# Patient Record
Sex: FEMALE | Race: WHITE | NOT HISPANIC OR LATINO | Employment: OTHER
[De-identification: names, ages, dates, MRNs, and addresses within clinical notes are randomized per-mention and may not be internally consistent; named-entity substitution may affect disease eponyms.]

---

## 2017-01-05 ENCOUNTER — RECORDS - HEALTHEAST (OUTPATIENT)
Dept: ADMINISTRATIVE | Facility: OTHER | Age: 67
End: 2017-01-05

## 2017-01-13 ENCOUNTER — COMMUNICATION - HEALTHEAST (OUTPATIENT)
Dept: INTERNAL MEDICINE | Facility: CLINIC | Age: 67
End: 2017-01-13

## 2017-01-25 ENCOUNTER — RECORDS - HEALTHEAST (OUTPATIENT)
Dept: ADMINISTRATIVE | Facility: OTHER | Age: 67
End: 2017-01-25

## 2017-01-28 ENCOUNTER — COMMUNICATION - HEALTHEAST (OUTPATIENT)
Dept: INTERNAL MEDICINE | Facility: CLINIC | Age: 67
End: 2017-01-28

## 2017-01-30 ENCOUNTER — COMMUNICATION - HEALTHEAST (OUTPATIENT)
Dept: INTERNAL MEDICINE | Facility: CLINIC | Age: 67
End: 2017-01-30

## 2017-02-07 ENCOUNTER — RECORDS - HEALTHEAST (OUTPATIENT)
Dept: ADMINISTRATIVE | Facility: OTHER | Age: 67
End: 2017-02-07

## 2017-02-14 ENCOUNTER — RECORDS - HEALTHEAST (OUTPATIENT)
Dept: ADMINISTRATIVE | Facility: OTHER | Age: 67
End: 2017-02-14

## 2017-02-14 ENCOUNTER — COMMUNICATION - HEALTHEAST (OUTPATIENT)
Dept: INTERNAL MEDICINE | Facility: CLINIC | Age: 67
End: 2017-02-14

## 2017-02-20 ENCOUNTER — OFFICE VISIT - HEALTHEAST (OUTPATIENT)
Dept: INTERNAL MEDICINE | Facility: CLINIC | Age: 67
End: 2017-02-20

## 2017-02-20 DIAGNOSIS — R42 DIZZINESS: ICD-10-CM

## 2017-02-22 ENCOUNTER — COMMUNICATION - HEALTHEAST (OUTPATIENT)
Dept: INTERNAL MEDICINE | Facility: CLINIC | Age: 67
End: 2017-02-22

## 2017-02-22 ENCOUNTER — RECORDS - HEALTHEAST (OUTPATIENT)
Dept: ADMINISTRATIVE | Facility: OTHER | Age: 67
End: 2017-02-22

## 2017-02-23 ENCOUNTER — RECORDS - HEALTHEAST (OUTPATIENT)
Dept: ADMINISTRATIVE | Facility: OTHER | Age: 67
End: 2017-02-23

## 2017-02-23 ENCOUNTER — COMMUNICATION - HEALTHEAST (OUTPATIENT)
Dept: INTERNAL MEDICINE | Facility: CLINIC | Age: 67
End: 2017-02-23

## 2017-02-28 ENCOUNTER — OFFICE VISIT - HEALTHEAST (OUTPATIENT)
Dept: INTERNAL MEDICINE | Facility: CLINIC | Age: 67
End: 2017-02-28

## 2017-02-28 DIAGNOSIS — E83.42 HYPOMAGNESEMIA: ICD-10-CM

## 2017-02-28 DIAGNOSIS — E86.0 DEHYDRATION: ICD-10-CM

## 2017-02-28 DIAGNOSIS — R06.02 SHORTNESS OF BREATH: ICD-10-CM

## 2017-03-02 ENCOUNTER — RECORDS - HEALTHEAST (OUTPATIENT)
Dept: ADMINISTRATIVE | Facility: OTHER | Age: 67
End: 2017-03-02

## 2017-03-10 ENCOUNTER — HOSPITAL ENCOUNTER (OUTPATIENT)
Dept: CARDIOLOGY | Facility: CLINIC | Age: 67
Discharge: HOME | End: 2017-03-10
Attending: INTERNAL MEDICINE

## 2017-03-10 DIAGNOSIS — R06.02 SHORTNESS OF BREATH: ICD-10-CM

## 2017-03-10 LAB
AORTIC ROOT: 3 CM
AORTIC ROOT: 3 CM
BSA FOR ECHO PROCEDURE: 1.49 M2
CV BLOOD PRESSURE: NORMAL MMHG
CV ECHO HEIGHT: 60 IN
CV ECHO WEIGHT: 116 LBS
DOP CALC LVOT PEAK VEL: 93.1 CM/S
DOP CALCLVOT PEAK VEL VTI: 16.1 CM
EJECTION FRACTION: 64 % (ref 55–75)
FRACTIONAL SHORTENING: 31.7 % (ref 28–44)
INTERVENTRICULAR SEPTUM IN END DIASTOLE: 0.86 CM (ref 0.6–0.9)
IVS/PW RATIO: 1.1
LA AREA 1: 8.53 CM2
LA AREA 2: 8.98 CM2
LEFT ATRIUM LENGTH: 3.61 CM
LEFT ATRIUM SIZE: 3.3 CM
LEFT ATRIUM TO AORTIC ROOT RATIO: 1.1 NO UNITS
LEFT ATRIUM VOLUME INDEX: 12.1 ML/M2
LEFT ATRIUM VOLUME: 18 CM3
LEFT VENTRICLE DIASTOLIC VOLUME INDEX: 31.5 CM3/M2 (ref 34–74)
LEFT VENTRICLE DIASTOLIC VOLUME: 47 CM3 (ref 46–106)
LEFT VENTRICLE HEART RATE: 62 BPM
LEFT VENTRICLE MASS INDEX: 79.5 G/M2
LEFT VENTRICLE SYSTOLIC VOLUME INDEX: 11.4 CM3/M2 (ref 11–31)
LEFT VENTRICLE SYSTOLIC VOLUME: 17 CM3 (ref 14–42)
LEFT VENTRICULAR INTERNAL DIMENSION IN DIASTOLE: 4.51 CM (ref 3.8–5.2)
LEFT VENTRICULAR INTERNAL DIMENSION IN SYSTOLE: 3.08 CM (ref 2.2–3.5)
LEFT VENTRICULAR MASS: 118.5 G
LEFT VENTRICULAR OUTFLOW TRACT MEAN GRADIENT: 2 MMHG
LEFT VENTRICULAR OUTFLOW TRACT MEAN VELOCITY: 64.8 CM/S
LEFT VENTRICULAR OUTFLOW TRACT PEAK GRADIENT: 3 MMHG
LEFT VENTRICULAR POSTERIOR WALL IN END DIASTOLE: 0.79 CM (ref 0.6–0.9)
MITRAL VALVE DECELERATION SLOPE: 2160 MM/S2
MITRAL VALVE E/A RATIO: 0.9
MITRAL VALVE PRESSURE HALF-TIME: 105 MS
MV AVERAGE E/E' RATIO: 6.7 CM/S
MV DECELERATION TIME: 303 MS
MV E'TISSUE VEL-LAT: 8.77 CM/S
MV E'TISSUE VEL-MED: 8.19 CM/S
MV LATERAL E/E' RATIO: 6.5
MV MEDIAL E/E' RATIO: 6.9
MV PEAK A VELOCITY: 64.8 CM/S
MV PEAK E VELOCITY: 56.8 CM/S
MV VALVE AREA PRESSURE 1/2 METHOD: 2.1 CM2
NUC REST DIASTOLIC VOLUME INDEX: 1856 LBS
NUC REST SYSTOLIC VOLUME INDEX: 60 IN
TRICUSPID REGURGITATION PEAK PRESSURE GRADIENT: 26 MMHG
TRICUSPID VALVE PEAK REGURGITANT VELOCITY: 255 CM/S

## 2017-03-10 ASSESSMENT — MIFFLIN-ST. JEOR: SCORE: 962.67

## 2017-03-13 ENCOUNTER — COMMUNICATION - HEALTHEAST (OUTPATIENT)
Dept: INTERNAL MEDICINE | Facility: CLINIC | Age: 67
End: 2017-03-13

## 2017-03-23 ENCOUNTER — RECORDS - HEALTHEAST (OUTPATIENT)
Dept: ADMINISTRATIVE | Facility: OTHER | Age: 67
End: 2017-03-23

## 2017-03-25 ENCOUNTER — COMMUNICATION - HEALTHEAST (OUTPATIENT)
Dept: INTERNAL MEDICINE | Facility: CLINIC | Age: 67
End: 2017-03-25

## 2017-03-31 ENCOUNTER — RECORDS - HEALTHEAST (OUTPATIENT)
Dept: ADMINISTRATIVE | Facility: OTHER | Age: 67
End: 2017-03-31

## 2017-04-02 ENCOUNTER — RECORDS - HEALTHEAST (OUTPATIENT)
Dept: ADMINISTRATIVE | Facility: OTHER | Age: 67
End: 2017-04-02

## 2017-04-04 ENCOUNTER — COMMUNICATION - HEALTHEAST (OUTPATIENT)
Dept: INTERNAL MEDICINE | Facility: CLINIC | Age: 67
End: 2017-04-04

## 2017-04-04 ENCOUNTER — RECORDS - HEALTHEAST (OUTPATIENT)
Dept: ADMINISTRATIVE | Facility: OTHER | Age: 67
End: 2017-04-04

## 2017-04-05 ENCOUNTER — COMMUNICATION - HEALTHEAST (OUTPATIENT)
Dept: SCHEDULING | Facility: CLINIC | Age: 67
End: 2017-04-05

## 2017-04-05 ENCOUNTER — COMMUNICATION - HEALTHEAST (OUTPATIENT)
Dept: INTERNAL MEDICINE | Facility: CLINIC | Age: 67
End: 2017-04-05

## 2017-04-05 ENCOUNTER — RECORDS - HEALTHEAST (OUTPATIENT)
Dept: ADMINISTRATIVE | Facility: OTHER | Age: 67
End: 2017-04-05

## 2017-04-07 ENCOUNTER — COMMUNICATION - HEALTHEAST (OUTPATIENT)
Dept: INTERNAL MEDICINE | Facility: CLINIC | Age: 67
End: 2017-04-07

## 2017-04-08 ENCOUNTER — COMMUNICATION - HEALTHEAST (OUTPATIENT)
Dept: INTERNAL MEDICINE | Facility: CLINIC | Age: 67
End: 2017-04-08

## 2017-04-10 ENCOUNTER — RECORDS - HEALTHEAST (OUTPATIENT)
Dept: ADMINISTRATIVE | Facility: OTHER | Age: 67
End: 2017-04-10

## 2017-04-13 ENCOUNTER — RECORDS - HEALTHEAST (OUTPATIENT)
Dept: ADMINISTRATIVE | Facility: OTHER | Age: 67
End: 2017-04-13

## 2017-04-17 ENCOUNTER — RECORDS - HEALTHEAST (OUTPATIENT)
Dept: ADMINISTRATIVE | Facility: OTHER | Age: 67
End: 2017-04-17

## 2017-04-17 ENCOUNTER — COMMUNICATION - HEALTHEAST (OUTPATIENT)
Dept: ADMINISTRATIVE | Facility: CLINIC | Age: 67
End: 2017-04-17

## 2017-04-17 DIAGNOSIS — R10.9 ABDOMINAL PAIN: ICD-10-CM

## 2017-04-19 ENCOUNTER — RECORDS - HEALTHEAST (OUTPATIENT)
Dept: ADMINISTRATIVE | Facility: OTHER | Age: 67
End: 2017-04-19

## 2017-04-28 ENCOUNTER — OFFICE VISIT - HEALTHEAST (OUTPATIENT)
Dept: INTERNAL MEDICINE | Facility: CLINIC | Age: 67
End: 2017-04-28

## 2017-04-28 DIAGNOSIS — N18.30 ACUTE RENAL FAILURE SUPERIMPOSED ON STAGE 3 CHRONIC KIDNEY DISEASE (H): ICD-10-CM

## 2017-04-28 DIAGNOSIS — N17.9 ACUTE RENAL FAILURE SUPERIMPOSED ON STAGE 3 CHRONIC KIDNEY DISEASE (H): ICD-10-CM

## 2017-04-28 ASSESSMENT — MIFFLIN-ST. JEOR: SCORE: 958.13

## 2017-05-04 ENCOUNTER — RECORDS - HEALTHEAST (OUTPATIENT)
Dept: ADMINISTRATIVE | Facility: OTHER | Age: 67
End: 2017-05-04

## 2017-05-08 ENCOUNTER — RECORDS - HEALTHEAST (OUTPATIENT)
Dept: ADMINISTRATIVE | Facility: OTHER | Age: 67
End: 2017-05-08

## 2017-05-08 ENCOUNTER — COMMUNICATION - HEALTHEAST (OUTPATIENT)
Dept: INTERNAL MEDICINE | Facility: CLINIC | Age: 67
End: 2017-05-08

## 2017-05-11 ENCOUNTER — COMMUNICATION - HEALTHEAST (OUTPATIENT)
Dept: SCHEDULING | Facility: CLINIC | Age: 67
End: 2017-05-11

## 2017-05-11 ENCOUNTER — COMMUNICATION - HEALTHEAST (OUTPATIENT)
Dept: INTERNAL MEDICINE | Facility: CLINIC | Age: 67
End: 2017-05-11

## 2017-05-12 ENCOUNTER — COMMUNICATION - HEALTHEAST (OUTPATIENT)
Dept: INTERNAL MEDICINE | Facility: CLINIC | Age: 67
End: 2017-05-12

## 2017-05-15 ENCOUNTER — COMMUNICATION - HEALTHEAST (OUTPATIENT)
Dept: INTERNAL MEDICINE | Facility: CLINIC | Age: 67
End: 2017-05-15

## 2017-05-19 ENCOUNTER — RECORDS - HEALTHEAST (OUTPATIENT)
Dept: ADMINISTRATIVE | Facility: OTHER | Age: 67
End: 2017-05-19

## 2017-05-19 ENCOUNTER — OFFICE VISIT - HEALTHEAST (OUTPATIENT)
Dept: INTERNAL MEDICINE | Facility: CLINIC | Age: 67
End: 2017-05-19

## 2017-05-19 DIAGNOSIS — E86.0 DEHYDRATION: ICD-10-CM

## 2017-05-19 ASSESSMENT — MIFFLIN-ST. JEOR: SCORE: 962.67

## 2017-05-22 ENCOUNTER — RECORDS - HEALTHEAST (OUTPATIENT)
Dept: ADMINISTRATIVE | Facility: OTHER | Age: 67
End: 2017-05-22

## 2017-05-22 ENCOUNTER — COMMUNICATION - HEALTHEAST (OUTPATIENT)
Dept: INTERNAL MEDICINE | Facility: CLINIC | Age: 67
End: 2017-05-22

## 2017-05-23 ENCOUNTER — COMMUNICATION - HEALTHEAST (OUTPATIENT)
Dept: INTERNAL MEDICINE | Facility: CLINIC | Age: 67
End: 2017-05-23

## 2017-05-23 ENCOUNTER — RECORDS - HEALTHEAST (OUTPATIENT)
Dept: ADMINISTRATIVE | Facility: OTHER | Age: 67
End: 2017-05-23

## 2017-05-25 ENCOUNTER — RECORDS - HEALTHEAST (OUTPATIENT)
Dept: ADMINISTRATIVE | Facility: OTHER | Age: 67
End: 2017-05-25

## 2017-05-26 ENCOUNTER — RECORDS - HEALTHEAST (OUTPATIENT)
Dept: ADMINISTRATIVE | Facility: OTHER | Age: 67
End: 2017-05-26

## 2017-06-01 ENCOUNTER — COMMUNICATION - HEALTHEAST (OUTPATIENT)
Dept: INTERNAL MEDICINE | Facility: CLINIC | Age: 67
End: 2017-06-01

## 2017-06-02 ENCOUNTER — COMMUNICATION - HEALTHEAST (OUTPATIENT)
Dept: INTERNAL MEDICINE | Facility: CLINIC | Age: 67
End: 2017-06-02

## 2017-06-07 ENCOUNTER — HOME INFUSION (PRE-WILLOW HOME INFUSION) (OUTPATIENT)
Dept: PHARMACY | Facility: CLINIC | Age: 67
End: 2017-06-07

## 2017-06-13 ENCOUNTER — RECORDS - HEALTHEAST (OUTPATIENT)
Dept: ADMINISTRATIVE | Facility: OTHER | Age: 67
End: 2017-06-13

## 2017-06-14 NOTE — PROGRESS NOTES
This is a snapshot of the patient's [facility] medical record. For complete information or questions call [phone] or In [facility],  Home Infusion ([id number]).  [facility] Number:  [id number]

## 2017-06-15 ENCOUNTER — COMMUNICATION - HEALTHEAST (OUTPATIENT)
Dept: INTERNAL MEDICINE | Facility: CLINIC | Age: 67
End: 2017-06-15

## 2017-06-16 ENCOUNTER — COMMUNICATION - HEALTHEAST (OUTPATIENT)
Dept: INTERNAL MEDICINE | Facility: CLINIC | Age: 67
End: 2017-06-16

## 2017-06-19 ENCOUNTER — COMMUNICATION - HEALTHEAST (OUTPATIENT)
Dept: INTERNAL MEDICINE | Facility: CLINIC | Age: 67
End: 2017-06-19

## 2017-06-20 ENCOUNTER — RECORDS - HEALTHEAST (OUTPATIENT)
Dept: ADMINISTRATIVE | Facility: OTHER | Age: 67
End: 2017-06-20

## 2017-06-23 ENCOUNTER — COMMUNICATION - HEALTHEAST (OUTPATIENT)
Dept: INTERNAL MEDICINE | Facility: CLINIC | Age: 67
End: 2017-06-23

## 2017-06-24 ENCOUNTER — COMMUNICATION - HEALTHEAST (OUTPATIENT)
Dept: INTERNAL MEDICINE | Facility: CLINIC | Age: 67
End: 2017-06-24

## 2017-06-27 ENCOUNTER — OFFICE VISIT - HEALTHEAST (OUTPATIENT)
Dept: INTERNAL MEDICINE | Facility: CLINIC | Age: 67
End: 2017-06-27

## 2017-06-27 DIAGNOSIS — E83.42 HYPOMAGNESEMIA: ICD-10-CM

## 2017-06-27 ASSESSMENT — MIFFLIN-ST. JEOR: SCORE: 958.13

## 2017-06-28 ENCOUNTER — RECORDS - HEALTHEAST (OUTPATIENT)
Dept: ADMINISTRATIVE | Facility: OTHER | Age: 67
End: 2017-06-28

## 2017-07-05 ENCOUNTER — HOME INFUSION (PRE-WILLOW HOME INFUSION) (OUTPATIENT)
Dept: PHARMACY | Facility: CLINIC | Age: 67
End: 2017-07-05

## 2017-07-07 ENCOUNTER — COMMUNICATION - HEALTHEAST (OUTPATIENT)
Dept: INTERNAL MEDICINE | Facility: CLINIC | Age: 67
End: 2017-07-07

## 2017-07-07 ENCOUNTER — RECORDS - HEALTHEAST (OUTPATIENT)
Dept: ADMINISTRATIVE | Facility: OTHER | Age: 67
End: 2017-07-07

## 2017-07-08 ENCOUNTER — COMMUNICATION - HEALTHEAST (OUTPATIENT)
Dept: INTERNAL MEDICINE | Facility: CLINIC | Age: 67
End: 2017-07-08

## 2017-07-11 ENCOUNTER — COMMUNICATION - HEALTHEAST (OUTPATIENT)
Dept: INTERNAL MEDICINE | Facility: CLINIC | Age: 67
End: 2017-07-11

## 2017-07-11 DIAGNOSIS — G93.32 CHRONIC FATIGUE SYNDROME: ICD-10-CM

## 2017-07-12 ENCOUNTER — COMMUNICATION - HEALTHEAST (OUTPATIENT)
Dept: INTERNAL MEDICINE | Facility: CLINIC | Age: 67
End: 2017-07-12

## 2017-07-12 ENCOUNTER — RECORDS - HEALTHEAST (OUTPATIENT)
Dept: ADMINISTRATIVE | Facility: OTHER | Age: 67
End: 2017-07-12

## 2017-07-13 ENCOUNTER — COMMUNICATION - HEALTHEAST (OUTPATIENT)
Dept: INTERNAL MEDICINE | Facility: CLINIC | Age: 67
End: 2017-07-13

## 2017-07-13 DIAGNOSIS — R10.9 ABDOMINAL PAIN: ICD-10-CM

## 2017-07-14 ENCOUNTER — RECORDS - HEALTHEAST (OUTPATIENT)
Dept: ADMINISTRATIVE | Facility: OTHER | Age: 67
End: 2017-07-14

## 2017-07-18 ENCOUNTER — OFFICE VISIT - HEALTHEAST (OUTPATIENT)
Dept: INTERNAL MEDICINE | Facility: CLINIC | Age: 67
End: 2017-07-18

## 2017-07-18 DIAGNOSIS — E83.42 HYPOMAGNESEMIA: ICD-10-CM

## 2017-07-18 ASSESSMENT — MIFFLIN-ST. JEOR: SCORE: 942.26

## 2017-07-19 ENCOUNTER — RECORDS - HEALTHEAST (OUTPATIENT)
Dept: ADMINISTRATIVE | Facility: OTHER | Age: 67
End: 2017-07-19

## 2017-07-27 ENCOUNTER — COMMUNICATION - HEALTHEAST (OUTPATIENT)
Dept: INTERNAL MEDICINE | Facility: CLINIC | Age: 67
End: 2017-07-27

## 2017-07-27 DIAGNOSIS — E83.42 HYPOMAGNESEMIA: ICD-10-CM

## 2017-08-03 ENCOUNTER — RECORDS - HEALTHEAST (OUTPATIENT)
Dept: ADMINISTRATIVE | Facility: OTHER | Age: 67
End: 2017-08-03

## 2017-08-09 ENCOUNTER — COMMUNICATION - HEALTHEAST (OUTPATIENT)
Dept: INTERNAL MEDICINE | Facility: CLINIC | Age: 67
End: 2017-08-09

## 2017-08-10 ENCOUNTER — RECORDS - HEALTHEAST (OUTPATIENT)
Dept: ADMINISTRATIVE | Facility: OTHER | Age: 67
End: 2017-08-10

## 2017-08-10 ENCOUNTER — OFFICE VISIT - HEALTHEAST (OUTPATIENT)
Dept: CARDIOLOGY | Facility: CLINIC | Age: 67
End: 2017-08-10

## 2017-08-10 DIAGNOSIS — R07.9 CHEST PAIN: ICD-10-CM

## 2017-08-10 DIAGNOSIS — R42 DIZZINESS: ICD-10-CM

## 2017-08-10 DIAGNOSIS — G47.30 SLEEP APNEA: ICD-10-CM

## 2017-08-10 LAB
ATRIAL RATE - MUSE: 58 BPM
DIASTOLIC BLOOD PRESSURE - MUSE: NORMAL MMHG
INTERPRETATION ECG - MUSE: NORMAL
P AXIS - MUSE: 71 DEGREES
PR INTERVAL - MUSE: 166 MS
QRS DURATION - MUSE: 82 MS
QT - MUSE: 410 MS
QTC - MUSE: 402 MS
R AXIS - MUSE: 10 DEGREES
SYSTOLIC BLOOD PRESSURE - MUSE: NORMAL MMHG
T AXIS - MUSE: 50 DEGREES
VENTRICULAR RATE- MUSE: 58 BPM

## 2017-08-10 ASSESSMENT — MIFFLIN-ST. JEOR: SCORE: 937.95

## 2017-08-11 ENCOUNTER — AMBULATORY - HEALTHEAST (OUTPATIENT)
Dept: SLEEP MEDICINE | Facility: CLINIC | Age: 67
End: 2017-08-11

## 2017-08-15 ENCOUNTER — COMMUNICATION - HEALTHEAST (OUTPATIENT)
Dept: INTERNAL MEDICINE | Facility: CLINIC | Age: 67
End: 2017-08-15

## 2017-08-16 ENCOUNTER — RECORDS - HEALTHEAST (OUTPATIENT)
Dept: ADMINISTRATIVE | Facility: OTHER | Age: 67
End: 2017-08-16

## 2017-08-16 ENCOUNTER — COMMUNICATION - HEALTHEAST (OUTPATIENT)
Dept: INTERNAL MEDICINE | Facility: CLINIC | Age: 67
End: 2017-08-16

## 2017-08-17 ENCOUNTER — HOSPITAL ENCOUNTER (OUTPATIENT)
Dept: CARDIOLOGY | Facility: CLINIC | Age: 67
Discharge: HOME | End: 2017-08-17
Attending: INTERNAL MEDICINE

## 2017-08-17 DIAGNOSIS — R07.9 CHEST PAIN: ICD-10-CM

## 2017-08-17 DIAGNOSIS — R42 DIZZINESS: ICD-10-CM

## 2017-08-17 LAB
CV STRESS CURRENT BP HE: NORMAL
CV STRESS CURRENT HR HE: 101
CV STRESS CURRENT HR HE: 102
CV STRESS CURRENT HR HE: 109
CV STRESS CURRENT HR HE: 109
CV STRESS CURRENT HR HE: 110
CV STRESS CURRENT HR HE: 114
CV STRESS CURRENT HR HE: 119
CV STRESS CURRENT HR HE: 126
CV STRESS CURRENT HR HE: 127
CV STRESS CURRENT HR HE: 128
CV STRESS CURRENT HR HE: 128
CV STRESS CURRENT HR HE: 64
CV STRESS CURRENT HR HE: 67
CV STRESS CURRENT HR HE: 69
CV STRESS CURRENT HR HE: 72
CV STRESS CURRENT HR HE: 73
CV STRESS CURRENT HR HE: 74
CV STRESS CURRENT HR HE: 74
CV STRESS CURRENT HR HE: 75
CV STRESS CURRENT HR HE: 76
CV STRESS CURRENT HR HE: 76
CV STRESS CURRENT HR HE: 77
CV STRESS CURRENT HR HE: 79
CV STRESS CURRENT HR HE: 81
CV STRESS CURRENT HR HE: 82
CV STRESS CURRENT HR HE: 88
CV STRESS CURRENT HR HE: 90
CV STRESS CURRENT HR HE: 90
CV STRESS CURRENT HR HE: 94
CV STRESS CURRENT HR HE: 97
CV STRESS DEVIATION TIME HE: NORMAL
CV STRESS ECHO PERCENT HR HE: NORMAL
CV STRESS EXERCISE STAGE HE: NORMAL
CV STRESS FINAL RESTING BP HE: NORMAL
CV STRESS FINAL RESTING HR HE: 69
CV STRESS MAX HR HE: 128
CV STRESS MAX TREADMILL GRADE HE: 14
CV STRESS MAX TREADMILL SPEED HE: 3.4
CV STRESS PEAK DIA BP HE: NORMAL
CV STRESS PEAK SYS BP HE: NORMAL
CV STRESS PHASE HE: NORMAL
CV STRESS PROTOCOL HE: NORMAL
CV STRESS RESTING PT POSITION HE: NORMAL
CV STRESS RESTING PT POSITION HE: NORMAL
CV STRESS ST DEVIATION AMOUNT HE: NORMAL
CV STRESS ST DEVIATION ELEVATION HE: NORMAL
CV STRESS ST EVELATION AMOUNT HE: NORMAL
CV STRESS TEST TYPE HE: NORMAL
CV STRESS TOTAL STAGE TIME MIN 1 HE: NORMAL
ECHO EJECTION FRACTION ESTIMATED: 60 %
STRESS ECHO BASELINE BP: NORMAL
STRESS ECHO BASELINE HR: 78
STRESS ECHO CALCULATED PERCENT HR: 84 %
STRESS ECHO LAST STRESS BP: NORMAL
STRESS ECHO LAST STRESS HR: 128
STRESS ECHO POST ESTIMATED WORKLOAD: 10.3
STRESS ECHO POST EXERCISE DUR MIN: 8
STRESS ECHO POST EXERCISE DUR SEC: 45
STRESS ECHO TARGET HR: 130

## 2017-08-18 ENCOUNTER — AMBULATORY - HEALTHEAST (OUTPATIENT)
Dept: CARDIOLOGY | Facility: CLINIC | Age: 67
End: 2017-08-18

## 2017-08-18 ENCOUNTER — RECORDS - HEALTHEAST (OUTPATIENT)
Dept: ADMINISTRATIVE | Facility: OTHER | Age: 67
End: 2017-08-18

## 2017-08-18 DIAGNOSIS — R07.9 CHEST PAIN WITH HIGH RISK FOR CARDIAC ETIOLOGY: ICD-10-CM

## 2017-08-23 ENCOUNTER — RECORDS - HEALTHEAST (OUTPATIENT)
Dept: ADMINISTRATIVE | Facility: OTHER | Age: 67
End: 2017-08-23

## 2017-08-24 ENCOUNTER — RECORDS - HEALTHEAST (OUTPATIENT)
Dept: ADMINISTRATIVE | Facility: OTHER | Age: 67
End: 2017-08-24

## 2017-08-29 ENCOUNTER — COMMUNICATION - HEALTHEAST (OUTPATIENT)
Dept: CARDIOLOGY | Facility: CLINIC | Age: 67
End: 2017-08-29

## 2017-08-31 ENCOUNTER — HOSPITAL ENCOUNTER (OUTPATIENT)
Dept: CT IMAGING | Facility: CLINIC | Age: 67
Discharge: HOME | End: 2017-08-31
Attending: INTERNAL MEDICINE

## 2017-08-31 ENCOUNTER — COMMUNICATION - HEALTHEAST (OUTPATIENT)
Dept: INTERNAL MEDICINE | Facility: CLINIC | Age: 67
End: 2017-08-31

## 2017-08-31 DIAGNOSIS — R07.9 CHEST PAIN WITH HIGH RISK FOR CARDIAC ETIOLOGY: ICD-10-CM

## 2017-08-31 LAB
BSA FOR ECHO PROCEDURE: 1.5 M2
CV CALCIUM SCORE AGATSTON LM: 0
CV CALCIUM SCORING AGATSON LAD: 0
CV CALCIUM SCORING AGATSTON CX: 0
CV CALCIUM SCORING AGATSTON RCA: 0
CV CALCIUM SCORING AGATSTON TOTAL: 0
LEFT VENTRICLE HEART RATE: 61 BPM

## 2017-08-31 ASSESSMENT — MIFFLIN-ST. JEOR: SCORE: 1005.76

## 2017-09-01 ENCOUNTER — COMMUNICATION - HEALTHEAST (OUTPATIENT)
Dept: CARDIOLOGY | Facility: CLINIC | Age: 67
End: 2017-09-01

## 2017-09-01 DIAGNOSIS — N28.9 RENAL INSUFFICIENCY: ICD-10-CM

## 2017-09-05 ENCOUNTER — RECORDS - HEALTHEAST (OUTPATIENT)
Dept: ADMINISTRATIVE | Facility: OTHER | Age: 67
End: 2017-09-05

## 2017-09-05 ENCOUNTER — COMMUNICATION - HEALTHEAST (OUTPATIENT)
Dept: INTERNAL MEDICINE | Facility: CLINIC | Age: 67
End: 2017-09-05

## 2017-09-06 ENCOUNTER — RECORDS - HEALTHEAST (OUTPATIENT)
Dept: ADMINISTRATIVE | Facility: OTHER | Age: 67
End: 2017-09-06

## 2017-09-07 ENCOUNTER — COMMUNICATION - HEALTHEAST (OUTPATIENT)
Dept: CARDIOLOGY | Facility: CLINIC | Age: 67
End: 2017-09-07

## 2017-09-11 ENCOUNTER — AMBULATORY - HEALTHEAST (OUTPATIENT)
Dept: CARDIOLOGY | Facility: CLINIC | Age: 67
End: 2017-09-11

## 2017-09-13 ENCOUNTER — RECORDS - HEALTHEAST (OUTPATIENT)
Dept: ADMINISTRATIVE | Facility: OTHER | Age: 67
End: 2017-09-13

## 2017-09-14 ENCOUNTER — COMMUNICATION - HEALTHEAST (OUTPATIENT)
Dept: INTERNAL MEDICINE | Facility: CLINIC | Age: 67
End: 2017-09-14

## 2017-09-19 ENCOUNTER — COMMUNICATION - HEALTHEAST (OUTPATIENT)
Dept: INTERNAL MEDICINE | Facility: CLINIC | Age: 67
End: 2017-09-19

## 2017-09-20 ENCOUNTER — RECORDS - HEALTHEAST (OUTPATIENT)
Dept: ADMINISTRATIVE | Facility: OTHER | Age: 67
End: 2017-09-20

## 2017-09-21 ENCOUNTER — COMMUNICATION - HEALTHEAST (OUTPATIENT)
Dept: INTERNAL MEDICINE | Facility: CLINIC | Age: 67
End: 2017-09-21

## 2017-09-21 ENCOUNTER — RECORDS - HEALTHEAST (OUTPATIENT)
Dept: ADMINISTRATIVE | Facility: OTHER | Age: 67
End: 2017-09-21

## 2017-09-22 ENCOUNTER — COMMUNICATION - HEALTHEAST (OUTPATIENT)
Dept: INTERNAL MEDICINE | Facility: CLINIC | Age: 67
End: 2017-09-22

## 2017-09-25 ENCOUNTER — COMMUNICATION - HEALTHEAST (OUTPATIENT)
Dept: INTERNAL MEDICINE | Facility: CLINIC | Age: 67
End: 2017-09-25

## 2017-09-26 ENCOUNTER — AMBULATORY - HEALTHEAST (OUTPATIENT)
Dept: PULMONOLOGY | Facility: OTHER | Age: 67
End: 2017-09-26

## 2017-09-26 ENCOUNTER — OFFICE VISIT - HEALTHEAST (OUTPATIENT)
Dept: CARDIOLOGY | Facility: CLINIC | Age: 67
End: 2017-09-26

## 2017-09-26 DIAGNOSIS — R06.02 SOB (SHORTNESS OF BREATH): ICD-10-CM

## 2017-09-26 DIAGNOSIS — R06.00 DYSPNEA: ICD-10-CM

## 2017-09-26 ASSESSMENT — MIFFLIN-ST. JEOR: SCORE: 929.79

## 2017-09-27 ENCOUNTER — RECORDS - HEALTHEAST (OUTPATIENT)
Dept: ADMINISTRATIVE | Facility: OTHER | Age: 67
End: 2017-09-27

## 2017-09-27 ENCOUNTER — COMMUNICATION - HEALTHEAST (OUTPATIENT)
Dept: INTERNAL MEDICINE | Facility: CLINIC | Age: 67
End: 2017-09-27

## 2017-09-28 ENCOUNTER — COMMUNICATION - HEALTHEAST (OUTPATIENT)
Dept: INTERNAL MEDICINE | Facility: CLINIC | Age: 67
End: 2017-09-28

## 2017-09-28 NOTE — PROGRESS NOTES
This is a recent snapshot of the patient's [facility] medical record.  For current drug dose and complete information and questions, call [phone] or In [facility] pool, fv home infusion (71968)  CSN Number:  [id number]

## 2017-09-29 ENCOUNTER — RECORDS - HEALTHEAST (OUTPATIENT)
Dept: ADMINISTRATIVE | Facility: OTHER | Age: 67
End: 2017-09-29

## 2017-10-02 ENCOUNTER — RECORDS - HEALTHEAST (OUTPATIENT)
Dept: ADMINISTRATIVE | Facility: OTHER | Age: 67
End: 2017-10-02

## 2017-10-06 ENCOUNTER — COMMUNICATION - HEALTHEAST (OUTPATIENT)
Dept: INTERNAL MEDICINE | Facility: CLINIC | Age: 67
End: 2017-10-06

## 2017-10-06 DIAGNOSIS — E53.8 VITAMIN B12 DEFICIENCY: ICD-10-CM

## 2017-10-09 ENCOUNTER — RECORDS - HEALTHEAST (OUTPATIENT)
Dept: ADMINISTRATIVE | Facility: OTHER | Age: 67
End: 2017-10-09

## 2017-10-09 ENCOUNTER — RECORDS - HEALTHEAST (OUTPATIENT)
Dept: PULMONOLOGY | Facility: OTHER | Age: 67
End: 2017-10-09

## 2017-10-09 DIAGNOSIS — R06.02 SHORTNESS OF BREATH: ICD-10-CM

## 2017-10-11 ENCOUNTER — RECORDS - HEALTHEAST (OUTPATIENT)
Dept: ADMINISTRATIVE | Facility: OTHER | Age: 67
End: 2017-10-11

## 2017-10-12 ENCOUNTER — COMMUNICATION - HEALTHEAST (OUTPATIENT)
Dept: INTERNAL MEDICINE | Facility: CLINIC | Age: 67
End: 2017-10-12

## 2017-10-12 ENCOUNTER — RECORDS - HEALTHEAST (OUTPATIENT)
Dept: ADMINISTRATIVE | Facility: OTHER | Age: 67
End: 2017-10-12

## 2017-10-19 ENCOUNTER — RECORDS - HEALTHEAST (OUTPATIENT)
Dept: ADMINISTRATIVE | Facility: OTHER | Age: 67
End: 2017-10-19

## 2017-10-24 ENCOUNTER — OFFICE VISIT - HEALTHEAST (OUTPATIENT)
Dept: INTERNAL MEDICINE | Facility: CLINIC | Age: 67
End: 2017-10-24

## 2017-10-24 DIAGNOSIS — E83.42 HYPOMAGNESEMIA: ICD-10-CM

## 2017-10-24 DIAGNOSIS — Z93.2 HIGH OUTPUT ILEOSTOMY (H): ICD-10-CM

## 2017-10-24 DIAGNOSIS — R19.8 HIGH OUTPUT ILEOSTOMY (H): ICD-10-CM

## 2017-10-25 ENCOUNTER — RECORDS - HEALTHEAST (OUTPATIENT)
Dept: ADMINISTRATIVE | Facility: OTHER | Age: 67
End: 2017-10-25

## 2017-10-26 ENCOUNTER — INFUSION - HEALTHEAST (OUTPATIENT)
Dept: INFUSION THERAPY | Facility: HOSPITAL | Age: 67
End: 2017-10-26

## 2017-10-26 ENCOUNTER — AMBULATORY - HEALTHEAST (OUTPATIENT)
Dept: PULMONOLOGY | Facility: OTHER | Age: 67
End: 2017-10-26

## 2017-10-26 ENCOUNTER — OFFICE VISIT - HEALTHEAST (OUTPATIENT)
Dept: PULMONOLOGY | Facility: OTHER | Age: 67
End: 2017-10-26

## 2017-10-26 DIAGNOSIS — E86.0 DEHYDRATION: ICD-10-CM

## 2017-10-26 DIAGNOSIS — R06.09 DOE (DYSPNEA ON EXERTION): ICD-10-CM

## 2017-10-26 DIAGNOSIS — E83.42 HYPOMAGNESEMIA: ICD-10-CM

## 2017-10-26 DIAGNOSIS — R06.00 DYSPNEA: ICD-10-CM

## 2017-10-26 DIAGNOSIS — R06.09 DYSPNEA ON EXERTION: ICD-10-CM

## 2017-10-26 ASSESSMENT — MIFFLIN-ST. JEOR: SCORE: 941.58

## 2017-10-30 LAB — ANA SER QL: 2.9 U

## 2017-10-31 LAB
DNA (DS) ANTIBODY - HISTORICAL: 8 IU
JO-1 AUTOANTIBODIES - HISTORICAL: 10 EU
SCL-70 AUTOANTIBODIES - HISTORICAL: 4 EU
SM/RNP AUTOANTIBODIES - HISTORICAL: 19 EU
SS-A/RO AUTOANTIBODIES - HISTORICAL: 59 EU
SS-B/LA AUTOANTIBODIES - HISTORICAL: 6 EU

## 2017-11-02 ENCOUNTER — RECORDS - HEALTHEAST (OUTPATIENT)
Dept: ADMINISTRATIVE | Facility: OTHER | Age: 67
End: 2017-11-02

## 2017-11-02 ENCOUNTER — COMMUNICATION - HEALTHEAST (OUTPATIENT)
Dept: PULMONOLOGY | Facility: OTHER | Age: 67
End: 2017-11-02

## 2017-11-02 DIAGNOSIS — R06.89 DYSPNEA AND RESPIRATORY ABNORMALITY: ICD-10-CM

## 2017-11-02 DIAGNOSIS — R06.00 DYSPNEA AND RESPIRATORY ABNORMALITY: ICD-10-CM

## 2017-11-03 ENCOUNTER — COMMUNICATION - HEALTHEAST (OUTPATIENT)
Dept: PULMONOLOGY | Facility: OTHER | Age: 67
End: 2017-11-03

## 2017-11-09 ENCOUNTER — RECORDS - HEALTHEAST (OUTPATIENT)
Dept: ADMINISTRATIVE | Facility: OTHER | Age: 67
End: 2017-11-09

## 2017-11-10 ENCOUNTER — HOME INFUSION (PRE-WILLOW HOME INFUSION) (OUTPATIENT)
Dept: PHARMACY | Facility: CLINIC | Age: 67
End: 2017-11-10

## 2017-11-13 NOTE — PROGRESS NOTES
This is a recent snapshot of the patient's [facility] medical record.  For current drug dose and complete information and questions, call [phone] or In Basket pool, fv home infusion (17285)  CSN Number:  [id number]

## 2017-11-15 ENCOUNTER — COMMUNICATION - HEALTHEAST (OUTPATIENT)
Dept: INTERNAL MEDICINE | Facility: CLINIC | Age: 67
End: 2017-11-15

## 2017-11-16 ENCOUNTER — COMMUNICATION - HEALTHEAST (OUTPATIENT)
Dept: INTERNAL MEDICINE | Facility: CLINIC | Age: 67
End: 2017-11-16

## 2017-11-16 ENCOUNTER — HOSPITAL ENCOUNTER (OUTPATIENT)
Dept: CT IMAGING | Facility: CLINIC | Age: 67
Discharge: HOME | End: 2017-11-16
Attending: INTERNAL MEDICINE

## 2017-11-16 DIAGNOSIS — R06.89 DYSPNEA AND RESPIRATORY ABNORMALITY: ICD-10-CM

## 2017-11-16 DIAGNOSIS — R06.00 DYSPNEA AND RESPIRATORY ABNORMALITY: ICD-10-CM

## 2017-11-17 ENCOUNTER — COMMUNICATION - HEALTHEAST (OUTPATIENT)
Dept: INTENSIVE CARE | Facility: CLINIC | Age: 67
End: 2017-11-17

## 2017-11-17 DIAGNOSIS — M35.00 SICCA SYNDROME (H): ICD-10-CM

## 2017-11-24 ENCOUNTER — RECORDS - HEALTHEAST (OUTPATIENT)
Dept: ADMINISTRATIVE | Facility: OTHER | Age: 67
End: 2017-11-24

## 2017-11-27 ENCOUNTER — HOME INFUSION (PRE-WILLOW HOME INFUSION) (OUTPATIENT)
Dept: PHARMACY | Facility: CLINIC | Age: 67
End: 2017-11-27

## 2017-11-27 ENCOUNTER — RECORDS - HEALTHEAST (OUTPATIENT)
Dept: ADMINISTRATIVE | Facility: OTHER | Age: 67
End: 2017-11-27

## 2017-11-28 ENCOUNTER — COMMUNICATION - HEALTHEAST (OUTPATIENT)
Dept: INTERNAL MEDICINE | Facility: CLINIC | Age: 67
End: 2017-11-28

## 2017-11-28 NOTE — PROGRESS NOTES
This is a recent snapshot of the patient's [facility] medical record.  For current drug dose and complete information and questions, call [phone] or In Basket pool, fv home infusion (19148)  CSN Number:  [id number]

## 2017-11-30 ENCOUNTER — RECORDS - HEALTHEAST (OUTPATIENT)
Dept: ADMINISTRATIVE | Facility: OTHER | Age: 67
End: 2017-11-30

## 2017-12-04 ENCOUNTER — COMMUNICATION - HEALTHEAST (OUTPATIENT)
Dept: INTERNAL MEDICINE | Facility: CLINIC | Age: 67
End: 2017-12-04

## 2017-12-04 DIAGNOSIS — R10.9 ABDOMINAL PAIN: ICD-10-CM

## 2017-12-04 DIAGNOSIS — F32.A DEPRESSION: ICD-10-CM

## 2017-12-06 ENCOUNTER — RECORDS - HEALTHEAST (OUTPATIENT)
Dept: ADMINISTRATIVE | Facility: OTHER | Age: 67
End: 2017-12-06

## 2017-12-07 ENCOUNTER — HOME INFUSION (PRE-WILLOW HOME INFUSION) (OUTPATIENT)
Dept: PHARMACY | Facility: CLINIC | Age: 67
End: 2017-12-07

## 2017-12-08 NOTE — PROGRESS NOTES
This is a recent snapshot of the patient's [facility] medical record.  For current drug dose and complete information and questions, call [phone] or In Basket pool, fv home infusion (34517)  CSN Number:  [id number]

## 2017-12-14 ENCOUNTER — RECORDS - HEALTHEAST (OUTPATIENT)
Dept: ADMINISTRATIVE | Facility: OTHER | Age: 67
End: 2017-12-14

## 2017-12-20 ENCOUNTER — COMMUNICATION - HEALTHEAST (OUTPATIENT)
Dept: INTERNAL MEDICINE | Facility: CLINIC | Age: 67
End: 2017-12-20

## 2017-12-20 ENCOUNTER — RECORDS - HEALTHEAST (OUTPATIENT)
Dept: ADMINISTRATIVE | Facility: OTHER | Age: 67
End: 2017-12-20

## 2017-12-20 ENCOUNTER — HOME INFUSION (PRE-WILLOW HOME INFUSION) (OUTPATIENT)
Dept: PHARMACY | Facility: CLINIC | Age: 67
End: 2017-12-20

## 2017-12-22 ENCOUNTER — RECORDS - HEALTHEAST (OUTPATIENT)
Dept: ADMINISTRATIVE | Facility: OTHER | Age: 67
End: 2017-12-22

## 2017-12-22 NOTE — PROGRESS NOTES
This is a recent snapshot of the patient's [facility] medical record.  For current drug dose and complete information and questions, call [phone] or In Basket pool, fv home infusion (34286)  CSN Number:  [id number]

## 2017-12-28 ENCOUNTER — RECORDS - HEALTHEAST (OUTPATIENT)
Dept: ADMINISTRATIVE | Facility: OTHER | Age: 67
End: 2017-12-28

## 2018-01-02 ENCOUNTER — COMMUNICATION - HEALTHEAST (OUTPATIENT)
Dept: INTERNAL MEDICINE | Facility: CLINIC | Age: 68
End: 2018-01-02

## 2018-01-03 ENCOUNTER — RECORDS - HEALTHEAST (OUTPATIENT)
Dept: ADMINISTRATIVE | Facility: OTHER | Age: 68
End: 2018-01-03

## 2018-01-04 ENCOUNTER — HOME INFUSION (PRE-WILLOW HOME INFUSION) (OUTPATIENT)
Dept: PHARMACY | Facility: CLINIC | Age: 68
End: 2018-01-04

## 2018-01-05 NOTE — PROGRESS NOTES
This is a recent snapshot of the patient's [facility] medical record.  For current drug dose and complete information and questions, call [phone] or In Basket pool, fv home infusion (85103)  CSN Number:  [id number]

## 2018-01-08 ENCOUNTER — COMMUNICATION - HEALTHEAST (OUTPATIENT)
Dept: INTERNAL MEDICINE | Facility: CLINIC | Age: 68
End: 2018-01-08

## 2018-01-08 DIAGNOSIS — E53.8 VITAMIN B12 DEFICIENCY: ICD-10-CM

## 2018-01-15 ENCOUNTER — OFFICE VISIT - HEALTHEAST (OUTPATIENT)
Dept: RHEUMATOLOGY | Facility: CLINIC | Age: 68
End: 2018-01-15

## 2018-01-15 DIAGNOSIS — E86.0 DEHYDRATION: ICD-10-CM

## 2018-01-15 DIAGNOSIS — R94.4 DECREASED GFR: ICD-10-CM

## 2018-01-15 DIAGNOSIS — G89.29 CHRONIC NECK PAIN: ICD-10-CM

## 2018-01-15 DIAGNOSIS — M25.562 CHRONIC PAIN OF BOTH KNEES: ICD-10-CM

## 2018-01-15 DIAGNOSIS — E83.51 HYPOCALCEMIA: ICD-10-CM

## 2018-01-15 DIAGNOSIS — M25.561 CHRONIC PAIN OF BOTH KNEES: ICD-10-CM

## 2018-01-15 DIAGNOSIS — D69.6 THROMBOCYTOPENIA (H): ICD-10-CM

## 2018-01-15 DIAGNOSIS — D64.9 ANEMIA: ICD-10-CM

## 2018-01-15 DIAGNOSIS — M54.2 CHRONIC NECK PAIN: ICD-10-CM

## 2018-01-15 DIAGNOSIS — G89.29 CHRONIC PAIN OF BOTH KNEES: ICD-10-CM

## 2018-01-15 DIAGNOSIS — Z78.0 POST-MENOPAUSAL: ICD-10-CM

## 2018-01-15 DIAGNOSIS — R06.09 DOE (DYSPNEA ON EXERTION): ICD-10-CM

## 2018-01-15 DIAGNOSIS — M79.7 FIBROMYALGIA: ICD-10-CM

## 2018-01-15 DIAGNOSIS — R23.3 EASY BRUISING: ICD-10-CM

## 2018-01-15 DIAGNOSIS — M79.605 PAIN IN BOTH LOWER EXTREMITIES: ICD-10-CM

## 2018-01-15 DIAGNOSIS — M79.604 PAIN IN BOTH LOWER EXTREMITIES: ICD-10-CM

## 2018-01-15 DIAGNOSIS — M35.00 SICCA SYNDROME, UNSPECIFIED: ICD-10-CM

## 2018-01-15 ASSESSMENT — MIFFLIN-ST. JEOR: SCORE: 952.47

## 2018-01-16 ENCOUNTER — COMMUNICATION - HEALTHEAST (OUTPATIENT)
Dept: RHEUMATOLOGY | Facility: CLINIC | Age: 68
End: 2018-01-16

## 2018-01-16 DIAGNOSIS — M79.7 FIBROMYALGIA: ICD-10-CM

## 2018-01-17 ENCOUNTER — RECORDS - HEALTHEAST (OUTPATIENT)
Dept: ADMINISTRATIVE | Facility: OTHER | Age: 68
End: 2018-01-17

## 2018-01-18 ENCOUNTER — HOME INFUSION (PRE-WILLOW HOME INFUSION) (OUTPATIENT)
Dept: PHARMACY | Facility: CLINIC | Age: 68
End: 2018-01-18

## 2018-01-19 NOTE — PROGRESS NOTES
This is a recent snapshot of the patient's [facility] medical record.  For current drug dose and complete information and questions, call [phone] or In Basket pool, fv home infusion (83254)  CSN Number:  [id number]

## 2018-01-20 ENCOUNTER — HOME INFUSION (PRE-WILLOW HOME INFUSION) (OUTPATIENT)
Dept: PHARMACY | Facility: CLINIC | Age: 68
End: 2018-01-20

## 2018-01-22 ENCOUNTER — COMMUNICATION - HEALTHEAST (OUTPATIENT)
Dept: INTERNAL MEDICINE | Facility: CLINIC | Age: 68
End: 2018-01-22

## 2018-01-23 ENCOUNTER — COMMUNICATION - HEALTHEAST (OUTPATIENT)
Dept: INTERNAL MEDICINE | Facility: CLINIC | Age: 68
End: 2018-01-23

## 2018-01-23 NOTE — PROGRESS NOTES
This is a recent snapshot of the patient's [facility] medical record.  For current drug dose and complete information and questions, call [phone] or In Basket pool, fv home infusion (69716)  CSN Number:  [id number]

## 2018-01-31 ENCOUNTER — COMMUNICATION - HEALTHEAST (OUTPATIENT)
Dept: INTERNAL MEDICINE | Facility: CLINIC | Age: 68
End: 2018-01-31

## 2018-01-31 ENCOUNTER — RECORDS - HEALTHEAST (OUTPATIENT)
Dept: LAB | Facility: HOSPITAL | Age: 68
End: 2018-01-31

## 2018-01-31 LAB
CREAT SERPL-MCNC: 0.95 MG/DL (ref 0.6–1.1)
GFR SERPL CREATININE-BSD FRML MDRD: 58 ML/MIN/1.73M2
MAGNESIUM SERPL-MCNC: 1.7 MG/DL (ref 1.8–2.6)

## 2018-02-01 ENCOUNTER — COMMUNICATION - HEALTHEAST (OUTPATIENT)
Dept: INTERNAL MEDICINE | Facility: CLINIC | Age: 68
End: 2018-02-01

## 2018-02-01 ENCOUNTER — HOME INFUSION (PRE-WILLOW HOME INFUSION) (OUTPATIENT)
Dept: PHARMACY | Facility: CLINIC | Age: 68
End: 2018-02-01

## 2018-02-02 ENCOUNTER — RECORDS - HEALTHEAST (OUTPATIENT)
Dept: ADMINISTRATIVE | Facility: OTHER | Age: 68
End: 2018-02-02

## 2018-02-02 NOTE — PROGRESS NOTES
This is a recent snapshot of the patient's [facility] medical record.  For current drug dose and complete information and questions, call [phone] or In Basket pool, fv home infusion (45254)  CSN Number:  [id number]

## 2018-02-05 ENCOUNTER — COMMUNICATION - HEALTHEAST (OUTPATIENT)
Dept: INTERNAL MEDICINE | Facility: CLINIC | Age: 68
End: 2018-02-05

## 2018-02-05 DIAGNOSIS — G93.32 CHRONIC FATIGUE SYNDROME: ICD-10-CM

## 2018-02-06 ENCOUNTER — HOSPITAL ENCOUNTER (OUTPATIENT)
Dept: RESPIRATORY THERAPY | Facility: HOSPITAL | Age: 68
Discharge: HOME | End: 2018-02-06
Attending: INTERNAL MEDICINE

## 2018-02-06 DIAGNOSIS — R06.09 DOE (DYSPNEA ON EXERTION): ICD-10-CM

## 2018-02-06 DIAGNOSIS — R06.09 OTHER FORMS OF DYSPNEA: ICD-10-CM

## 2018-02-06 LAB — HGB BLD-MCNC: 11 G/DL (ref 12–16)

## 2018-02-12 ENCOUNTER — RECORDS - HEALTHEAST (OUTPATIENT)
Dept: BONE DENSITY | Facility: CLINIC | Age: 68
End: 2018-02-12

## 2018-02-12 ENCOUNTER — RECORDS - HEALTHEAST (OUTPATIENT)
Dept: ADMINISTRATIVE | Facility: OTHER | Age: 68
End: 2018-02-12

## 2018-02-12 DIAGNOSIS — Z78.0 ASYMPTOMATIC MENOPAUSAL STATE: ICD-10-CM

## 2018-02-14 ENCOUNTER — RECORDS - HEALTHEAST (OUTPATIENT)
Dept: ADMINISTRATIVE | Facility: OTHER | Age: 68
End: 2018-02-14

## 2018-02-15 ENCOUNTER — HOME INFUSION (PRE-WILLOW HOME INFUSION) (OUTPATIENT)
Dept: PHARMACY | Facility: CLINIC | Age: 68
End: 2018-02-15

## 2018-02-16 ENCOUNTER — INFUSION - HEALTHEAST (OUTPATIENT)
Dept: INFUSION THERAPY | Facility: HOSPITAL | Age: 68
End: 2018-02-16

## 2018-02-16 ENCOUNTER — OFFICE VISIT - HEALTHEAST (OUTPATIENT)
Dept: PULMONOLOGY | Facility: OTHER | Age: 68
End: 2018-02-16

## 2018-02-16 DIAGNOSIS — D64.9 ANEMIA: ICD-10-CM

## 2018-02-16 DIAGNOSIS — E83.51 HYPOCALCEMIA: ICD-10-CM

## 2018-02-16 DIAGNOSIS — R06.09 DOE (DYSPNEA ON EXERTION): ICD-10-CM

## 2018-02-16 DIAGNOSIS — M79.604 PAIN IN BOTH LOWER EXTREMITIES: ICD-10-CM

## 2018-02-16 DIAGNOSIS — M25.562 CHRONIC PAIN OF BOTH KNEES: ICD-10-CM

## 2018-02-16 DIAGNOSIS — D69.6 THROMBOCYTOPENIA (H): ICD-10-CM

## 2018-02-16 DIAGNOSIS — M79.605 PAIN IN BOTH LOWER EXTREMITIES: ICD-10-CM

## 2018-02-16 DIAGNOSIS — M35.00 SICCA SYNDROME, UNSPECIFIED: ICD-10-CM

## 2018-02-16 DIAGNOSIS — M25.561 CHRONIC PAIN OF BOTH KNEES: ICD-10-CM

## 2018-02-16 DIAGNOSIS — R23.3 EASY BRUISING: ICD-10-CM

## 2018-02-16 DIAGNOSIS — R94.4 DECREASED GFR: ICD-10-CM

## 2018-02-16 DIAGNOSIS — G89.29 CHRONIC PAIN OF BOTH KNEES: ICD-10-CM

## 2018-02-16 LAB
ALBUMIN SERPL-MCNC: 3.2 G/DL (ref 3.5–5)
ALT SERPL W P-5'-P-CCNC: 16 U/L (ref 0–45)
AST SERPL W P-5'-P-CCNC: 19 U/L (ref 0–40)
BASOPHILS # BLD AUTO: 0 THOU/UL (ref 0–0.2)
BASOPHILS NFR BLD AUTO: 0 % (ref 0–2)
C REACTIVE PROTEIN LHE: 0.3 MG/DL (ref 0–0.8)
CALCIUM SERPL-MCNC: 8.6 MG/DL (ref 8.5–10.5)
CREAT SERPL-MCNC: 0.91 MG/DL (ref 0.6–1.1)
EOSINOPHIL # BLD AUTO: 0.1 THOU/UL (ref 0–0.4)
EOSINOPHIL NFR BLD AUTO: 1 % (ref 0–6)
ERYTHROCYTE [DISTWIDTH] IN BLOOD BY AUTOMATED COUNT: 13.8 % (ref 11–14.5)
ERYTHROCYTE [SEDIMENTATION RATE] IN BLOOD BY WESTERGREN METHOD: 12 MM/HR (ref 0–20)
GFR SERPL CREATININE-BSD FRML MDRD: >60 ML/MIN/1.73M2
HCT VFR BLD AUTO: 34.1 % (ref 35–47)
HGB BLD-MCNC: 11.1 G/DL (ref 12–16)
LYMPHOCYTES # BLD AUTO: 1.7 THOU/UL (ref 0.8–4.4)
LYMPHOCYTES NFR BLD AUTO: 23 % (ref 20–40)
MCH RBC QN AUTO: 30.2 PG (ref 27–34)
MCHC RBC AUTO-ENTMCNC: 32.6 G/DL (ref 32–36)
MCV RBC AUTO: 93 FL (ref 80–100)
MONOCYTES # BLD AUTO: 0.4 THOU/UL (ref 0–0.9)
MONOCYTES NFR BLD AUTO: 6 % (ref 2–10)
NEUTROPHILS # BLD AUTO: 5 THOU/UL (ref 2–7.7)
NEUTROPHILS NFR BLD AUTO: 70 % (ref 50–70)
PLATELET # BLD AUTO: 166 THOU/UL (ref 140–440)
PMV BLD AUTO: 10.5 FL (ref 8.5–12.5)
PTH-INTACT SERPL-MCNC: 44 PG/ML (ref 10–86)
RBC # BLD AUTO: 3.68 MILL/UL (ref 3.8–5.4)
WBC: 7.3 THOU/UL (ref 4–11)

## 2018-02-19 LAB — 25(OH)D3 SERPL-MCNC: 26.1 NG/ML (ref 30–80)

## 2018-02-20 LAB
CARDIOLIPIN IGA SER IA-ACNC: <0.5 APL U/ML (ref 0–19.9)
CARDIOLIPIN IGG SER IA-ACNC: <1.6 GPL-U/ML (ref 0–19.9)
CARDIOLIPIN IGM SER IA-ACNC: 0.7 MPL-U/ML (ref 0–19.9)

## 2018-02-20 NOTE — PROGRESS NOTES
This is a recent snapshot of the patient's [facility] medical record.  For current drug dose and complete information and questions, call [phone] or In Basket pool, fv home infusion (92772)  CSN Number:  [id number]

## 2018-02-21 ENCOUNTER — COMMUNICATION - HEALTHEAST (OUTPATIENT)
Dept: RHEUMATOLOGY | Facility: CLINIC | Age: 68
End: 2018-02-21

## 2018-02-21 DIAGNOSIS — E55.9 VITAMIN D DEFICIENCY: ICD-10-CM

## 2018-02-21 LAB — DRVVT, LUPUS ANTICOAGULANT - HISTORICAL: 46 SEC

## 2018-02-28 ENCOUNTER — RECORDS - HEALTHEAST (OUTPATIENT)
Dept: ADMINISTRATIVE | Facility: OTHER | Age: 68
End: 2018-02-28

## 2018-03-01 ENCOUNTER — RECORDS - HEALTHEAST (OUTPATIENT)
Dept: ADMINISTRATIVE | Facility: OTHER | Age: 68
End: 2018-03-01

## 2018-03-01 ENCOUNTER — HOME INFUSION (PRE-WILLOW HOME INFUSION) (OUTPATIENT)
Dept: PHARMACY | Facility: CLINIC | Age: 68
End: 2018-03-01

## 2018-03-01 ENCOUNTER — COMMUNICATION - HEALTHEAST (OUTPATIENT)
Dept: INTERNAL MEDICINE | Facility: CLINIC | Age: 68
End: 2018-03-01

## 2018-03-02 NOTE — PROGRESS NOTES
This is a recent snapshot of the patient's [facility] medical record.  For current drug dose and complete information and questions, call [phone] or In Basket pool, fv home infusion (93451)  CSN Number:  [id number]

## 2018-03-05 ENCOUNTER — RECORDS - HEALTHEAST (OUTPATIENT)
Dept: ADMINISTRATIVE | Facility: OTHER | Age: 68
End: 2018-03-05

## 2018-03-06 ENCOUNTER — COMMUNICATION - HEALTHEAST (OUTPATIENT)
Dept: INTERNAL MEDICINE | Facility: CLINIC | Age: 68
End: 2018-03-06

## 2018-03-14 ENCOUNTER — RECORDS - HEALTHEAST (OUTPATIENT)
Dept: ADMINISTRATIVE | Facility: OTHER | Age: 68
End: 2018-03-14

## 2018-03-15 ENCOUNTER — COMMUNICATION - HEALTHEAST (OUTPATIENT)
Dept: INTERNAL MEDICINE | Facility: CLINIC | Age: 68
End: 2018-03-15

## 2018-03-15 ENCOUNTER — HOME INFUSION (PRE-WILLOW HOME INFUSION) (OUTPATIENT)
Dept: PHARMACY | Facility: CLINIC | Age: 68
End: 2018-03-15

## 2018-03-15 ENCOUNTER — OFFICE VISIT - HEALTHEAST (OUTPATIENT)
Dept: INTERNAL MEDICINE | Facility: CLINIC | Age: 68
End: 2018-03-15

## 2018-03-15 DIAGNOSIS — H26.9 CATARACT: ICD-10-CM

## 2018-03-15 DIAGNOSIS — Z01.810 PREOP CARDIOVASCULAR EXAM: ICD-10-CM

## 2018-03-16 ENCOUNTER — RECORDS - HEALTHEAST (OUTPATIENT)
Dept: ADMINISTRATIVE | Facility: OTHER | Age: 68
End: 2018-03-16

## 2018-03-19 ENCOUNTER — COMMUNICATION - HEALTHEAST (OUTPATIENT)
Dept: INTERNAL MEDICINE | Facility: CLINIC | Age: 68
End: 2018-03-19

## 2018-03-23 ENCOUNTER — RECORDS - HEALTHEAST (OUTPATIENT)
Dept: ADMINISTRATIVE | Facility: OTHER | Age: 68
End: 2018-03-23

## 2018-03-23 NOTE — PROGRESS NOTES
This is a recent snapshot of the patient's [facility] medical record.  For current drug dose and complete information and questions, call [phone] or In Basket pool, fv home infusion (99439)  CSN Number:  [id number]

## 2018-03-28 ENCOUNTER — RECORDS - HEALTHEAST (OUTPATIENT)
Dept: ADMINISTRATIVE | Facility: OTHER | Age: 68
End: 2018-03-28

## 2018-03-28 ENCOUNTER — COMMUNICATION - HEALTHEAST (OUTPATIENT)
Dept: INTERNAL MEDICINE | Facility: CLINIC | Age: 68
End: 2018-03-28

## 2018-03-29 ENCOUNTER — HOME INFUSION (PRE-WILLOW HOME INFUSION) (OUTPATIENT)
Dept: PHARMACY | Facility: CLINIC | Age: 68
End: 2018-03-29

## 2018-03-30 ENCOUNTER — COMMUNICATION - HEALTHEAST (OUTPATIENT)
Dept: INTERNAL MEDICINE | Facility: CLINIC | Age: 68
End: 2018-03-30

## 2018-03-30 DIAGNOSIS — R10.9 ABDOMINAL PAIN: ICD-10-CM

## 2018-04-03 ENCOUNTER — RECORDS - HEALTHEAST (OUTPATIENT)
Dept: ADMINISTRATIVE | Facility: OTHER | Age: 68
End: 2018-04-03

## 2018-04-05 ENCOUNTER — COMMUNICATION - HEALTHEAST (OUTPATIENT)
Dept: INTERNAL MEDICINE | Facility: CLINIC | Age: 68
End: 2018-04-05

## 2018-04-05 DIAGNOSIS — E53.8 VITAMIN B12 DEFICIENCY: ICD-10-CM

## 2018-04-12 ENCOUNTER — COMMUNICATION - HEALTHEAST (OUTPATIENT)
Dept: INTERNAL MEDICINE | Facility: CLINIC | Age: 68
End: 2018-04-12

## 2018-04-12 ENCOUNTER — HOME INFUSION (PRE-WILLOW HOME INFUSION) (OUTPATIENT)
Dept: PHARMACY | Facility: CLINIC | Age: 68
End: 2018-04-12

## 2018-04-13 ENCOUNTER — COMMUNICATION - HEALTHEAST (OUTPATIENT)
Dept: INTERNAL MEDICINE | Facility: CLINIC | Age: 68
End: 2018-04-13

## 2018-04-13 ENCOUNTER — HOME INFUSION (PRE-WILLOW HOME INFUSION) (OUTPATIENT)
Dept: PHARMACY | Facility: CLINIC | Age: 68
End: 2018-04-13

## 2018-04-13 NOTE — PROGRESS NOTES
This is a recent snapshot of the patient's [facility] medical record.  For current drug dose and complete information and questions, call [phone] or In Basket pool, fv home infusion (12517)  CSN Number:  [id number]

## 2018-04-16 ENCOUNTER — RECORDS - HEALTHEAST (OUTPATIENT)
Dept: ADMINISTRATIVE | Facility: OTHER | Age: 68
End: 2018-04-16

## 2018-04-17 ENCOUNTER — HOME INFUSION (PRE-WILLOW HOME INFUSION) (OUTPATIENT)
Dept: PHARMACY | Facility: CLINIC | Age: 68
End: 2018-04-17

## 2018-04-17 NOTE — PROGRESS NOTES
This is a recent snapshot of the patient's [facility] medical record.  For current drug dose and complete information and questions, call [phone] or In Basket pool, fv home infusion (16051)  CSN Number:  [id number]

## 2018-04-18 NOTE — PROGRESS NOTES
This is a recent snapshot of the patient's [facility] medical record.  For current drug dose and complete information and questions, call [phone] or In Basket pool, fv home infusion (21203)  CSN Number:  [id number]

## 2018-04-18 NOTE — PROGRESS NOTES
This is a recent snapshot of the patient's [facility] medical record.  For current drug dose and complete information and questions, call [phone] or In Basket pool, fv home infusion (79186)  CSN Number:  [id number]

## 2018-04-19 ENCOUNTER — RECORDS - HEALTHEAST (OUTPATIENT)
Dept: ADMINISTRATIVE | Facility: OTHER | Age: 68
End: 2018-04-19

## 2018-04-23 ENCOUNTER — RECORDS - HEALTHEAST (OUTPATIENT)
Dept: ADMINISTRATIVE | Facility: OTHER | Age: 68
End: 2018-04-23

## 2018-04-30 ENCOUNTER — RECORDS - HEALTHEAST (OUTPATIENT)
Dept: ADMINISTRATIVE | Facility: OTHER | Age: 68
End: 2018-04-30

## 2018-05-01 ENCOUNTER — COMMUNICATION - HEALTHEAST (OUTPATIENT)
Dept: OTOLARYNGOLOGY | Facility: CLINIC | Age: 68
End: 2018-05-01

## 2018-05-02 ENCOUNTER — RECORDS - HEALTHEAST (OUTPATIENT)
Dept: ADMINISTRATIVE | Facility: OTHER | Age: 68
End: 2018-05-02

## 2018-05-03 ENCOUNTER — HOME INFUSION (PRE-WILLOW HOME INFUSION) (OUTPATIENT)
Dept: PHARMACY | Facility: CLINIC | Age: 68
End: 2018-05-03

## 2018-05-04 NOTE — PROGRESS NOTES
This is a recent snapshot of the patient's [facility] medical record.  For current drug dose and complete information and questions, call [phone] or In Basket pool, fv home infusion (91591)  CSN Number:  [id number]

## 2018-05-07 ENCOUNTER — COMMUNICATION - HEALTHEAST (OUTPATIENT)
Dept: INTERNAL MEDICINE | Facility: CLINIC | Age: 68
End: 2018-05-07

## 2018-05-07 DIAGNOSIS — G93.32 CHRONIC FATIGUE SYNDROME: ICD-10-CM

## 2018-05-09 ENCOUNTER — HOME INFUSION (PRE-WILLOW HOME INFUSION) (OUTPATIENT)
Dept: PHARMACY | Facility: CLINIC | Age: 68
End: 2018-05-09

## 2018-05-09 ENCOUNTER — COMMUNICATION - HEALTHEAST (OUTPATIENT)
Dept: INTERNAL MEDICINE | Facility: CLINIC | Age: 68
End: 2018-05-09

## 2018-05-10 ENCOUNTER — HOME INFUSION (PRE-WILLOW HOME INFUSION) (OUTPATIENT)
Dept: PHARMACY | Facility: CLINIC | Age: 68
End: 2018-05-10

## 2018-05-10 DIAGNOSIS — Z53.9 ERRONEOUS ENCOUNTER--DISREGARD: Primary | ICD-10-CM

## 2018-05-16 ENCOUNTER — RECORDS - HEALTHEAST (OUTPATIENT)
Dept: ADMINISTRATIVE | Facility: OTHER | Age: 68
End: 2018-05-16

## 2018-05-17 ENCOUNTER — HOME INFUSION (PRE-WILLOW HOME INFUSION) (OUTPATIENT)
Dept: PHARMACY | Facility: CLINIC | Age: 68
End: 2018-05-17

## 2018-05-17 ENCOUNTER — RECORDS - HEALTHEAST (OUTPATIENT)
Dept: ADMINISTRATIVE | Facility: OTHER | Age: 68
End: 2018-05-17

## 2018-05-17 ENCOUNTER — COMMUNICATION - HEALTHEAST (OUTPATIENT)
Dept: INTERNAL MEDICINE | Facility: CLINIC | Age: 68
End: 2018-05-17

## 2018-05-17 DIAGNOSIS — E55.9 VITAMIN D DEFICIENCY: ICD-10-CM

## 2018-05-18 NOTE — PROGRESS NOTES
This is a recent snapshot of the patient's [facility] medical record.  For current drug dose and complete information and questions, call [phone] or In Basket pool, fv home infusion (49682)  CSN Number:  [id number]

## 2018-05-22 ENCOUNTER — RECORDS - HEALTHEAST (OUTPATIENT)
Dept: ADMINISTRATIVE | Facility: OTHER | Age: 68
End: 2018-05-22

## 2018-05-23 ENCOUNTER — COMMUNICATION - HEALTHEAST (OUTPATIENT)
Dept: INTERNAL MEDICINE | Facility: CLINIC | Age: 68
End: 2018-05-23

## 2018-05-27 ENCOUNTER — COMMUNICATION - HEALTHEAST (OUTPATIENT)
Dept: INTERNAL MEDICINE | Facility: CLINIC | Age: 68
End: 2018-05-27

## 2018-05-27 DIAGNOSIS — F32.A DEPRESSION: ICD-10-CM

## 2018-05-29 ENCOUNTER — COMMUNICATION - HEALTHEAST (OUTPATIENT)
Dept: INTERNAL MEDICINE | Facility: CLINIC | Age: 68
End: 2018-05-29

## 2018-05-29 ENCOUNTER — RECORDS - HEALTHEAST (OUTPATIENT)
Dept: ADMINISTRATIVE | Facility: OTHER | Age: 68
End: 2018-05-29

## 2018-05-30 ENCOUNTER — RECORDS - HEALTHEAST (OUTPATIENT)
Dept: ADMINISTRATIVE | Facility: OTHER | Age: 68
End: 2018-05-30

## 2018-05-31 ENCOUNTER — HOME INFUSION (PRE-WILLOW HOME INFUSION) (OUTPATIENT)
Dept: PHARMACY | Facility: CLINIC | Age: 68
End: 2018-05-31

## 2018-06-01 ENCOUNTER — COMMUNICATION - HEALTHEAST (OUTPATIENT)
Dept: INTERNAL MEDICINE | Facility: CLINIC | Age: 68
End: 2018-06-01

## 2018-06-01 NOTE — PROGRESS NOTES
This is a recent snapshot of the patient's [facility] medical record.  For current drug dose and complete information and questions, call [phone] or In Basket pool, fv home infusion (25401)  CSN Number:  [id number]

## 2018-06-01 NOTE — PROGRESS NOTES
This is a recent snapshot of the patient's [facility] medical record.  For current drug dose and complete information and questions, call [phone] or In Basket pool, fv home infusion (62711)  CSN Number:  [id number]

## 2018-06-08 ENCOUNTER — COMMUNICATION - HEALTHEAST (OUTPATIENT)
Dept: INTERNAL MEDICINE | Facility: CLINIC | Age: 68
End: 2018-06-08

## 2018-06-13 ENCOUNTER — RECORDS - HEALTHEAST (OUTPATIENT)
Dept: ADMINISTRATIVE | Facility: OTHER | Age: 68
End: 2018-06-13

## 2018-06-13 ENCOUNTER — HOME INFUSION (PRE-WILLOW HOME INFUSION) (OUTPATIENT)
Dept: PHARMACY | Facility: CLINIC | Age: 68
End: 2018-06-13

## 2018-06-14 NOTE — PROGRESS NOTES
This is a recent snapshot of the patient's [facility] medical record.  For current drug dose and complete information and questions, call [phone] or In Basket pool, fv home infusion (59845)  CSN Number:  [id number]

## 2018-06-15 ENCOUNTER — RECORDS - HEALTHEAST (OUTPATIENT)
Dept: ADMINISTRATIVE | Facility: OTHER | Age: 68
End: 2018-06-15

## 2018-06-18 ENCOUNTER — COMMUNICATION - HEALTHEAST (OUTPATIENT)
Dept: INTERNAL MEDICINE | Facility: CLINIC | Age: 68
End: 2018-06-18

## 2018-06-20 ENCOUNTER — AMBULATORY - HEALTHEAST (OUTPATIENT)
Dept: SCHEDULING | Facility: CLINIC | Age: 68
End: 2018-06-20

## 2018-06-20 DIAGNOSIS — Z71.89 ENCOUNTER FOR OSTOMY NURSE CONSULTATION: ICD-10-CM

## 2018-06-21 ENCOUNTER — HOME INFUSION (PRE-WILLOW HOME INFUSION) (OUTPATIENT)
Dept: PHARMACY | Facility: CLINIC | Age: 68
End: 2018-06-21

## 2018-06-23 NOTE — PROGRESS NOTES
This is a recent snapshot of the patient's [facility] medical record.  For current drug dose and complete information and questions, call [phone] or In Basket pool, fv home infusion (24939)  CSN Number:  [id number]

## 2018-06-27 ENCOUNTER — RECORDS - HEALTHEAST (OUTPATIENT)
Dept: ADMINISTRATIVE | Facility: OTHER | Age: 68
End: 2018-06-27

## 2018-07-02 ENCOUNTER — COMMUNICATION - HEALTHEAST (OUTPATIENT)
Dept: INTERNAL MEDICINE | Facility: CLINIC | Age: 68
End: 2018-07-02

## 2018-07-03 ENCOUNTER — RECORDS - HEALTHEAST (OUTPATIENT)
Dept: ADMINISTRATIVE | Facility: OTHER | Age: 68
End: 2018-07-03

## 2018-07-05 ENCOUNTER — COMMUNICATION - HEALTHEAST (OUTPATIENT)
Dept: SCHEDULING | Facility: CLINIC | Age: 68
End: 2018-07-05

## 2018-07-09 ENCOUNTER — COMMUNICATION - HEALTHEAST (OUTPATIENT)
Dept: INTERNAL MEDICINE | Facility: CLINIC | Age: 68
End: 2018-07-09

## 2018-07-11 ENCOUNTER — COMMUNICATION - HEALTHEAST (OUTPATIENT)
Dept: INTERNAL MEDICINE | Facility: CLINIC | Age: 68
End: 2018-07-11

## 2018-07-11 ENCOUNTER — RECORDS - HEALTHEAST (OUTPATIENT)
Dept: ADMINISTRATIVE | Facility: OTHER | Age: 68
End: 2018-07-11

## 2018-07-11 DIAGNOSIS — E53.8 VITAMIN B12 DEFICIENCY: ICD-10-CM

## 2018-07-12 ENCOUNTER — HOME INFUSION (PRE-WILLOW HOME INFUSION) (OUTPATIENT)
Dept: PHARMACY | Facility: CLINIC | Age: 68
End: 2018-07-12

## 2018-07-12 ENCOUNTER — COMMUNICATION - HEALTHEAST (OUTPATIENT)
Dept: SCHEDULING | Facility: CLINIC | Age: 68
End: 2018-07-12

## 2018-07-12 ENCOUNTER — OFFICE VISIT - HEALTHEAST (OUTPATIENT)
Dept: FAMILY MEDICINE | Facility: CLINIC | Age: 68
End: 2018-07-12

## 2018-07-12 DIAGNOSIS — T80.212A LOCAL INFECTION DUE TO PORT-A-CATH, INITIAL ENCOUNTER: ICD-10-CM

## 2018-07-12 RX ORDER — LOPERAMIDE HCL 2 MG
2-4 CAPSULE ORAL 2 TIMES DAILY PRN
Refills: 2 | Status: SHIPPED | COMMUNITY
Start: 2018-04-23 | End: 2022-02-18

## 2018-07-13 NOTE — PROGRESS NOTES
This is a recent snapshot of the patient's [facility] medical record.  For current drug dose and complete information and questions, call [phone] or In Basket pool, fv home infusion (29480)  CSN Number:  [id number]

## 2018-07-16 ENCOUNTER — RECORDS - HEALTHEAST (OUTPATIENT)
Dept: ADMINISTRATIVE | Facility: OTHER | Age: 68
End: 2018-07-16

## 2018-07-17 ENCOUNTER — OFFICE VISIT - HEALTHEAST (OUTPATIENT)
Dept: WOUND CARE | Facility: HOSPITAL | Age: 68
End: 2018-07-17

## 2018-07-17 DIAGNOSIS — Z71.89 ENCOUNTER FOR OSTOMY NURSE CONSULTATION: ICD-10-CM

## 2018-07-18 ENCOUNTER — AMBULATORY - HEALTHEAST (OUTPATIENT)
Dept: SCHEDULING | Facility: CLINIC | Age: 68
End: 2018-07-18

## 2018-07-18 ENCOUNTER — COMMUNICATION - HEALTHEAST (OUTPATIENT)
Dept: ADMINISTRATIVE | Facility: CLINIC | Age: 68
End: 2018-07-18

## 2018-07-18 ENCOUNTER — OFFICE VISIT - HEALTHEAST (OUTPATIENT)
Dept: INTERNAL MEDICINE | Facility: CLINIC | Age: 68
End: 2018-07-18

## 2018-07-18 DIAGNOSIS — Z01.818 PRE-OP EXAM: ICD-10-CM

## 2018-07-18 DIAGNOSIS — Z12.31 VISIT FOR SCREENING MAMMOGRAM: ICD-10-CM

## 2018-07-18 DIAGNOSIS — E86.0 SEVERE DEHYDRATION: ICD-10-CM

## 2018-07-18 DIAGNOSIS — T80.219A INFECTED VENOUS ACCESS PORT: ICD-10-CM

## 2018-07-18 LAB
ATRIAL RATE - MUSE: 81 BPM
DIASTOLIC BLOOD PRESSURE - MUSE: NORMAL MMHG
HGB BLD-MCNC: 10.9 G/DL (ref 12–16)
INTERPRETATION ECG - MUSE: NORMAL
P AXIS - MUSE: 49 DEGREES
POTASSIUM BLD-SCNC: 4.9 MMOL/L (ref 3.5–5)
PR INTERVAL - MUSE: 154 MS
QRS DURATION - MUSE: 76 MS
QT - MUSE: 372 MS
QTC - MUSE: 432 MS
R AXIS - MUSE: 13 DEGREES
SYSTOLIC BLOOD PRESSURE - MUSE: NORMAL MMHG
T AXIS - MUSE: 54 DEGREES
VENTRICULAR RATE- MUSE: 81 BPM

## 2018-07-19 ASSESSMENT — MIFFLIN-ST. JEOR: SCORE: 1011.44

## 2018-07-20 ENCOUNTER — HOME INFUSION (PRE-WILLOW HOME INFUSION) (OUTPATIENT)
Dept: PHARMACY | Facility: CLINIC | Age: 68
End: 2018-07-20

## 2018-07-20 ENCOUNTER — SURGERY - HEALTHEAST (OUTPATIENT)
Dept: SURGERY | Facility: CLINIC | Age: 68
End: 2018-07-20

## 2018-07-20 ENCOUNTER — ANESTHESIA - HEALTHEAST (OUTPATIENT)
Dept: SURGERY | Facility: CLINIC | Age: 68
End: 2018-07-20

## 2018-07-20 ENCOUNTER — RECORDS - HEALTHEAST (OUTPATIENT)
Dept: ADMINISTRATIVE | Facility: OTHER | Age: 68
End: 2018-07-20

## 2018-07-21 ENCOUNTER — HOME INFUSION (PRE-WILLOW HOME INFUSION) (OUTPATIENT)
Dept: PHARMACY | Facility: CLINIC | Age: 68
End: 2018-07-21

## 2018-07-23 ENCOUNTER — HOME INFUSION (PRE-WILLOW HOME INFUSION) (OUTPATIENT)
Dept: PHARMACY | Facility: CLINIC | Age: 68
End: 2018-07-23

## 2018-07-23 ENCOUNTER — OFFICE VISIT - HEALTHEAST (OUTPATIENT)
Dept: OTHER | Facility: HOSPITAL | Age: 68
End: 2018-07-23

## 2018-07-23 DIAGNOSIS — E86.0 SEVERE DEHYDRATION: ICD-10-CM

## 2018-07-23 NOTE — PROGRESS NOTES
This is a recent snapshot of the patient's [facility] medical record.  For current drug dose and complete information and questions, call [phone] or In Basket pool, fv home infusion (28792)  CSN Number:  [id number]

## 2018-07-24 ENCOUNTER — HOME INFUSION (PRE-WILLOW HOME INFUSION) (OUTPATIENT)
Dept: PHARMACY | Facility: CLINIC | Age: 68
End: 2018-07-24

## 2018-07-24 ENCOUNTER — COMMUNICATION - HEALTHEAST (OUTPATIENT)
Dept: INTERNAL MEDICINE | Facility: CLINIC | Age: 68
End: 2018-07-24

## 2018-07-24 NOTE — PROGRESS NOTES
This is a recent snapshot of the patient's [facility] medical record.  For current drug dose and complete information and questions, call [phone] or In Basket pool, fv home infusion (89664)  CSN Number:  [id number]

## 2018-07-25 ENCOUNTER — HOME INFUSION (PRE-WILLOW HOME INFUSION) (OUTPATIENT)
Dept: PHARMACY | Facility: CLINIC | Age: 68
End: 2018-07-25

## 2018-07-25 NOTE — PROGRESS NOTES
This is a recent snapshot of the patient's [facility] medical record.  For current drug dose and complete information and questions, call [phone] or In Basket pool, fv home infusion (98071)  CSN Number:  [id number]

## 2018-07-26 ENCOUNTER — RECORDS - HEALTHEAST (OUTPATIENT)
Dept: ADMINISTRATIVE | Facility: OTHER | Age: 68
End: 2018-07-26

## 2018-07-26 NOTE — PROGRESS NOTES
This is a recent snapshot of the patient's [facility] medical record.  For current drug dose and complete information and questions, call [phone] or In Basket pool, fv home infusion (54520)  CSN Number:  [id number]

## 2018-07-27 ENCOUNTER — RECORDS - HEALTHEAST (OUTPATIENT)
Dept: ADMINISTRATIVE | Facility: OTHER | Age: 68
End: 2018-07-27

## 2018-08-01 ENCOUNTER — COMMUNICATION - HEALTHEAST (OUTPATIENT)
Dept: INTERNAL MEDICINE | Facility: CLINIC | Age: 68
End: 2018-08-01

## 2018-08-03 ENCOUNTER — COMMUNICATION - HEALTHEAST (OUTPATIENT)
Dept: INTERNAL MEDICINE | Facility: CLINIC | Age: 68
End: 2018-08-03

## 2018-08-06 ENCOUNTER — RECORDS - HEALTHEAST (OUTPATIENT)
Dept: ADMINISTRATIVE | Facility: OTHER | Age: 68
End: 2018-08-06

## 2018-08-08 ENCOUNTER — HOME INFUSION (PRE-WILLOW HOME INFUSION) (OUTPATIENT)
Dept: PHARMACY | Facility: CLINIC | Age: 68
End: 2018-08-08

## 2018-08-08 ENCOUNTER — COMMUNICATION - HEALTHEAST (OUTPATIENT)
Dept: INTERNAL MEDICINE | Facility: CLINIC | Age: 68
End: 2018-08-08

## 2018-08-09 NOTE — PROGRESS NOTES
This is a recent snapshot of the patient's [facility] medical record.  For current drug dose and complete information and questions, call [phone] or In Basket pool, fv home infusion (15921)  CSN Number:  [id number]

## 2018-08-23 ENCOUNTER — COMMUNICATION - HEALTHEAST (OUTPATIENT)
Dept: INTERNAL MEDICINE | Facility: CLINIC | Age: 68
End: 2018-08-23

## 2018-08-24 ASSESSMENT — MIFFLIN-ST. JEOR: SCORE: 1010.3

## 2018-08-27 ENCOUNTER — OFFICE VISIT - HEALTHEAST (OUTPATIENT)
Dept: INTERNAL MEDICINE | Facility: CLINIC | Age: 68
End: 2018-08-27

## 2018-08-27 DIAGNOSIS — R03.0 ELEVATED BLOOD PRESSURE READING WITHOUT DIAGNOSIS OF HYPERTENSION: ICD-10-CM

## 2018-08-27 DIAGNOSIS — Z45.2 ENCOUNTER FOR CARE RELATED TO VASCULAR ACCESS PORT: ICD-10-CM

## 2018-08-27 DIAGNOSIS — Z01.818 PREOPERATIVE EXAMINATION: ICD-10-CM

## 2018-08-27 LAB
ANION GAP SERPL CALCULATED.3IONS-SCNC: 7 MMOL/L (ref 5–18)
BUN SERPL-MCNC: 24 MG/DL (ref 8–22)
CALCIUM SERPL-MCNC: 8.4 MG/DL (ref 8.5–10.5)
CHLORIDE BLD-SCNC: 114 MMOL/L (ref 98–107)
CO2 SERPL-SCNC: 19 MMOL/L (ref 22–31)
CREAT SERPL-MCNC: 0.94 MG/DL (ref 0.6–1.1)
GFR SERPL CREATININE-BSD FRML MDRD: 59 ML/MIN/1.73M2
GLUCOSE BLD-MCNC: 89 MG/DL (ref 70–125)
POTASSIUM BLD-SCNC: 4.2 MMOL/L (ref 3.5–5)
SODIUM SERPL-SCNC: 140 MMOL/L (ref 136–145)

## 2018-08-28 ENCOUNTER — COMMUNICATION - HEALTHEAST (OUTPATIENT)
Dept: INTERNAL MEDICINE | Facility: CLINIC | Age: 68
End: 2018-08-28

## 2018-08-29 ENCOUNTER — SURGERY - HEALTHEAST (OUTPATIENT)
Dept: SURGERY | Facility: CLINIC | Age: 68
End: 2018-08-29

## 2018-08-29 ENCOUNTER — ANESTHESIA - HEALTHEAST (OUTPATIENT)
Dept: SURGERY | Facility: CLINIC | Age: 68
End: 2018-08-29

## 2018-08-29 ASSESSMENT — MIFFLIN-ST. JEOR: SCORE: 1004.86

## 2018-08-30 ENCOUNTER — HOME INFUSION (PRE-WILLOW HOME INFUSION) (OUTPATIENT)
Dept: PHARMACY | Facility: CLINIC | Age: 68
End: 2018-08-30

## 2018-08-31 NOTE — PROGRESS NOTES
This is a recent snapshot of the patient's [facility] medical record.  For current drug dose and complete information and questions, call [phone] or In Basket pool, fv home infusion (74798)  CSN Number:  [id number]

## 2018-09-04 ENCOUNTER — COMMUNICATION - HEALTHEAST (OUTPATIENT)
Dept: INTERNAL MEDICINE | Facility: CLINIC | Age: 68
End: 2018-09-04

## 2018-09-06 ENCOUNTER — HOME INFUSION (PRE-WILLOW HOME INFUSION) (OUTPATIENT)
Dept: PHARMACY | Facility: CLINIC | Age: 68
End: 2018-09-06

## 2018-09-06 ENCOUNTER — RECORDS - HEALTHEAST (OUTPATIENT)
Dept: ADMINISTRATIVE | Facility: OTHER | Age: 68
End: 2018-09-06

## 2018-09-07 NOTE — PROGRESS NOTES
This is a recent snapshot of the patient's [facility] medical record.  For current drug dose and complete information and questions, call [phone] or In Basket pool, fv home infusion (81695)  CSN Number:  [id number]

## 2018-09-10 ENCOUNTER — OFFICE VISIT - HEALTHEAST (OUTPATIENT)
Dept: INTERNAL MEDICINE | Facility: CLINIC | Age: 68
End: 2018-09-10

## 2018-09-10 DIAGNOSIS — F11.90 CHRONIC, CONTINUOUS USE OF OPIOIDS: ICD-10-CM

## 2018-09-10 DIAGNOSIS — Z79.899 CONTROLLED SUBSTANCE AGREEMENT SIGNED: ICD-10-CM

## 2018-09-10 LAB
AMPHETAMINES UR QL SCN: ABNORMAL
BARBITURATES UR QL: ABNORMAL
BENZODIAZ UR QL: ABNORMAL
CANNABINOIDS UR QL SCN: ABNORMAL
COCAINE UR QL: ABNORMAL
CREAT UR-MCNC: 103.5 MG/DL
METHADONE UR QL SCN: ABNORMAL
OPIATES UR QL SCN: ABNORMAL
OXYCODONE UR QL: ABNORMAL
PCP UR QL SCN: ABNORMAL

## 2018-09-12 ENCOUNTER — COMMUNICATION - HEALTHEAST (OUTPATIENT)
Dept: INTERNAL MEDICINE | Facility: CLINIC | Age: 68
End: 2018-09-12

## 2018-09-19 ENCOUNTER — COMMUNICATION - HEALTHEAST (OUTPATIENT)
Dept: INTERNAL MEDICINE | Facility: CLINIC | Age: 68
End: 2018-09-19

## 2018-09-20 ENCOUNTER — HOME INFUSION (PRE-WILLOW HOME INFUSION) (OUTPATIENT)
Dept: PHARMACY | Facility: CLINIC | Age: 68
End: 2018-09-20

## 2018-09-20 ENCOUNTER — HOSPITAL ENCOUNTER (OUTPATIENT)
Dept: MRI IMAGING | Facility: HOSPITAL | Age: 68
Discharge: HOME | End: 2018-09-20
Attending: INTERNAL MEDICINE

## 2018-09-20 DIAGNOSIS — R10.2 PELVIC PAIN IN FEMALE: ICD-10-CM

## 2018-09-21 ENCOUNTER — COMMUNICATION - HEALTHEAST (OUTPATIENT)
Dept: INTERNAL MEDICINE | Facility: CLINIC | Age: 68
End: 2018-09-21

## 2018-09-21 NOTE — PROGRESS NOTES
This is a recent snapshot of the patient's [facility] medical record.  For current drug dose and complete information and questions, call [phone] or In Basket pool, fv home infusion (41422)  CSN Number:  [id number]

## 2018-10-01 ENCOUNTER — RECORDS - HEALTHEAST (OUTPATIENT)
Dept: ADMINISTRATIVE | Facility: OTHER | Age: 68
End: 2018-10-01

## 2018-10-02 ENCOUNTER — RECORDS - HEALTHEAST (OUTPATIENT)
Dept: ADMINISTRATIVE | Facility: OTHER | Age: 68
End: 2018-10-02

## 2018-10-03 ENCOUNTER — HOME INFUSION (PRE-WILLOW HOME INFUSION) (OUTPATIENT)
Dept: PHARMACY | Facility: CLINIC | Age: 68
End: 2018-10-03

## 2018-10-03 ENCOUNTER — RECORDS - HEALTHEAST (OUTPATIENT)
Dept: ADMINISTRATIVE | Facility: OTHER | Age: 68
End: 2018-10-03

## 2018-10-04 ENCOUNTER — RECORDS - HEALTHEAST (OUTPATIENT)
Dept: ADMINISTRATIVE | Facility: OTHER | Age: 68
End: 2018-10-04

## 2018-10-04 NOTE — PROGRESS NOTES
This is a recent snapshot of the patient's [facility] medical record.  For current drug dose and complete information and questions, call [phone] or In Basket pool, fv home infusion (37615)  CSN Number:  [id number]

## 2018-10-05 ENCOUNTER — COMMUNICATION - HEALTHEAST (OUTPATIENT)
Dept: INTERNAL MEDICINE | Facility: CLINIC | Age: 68
End: 2018-10-05

## 2018-10-05 ENCOUNTER — COMMUNICATION - HEALTHEAST (OUTPATIENT)
Dept: SCHEDULING | Facility: CLINIC | Age: 68
End: 2018-10-05

## 2018-10-06 ENCOUNTER — COMMUNICATION - HEALTHEAST (OUTPATIENT)
Dept: INTERNAL MEDICINE | Facility: CLINIC | Age: 68
End: 2018-10-06

## 2018-10-06 DIAGNOSIS — R10.9 ABDOMINAL PAIN: ICD-10-CM

## 2018-10-17 ENCOUNTER — COMMUNICATION - HEALTHEAST (OUTPATIENT)
Dept: INTERNAL MEDICINE | Facility: CLINIC | Age: 68
End: 2018-10-17

## 2018-10-17 DIAGNOSIS — E53.8 VITAMIN B12 DEFICIENCY: ICD-10-CM

## 2018-10-24 ENCOUNTER — HOME INFUSION (PRE-WILLOW HOME INFUSION) (OUTPATIENT)
Dept: PHARMACY | Facility: CLINIC | Age: 68
End: 2018-10-24

## 2018-10-25 NOTE — PROGRESS NOTES
This is a recent snapshot of the patient's [facility] medical record.  For current drug dose and complete information and questions, call [phone] or In Basket pool, fv home infusion (22120)  CSN Number:  [id number]

## 2018-11-01 ENCOUNTER — RECORDS - HEALTHEAST (OUTPATIENT)
Dept: ADMINISTRATIVE | Facility: OTHER | Age: 68
End: 2018-11-01

## 2018-11-05 ENCOUNTER — COMMUNICATION - HEALTHEAST (OUTPATIENT)
Dept: INTERNAL MEDICINE | Facility: CLINIC | Age: 68
End: 2018-11-05

## 2018-11-05 ENCOUNTER — HOME INFUSION (PRE-WILLOW HOME INFUSION) (OUTPATIENT)
Dept: PHARMACY | Facility: CLINIC | Age: 68
End: 2018-11-05

## 2018-11-06 ENCOUNTER — HOME INFUSION (PRE-WILLOW HOME INFUSION) (OUTPATIENT)
Dept: PHARMACY | Facility: CLINIC | Age: 68
End: 2018-11-06

## 2018-11-06 NOTE — PROGRESS NOTES
This is a recent snapshot of the patient's [facility] medical record.  For current drug dose and complete information and questions, call [phone] or In Basket pool, fv home infusion (94854)  CSN Number:  [id number]

## 2018-11-07 NOTE — PROGRESS NOTES
This is a recent snapshot of the patient's [facility] medical record.  For current drug dose and complete information and questions, call [phone] or In [facility] pool, fv home infusion (11062)  CSN Number:  [id number]

## 2018-11-18 ENCOUNTER — COMMUNICATION - HEALTHEAST (OUTPATIENT)
Dept: INTERNAL MEDICINE | Facility: CLINIC | Age: 68
End: 2018-11-18

## 2018-11-21 ENCOUNTER — COMMUNICATION - HEALTHEAST (OUTPATIENT)
Dept: INTERNAL MEDICINE | Facility: CLINIC | Age: 68
End: 2018-11-21

## 2018-11-28 ENCOUNTER — RECORDS - HEALTHEAST (OUTPATIENT)
Dept: ADMINISTRATIVE | Facility: OTHER | Age: 68
End: 2018-11-28

## 2018-11-29 ENCOUNTER — HOME INFUSION (PRE-WILLOW HOME INFUSION) (OUTPATIENT)
Dept: PHARMACY | Facility: CLINIC | Age: 68
End: 2018-11-29

## 2018-11-30 NOTE — PROGRESS NOTES
This is a recent snapshot of the patient's [facility] medical record.  For current drug dose and complete information and questions, call [phone] or In Basket pool, fv home infusion (96439)  CSN Number:  [id number]

## 2018-12-03 ENCOUNTER — HOSPITAL ENCOUNTER (OUTPATIENT)
Dept: MAMMOGRAPHY | Facility: CLINIC | Age: 68
Discharge: HOME | End: 2018-12-03

## 2018-12-03 ENCOUNTER — COMMUNICATION - HEALTHEAST (OUTPATIENT)
Dept: INTERNAL MEDICINE | Facility: CLINIC | Age: 68
End: 2018-12-03

## 2018-12-03 DIAGNOSIS — Z12.31 VISIT FOR SCREENING MAMMOGRAM: ICD-10-CM

## 2018-12-17 ENCOUNTER — COMMUNICATION - HEALTHEAST (OUTPATIENT)
Dept: INTERNAL MEDICINE | Facility: CLINIC | Age: 68
End: 2018-12-17

## 2018-12-17 DIAGNOSIS — G93.32 CHRONIC FATIGUE SYNDROME: ICD-10-CM

## 2018-12-19 ENCOUNTER — HOME INFUSION (PRE-WILLOW HOME INFUSION) (OUTPATIENT)
Dept: PHARMACY | Facility: CLINIC | Age: 68
End: 2018-12-19

## 2018-12-20 ENCOUNTER — RECORDS - HEALTHEAST (OUTPATIENT)
Dept: ADMINISTRATIVE | Facility: OTHER | Age: 68
End: 2018-12-20

## 2018-12-20 NOTE — PROGRESS NOTES
This is a recent snapshot of the patient's [facility] medical record.  For current drug dose and complete information and questions, call [phone] or In [facility] pool, fv home infusion (58446)  CSN Number:  [id number]

## 2018-12-26 ENCOUNTER — RECORDS - HEALTHEAST (OUTPATIENT)
Dept: ADMINISTRATIVE | Facility: OTHER | Age: 68
End: 2018-12-26

## 2018-12-27 ENCOUNTER — HOME INFUSION (PRE-WILLOW HOME INFUSION) (OUTPATIENT)
Dept: PHARMACY | Facility: CLINIC | Age: 68
End: 2018-12-27

## 2018-12-28 NOTE — PROGRESS NOTES
This is a recent snapshot of the patient's [facility] medical record.  For current drug dose and complete information and questions, call [phone] or In Basket pool, fv home infusion (85069)  CSN Number:  [id number]

## 2019-01-03 ENCOUNTER — COMMUNICATION - HEALTHEAST (OUTPATIENT)
Dept: INTERNAL MEDICINE | Facility: CLINIC | Age: 69
End: 2019-01-03

## 2019-01-18 ENCOUNTER — COMMUNICATION - HEALTHEAST (OUTPATIENT)
Dept: INTERNAL MEDICINE | Facility: CLINIC | Age: 69
End: 2019-01-18

## 2019-01-24 ENCOUNTER — RECORDS - HEALTHEAST (OUTPATIENT)
Dept: ADMINISTRATIVE | Facility: OTHER | Age: 69
End: 2019-01-24

## 2019-01-25 ENCOUNTER — HOME INFUSION (PRE-WILLOW HOME INFUSION) (OUTPATIENT)
Dept: PHARMACY | Facility: CLINIC | Age: 69
End: 2019-01-25

## 2019-01-25 ENCOUNTER — RECORDS - HEALTHEAST (OUTPATIENT)
Dept: ADMINISTRATIVE | Facility: OTHER | Age: 69
End: 2019-01-25

## 2019-01-28 NOTE — PROGRESS NOTES
This is a recent snapshot of the patient's [facility] medical record.  For current drug dose and complete information and questions, call [phone] or In Basket pool, fv home infusion (55753)  CSN Number:  [id number]

## 2019-01-31 ENCOUNTER — COMMUNICATION - HEALTHEAST (OUTPATIENT)
Dept: INTERNAL MEDICINE | Facility: CLINIC | Age: 69
End: 2019-01-31

## 2019-01-31 ENCOUNTER — RECORDS - HEALTHEAST (OUTPATIENT)
Dept: ADMINISTRATIVE | Facility: OTHER | Age: 69
End: 2019-01-31

## 2019-02-01 ENCOUNTER — COMMUNICATION - HEALTHEAST (OUTPATIENT)
Dept: INTERNAL MEDICINE | Facility: CLINIC | Age: 69
End: 2019-02-01

## 2019-02-15 ENCOUNTER — OFFICE VISIT - HEALTHEAST (OUTPATIENT)
Dept: INTERNAL MEDICINE | Facility: CLINIC | Age: 69
End: 2019-02-15

## 2019-02-15 DIAGNOSIS — E86.0 DEHYDRATION: ICD-10-CM

## 2019-02-15 DIAGNOSIS — M79.7 FIBROMYALGIA: ICD-10-CM

## 2019-02-15 DIAGNOSIS — F41.9 ANXIETY AND DEPRESSION: ICD-10-CM

## 2019-02-15 DIAGNOSIS — Z93.2 HIGH OUTPUT ILEOSTOMY (H): ICD-10-CM

## 2019-02-15 DIAGNOSIS — Z00.00 ROUTINE GENERAL MEDICAL EXAMINATION AT A HEALTH CARE FACILITY: ICD-10-CM

## 2019-02-15 DIAGNOSIS — F32.A ANXIETY AND DEPRESSION: ICD-10-CM

## 2019-02-15 DIAGNOSIS — R19.8 HIGH OUTPUT ILEOSTOMY (H): ICD-10-CM

## 2019-02-15 ASSESSMENT — MIFFLIN-ST. JEOR: SCORE: 1063.49

## 2019-02-17 ENCOUNTER — COMMUNICATION - HEALTHEAST (OUTPATIENT)
Dept: INTERNAL MEDICINE | Facility: CLINIC | Age: 69
End: 2019-02-17

## 2019-02-17 DIAGNOSIS — F32.A DEPRESSION: ICD-10-CM

## 2019-02-21 ENCOUNTER — RECORDS - HEALTHEAST (OUTPATIENT)
Dept: ADMINISTRATIVE | Facility: OTHER | Age: 69
End: 2019-02-21

## 2019-02-22 ENCOUNTER — HOME INFUSION (PRE-WILLOW HOME INFUSION) (OUTPATIENT)
Dept: PHARMACY | Facility: CLINIC | Age: 69
End: 2019-02-22

## 2019-02-25 NOTE — PROGRESS NOTES
This is a recent snapshot of the patient's [facility] medical record.  For current drug dose and complete information and questions, call [phone] or In Basket pool, fv home infusion (15574)  CSN Number:  [id number]

## 2019-02-27 ENCOUNTER — COMMUNICATION - HEALTHEAST (OUTPATIENT)
Dept: SCHEDULING | Facility: CLINIC | Age: 69
End: 2019-02-27

## 2019-03-04 ENCOUNTER — COMMUNICATION - HEALTHEAST (OUTPATIENT)
Dept: INTERNAL MEDICINE | Facility: CLINIC | Age: 69
End: 2019-03-04

## 2019-03-05 ENCOUNTER — OFFICE VISIT - HEALTHEAST (OUTPATIENT)
Dept: OTOLARYNGOLOGY | Facility: CLINIC | Age: 69
End: 2019-03-05

## 2019-03-05 DIAGNOSIS — H61.23 BILATERAL IMPACTED CERUMEN: ICD-10-CM

## 2019-03-14 ENCOUNTER — RECORDS - HEALTHEAST (OUTPATIENT)
Dept: ADMINISTRATIVE | Facility: OTHER | Age: 69
End: 2019-03-14

## 2019-03-17 ENCOUNTER — HOME INFUSION (PRE-WILLOW HOME INFUSION) (OUTPATIENT)
Dept: PHARMACY | Facility: CLINIC | Age: 69
End: 2019-03-17

## 2019-03-18 NOTE — PROGRESS NOTES
This is a recent snapshot of the patient's [facility] medical record.  For current drug dose and complete information and questions, call [phone] or In Basket pool, fv home infusion (41868)  CSN Number:  [id number]

## 2019-03-21 ENCOUNTER — RECORDS - HEALTHEAST (OUTPATIENT)
Dept: ADMINISTRATIVE | Facility: OTHER | Age: 69
End: 2019-03-21

## 2019-03-27 ENCOUNTER — HOME INFUSION (PRE-WILLOW HOME INFUSION) (OUTPATIENT)
Dept: PHARMACY | Facility: CLINIC | Age: 69
End: 2019-03-27

## 2019-03-27 ENCOUNTER — COMMUNICATION - HEALTHEAST (OUTPATIENT)
Dept: INTERNAL MEDICINE | Facility: CLINIC | Age: 69
End: 2019-03-27

## 2019-03-28 ENCOUNTER — HOME INFUSION (PRE-WILLOW HOME INFUSION) (OUTPATIENT)
Dept: PHARMACY | Facility: CLINIC | Age: 69
End: 2019-03-28

## 2019-03-28 NOTE — PROGRESS NOTES
This is a recent snapshot of the patient's [facility] medical record.  For current drug dose and complete information and questions, call [phone] or In Basket pool, fv home infusion (46130)  CSN Number:  [id number]

## 2019-03-29 ENCOUNTER — HOME INFUSION (PRE-WILLOW HOME INFUSION) (OUTPATIENT)
Dept: PHARMACY | Facility: CLINIC | Age: 69
End: 2019-03-29

## 2019-03-30 ENCOUNTER — HOME INFUSION (PRE-WILLOW HOME INFUSION) (OUTPATIENT)
Dept: PHARMACY | Facility: CLINIC | Age: 69
End: 2019-03-30

## 2019-04-01 ENCOUNTER — COMMUNICATION - HEALTHEAST (OUTPATIENT)
Dept: INTERNAL MEDICINE | Facility: CLINIC | Age: 69
End: 2019-04-01

## 2019-04-01 DIAGNOSIS — N30.20 CHRONIC CYSTITIS: ICD-10-CM

## 2019-04-01 NOTE — PROGRESS NOTES
This is a recent snapshot of the patient's [facility] medical record.  For current drug dose and complete information and questions, call [phone] or In Basket pool, fv home infusion (44777)  CSN Number:  [id number]

## 2019-04-01 NOTE — PROGRESS NOTES
This is a recent snapshot of the patient's [facility] medical record.  For current drug dose and complete information and questions, call [phone] or In Basket pool, fv home infusion (31165)  CSN Number:  [id number]

## 2019-04-02 ENCOUNTER — COMMUNICATION - HEALTHEAST (OUTPATIENT)
Dept: SCHEDULING | Facility: CLINIC | Age: 69
End: 2019-04-02

## 2019-04-02 ENCOUNTER — RECORDS - HEALTHEAST (OUTPATIENT)
Dept: ADMINISTRATIVE | Facility: OTHER | Age: 69
End: 2019-04-02

## 2019-04-03 ENCOUNTER — OFFICE VISIT - HEALTHEAST (OUTPATIENT)
Dept: INTERNAL MEDICINE | Facility: CLINIC | Age: 69
End: 2019-04-03

## 2019-04-03 DIAGNOSIS — F41.9 ANXIETY AND DEPRESSION: ICD-10-CM

## 2019-04-03 DIAGNOSIS — F32.A ANXIETY AND DEPRESSION: ICD-10-CM

## 2019-04-03 DIAGNOSIS — E86.0 DEHYDRATION: ICD-10-CM

## 2019-04-03 DIAGNOSIS — R03.0 ELEVATED BLOOD PRESSURE READING WITHOUT DIAGNOSIS OF HYPERTENSION: ICD-10-CM

## 2019-04-03 ASSESSMENT — MIFFLIN-ST. JEOR: SCORE: 1019.49

## 2019-04-04 ENCOUNTER — HOME INFUSION (PRE-WILLOW HOME INFUSION) (OUTPATIENT)
Dept: PHARMACY | Facility: CLINIC | Age: 69
End: 2019-04-04

## 2019-04-05 ENCOUNTER — COMMUNICATION - HEALTHEAST (OUTPATIENT)
Dept: INTERNAL MEDICINE | Facility: CLINIC | Age: 69
End: 2019-04-05

## 2019-04-05 ENCOUNTER — HOME INFUSION (PRE-WILLOW HOME INFUSION) (OUTPATIENT)
Dept: PHARMACY | Facility: CLINIC | Age: 69
End: 2019-04-05

## 2019-04-05 NOTE — PROGRESS NOTES
This is a recent snapshot of the patient's [facility] medical record.  For current drug dose and complete information and questions, call [phone] or In Basket pool, fv home infusion (41475)  CSN Number:  [id number]

## 2019-04-08 ENCOUNTER — COMMUNICATION - HEALTHEAST (OUTPATIENT)
Dept: INTERNAL MEDICINE | Facility: CLINIC | Age: 69
End: 2019-04-08

## 2019-04-08 ENCOUNTER — RECORDS - HEALTHEAST (OUTPATIENT)
Dept: ADMINISTRATIVE | Facility: OTHER | Age: 69
End: 2019-04-08

## 2019-04-08 ENCOUNTER — HOME INFUSION (PRE-WILLOW HOME INFUSION) (OUTPATIENT)
Dept: PHARMACY | Facility: CLINIC | Age: 69
End: 2019-04-08

## 2019-04-08 NOTE — PROGRESS NOTES
This is a recent snapshot of the patient's [facility] medical record.  For current drug dose and complete information and questions, call [phone] or In Basket pool, fv home infusion (43178)  CSN Number:  [id number]

## 2019-04-09 ENCOUNTER — COMMUNICATION - HEALTHEAST (OUTPATIENT)
Dept: INTERNAL MEDICINE | Facility: CLINIC | Age: 69
End: 2019-04-09

## 2019-04-09 ENCOUNTER — RECORDS - HEALTHEAST (OUTPATIENT)
Dept: ADMINISTRATIVE | Facility: OTHER | Age: 69
End: 2019-04-09

## 2019-04-09 NOTE — PROGRESS NOTES
This is a recent snapshot of the patient's [facility] medical record.  For current drug dose and complete information and questions, call [phone] or In Basket pool, fv home infusion (51942)  CSN Number:  [id number]

## 2019-04-10 ENCOUNTER — HOME INFUSION (PRE-WILLOW HOME INFUSION) (OUTPATIENT)
Dept: PHARMACY | Facility: CLINIC | Age: 69
End: 2019-04-10

## 2019-04-10 ENCOUNTER — COMMUNICATION - HEALTHEAST (OUTPATIENT)
Dept: INTERNAL MEDICINE | Facility: CLINIC | Age: 69
End: 2019-04-10

## 2019-04-11 ENCOUNTER — COMMUNICATION - HEALTHEAST (OUTPATIENT)
Dept: INTERNAL MEDICINE | Facility: CLINIC | Age: 69
End: 2019-04-11

## 2019-04-11 NOTE — PROGRESS NOTES
This is a recent snapshot of the patient's [facility] medical record.  For current drug dose and complete information and questions, call [phone] or In Basket pool, fv home infusion (73539)  CSN Number:  [id number]

## 2019-04-16 ENCOUNTER — HOME INFUSION (PRE-WILLOW HOME INFUSION) (OUTPATIENT)
Dept: PHARMACY | Facility: CLINIC | Age: 69
End: 2019-04-16

## 2019-04-17 ENCOUNTER — COMMUNICATION - HEALTHEAST (OUTPATIENT)
Dept: INTERNAL MEDICINE | Facility: CLINIC | Age: 69
End: 2019-04-17

## 2019-04-17 NOTE — PROGRESS NOTES
This is a recent snapshot of the patient's [facility] medical record.  For current drug dose and complete information and questions, call [phone] or In Basket pool, fv home infusion (14259)  CSN Number:  [id number]

## 2019-04-19 ENCOUNTER — OFFICE VISIT - HEALTHEAST (OUTPATIENT)
Dept: INTERNAL MEDICINE | Facility: CLINIC | Age: 69
End: 2019-04-19

## 2019-04-19 DIAGNOSIS — K65.9 PERITONITIS (H): ICD-10-CM

## 2019-04-19 DIAGNOSIS — M79.7 FIBROMYALGIA: ICD-10-CM

## 2019-04-22 ENCOUNTER — RECORDS - HEALTHEAST (OUTPATIENT)
Dept: ADMINISTRATIVE | Facility: OTHER | Age: 69
End: 2019-04-22

## 2019-04-22 ENCOUNTER — COMMUNICATION - HEALTHEAST (OUTPATIENT)
Dept: SCHEDULING | Facility: CLINIC | Age: 69
End: 2019-04-22

## 2019-04-22 DIAGNOSIS — R11.0 NAUSEA: ICD-10-CM

## 2019-04-24 ENCOUNTER — COMMUNICATION - HEALTHEAST (OUTPATIENT)
Dept: INTERNAL MEDICINE | Facility: CLINIC | Age: 69
End: 2019-04-24

## 2019-04-24 DIAGNOSIS — I26.99 OTHER PULMONARY EMBOLISM WITHOUT ACUTE COR PULMONALE, UNSPECIFIED CHRONICITY (H): ICD-10-CM

## 2019-04-25 ENCOUNTER — COMMUNICATION - HEALTHEAST (OUTPATIENT)
Dept: INTERNAL MEDICINE | Facility: CLINIC | Age: 69
End: 2019-04-25

## 2019-04-29 ENCOUNTER — RECORDS - HEALTHEAST (OUTPATIENT)
Dept: ADMINISTRATIVE | Facility: OTHER | Age: 69
End: 2019-04-29

## 2019-05-01 ENCOUNTER — RECORDS - HEALTHEAST (OUTPATIENT)
Dept: ADMINISTRATIVE | Facility: OTHER | Age: 69
End: 2019-05-01

## 2019-05-06 ENCOUNTER — RECORDS - HEALTHEAST (OUTPATIENT)
Dept: ADMINISTRATIVE | Facility: OTHER | Age: 69
End: 2019-05-06

## 2019-05-06 ENCOUNTER — COMMUNICATION - HEALTHEAST (OUTPATIENT)
Dept: INTERNAL MEDICINE | Facility: CLINIC | Age: 69
End: 2019-05-06

## 2019-05-06 DIAGNOSIS — M79.7 FIBROMYALGIA: ICD-10-CM

## 2019-05-07 ENCOUNTER — HOME INFUSION (PRE-WILLOW HOME INFUSION) (OUTPATIENT)
Dept: PHARMACY | Facility: CLINIC | Age: 69
End: 2019-05-07

## 2019-05-07 ENCOUNTER — AMBULATORY - HEALTHEAST (OUTPATIENT)
Dept: SCHEDULING | Facility: CLINIC | Age: 69
End: 2019-05-07

## 2019-05-07 DIAGNOSIS — Z71.89 OSTOMY NURSE CONSULTATION: ICD-10-CM

## 2019-05-09 ENCOUNTER — OFFICE VISIT - HEALTHEAST (OUTPATIENT)
Dept: WOUND CARE | Facility: HOSPITAL | Age: 69
End: 2019-05-09

## 2019-05-09 DIAGNOSIS — Z71.89 OSTOMY NURSE CONSULTATION: ICD-10-CM

## 2019-05-13 ENCOUNTER — RECORDS - HEALTHEAST (OUTPATIENT)
Dept: ADMINISTRATIVE | Facility: OTHER | Age: 69
End: 2019-05-13

## 2019-05-14 ENCOUNTER — RECORDS - HEALTHEAST (OUTPATIENT)
Dept: ADMINISTRATIVE | Facility: OTHER | Age: 69
End: 2019-05-14

## 2019-05-14 NOTE — PROGRESS NOTES
This is a recent snapshot of the patient's [facility] medical record.  For current drug dose and complete information and questions, call [phone] or In Basket pool, fv home infusion (03194)  CSN Number:  [id number]

## 2019-05-21 ENCOUNTER — COMMUNICATION - HEALTHEAST (OUTPATIENT)
Dept: INTERNAL MEDICINE | Facility: CLINIC | Age: 69
End: 2019-05-21

## 2019-05-21 DIAGNOSIS — E86.0 DEHYDRATION: ICD-10-CM

## 2019-05-21 DIAGNOSIS — M79.7 FIBROMYALGIA: ICD-10-CM

## 2019-05-25 ENCOUNTER — COMMUNICATION - HEALTHEAST (OUTPATIENT)
Dept: INTERNAL MEDICINE | Facility: CLINIC | Age: 69
End: 2019-05-25

## 2019-05-25 DIAGNOSIS — I26.99 OTHER PULMONARY EMBOLISM WITHOUT ACUTE COR PULMONALE, UNSPECIFIED CHRONICITY (H): ICD-10-CM

## 2019-06-05 ENCOUNTER — COMMUNICATION - HEALTHEAST (OUTPATIENT)
Dept: INTERNAL MEDICINE | Facility: CLINIC | Age: 69
End: 2019-06-05

## 2019-06-05 ENCOUNTER — RECORDS - HEALTHEAST (OUTPATIENT)
Dept: ADMINISTRATIVE | Facility: OTHER | Age: 69
End: 2019-06-05

## 2019-06-12 ENCOUNTER — HOME INFUSION (PRE-WILLOW HOME INFUSION) (OUTPATIENT)
Dept: PHARMACY | Facility: CLINIC | Age: 69
End: 2019-06-12

## 2019-06-13 NOTE — PROGRESS NOTES
This is a recent snapshot of the patient's [facility] medical record.  For current drug dose and complete information and questions, call [phone] or In [facility] pool, fv home infusion (13548)  CSN Number:  [id number]

## 2019-06-17 ENCOUNTER — COMMUNICATION - HEALTHEAST (OUTPATIENT)
Dept: INTERNAL MEDICINE | Facility: CLINIC | Age: 69
End: 2019-06-17

## 2019-06-17 ENCOUNTER — RECORDS - HEALTHEAST (OUTPATIENT)
Dept: ADMINISTRATIVE | Facility: OTHER | Age: 69
End: 2019-06-17

## 2019-06-17 DIAGNOSIS — M79.7 FIBROMYALGIA: ICD-10-CM

## 2019-06-27 ENCOUNTER — COMMUNICATION - HEALTHEAST (OUTPATIENT)
Dept: INTERNAL MEDICINE | Facility: CLINIC | Age: 69
End: 2019-06-27

## 2019-06-27 DIAGNOSIS — I26.99 OTHER PULMONARY EMBOLISM WITHOUT ACUTE COR PULMONALE, UNSPECIFIED CHRONICITY (H): ICD-10-CM

## 2019-07-01 ENCOUNTER — COMMUNICATION - HEALTHEAST (OUTPATIENT)
Dept: INTERNAL MEDICINE | Facility: CLINIC | Age: 69
End: 2019-07-01

## 2019-07-01 DIAGNOSIS — N30.20 CHRONIC CYSTITIS: ICD-10-CM

## 2019-07-10 ENCOUNTER — HOME INFUSION (PRE-WILLOW HOME INFUSION) (OUTPATIENT)
Dept: PHARMACY | Facility: CLINIC | Age: 69
End: 2019-07-10

## 2019-07-11 NOTE — PROGRESS NOTES
This is a recent snapshot of the patient's [facility] medical record.  For current drug dose and complete information and questions, call [phone] or In Basket pool, fv home infusion (69220)  CSN Number:  [id number]

## 2019-07-18 ENCOUNTER — COMMUNICATION - HEALTHEAST (OUTPATIENT)
Dept: INTERNAL MEDICINE | Facility: CLINIC | Age: 69
End: 2019-07-18

## 2019-07-18 DIAGNOSIS — M79.7 FIBROMYALGIA: ICD-10-CM

## 2019-07-26 ENCOUNTER — COMMUNICATION - HEALTHEAST (OUTPATIENT)
Dept: INTERNAL MEDICINE | Facility: CLINIC | Age: 69
End: 2019-07-26

## 2019-07-26 DIAGNOSIS — I26.99 OTHER PULMONARY EMBOLISM WITHOUT ACUTE COR PULMONALE, UNSPECIFIED CHRONICITY (H): ICD-10-CM

## 2019-07-31 ENCOUNTER — RECORDS - HEALTHEAST (OUTPATIENT)
Dept: ADMINISTRATIVE | Facility: OTHER | Age: 69
End: 2019-07-31

## 2019-08-05 ENCOUNTER — COMMUNICATION - HEALTHEAST (OUTPATIENT)
Dept: INTERNAL MEDICINE | Facility: CLINIC | Age: 69
End: 2019-08-05

## 2019-08-07 ENCOUNTER — HOME INFUSION (PRE-WILLOW HOME INFUSION) (OUTPATIENT)
Dept: PHARMACY | Facility: CLINIC | Age: 69
End: 2019-08-07

## 2019-08-08 ENCOUNTER — RECORDS - HEALTHEAST (OUTPATIENT)
Dept: ADMINISTRATIVE | Facility: OTHER | Age: 69
End: 2019-08-08

## 2019-08-09 NOTE — PROGRESS NOTES
This is a recent snapshot of the patient's [facility] medical record.  For current drug dose and complete information and questions, call [phone] or In [facility] pool, fv home infusion (62624)  CSN Number:  [id number]

## 2019-08-12 ENCOUNTER — RECORDS - HEALTHEAST (OUTPATIENT)
Dept: ADMINISTRATIVE | Facility: OTHER | Age: 69
End: 2019-08-12

## 2019-08-18 ENCOUNTER — COMMUNICATION - HEALTHEAST (OUTPATIENT)
Dept: INTERNAL MEDICINE | Facility: CLINIC | Age: 69
End: 2019-08-18

## 2019-08-18 DIAGNOSIS — E86.0 DEHYDRATION: ICD-10-CM

## 2019-08-20 ENCOUNTER — RECORDS - HEALTHEAST (OUTPATIENT)
Dept: ADMINISTRATIVE | Facility: OTHER | Age: 69
End: 2019-08-20

## 2019-08-20 ENCOUNTER — COMMUNICATION - HEALTHEAST (OUTPATIENT)
Dept: INTERNAL MEDICINE | Facility: CLINIC | Age: 69
End: 2019-08-20

## 2019-08-20 DIAGNOSIS — M79.7 FIBROMYALGIA: ICD-10-CM

## 2019-08-22 ENCOUNTER — RECORDS - HEALTHEAST (OUTPATIENT)
Dept: ADMINISTRATIVE | Facility: OTHER | Age: 69
End: 2019-08-22

## 2019-08-22 ENCOUNTER — COMMUNICATION - HEALTHEAST (OUTPATIENT)
Dept: INTERNAL MEDICINE | Facility: CLINIC | Age: 69
End: 2019-08-22

## 2019-08-22 DIAGNOSIS — M79.7 FIBROMYALGIA: ICD-10-CM

## 2019-08-27 ENCOUNTER — COMMUNICATION - HEALTHEAST (OUTPATIENT)
Dept: INTERNAL MEDICINE | Facility: CLINIC | Age: 69
End: 2019-08-27

## 2019-08-27 DIAGNOSIS — I26.99 OTHER PULMONARY EMBOLISM WITHOUT ACUTE COR PULMONALE, UNSPECIFIED CHRONICITY (H): ICD-10-CM

## 2019-08-28 ENCOUNTER — RECORDS - HEALTHEAST (OUTPATIENT)
Dept: ADMINISTRATIVE | Facility: OTHER | Age: 69
End: 2019-08-28

## 2019-08-29 ENCOUNTER — HOME INFUSION (PRE-WILLOW HOME INFUSION) (OUTPATIENT)
Dept: PHARMACY | Facility: CLINIC | Age: 69
End: 2019-08-29

## 2019-08-30 NOTE — PROGRESS NOTES
This is a recent snapshot of the patient's [facility] medical record.  For current drug dose and complete information and questions, call [phone] or In [facility] pool, fv home infusion (29305)  CSN Number:  [id number]

## 2019-09-16 ENCOUNTER — COMMUNICATION - HEALTHEAST (OUTPATIENT)
Dept: INTERNAL MEDICINE | Facility: CLINIC | Age: 69
End: 2019-09-16

## 2019-09-16 DIAGNOSIS — M79.7 FIBROMYALGIA: ICD-10-CM

## 2019-09-25 ENCOUNTER — RECORDS - HEALTHEAST (OUTPATIENT)
Dept: ADMINISTRATIVE | Facility: OTHER | Age: 69
End: 2019-09-25

## 2019-09-26 ENCOUNTER — HOME INFUSION (PRE-WILLOW HOME INFUSION) (OUTPATIENT)
Dept: PHARMACY | Facility: CLINIC | Age: 69
End: 2019-09-26

## 2019-09-27 NOTE — PROGRESS NOTES
This is a recent snapshot of the patient's [facility] medical record.  For current drug dose and complete information and questions, call [phone] or In [facility] pool, fv home infusion (39112)  CSN Number:  [id number]

## 2019-09-29 ENCOUNTER — COMMUNICATION - HEALTHEAST (OUTPATIENT)
Dept: INTERNAL MEDICINE | Facility: CLINIC | Age: 69
End: 2019-09-29

## 2019-09-29 DIAGNOSIS — N30.20 CHRONIC CYSTITIS: ICD-10-CM

## 2019-09-29 DIAGNOSIS — I26.99 OTHER PULMONARY EMBOLISM WITHOUT ACUTE COR PULMONALE, UNSPECIFIED CHRONICITY (H): ICD-10-CM

## 2019-10-02 ENCOUNTER — COMMUNICATION - HEALTHEAST (OUTPATIENT)
Dept: INTERNAL MEDICINE | Facility: CLINIC | Age: 69
End: 2019-10-02

## 2019-10-07 ENCOUNTER — RECORDS - HEALTHEAST (OUTPATIENT)
Dept: ADMINISTRATIVE | Facility: OTHER | Age: 69
End: 2019-10-07

## 2019-10-08 ENCOUNTER — RECORDS - HEALTHEAST (OUTPATIENT)
Dept: ADMINISTRATIVE | Facility: OTHER | Age: 69
End: 2019-10-08

## 2019-10-11 ENCOUNTER — COMMUNICATION - HEALTHEAST (OUTPATIENT)
Dept: INTERNAL MEDICINE | Facility: CLINIC | Age: 69
End: 2019-10-11

## 2019-10-11 DIAGNOSIS — E53.8 VITAMIN B12 DEFICIENCY: ICD-10-CM

## 2019-10-14 ENCOUNTER — COMMUNICATION - HEALTHEAST (OUTPATIENT)
Dept: INTERNAL MEDICINE | Facility: CLINIC | Age: 69
End: 2019-10-14

## 2019-10-14 ENCOUNTER — COMMUNICATION - HEALTHEAST (OUTPATIENT)
Dept: SCHEDULING | Facility: CLINIC | Age: 69
End: 2019-10-14

## 2019-10-14 DIAGNOSIS — M79.7 FIBROMYALGIA: ICD-10-CM

## 2019-10-15 ENCOUNTER — OFFICE VISIT - HEALTHEAST (OUTPATIENT)
Dept: OTOLARYNGOLOGY | Facility: CLINIC | Age: 69
End: 2019-10-15

## 2019-10-15 DIAGNOSIS — H61.22 IMPACTED CERUMEN OF LEFT EAR: ICD-10-CM

## 2019-10-22 ENCOUNTER — COMMUNICATION - HEALTHEAST (OUTPATIENT)
Dept: INTERNAL MEDICINE | Facility: CLINIC | Age: 69
End: 2019-10-22

## 2019-10-22 DIAGNOSIS — F41.9 ANXIETY AND DEPRESSION: ICD-10-CM

## 2019-10-22 DIAGNOSIS — F32.A ANXIETY AND DEPRESSION: ICD-10-CM

## 2019-10-23 ENCOUNTER — RECORDS - HEALTHEAST (OUTPATIENT)
Dept: ADMINISTRATIVE | Facility: OTHER | Age: 69
End: 2019-10-23

## 2019-10-28 ENCOUNTER — HOME INFUSION (PRE-WILLOW HOME INFUSION) (OUTPATIENT)
Dept: PHARMACY | Facility: CLINIC | Age: 69
End: 2019-10-28

## 2019-10-31 NOTE — PROGRESS NOTES
This is a recent snapshot of the patient's [facility] medical record.  For current drug dose and complete information and questions, call [phone] or In Basket pool, fv home infusion (89140)  CSN Number:  [id number]

## 2019-11-01 ENCOUNTER — COMMUNICATION - HEALTHEAST (OUTPATIENT)
Dept: INTERNAL MEDICINE | Facility: CLINIC | Age: 69
End: 2019-11-01

## 2019-11-01 DIAGNOSIS — I26.99 OTHER PULMONARY EMBOLISM WITHOUT ACUTE COR PULMONALE, UNSPECIFIED CHRONICITY (H): ICD-10-CM

## 2019-11-06 ENCOUNTER — COMMUNICATION - HEALTHEAST (OUTPATIENT)
Dept: SCHEDULING | Facility: CLINIC | Age: 69
End: 2019-11-06

## 2019-11-12 ENCOUNTER — COMMUNICATION - HEALTHEAST (OUTPATIENT)
Dept: SCHEDULING | Facility: CLINIC | Age: 69
End: 2019-11-12

## 2019-11-13 ENCOUNTER — COMMUNICATION - HEALTHEAST (OUTPATIENT)
Dept: INTERNAL MEDICINE | Facility: CLINIC | Age: 69
End: 2019-11-13

## 2019-11-13 DIAGNOSIS — M79.7 FIBROMYALGIA: ICD-10-CM

## 2019-11-15 ENCOUNTER — OFFICE VISIT - HEALTHEAST (OUTPATIENT)
Dept: INTERNAL MEDICINE | Facility: CLINIC | Age: 69
End: 2019-11-15

## 2019-11-15 DIAGNOSIS — I10 BENIGN ESSENTIAL HYPERTENSION: ICD-10-CM

## 2019-11-16 ENCOUNTER — COMMUNICATION - HEALTHEAST (OUTPATIENT)
Dept: INTERNAL MEDICINE | Facility: CLINIC | Age: 69
End: 2019-11-16

## 2019-11-16 DIAGNOSIS — R10.9 ABDOMINAL PAIN: ICD-10-CM

## 2019-11-18 ENCOUNTER — COMMUNICATION - HEALTHEAST (OUTPATIENT)
Dept: INTERNAL MEDICINE | Facility: CLINIC | Age: 69
End: 2019-11-18

## 2019-11-18 DIAGNOSIS — Z90.49 S/P TOTAL COLECTOMY: ICD-10-CM

## 2019-11-19 ENCOUNTER — RECORDS - HEALTHEAST (OUTPATIENT)
Dept: ADMINISTRATIVE | Facility: OTHER | Age: 69
End: 2019-11-19

## 2019-11-20 ENCOUNTER — RECORDS - HEALTHEAST (OUTPATIENT)
Dept: LAB | Facility: HOSPITAL | Age: 69
End: 2019-11-20

## 2019-11-20 ENCOUNTER — AMBULATORY - HEALTHEAST (OUTPATIENT)
Dept: OTHER | Facility: CLINIC | Age: 69
End: 2019-11-20

## 2019-11-20 ENCOUNTER — DOCUMENTATION ONLY (OUTPATIENT)
Dept: OTHER | Facility: CLINIC | Age: 69
End: 2019-11-20

## 2019-11-20 LAB
CREAT SERPL-MCNC: 1.02 MG/DL (ref 0.6–1.1)
GFR SERPL CREATININE-BSD FRML MDRD: 54 ML/MIN/1.73M2
MAGNESIUM SERPL-MCNC: 1.9 MG/DL (ref 1.8–2.6)

## 2019-11-21 ENCOUNTER — COMMUNICATION - HEALTHEAST (OUTPATIENT)
Dept: INTERNAL MEDICINE | Facility: CLINIC | Age: 69
End: 2019-11-21

## 2019-11-21 DIAGNOSIS — E86.0 DEHYDRATION: ICD-10-CM

## 2019-11-25 ENCOUNTER — HOME INFUSION (PRE-WILLOW HOME INFUSION) (OUTPATIENT)
Dept: PHARMACY | Facility: CLINIC | Age: 69
End: 2019-11-25

## 2019-11-25 ENCOUNTER — RECORDS - HEALTHEAST (OUTPATIENT)
Dept: ADMINISTRATIVE | Facility: OTHER | Age: 69
End: 2019-11-25

## 2019-11-25 ENCOUNTER — OFFICE VISIT - HEALTHEAST (OUTPATIENT)
Dept: INTERNAL MEDICINE | Facility: CLINIC | Age: 69
End: 2019-11-25

## 2019-11-25 DIAGNOSIS — M79.7 FIBROMYALGIA: ICD-10-CM

## 2019-11-25 DIAGNOSIS — N18.2 STAGE 2 CHRONIC KIDNEY DISEASE: ICD-10-CM

## 2019-11-25 DIAGNOSIS — E86.0 DEHYDRATION: ICD-10-CM

## 2019-11-25 DIAGNOSIS — I10 BENIGN ESSENTIAL HYPERTENSION: ICD-10-CM

## 2019-11-26 NOTE — PROGRESS NOTES
This is a recent snapshot of the patient's [facility] medical record.  For current drug dose and complete information and questions, call [phone] or In Basket pool, fv home infusion (17236)  CSN Number:  [id number]

## 2019-12-04 ENCOUNTER — COMMUNICATION - HEALTHEAST (OUTPATIENT)
Dept: INTERNAL MEDICINE | Facility: CLINIC | Age: 69
End: 2019-12-04

## 2019-12-04 DIAGNOSIS — I26.99 OTHER PULMONARY EMBOLISM WITHOUT ACUTE COR PULMONALE, UNSPECIFIED CHRONICITY (H): ICD-10-CM

## 2019-12-10 ENCOUNTER — COMMUNICATION - HEALTHEAST (OUTPATIENT)
Dept: INTERNAL MEDICINE | Facility: CLINIC | Age: 69
End: 2019-12-10

## 2019-12-10 ENCOUNTER — RECORDS - HEALTHEAST (OUTPATIENT)
Dept: ADMINISTRATIVE | Facility: OTHER | Age: 69
End: 2019-12-10

## 2019-12-12 ENCOUNTER — COMMUNICATION - HEALTHEAST (OUTPATIENT)
Dept: INTERNAL MEDICINE | Facility: CLINIC | Age: 69
End: 2019-12-12

## 2019-12-12 DIAGNOSIS — I10 BENIGN ESSENTIAL HYPERTENSION: ICD-10-CM

## 2019-12-13 ENCOUNTER — COMMUNICATION - HEALTHEAST (OUTPATIENT)
Dept: INTERNAL MEDICINE | Facility: CLINIC | Age: 69
End: 2019-12-13

## 2019-12-13 DIAGNOSIS — M79.7 FIBROMYALGIA: ICD-10-CM

## 2019-12-17 ENCOUNTER — OFFICE VISIT - HEALTHEAST (OUTPATIENT)
Dept: INTERNAL MEDICINE | Facility: CLINIC | Age: 69
End: 2019-12-17

## 2019-12-17 DIAGNOSIS — K62.89 CHRONIC RECTAL PAIN: ICD-10-CM

## 2019-12-17 DIAGNOSIS — G89.29 CHRONIC RECTAL PAIN: ICD-10-CM

## 2019-12-17 DIAGNOSIS — M79.7 FIBROMYALGIA: ICD-10-CM

## 2019-12-17 DIAGNOSIS — I10 BENIGN ESSENTIAL HYPERTENSION: ICD-10-CM

## 2019-12-18 ENCOUNTER — RECORDS - HEALTHEAST (OUTPATIENT)
Dept: LAB | Facility: HOSPITAL | Age: 69
End: 2019-12-18

## 2019-12-18 LAB
ALBUMIN SERPL-MCNC: 3.4 G/DL (ref 3.5–5)
ALP SERPL-CCNC: 166 U/L (ref 45–120)
ALT SERPL W P-5'-P-CCNC: 37 U/L (ref 0–45)
ANION GAP SERPL CALCULATED.3IONS-SCNC: 6 MMOL/L (ref 5–18)
AST SERPL W P-5'-P-CCNC: 29 U/L (ref 0–40)
BILIRUB SERPL-MCNC: 0.3 MG/DL (ref 0–1)
BUN SERPL-MCNC: 31 MG/DL (ref 8–22)
CALCIUM SERPL-MCNC: 8.4 MG/DL (ref 8.5–10.5)
CHLORIDE BLD-SCNC: 112 MMOL/L (ref 98–107)
CO2 SERPL-SCNC: 21 MMOL/L (ref 22–31)
CREAT SERPL-MCNC: 1.08 MG/DL (ref 0.6–1.1)
GFR SERPL CREATININE-BSD FRML MDRD: 50 ML/MIN/1.73M2
GLUCOSE BLD-MCNC: 98 MG/DL (ref 70–125)
POTASSIUM BLD-SCNC: 5 MMOL/L (ref 3.5–5)
PROT SERPL-MCNC: 6.8 G/DL (ref 6–8)
SODIUM SERPL-SCNC: 139 MMOL/L (ref 136–145)

## 2019-12-19 ENCOUNTER — RECORDS - HEALTHEAST (OUTPATIENT)
Dept: ADMINISTRATIVE | Facility: OTHER | Age: 69
End: 2019-12-19

## 2019-12-24 ENCOUNTER — RECORDS - HEALTHEAST (OUTPATIENT)
Dept: LAB | Facility: CLINIC | Age: 69
End: 2019-12-24

## 2019-12-24 ENCOUNTER — HOME INFUSION (PRE-WILLOW HOME INFUSION) (OUTPATIENT)
Dept: PHARMACY | Facility: CLINIC | Age: 69
End: 2019-12-24

## 2019-12-24 LAB — MAGNESIUM SERPL-MCNC: 2.1 MG/DL (ref 1.8–2.6)

## 2019-12-26 ENCOUNTER — HOME INFUSION (PRE-WILLOW HOME INFUSION) (OUTPATIENT)
Dept: PHARMACY | Facility: CLINIC | Age: 69
End: 2019-12-26

## 2019-12-26 NOTE — PROGRESS NOTES
This is a recent snapshot of the patient's [facility] medical record.  For current drug dose and complete information and questions, call [phone] or In Basket pool, fv home infusion (36911)  CSN Number:  [id number]

## 2019-12-30 ENCOUNTER — COMMUNICATION - HEALTHEAST (OUTPATIENT)
Dept: INTERNAL MEDICINE | Facility: CLINIC | Age: 69
End: 2019-12-30

## 2019-12-30 DIAGNOSIS — N30.20 CHRONIC CYSTITIS: ICD-10-CM

## 2019-12-30 NOTE — PROGRESS NOTES
This is a recent snapshot of the patient's [facility] medical record.  For current drug dose and complete information and questions, call [phone] or In Basket pool, fv home infusion (45244)  CSN Number:  [id number]

## 2020-01-07 ENCOUNTER — COMMUNICATION - HEALTHEAST (OUTPATIENT)
Dept: INTERNAL MEDICINE | Facility: CLINIC | Age: 70
End: 2020-01-07

## 2020-01-07 DIAGNOSIS — E86.0 DEHYDRATION: ICD-10-CM

## 2020-01-08 ENCOUNTER — COMMUNICATION - HEALTHEAST (OUTPATIENT)
Dept: INTERNAL MEDICINE | Facility: CLINIC | Age: 70
End: 2020-01-08

## 2020-01-08 DIAGNOSIS — I26.99 OTHER PULMONARY EMBOLISM WITHOUT ACUTE COR PULMONALE, UNSPECIFIED CHRONICITY (H): ICD-10-CM

## 2020-01-09 ENCOUNTER — COMMUNICATION - HEALTHEAST (OUTPATIENT)
Dept: INTERNAL MEDICINE | Facility: CLINIC | Age: 70
End: 2020-01-09

## 2020-01-09 ENCOUNTER — RECORDS - HEALTHEAST (OUTPATIENT)
Dept: ADMINISTRATIVE | Facility: OTHER | Age: 70
End: 2020-01-09

## 2020-01-10 ENCOUNTER — COMMUNICATION - HEALTHEAST (OUTPATIENT)
Dept: INTERNAL MEDICINE | Facility: CLINIC | Age: 70
End: 2020-01-10

## 2020-01-14 ENCOUNTER — RECORDS - HEALTHEAST (OUTPATIENT)
Dept: LAB | Facility: CLINIC | Age: 70
End: 2020-01-14

## 2020-01-14 LAB
CREAT SERPL-MCNC: 1.32 MG/DL (ref 0.6–1.1)
GFR SERPL CREATININE-BSD FRML MDRD: 40 ML/MIN/1.73M2
MAGNESIUM SERPL-MCNC: 2.1 MG/DL (ref 1.8–2.6)

## 2020-01-15 ENCOUNTER — RECORDS - HEALTHEAST (OUTPATIENT)
Dept: ADMINISTRATIVE | Facility: OTHER | Age: 70
End: 2020-01-15

## 2020-01-15 ENCOUNTER — COMMUNICATION - HEALTHEAST (OUTPATIENT)
Dept: INTERNAL MEDICINE | Facility: CLINIC | Age: 70
End: 2020-01-15

## 2020-01-15 DIAGNOSIS — I10 BENIGN ESSENTIAL HYPERTENSION: ICD-10-CM

## 2020-01-16 ENCOUNTER — HOME INFUSION (PRE-WILLOW HOME INFUSION) (OUTPATIENT)
Dept: PHARMACY | Facility: CLINIC | Age: 70
End: 2020-01-16

## 2020-01-17 NOTE — PROGRESS NOTES
This is a recent snapshot of the patient's [facility] medical record.  For current drug dose and complete information and questions, call [phone] or In Basket pool, fv home infusion (84546)  CSN Number:  [id number]

## 2020-01-20 ENCOUNTER — COMMUNICATION - HEALTHEAST (OUTPATIENT)
Dept: INTERNAL MEDICINE | Facility: CLINIC | Age: 70
End: 2020-01-20

## 2020-01-20 DIAGNOSIS — M79.7 FIBROMYALGIA: ICD-10-CM

## 2020-01-20 DIAGNOSIS — E53.8 VITAMIN B12 DEFICIENCY: ICD-10-CM

## 2020-01-23 ENCOUNTER — HOME INFUSION (PRE-WILLOW HOME INFUSION) (OUTPATIENT)
Dept: PHARMACY | Facility: CLINIC | Age: 70
End: 2020-01-23

## 2020-01-24 NOTE — PROGRESS NOTES
This is a recent snapshot of the patient's [facility] medical record.  For current drug dose and complete information and questions, call [phone] or In Basket pool, fv home infusion (14198)  CSN Number:  [id number]

## 2020-01-28 ENCOUNTER — COMMUNICATION - HEALTHEAST (OUTPATIENT)
Dept: INTERNAL MEDICINE | Facility: CLINIC | Age: 70
End: 2020-01-28

## 2020-01-28 ENCOUNTER — RECORDS - HEALTHEAST (OUTPATIENT)
Dept: ADMINISTRATIVE | Facility: OTHER | Age: 70
End: 2020-01-28

## 2020-01-30 ENCOUNTER — RECORDS - HEALTHEAST (OUTPATIENT)
Dept: ADMINISTRATIVE | Facility: OTHER | Age: 70
End: 2020-01-30

## 2020-02-04 ENCOUNTER — COMMUNICATION - HEALTHEAST (OUTPATIENT)
Dept: INTERNAL MEDICINE | Facility: CLINIC | Age: 70
End: 2020-02-04

## 2020-02-04 DIAGNOSIS — M79.7 FIBROMYALGIA: ICD-10-CM

## 2020-02-11 ENCOUNTER — RECORDS - HEALTHEAST (OUTPATIENT)
Dept: ADMINISTRATIVE | Facility: OTHER | Age: 70
End: 2020-02-11

## 2020-02-12 ENCOUNTER — RECORDS - HEALTHEAST (OUTPATIENT)
Dept: LAB | Facility: HOSPITAL | Age: 70
End: 2020-02-12

## 2020-02-12 ENCOUNTER — RECORDS - HEALTHEAST (OUTPATIENT)
Dept: ADMINISTRATIVE | Facility: OTHER | Age: 70
End: 2020-02-12

## 2020-02-12 ENCOUNTER — COMMUNICATION - HEALTHEAST (OUTPATIENT)
Dept: INTERNAL MEDICINE | Facility: CLINIC | Age: 70
End: 2020-02-12

## 2020-02-12 DIAGNOSIS — F32.A DEPRESSION: ICD-10-CM

## 2020-02-12 LAB
CREAT SERPL-MCNC: 1.2 MG/DL (ref 0.6–1.1)
GFR SERPL CREATININE-BSD FRML MDRD: 44 ML/MIN/1.73M2
MAGNESIUM SERPL-MCNC: 2.3 MG/DL (ref 1.8–2.6)

## 2020-02-13 ENCOUNTER — HOME INFUSION (PRE-WILLOW HOME INFUSION) (OUTPATIENT)
Dept: PHARMACY | Facility: CLINIC | Age: 70
End: 2020-02-13

## 2020-02-14 ENCOUNTER — HOSPITAL ENCOUNTER (OUTPATIENT)
Dept: PALLIATIVE MEDICINE | Facility: OTHER | Age: 70
Discharge: HOME | End: 2020-02-14
Attending: INTERNAL MEDICINE

## 2020-02-14 DIAGNOSIS — Z90.49 S/P TOTAL COLECTOMY: ICD-10-CM

## 2020-02-14 DIAGNOSIS — T78.40XS HYPERSENSITIVITY, SEQUELA: ICD-10-CM

## 2020-02-14 ASSESSMENT — MIFFLIN-ST. JEOR: SCORE: 1010.87

## 2020-02-14 NOTE — PROGRESS NOTES
This is a recent snapshot of the patient's [facility] medical record.  For current drug dose and complete information and questions, call [phone] or In Basket pool, fv home infusion (98087)  CSN Number:  [id number]

## 2020-02-15 ENCOUNTER — COMMUNICATION - HEALTHEAST (OUTPATIENT)
Dept: INTERNAL MEDICINE | Facility: CLINIC | Age: 70
End: 2020-02-15

## 2020-02-15 DIAGNOSIS — R10.9 ABDOMINAL PAIN: ICD-10-CM

## 2020-02-15 DIAGNOSIS — F41.9 ANXIETY AND DEPRESSION: ICD-10-CM

## 2020-02-15 DIAGNOSIS — F32.A ANXIETY AND DEPRESSION: ICD-10-CM

## 2020-02-17 ENCOUNTER — RECORDS - HEALTHEAST (OUTPATIENT)
Dept: ADMINISTRATIVE | Facility: OTHER | Age: 70
End: 2020-02-17

## 2020-02-18 ENCOUNTER — COMMUNICATION - HEALTHEAST (OUTPATIENT)
Dept: INTERNAL MEDICINE | Facility: CLINIC | Age: 70
End: 2020-02-18

## 2020-02-18 DIAGNOSIS — M79.7 FIBROMYALGIA: ICD-10-CM

## 2020-02-21 ENCOUNTER — COMMUNICATION - HEALTHEAST (OUTPATIENT)
Dept: INTERNAL MEDICINE | Facility: CLINIC | Age: 70
End: 2020-02-21

## 2020-02-21 DIAGNOSIS — I26.99 OTHER PULMONARY EMBOLISM WITHOUT ACUTE COR PULMONALE, UNSPECIFIED CHRONICITY (H): ICD-10-CM

## 2020-02-22 ENCOUNTER — COMMUNICATION - HEALTHEAST (OUTPATIENT)
Dept: INTERNAL MEDICINE | Facility: CLINIC | Age: 70
End: 2020-02-22

## 2020-02-22 DIAGNOSIS — E86.0 DEHYDRATION: ICD-10-CM

## 2020-02-24 ENCOUNTER — COMMUNICATION - HEALTHEAST (OUTPATIENT)
Dept: INTERNAL MEDICINE | Facility: CLINIC | Age: 70
End: 2020-02-24

## 2020-02-24 DIAGNOSIS — E86.0 DEHYDRATION: ICD-10-CM

## 2020-03-02 ENCOUNTER — COMMUNICATION - HEALTHEAST (OUTPATIENT)
Dept: PALLIATIVE MEDICINE | Facility: OTHER | Age: 70
End: 2020-03-02

## 2020-03-03 ENCOUNTER — HOSPITAL ENCOUNTER (OUTPATIENT)
Dept: PALLIATIVE MEDICINE | Facility: OTHER | Age: 70
Discharge: HOME | End: 2020-03-03
Attending: PAIN MEDICINE | Admitting: PAIN MEDICINE

## 2020-03-03 ENCOUNTER — COMMUNICATION - HEALTHEAST (OUTPATIENT)
Dept: INTERNAL MEDICINE | Facility: CLINIC | Age: 70
End: 2020-03-03

## 2020-03-03 DIAGNOSIS — M79.18 MYOFASCIAL PAIN SYNDROME: ICD-10-CM

## 2020-03-03 ASSESSMENT — MIFFLIN-ST. JEOR: SCORE: 998.96

## 2020-03-10 ENCOUNTER — COMMUNICATION - HEALTHEAST (OUTPATIENT)
Dept: INTERNAL MEDICINE | Facility: CLINIC | Age: 70
End: 2020-03-10

## 2020-03-10 ENCOUNTER — RECORDS - HEALTHEAST (OUTPATIENT)
Dept: ADMINISTRATIVE | Facility: OTHER | Age: 70
End: 2020-03-10

## 2020-03-10 ENCOUNTER — RECORDS - HEALTHEAST (OUTPATIENT)
Dept: LAB | Facility: HOSPITAL | Age: 70
End: 2020-03-10

## 2020-03-10 LAB
CREAT SERPL-MCNC: 1.24 MG/DL (ref 0.6–1.1)
GFR SERPL CREATININE-BSD FRML MDRD: 43 ML/MIN/1.73M2
MAGNESIUM SERPL-MCNC: 2.2 MG/DL (ref 1.8–2.6)

## 2020-03-13 ENCOUNTER — RECORDS - HEALTHEAST (OUTPATIENT)
Dept: ADMINISTRATIVE | Facility: OTHER | Age: 70
End: 2020-03-13

## 2020-03-16 ENCOUNTER — HOME INFUSION (PRE-WILLOW HOME INFUSION) (OUTPATIENT)
Dept: PHARMACY | Facility: CLINIC | Age: 70
End: 2020-03-16

## 2020-03-16 ENCOUNTER — COMMUNICATION - HEALTHEAST (OUTPATIENT)
Dept: INTERNAL MEDICINE | Facility: CLINIC | Age: 70
End: 2020-03-16

## 2020-03-16 DIAGNOSIS — M79.7 FIBROMYALGIA: ICD-10-CM

## 2020-03-17 ENCOUNTER — COMMUNICATION - HEALTHEAST (OUTPATIENT)
Dept: INTERNAL MEDICINE | Facility: CLINIC | Age: 70
End: 2020-03-17

## 2020-03-17 NOTE — PROGRESS NOTES
This is a recent snapshot of the patient's [facility] medical record.  For current drug dose and complete information and questions, call [phone] or In Basket pool, fv home infusion (10706)  CSN Number:  [id number]

## 2020-03-18 ENCOUNTER — RECORDS - HEALTHEAST (OUTPATIENT)
Dept: ADMINISTRATIVE | Facility: OTHER | Age: 70
End: 2020-03-18

## 2020-03-20 ENCOUNTER — RECORDS - HEALTHEAST (OUTPATIENT)
Dept: ADMINISTRATIVE | Facility: OTHER | Age: 70
End: 2020-03-20

## 2020-03-23 ENCOUNTER — COMMUNICATION - HEALTHEAST (OUTPATIENT)
Dept: INTERNAL MEDICINE | Facility: CLINIC | Age: 70
End: 2020-03-23

## 2020-03-23 DIAGNOSIS — N30.20 CHRONIC CYSTITIS: ICD-10-CM

## 2020-03-23 DIAGNOSIS — M79.7 FIBROMYALGIA: ICD-10-CM

## 2020-03-30 ENCOUNTER — COMMUNICATION - HEALTHEAST (OUTPATIENT)
Dept: INTERNAL MEDICINE | Facility: CLINIC | Age: 70
End: 2020-03-30

## 2020-03-30 DIAGNOSIS — I26.99 OTHER PULMONARY EMBOLISM WITHOUT ACUTE COR PULMONALE, UNSPECIFIED CHRONICITY (H): ICD-10-CM

## 2020-03-31 ENCOUNTER — COMMUNICATION - HEALTHEAST (OUTPATIENT)
Dept: INTERNAL MEDICINE | Facility: CLINIC | Age: 70
End: 2020-03-31

## 2020-04-06 ENCOUNTER — RECORDS - HEALTHEAST (OUTPATIENT)
Dept: ADMINISTRATIVE | Facility: OTHER | Age: 70
End: 2020-04-06

## 2020-04-07 ENCOUNTER — RECORDS - HEALTHEAST (OUTPATIENT)
Dept: ADMINISTRATIVE | Facility: OTHER | Age: 70
End: 2020-04-07

## 2020-04-07 ENCOUNTER — COMMUNICATION - HEALTHEAST (OUTPATIENT)
Dept: INTERNAL MEDICINE | Facility: CLINIC | Age: 70
End: 2020-04-07

## 2020-04-07 ENCOUNTER — RECORDS - HEALTHEAST (OUTPATIENT)
Dept: LAB | Facility: CLINIC | Age: 70
End: 2020-04-07

## 2020-04-07 LAB
CREAT SERPL-MCNC: 1.21 MG/DL (ref 0.6–1.1)
GFR SERPL CREATININE-BSD FRML MDRD: 44 ML/MIN/1.73M2
MAGNESIUM SERPL-MCNC: 2.2 MG/DL (ref 1.8–2.6)

## 2020-04-08 ENCOUNTER — COMMUNICATION - HEALTHEAST (OUTPATIENT)
Dept: INTERNAL MEDICINE | Facility: CLINIC | Age: 70
End: 2020-04-08

## 2020-04-08 ENCOUNTER — HOSPITAL ENCOUNTER (OUTPATIENT)
Dept: PALLIATIVE MEDICINE | Facility: OTHER | Age: 70
Discharge: HOME | End: 2020-04-08
Attending: NURSE PRACTITIONER

## 2020-04-08 DIAGNOSIS — E86.0 DEHYDRATION: ICD-10-CM

## 2020-04-08 DIAGNOSIS — G89.4 CHRONIC PAIN SYNDROME: ICD-10-CM

## 2020-04-10 ENCOUNTER — HOME INFUSION (PRE-WILLOW HOME INFUSION) (OUTPATIENT)
Dept: PHARMACY | Facility: CLINIC | Age: 70
End: 2020-04-10

## 2020-04-13 NOTE — PROGRESS NOTES
This is a recent snapshot of the patient's [facility] medical record.  For current drug dose and complete information and questions, call [phone] or In Basket pool, fv home infusion (72812)  CSN Number:  [id number]

## 2020-04-14 ENCOUNTER — COMMUNICATION - HEALTHEAST (OUTPATIENT)
Dept: INTERNAL MEDICINE | Facility: CLINIC | Age: 70
End: 2020-04-14

## 2020-04-15 ENCOUNTER — COMMUNICATION - HEALTHEAST (OUTPATIENT)
Dept: INTERNAL MEDICINE | Facility: CLINIC | Age: 70
End: 2020-04-15

## 2020-04-15 ENCOUNTER — RECORDS - HEALTHEAST (OUTPATIENT)
Dept: ADMINISTRATIVE | Facility: OTHER | Age: 70
End: 2020-04-15

## 2020-04-15 DIAGNOSIS — F41.9 ANXIETY AND DEPRESSION: ICD-10-CM

## 2020-04-15 DIAGNOSIS — F32.A ANXIETY AND DEPRESSION: ICD-10-CM

## 2020-04-22 ENCOUNTER — COMMUNICATION - HEALTHEAST (OUTPATIENT)
Dept: INTERNAL MEDICINE | Facility: CLINIC | Age: 70
End: 2020-04-22

## 2020-04-22 DIAGNOSIS — M79.7 FIBROMYALGIA: ICD-10-CM

## 2020-05-04 ENCOUNTER — RECORDS - HEALTHEAST (OUTPATIENT)
Dept: ADMINISTRATIVE | Facility: OTHER | Age: 70
End: 2020-05-04

## 2020-05-05 ENCOUNTER — RECORDS - HEALTHEAST (OUTPATIENT)
Dept: LAB | Facility: CLINIC | Age: 70
End: 2020-05-05

## 2020-05-05 LAB
CREAT SERPL-MCNC: 1.18 MG/DL (ref 0.6–1.1)
GFR SERPL CREATININE-BSD FRML MDRD: 45 ML/MIN/1.73M2
MAGNESIUM SERPL-MCNC: 2.2 MG/DL (ref 1.8–2.6)

## 2020-05-07 ENCOUNTER — COMMUNICATION - HEALTHEAST (OUTPATIENT)
Dept: SCHEDULING | Facility: CLINIC | Age: 70
End: 2020-05-07

## 2020-05-10 ENCOUNTER — COMMUNICATION - HEALTHEAST (OUTPATIENT)
Dept: INTERNAL MEDICINE | Facility: CLINIC | Age: 70
End: 2020-05-10

## 2020-05-10 DIAGNOSIS — I26.99 OTHER PULMONARY EMBOLISM WITHOUT ACUTE COR PULMONALE, UNSPECIFIED CHRONICITY (H): ICD-10-CM

## 2020-05-18 ENCOUNTER — HOME INFUSION (PRE-WILLOW HOME INFUSION) (OUTPATIENT)
Dept: PHARMACY | Facility: CLINIC | Age: 70
End: 2020-05-18

## 2020-05-19 NOTE — PROGRESS NOTES
This is a recent snapshot of the patient's [facility] medical record.  For current drug dose and complete information and questions, call [phone] or In Basket pool, fv home infusion (44219)  CSN Number:  [id number]

## 2020-05-20 ENCOUNTER — COMMUNICATION - HEALTHEAST (OUTPATIENT)
Dept: PALLIATIVE MEDICINE | Facility: OTHER | Age: 70
End: 2020-05-20

## 2020-05-21 ENCOUNTER — HOSPITAL ENCOUNTER (OUTPATIENT)
Dept: PALLIATIVE MEDICINE | Facility: OTHER | Age: 70
Discharge: HOME | End: 2020-05-21
Attending: PAIN MEDICINE | Admitting: PAIN MEDICINE

## 2020-05-21 DIAGNOSIS — M79.18 MYOFASCIAL MUSCLE PAIN: ICD-10-CM

## 2020-05-21 ASSESSMENT — MIFFLIN-ST. JEOR: SCORE: 1006.9

## 2020-05-23 ENCOUNTER — COMMUNICATION - HEALTHEAST (OUTPATIENT)
Dept: INTERNAL MEDICINE | Facility: CLINIC | Age: 70
End: 2020-05-23

## 2020-05-23 DIAGNOSIS — E86.0 DEHYDRATION: ICD-10-CM

## 2020-05-27 ENCOUNTER — COMMUNICATION - HEALTHEAST (OUTPATIENT)
Dept: INTERNAL MEDICINE | Facility: CLINIC | Age: 70
End: 2020-05-27

## 2020-05-27 DIAGNOSIS — E86.0 DEHYDRATION: ICD-10-CM

## 2020-05-27 DIAGNOSIS — M79.7 FIBROMYALGIA: ICD-10-CM

## 2020-05-28 ENCOUNTER — COMMUNICATION - HEALTHEAST (OUTPATIENT)
Dept: SCHEDULING | Facility: CLINIC | Age: 70
End: 2020-05-28

## 2020-06-02 ENCOUNTER — RECORDS - HEALTHEAST (OUTPATIENT)
Dept: LAB | Facility: CLINIC | Age: 70
End: 2020-06-02

## 2020-06-02 ENCOUNTER — RECORDS - HEALTHEAST (OUTPATIENT)
Dept: ADMINISTRATIVE | Facility: OTHER | Age: 70
End: 2020-06-02

## 2020-06-02 LAB
CREAT SERPL-MCNC: 1.31 MG/DL (ref 0.6–1.1)
GFR SERPL CREATININE-BSD FRML MDRD: 40 ML/MIN/1.73M2
MAGNESIUM SERPL-MCNC: 2.2 MG/DL (ref 1.8–2.6)

## 2020-06-03 ENCOUNTER — HOME INFUSION (PRE-WILLOW HOME INFUSION) (OUTPATIENT)
Dept: PHARMACY | Facility: CLINIC | Age: 70
End: 2020-06-03

## 2020-06-04 NOTE — PROGRESS NOTES
This is a recent snapshot of the patient's [facility] medical record.  For current drug dose and complete information and questions, call [phone] or In Basket pool, fv home infusion (66260)  CSN Number:  [id number]

## 2020-06-05 ENCOUNTER — COMMUNICATION - HEALTHEAST (OUTPATIENT)
Dept: INTERNAL MEDICINE | Facility: CLINIC | Age: 70
End: 2020-06-05

## 2020-06-10 ENCOUNTER — COMMUNICATION - HEALTHEAST (OUTPATIENT)
Dept: INTERNAL MEDICINE | Facility: CLINIC | Age: 70
End: 2020-06-10

## 2020-06-10 DIAGNOSIS — M79.7 FIBROMYALGIA: ICD-10-CM

## 2020-06-11 ENCOUNTER — COMMUNICATION - HEALTHEAST (OUTPATIENT)
Dept: INTERNAL MEDICINE | Facility: CLINIC | Age: 70
End: 2020-06-11

## 2020-06-11 DIAGNOSIS — M79.7 FIBROMYALGIA: ICD-10-CM

## 2020-06-14 ENCOUNTER — COMMUNICATION - HEALTHEAST (OUTPATIENT)
Dept: INTERNAL MEDICINE | Facility: CLINIC | Age: 70
End: 2020-06-14

## 2020-06-14 DIAGNOSIS — I26.99 OTHER PULMONARY EMBOLISM WITHOUT ACUTE COR PULMONALE, UNSPECIFIED CHRONICITY (H): ICD-10-CM

## 2020-06-18 ENCOUNTER — RECORDS - HEALTHEAST (OUTPATIENT)
Dept: ADMINISTRATIVE | Facility: OTHER | Age: 70
End: 2020-06-18

## 2020-06-19 ENCOUNTER — RECORDS - HEALTHEAST (OUTPATIENT)
Dept: ADMINISTRATIVE | Facility: OTHER | Age: 70
End: 2020-06-19

## 2020-06-25 ENCOUNTER — COMMUNICATION - HEALTHEAST (OUTPATIENT)
Dept: INTERNAL MEDICINE | Facility: CLINIC | Age: 70
End: 2020-06-25

## 2020-06-25 DIAGNOSIS — M79.7 FIBROMYALGIA: ICD-10-CM

## 2020-06-25 DIAGNOSIS — N30.20 CHRONIC CYSTITIS: ICD-10-CM

## 2020-06-30 ENCOUNTER — RECORDS - HEALTHEAST (OUTPATIENT)
Dept: LAB | Facility: HOSPITAL | Age: 70
End: 2020-06-30

## 2020-06-30 LAB
CREAT SERPL-MCNC: 1.13 MG/DL (ref 0.6–1.1)
GFR SERPL CREATININE-BSD FRML MDRD: 48 ML/MIN/1.73M2
MAGNESIUM SERPL-MCNC: 2 MG/DL (ref 1.8–2.6)

## 2020-07-01 ENCOUNTER — HOME INFUSION (PRE-WILLOW HOME INFUSION) (OUTPATIENT)
Dept: PHARMACY | Facility: CLINIC | Age: 70
End: 2020-07-01

## 2020-07-02 ENCOUNTER — COMMUNICATION - HEALTHEAST (OUTPATIENT)
Dept: INTERNAL MEDICINE | Facility: CLINIC | Age: 70
End: 2020-07-02

## 2020-07-02 NOTE — PROGRESS NOTES
This is a recent snapshot of the patient's [facility] medical record.  For current drug dose and complete information and questions, call [phone] or In Basket pool, fv home infusion (11003)  CSN Number:  [id number]

## 2020-07-13 ENCOUNTER — COMMUNICATION - HEALTHEAST (OUTPATIENT)
Dept: INTERNAL MEDICINE | Facility: CLINIC | Age: 70
End: 2020-07-13

## 2020-07-13 DIAGNOSIS — E86.0 DEHYDRATION: ICD-10-CM

## 2020-07-14 ENCOUNTER — COMMUNICATION - HEALTHEAST (OUTPATIENT)
Dept: INTERNAL MEDICINE | Facility: CLINIC | Age: 70
End: 2020-07-14

## 2020-07-17 ENCOUNTER — RECORDS - HEALTHEAST (OUTPATIENT)
Dept: ADMINISTRATIVE | Facility: OTHER | Age: 70
End: 2020-07-17

## 2020-07-20 ENCOUNTER — COMMUNICATION - HEALTHEAST (OUTPATIENT)
Dept: INTERNAL MEDICINE | Facility: CLINIC | Age: 70
End: 2020-07-20

## 2020-07-21 ENCOUNTER — COMMUNICATION - HEALTHEAST (OUTPATIENT)
Dept: INTERNAL MEDICINE | Facility: CLINIC | Age: 70
End: 2020-07-21

## 2020-07-21 DIAGNOSIS — I26.99 OTHER PULMONARY EMBOLISM WITHOUT ACUTE COR PULMONALE, UNSPECIFIED CHRONICITY (H): ICD-10-CM

## 2020-07-28 ENCOUNTER — COMMUNICATION - HEALTHEAST (OUTPATIENT)
Dept: INTERNAL MEDICINE | Facility: CLINIC | Age: 70
End: 2020-07-28

## 2020-07-28 DIAGNOSIS — M79.7 FIBROMYALGIA: ICD-10-CM

## 2020-07-29 ENCOUNTER — COMMUNICATION - HEALTHEAST (OUTPATIENT)
Dept: INTERNAL MEDICINE | Facility: CLINIC | Age: 70
End: 2020-07-29

## 2020-07-29 ENCOUNTER — RECORDS - HEALTHEAST (OUTPATIENT)
Dept: LAB | Facility: HOSPITAL | Age: 70
End: 2020-07-29

## 2020-07-29 DIAGNOSIS — I10 BENIGN ESSENTIAL HYPERTENSION: ICD-10-CM

## 2020-08-03 ENCOUNTER — HOME INFUSION (PRE-WILLOW HOME INFUSION) (OUTPATIENT)
Dept: PHARMACY | Facility: CLINIC | Age: 70
End: 2020-08-03

## 2020-08-03 ENCOUNTER — RECORDS - HEALTHEAST (OUTPATIENT)
Dept: ADMINISTRATIVE | Facility: OTHER | Age: 70
End: 2020-08-03

## 2020-08-04 NOTE — PROGRESS NOTES
This is a recent snapshot of the patient's [facility] medical record.  For current drug dose and complete information and questions, call [phone] or In Basket pool, fv home infusion (15545)  CSN Number:  [id number]

## 2020-08-18 ENCOUNTER — RECORDS - HEALTHEAST (OUTPATIENT)
Dept: ADMINISTRATIVE | Facility: OTHER | Age: 70
End: 2020-08-18

## 2020-08-19 ENCOUNTER — COMMUNICATION - HEALTHEAST (OUTPATIENT)
Dept: PALLIATIVE MEDICINE | Facility: OTHER | Age: 70
End: 2020-08-19

## 2020-08-20 ENCOUNTER — HOSPITAL ENCOUNTER (OUTPATIENT)
Dept: PALLIATIVE MEDICINE | Facility: OTHER | Age: 70
Discharge: HOME | End: 2020-08-20
Attending: PAIN MEDICINE | Admitting: PAIN MEDICINE

## 2020-08-20 DIAGNOSIS — M79.18 MYOFASCIAL PAIN SYNDROME: ICD-10-CM

## 2020-08-20 ASSESSMENT — MIFFLIN-ST. JEOR: SCORE: 1078.34

## 2020-08-24 ENCOUNTER — COMMUNICATION - HEALTHEAST (OUTPATIENT)
Dept: PALLIATIVE MEDICINE | Facility: OTHER | Age: 70
End: 2020-08-24

## 2020-08-25 ENCOUNTER — RECORDS - HEALTHEAST (OUTPATIENT)
Dept: LAB | Facility: CLINIC | Age: 70
End: 2020-08-25

## 2020-08-25 ENCOUNTER — COMMUNICATION - HEALTHEAST (OUTPATIENT)
Dept: INTERNAL MEDICINE | Facility: CLINIC | Age: 70
End: 2020-08-25

## 2020-08-25 DIAGNOSIS — E86.0 DEHYDRATION: ICD-10-CM

## 2020-08-25 LAB
CREAT SERPL-MCNC: 1.27 MG/DL (ref 0.6–1.1)
GFR SERPL CREATININE-BSD FRML MDRD: 42 ML/MIN/1.73M2
MAGNESIUM SERPL-MCNC: 2 MG/DL (ref 1.8–2.6)

## 2020-08-27 ENCOUNTER — COMMUNICATION - HEALTHEAST (OUTPATIENT)
Dept: INTERNAL MEDICINE | Facility: CLINIC | Age: 70
End: 2020-08-27

## 2020-08-27 DIAGNOSIS — M79.7 FIBROMYALGIA: ICD-10-CM

## 2020-08-27 DIAGNOSIS — E86.0 DEHYDRATION: ICD-10-CM

## 2020-08-28 ENCOUNTER — COMMUNICATION - HEALTHEAST (OUTPATIENT)
Dept: INTERNAL MEDICINE | Facility: CLINIC | Age: 70
End: 2020-08-28

## 2020-08-28 DIAGNOSIS — I26.99 OTHER PULMONARY EMBOLISM WITHOUT ACUTE COR PULMONALE, UNSPECIFIED CHRONICITY (H): ICD-10-CM

## 2020-08-31 ENCOUNTER — HOME INFUSION (PRE-WILLOW HOME INFUSION) (OUTPATIENT)
Dept: PHARMACY | Facility: CLINIC | Age: 70
End: 2020-08-31

## 2020-09-01 ENCOUNTER — HOME INFUSION (PRE-WILLOW HOME INFUSION) (OUTPATIENT)
Dept: PHARMACY | Facility: CLINIC | Age: 70
End: 2020-09-01

## 2020-09-01 ENCOUNTER — COMMUNICATION - HEALTHEAST (OUTPATIENT)
Dept: INTERNAL MEDICINE | Facility: CLINIC | Age: 70
End: 2020-09-01

## 2020-09-01 ENCOUNTER — AMBULATORY - HEALTHEAST (OUTPATIENT)
Dept: INTERNAL MEDICINE | Facility: CLINIC | Age: 70
End: 2020-09-01

## 2020-09-01 ENCOUNTER — RECORDS - HEALTHEAST (OUTPATIENT)
Dept: ADMINISTRATIVE | Facility: OTHER | Age: 70
End: 2020-09-01

## 2020-09-01 DIAGNOSIS — M79.7 FIBROMYALGIA: ICD-10-CM

## 2020-09-01 NOTE — PROGRESS NOTES
This is a recent snapshot of the patient's [facility] medical record.  For current drug dose and complete information and questions, call [phone] or In Basket pool, fv home infusion (70604)  CSN Number:  [id number]

## 2020-09-03 ENCOUNTER — RECORDS - HEALTHEAST (OUTPATIENT)
Dept: ADMINISTRATIVE | Facility: OTHER | Age: 70
End: 2020-09-03

## 2020-09-03 NOTE — PROGRESS NOTES
This is a recent snapshot of the patient's [facility] medical record.  For current drug dose and complete information and questions, call [phone] or In Basket pool, fv home infusion (48609)  CSN Number:  [id number]

## 2020-09-08 ENCOUNTER — COMMUNICATION - HEALTHEAST (OUTPATIENT)
Dept: INTERNAL MEDICINE | Facility: CLINIC | Age: 70
End: 2020-09-08

## 2020-09-08 DIAGNOSIS — M79.7 FIBROMYALGIA: ICD-10-CM

## 2020-09-14 ENCOUNTER — COMMUNICATION - HEALTHEAST (OUTPATIENT)
Dept: INTERNAL MEDICINE | Facility: CLINIC | Age: 70
End: 2020-09-14

## 2020-09-14 DIAGNOSIS — M79.7 FIBROMYALGIA: ICD-10-CM

## 2020-09-17 ENCOUNTER — RECORDS - HEALTHEAST (OUTPATIENT)
Dept: ADMINISTRATIVE | Facility: OTHER | Age: 70
End: 2020-09-17

## 2020-09-22 ENCOUNTER — RECORDS - HEALTHEAST (OUTPATIENT)
Dept: LAB | Facility: CLINIC | Age: 70
End: 2020-09-22

## 2020-09-22 LAB
CREAT SERPL-MCNC: 1.21 MG/DL (ref 0.6–1.1)
GFR SERPL CREATININE-BSD FRML MDRD: 44 ML/MIN/1.73M2
MAGNESIUM SERPL-MCNC: 2 MG/DL (ref 1.8–2.6)

## 2020-09-24 ENCOUNTER — RECORDS - HEALTHEAST (OUTPATIENT)
Dept: ADMINISTRATIVE | Facility: OTHER | Age: 70
End: 2020-09-24

## 2020-09-24 ENCOUNTER — COMMUNICATION - HEALTHEAST (OUTPATIENT)
Dept: INTERNAL MEDICINE | Facility: CLINIC | Age: 70
End: 2020-09-24

## 2020-09-25 ENCOUNTER — COMMUNICATION - HEALTHEAST (OUTPATIENT)
Dept: INTERNAL MEDICINE | Facility: CLINIC | Age: 70
End: 2020-09-25

## 2020-09-25 DIAGNOSIS — N30.20 CHRONIC CYSTITIS: ICD-10-CM

## 2020-09-28 ENCOUNTER — HOME INFUSION (PRE-WILLOW HOME INFUSION) (OUTPATIENT)
Dept: PHARMACY | Facility: CLINIC | Age: 70
End: 2020-09-28

## 2020-09-28 ENCOUNTER — COMMUNICATION - HEALTHEAST (OUTPATIENT)
Dept: INTERNAL MEDICINE | Facility: CLINIC | Age: 70
End: 2020-09-28

## 2020-09-29 ENCOUNTER — COMMUNICATION - HEALTHEAST (OUTPATIENT)
Dept: INTERNAL MEDICINE | Facility: CLINIC | Age: 70
End: 2020-09-29

## 2020-09-29 DIAGNOSIS — M79.7 FIBROMYALGIA: ICD-10-CM

## 2020-09-29 NOTE — PROGRESS NOTES
This is a recent snapshot of the patient's [facility] medical record.  For current drug dose and complete information and questions, call [phone] or In Basket pool, fv home infusion (61338)  CSN Number:  [id number]

## 2020-09-30 ENCOUNTER — COMMUNICATION - HEALTHEAST (OUTPATIENT)
Dept: INTERNAL MEDICINE | Facility: CLINIC | Age: 70
End: 2020-09-30

## 2020-10-14 ENCOUNTER — COMMUNICATION - HEALTHEAST (OUTPATIENT)
Dept: INTERNAL MEDICINE | Facility: CLINIC | Age: 70
End: 2020-10-14

## 2020-10-14 DIAGNOSIS — I26.99 OTHER PULMONARY EMBOLISM WITHOUT ACUTE COR PULMONALE, UNSPECIFIED CHRONICITY (H): ICD-10-CM

## 2020-10-14 DIAGNOSIS — E86.0 DEHYDRATION: ICD-10-CM

## 2020-10-20 ENCOUNTER — RECORDS - HEALTHEAST (OUTPATIENT)
Dept: ADMINISTRATIVE | Facility: OTHER | Age: 70
End: 2020-10-20

## 2020-10-20 ENCOUNTER — RECORDS - HEALTHEAST (OUTPATIENT)
Dept: LAB | Facility: HOSPITAL | Age: 70
End: 2020-10-20

## 2020-10-20 LAB
CREAT SERPL-MCNC: 1.16 MG/DL (ref 0.6–1.1)
GFR SERPL CREATININE-BSD FRML MDRD: 46 ML/MIN/1.73M2
MAGNESIUM SERPL-MCNC: 2.1 MG/DL (ref 1.8–2.6)

## 2020-10-22 ENCOUNTER — HOME INFUSION (PRE-WILLOW HOME INFUSION) (OUTPATIENT)
Dept: PHARMACY | Facility: CLINIC | Age: 70
End: 2020-10-22

## 2020-10-23 NOTE — PROGRESS NOTES
This is a recent snapshot of the patient's [facility] medical record.  For current drug dose and complete information and questions, call [phone] or In Basket pool, fv home infusion (74439)  CSN Number:  [id number]

## 2020-10-27 ENCOUNTER — COMMUNICATION - HEALTHEAST (OUTPATIENT)
Dept: INTERNAL MEDICINE | Facility: CLINIC | Age: 70
End: 2020-10-27

## 2020-10-27 ENCOUNTER — COMMUNICATION - HEALTHEAST (OUTPATIENT)
Dept: SCHEDULING | Facility: CLINIC | Age: 70
End: 2020-10-27

## 2020-10-29 ENCOUNTER — OFFICE VISIT - HEALTHEAST (OUTPATIENT)
Dept: FAMILY MEDICINE | Facility: CLINIC | Age: 70
End: 2020-10-29

## 2020-10-29 ENCOUNTER — COMMUNICATION - HEALTHEAST (OUTPATIENT)
Dept: LAB | Facility: CLINIC | Age: 70
End: 2020-10-29

## 2020-10-29 DIAGNOSIS — R00.2 PALPITATIONS: ICD-10-CM

## 2020-10-29 DIAGNOSIS — G93.32 CHRONIC FATIGUE SYNDROME: ICD-10-CM

## 2020-10-29 DIAGNOSIS — R05.9 COUGH: ICD-10-CM

## 2020-10-29 LAB
ATRIAL RATE - MUSE: 75 BPM
DIASTOLIC BLOOD PRESSURE - MUSE: NORMAL
INTERPRETATION ECG - MUSE: NORMAL
P AXIS - MUSE: 63 DEGREES
PR INTERVAL - MUSE: 158 MS
QRS DURATION - MUSE: 76 MS
QT - MUSE: 352 MS
QTC - MUSE: 393 MS
R AXIS - MUSE: 17 DEGREES
SYSTOLIC BLOOD PRESSURE - MUSE: NORMAL
T AXIS - MUSE: 66 DEGREES
VENTRICULAR RATE- MUSE: 75 BPM

## 2020-10-30 ENCOUNTER — COMMUNICATION - HEALTHEAST (OUTPATIENT)
Dept: INTERNAL MEDICINE | Facility: CLINIC | Age: 70
End: 2020-10-30

## 2020-10-30 DIAGNOSIS — M79.7 FIBROMYALGIA: ICD-10-CM

## 2020-10-31 ENCOUNTER — COMMUNICATION - HEALTHEAST (OUTPATIENT)
Dept: SCHEDULING | Facility: CLINIC | Age: 70
End: 2020-10-31

## 2020-11-03 ENCOUNTER — RECORDS - HEALTHEAST (OUTPATIENT)
Dept: LAB | Facility: CLINIC | Age: 70
End: 2020-11-03

## 2020-11-03 ENCOUNTER — RECORDS - HEALTHEAST (OUTPATIENT)
Dept: ADMINISTRATIVE | Facility: OTHER | Age: 70
End: 2020-11-03

## 2020-11-03 ENCOUNTER — COMMUNICATION - HEALTHEAST (OUTPATIENT)
Dept: INTERNAL MEDICINE | Facility: CLINIC | Age: 70
End: 2020-11-03

## 2020-11-03 ENCOUNTER — HOSPITAL ENCOUNTER (OUTPATIENT)
Dept: PALLIATIVE MEDICINE | Facility: OTHER | Age: 70
Discharge: HOME | End: 2020-11-03
Attending: PAIN MEDICINE | Admitting: PAIN MEDICINE

## 2020-11-03 DIAGNOSIS — M79.18 MYOFASCIAL PAIN SYNDROME: ICD-10-CM

## 2020-11-03 LAB
ALBUMIN SERPL-MCNC: 3.5 G/DL (ref 3.5–5)
ALP SERPL-CCNC: 142 U/L (ref 45–120)
ALT SERPL W P-5'-P-CCNC: 34 U/L (ref 0–45)
ANION GAP SERPL CALCULATED.3IONS-SCNC: 5 MMOL/L (ref 5–18)
AST SERPL W P-5'-P-CCNC: 30 U/L (ref 0–40)
BILIRUB SERPL-MCNC: 0.3 MG/DL (ref 0–1)
BUN SERPL-MCNC: 27 MG/DL (ref 8–28)
CALCIUM SERPL-MCNC: 7.9 MG/DL (ref 8.5–10.5)
CHLORIDE BLD-SCNC: 116 MMOL/L (ref 98–107)
CO2 SERPL-SCNC: 18 MMOL/L (ref 22–31)
CREAT SERPL-MCNC: 1.15 MG/DL (ref 0.6–1.1)
ERYTHROCYTE [DISTWIDTH] IN BLOOD BY AUTOMATED COUNT: 14.6 % (ref 11–14.5)
GFR SERPL CREATININE-BSD FRML MDRD: 47 ML/MIN/1.73M2
GLUCOSE BLD-MCNC: 119 MG/DL (ref 70–125)
HCT VFR BLD AUTO: 32.7 % (ref 35–47)
HGB BLD-MCNC: 10.4 G/DL (ref 12–16)
MCH RBC QN AUTO: 30.9 PG (ref 27–34)
MCHC RBC AUTO-ENTMCNC: 31.8 G/DL (ref 32–36)
MCV RBC AUTO: 97 FL (ref 80–100)
PLATELET # BLD AUTO: 171 THOU/UL (ref 140–440)
PMV BLD AUTO: 11.2 FL (ref 8.5–12.5)
POTASSIUM BLD-SCNC: 4.9 MMOL/L (ref 3.5–5)
PROT SERPL-MCNC: 6.4 G/DL (ref 6–8)
RBC # BLD AUTO: 3.37 MILL/UL (ref 3.8–5.4)
SODIUM SERPL-SCNC: 139 MMOL/L (ref 136–145)
TSH SERPL DL<=0.005 MIU/L-ACNC: 1.11 UIU/ML (ref 0.3–5)
WBC: 5 THOU/UL (ref 4–11)

## 2020-11-03 ASSESSMENT — MIFFLIN-ST. JEOR: SCORE: 1078.34

## 2020-11-04 ENCOUNTER — COMMUNICATION - HEALTHEAST (OUTPATIENT)
Dept: PALLIATIVE MEDICINE | Facility: OTHER | Age: 70
End: 2020-11-04

## 2020-11-06 ENCOUNTER — HOSPITAL ENCOUNTER (OUTPATIENT)
Dept: CARDIOLOGY | Facility: CLINIC | Age: 70
Discharge: HOME | End: 2020-11-06

## 2020-11-06 ENCOUNTER — OFFICE VISIT - HEALTHEAST (OUTPATIENT)
Dept: INTERNAL MEDICINE | Facility: CLINIC | Age: 70
End: 2020-11-06

## 2020-11-06 DIAGNOSIS — R19.8 HIGH OUTPUT ILEOSTOMY (H): ICD-10-CM

## 2020-11-06 DIAGNOSIS — Z93.2 HIGH OUTPUT ILEOSTOMY (H): ICD-10-CM

## 2020-11-06 DIAGNOSIS — R00.2 PALPITATIONS: ICD-10-CM

## 2020-11-10 ENCOUNTER — COMMUNICATION - HEALTHEAST (OUTPATIENT)
Dept: INTERNAL MEDICINE | Facility: CLINIC | Age: 70
End: 2020-11-10

## 2020-11-10 ENCOUNTER — COMMUNICATION - HEALTHEAST (OUTPATIENT)
Dept: SCHEDULING | Facility: CLINIC | Age: 70
End: 2020-11-10

## 2020-11-12 ENCOUNTER — RECORDS - HEALTHEAST (OUTPATIENT)
Dept: ADMINISTRATIVE | Facility: OTHER | Age: 70
End: 2020-11-12

## 2020-11-17 ENCOUNTER — RECORDS - HEALTHEAST (OUTPATIENT)
Dept: LAB | Facility: CLINIC | Age: 70
End: 2020-11-17

## 2020-11-17 LAB
CREAT SERPL-MCNC: 1.16 MG/DL (ref 0.6–1.1)
GFR SERPL CREATININE-BSD FRML MDRD: 46 ML/MIN/1.73M2
MAGNESIUM SERPL-MCNC: 2.1 MG/DL (ref 1.8–2.6)
PHOSPHATE SERPL-MCNC: 3.8 MG/DL (ref 2.5–4.5)
PTH-INTACT SERPL-MCNC: 116 PG/ML (ref 10–86)

## 2020-11-18 LAB — 25(OH)D3 SERPL-MCNC: 18.3 NG/ML (ref 30–80)

## 2020-11-19 ENCOUNTER — COMMUNICATION - HEALTHEAST (OUTPATIENT)
Dept: INTERNAL MEDICINE | Facility: CLINIC | Age: 70
End: 2020-11-19

## 2020-11-19 DIAGNOSIS — E56.9 VITAMIN DEFICIENCY: ICD-10-CM

## 2020-11-23 ENCOUNTER — COMMUNICATION - HEALTHEAST (OUTPATIENT)
Dept: FAMILY MEDICINE | Facility: CLINIC | Age: 70
End: 2020-11-23

## 2020-11-23 ENCOUNTER — HOME INFUSION (PRE-WILLOW HOME INFUSION) (OUTPATIENT)
Dept: PHARMACY | Facility: CLINIC | Age: 70
End: 2020-11-23

## 2020-11-24 ENCOUNTER — OFFICE VISIT - HEALTHEAST (OUTPATIENT)
Dept: FAMILY MEDICINE | Facility: CLINIC | Age: 70
End: 2020-11-24

## 2020-11-24 ENCOUNTER — RECORDS - HEALTHEAST (OUTPATIENT)
Dept: ADMINISTRATIVE | Facility: OTHER | Age: 70
End: 2020-11-24

## 2020-11-24 ENCOUNTER — COMMUNICATION - HEALTHEAST (OUTPATIENT)
Dept: INTERNAL MEDICINE | Facility: CLINIC | Age: 70
End: 2020-11-24

## 2020-11-24 ENCOUNTER — COMMUNICATION - HEALTHEAST (OUTPATIENT)
Dept: SCHEDULING | Facility: CLINIC | Age: 70
End: 2020-11-24

## 2020-11-24 DIAGNOSIS — R00.2 PALPITATIONS: ICD-10-CM

## 2020-11-24 DIAGNOSIS — E55.9 VITAMIN D DEFICIENCY: ICD-10-CM

## 2020-11-24 NOTE — PROGRESS NOTES
This is a recent snapshot of the patient's [facility] medical record.  For current drug dose and complete information and questions, call [phone] or In Basket pool, fv home infusion (56169)  CSN Number:  [id number]

## 2020-11-27 ENCOUNTER — COMMUNICATION - HEALTHEAST (OUTPATIENT)
Dept: INTERNAL MEDICINE | Facility: CLINIC | Age: 70
End: 2020-11-27

## 2020-11-27 DIAGNOSIS — I26.99 OTHER PULMONARY EMBOLISM WITHOUT ACUTE COR PULMONALE, UNSPECIFIED CHRONICITY (H): ICD-10-CM

## 2020-11-28 ENCOUNTER — COMMUNICATION - HEALTHEAST (OUTPATIENT)
Dept: INTERNAL MEDICINE | Facility: CLINIC | Age: 70
End: 2020-11-28

## 2020-11-28 DIAGNOSIS — I10 BENIGN ESSENTIAL HYPERTENSION: ICD-10-CM

## 2020-11-29 RX ORDER — LISINOPRIL 20 MG/1
TABLET ORAL
Qty: 90 TABLET | Refills: 3 | Status: SHIPPED | OUTPATIENT
Start: 2020-11-29 | End: 2021-12-07

## 2020-11-30 ENCOUNTER — COMMUNICATION - HEALTHEAST (OUTPATIENT)
Dept: SCHEDULING | Facility: CLINIC | Age: 70
End: 2020-11-30

## 2020-11-30 DIAGNOSIS — M79.7 FIBROMYALGIA: ICD-10-CM

## 2020-12-01 ENCOUNTER — RECORDS - HEALTHEAST (OUTPATIENT)
Dept: ADMINISTRATIVE | Facility: OTHER | Age: 70
End: 2020-12-01

## 2020-12-01 ENCOUNTER — COMMUNICATION - HEALTHEAST (OUTPATIENT)
Dept: INTERNAL MEDICINE | Facility: CLINIC | Age: 70
End: 2020-12-01

## 2020-12-01 DIAGNOSIS — E86.0 DEHYDRATION: ICD-10-CM

## 2020-12-15 ENCOUNTER — COMMUNICATION - HEALTHEAST (OUTPATIENT)
Dept: INTERNAL MEDICINE | Facility: CLINIC | Age: 70
End: 2020-12-15

## 2020-12-15 ENCOUNTER — RECORDS - HEALTHEAST (OUTPATIENT)
Dept: LAB | Facility: HOSPITAL | Age: 70
End: 2020-12-15

## 2020-12-15 DIAGNOSIS — M79.7 FIBROMYALGIA: ICD-10-CM

## 2020-12-15 LAB
CREAT SERPL-MCNC: 1.06 MG/DL (ref 0.6–1.1)
GFR SERPL CREATININE-BSD FRML MDRD: 51 ML/MIN/1.73M2
MAGNESIUM SERPL-MCNC: 1.9 MG/DL (ref 1.8–2.6)

## 2020-12-16 ENCOUNTER — COMMUNICATION - HEALTHEAST (OUTPATIENT)
Dept: PALLIATIVE MEDICINE | Facility: OTHER | Age: 70
End: 2020-12-16

## 2020-12-17 ENCOUNTER — HOSPITAL ENCOUNTER (OUTPATIENT)
Dept: PALLIATIVE MEDICINE | Facility: OTHER | Age: 70
Discharge: HOME | End: 2020-12-17
Attending: PAIN MEDICINE | Admitting: PAIN MEDICINE

## 2020-12-17 ENCOUNTER — HOME INFUSION (PRE-WILLOW HOME INFUSION) (OUTPATIENT)
Dept: PHARMACY | Facility: CLINIC | Age: 70
End: 2020-12-17

## 2020-12-17 DIAGNOSIS — M79.18 MYOFASCIAL PAIN SYNDROME: ICD-10-CM

## 2020-12-18 NOTE — PROGRESS NOTES
This is a recent snapshot of the patient's [facility] medical record.  For current drug dose and complete information and questions, call [phone] or In Basket pool, fv home infusion (02217)  CSN Number:  [id number]

## 2020-12-21 ENCOUNTER — COMMUNICATION - HEALTHEAST (OUTPATIENT)
Dept: ADMINISTRATIVE | Facility: CLINIC | Age: 70
End: 2020-12-21

## 2020-12-24 ENCOUNTER — COMMUNICATION - HEALTHEAST (OUTPATIENT)
Dept: INTERNAL MEDICINE | Facility: CLINIC | Age: 70
End: 2020-12-24

## 2020-12-24 DIAGNOSIS — E86.0 DEHYDRATION: ICD-10-CM

## 2020-12-24 DIAGNOSIS — N30.20 CHRONIC CYSTITIS: ICD-10-CM

## 2020-12-30 ENCOUNTER — COMMUNICATION - HEALTHEAST (OUTPATIENT)
Dept: INTERNAL MEDICINE | Facility: CLINIC | Age: 70
End: 2020-12-30

## 2020-12-30 DIAGNOSIS — M79.7 FIBROMYALGIA: ICD-10-CM

## 2021-01-07 ENCOUNTER — HOSPITAL ENCOUNTER (OUTPATIENT)
Dept: PALLIATIVE MEDICINE | Facility: OTHER | Age: 71
Discharge: HOME | End: 2021-01-07
Attending: NURSE PRACTITIONER

## 2021-01-07 DIAGNOSIS — Z90.49 S/P TOTAL COLECTOMY: ICD-10-CM

## 2021-01-07 DIAGNOSIS — K62.89 RECTAL PAIN: ICD-10-CM

## 2021-01-07 DIAGNOSIS — G89.4 CHRONIC PAIN SYNDROME: ICD-10-CM

## 2021-01-08 ENCOUNTER — COMMUNICATION - HEALTHEAST (OUTPATIENT)
Dept: INTERNAL MEDICINE | Facility: CLINIC | Age: 71
End: 2021-01-08

## 2021-01-08 DIAGNOSIS — E86.0 DEHYDRATION: ICD-10-CM

## 2021-01-08 RX ORDER — DIPHENOXYLATE HCL/ATROPINE 2.5-.025MG
TABLET ORAL
Qty: 180 TABLET | Refills: 0 | Status: SHIPPED | OUTPATIENT
Start: 2021-01-08 | End: 2023-01-01

## 2021-01-10 ENCOUNTER — COMMUNICATION - HEALTHEAST (OUTPATIENT)
Dept: INTERNAL MEDICINE | Facility: CLINIC | Age: 71
End: 2021-01-10

## 2021-01-10 DIAGNOSIS — I10 BENIGN ESSENTIAL HYPERTENSION: ICD-10-CM

## 2021-01-10 DIAGNOSIS — F32.A ANXIETY AND DEPRESSION: ICD-10-CM

## 2021-01-10 DIAGNOSIS — F41.9 ANXIETY AND DEPRESSION: ICD-10-CM

## 2021-01-11 RX ORDER — BUSPIRONE HYDROCHLORIDE 15 MG/1
TABLET ORAL
Qty: 360 TABLET | Refills: 3 | Status: SHIPPED | OUTPATIENT
Start: 2021-01-11 | End: 2022-01-12

## 2021-01-13 ENCOUNTER — COMMUNICATION - HEALTHEAST (OUTPATIENT)
Dept: ADMINISTRATIVE | Facility: CLINIC | Age: 71
End: 2021-01-13

## 2021-01-13 ENCOUNTER — RECORDS - HEALTHEAST (OUTPATIENT)
Dept: LAB | Facility: CLINIC | Age: 71
End: 2021-01-13

## 2021-01-13 LAB
CREAT SERPL-MCNC: 1.16 MG/DL (ref 0.6–1.1)
GFR SERPL CREATININE-BSD FRML MDRD: 46 ML/MIN/1.73M2
MAGNESIUM SERPL-MCNC: 2.3 MG/DL (ref 1.8–2.6)

## 2021-01-14 ENCOUNTER — COMMUNICATION - HEALTHEAST (OUTPATIENT)
Dept: ADMINISTRATIVE | Facility: CLINIC | Age: 71
End: 2021-01-14

## 2021-01-15 ENCOUNTER — HOME INFUSION (PRE-WILLOW HOME INFUSION) (OUTPATIENT)
Dept: PHARMACY | Facility: CLINIC | Age: 71
End: 2021-01-15

## 2021-01-15 ENCOUNTER — RECORDS - HEALTHEAST (OUTPATIENT)
Dept: ADMINISTRATIVE | Facility: OTHER | Age: 71
End: 2021-01-15

## 2021-01-18 NOTE — PROGRESS NOTES
This is a recent snapshot of the patient's [facility] medical record.  For current drug dose and complete information and questions, call [phone] or In Basket pool, fv home infusion (50129)  CSN Number:  [id number]

## 2021-01-22 ENCOUNTER — COMMUNICATION - HEALTHEAST (OUTPATIENT)
Dept: INTERNAL MEDICINE | Facility: CLINIC | Age: 71
End: 2021-01-22

## 2021-01-22 DIAGNOSIS — I26.99 OTHER PULMONARY EMBOLISM WITHOUT ACUTE COR PULMONALE, UNSPECIFIED CHRONICITY (H): ICD-10-CM

## 2021-01-26 ENCOUNTER — COMMUNICATION - HEALTHEAST (OUTPATIENT)
Dept: INTERNAL MEDICINE | Facility: CLINIC | Age: 71
End: 2021-01-26

## 2021-01-28 ENCOUNTER — COMMUNICATION - HEALTHEAST (OUTPATIENT)
Dept: ADMINISTRATIVE | Facility: CLINIC | Age: 71
End: 2021-01-28

## 2021-01-28 DIAGNOSIS — M79.7 FIBROMYALGIA: ICD-10-CM

## 2021-02-05 ENCOUNTER — AMBULATORY - HEALTHEAST (OUTPATIENT)
Dept: SURGERY | Facility: AMBULATORY SURGERY CENTER | Age: 71
End: 2021-02-05

## 2021-02-05 DIAGNOSIS — Z11.59 ENCOUNTER FOR SCREENING FOR OTHER VIRAL DISEASES: ICD-10-CM

## 2021-02-09 ENCOUNTER — RECORDS - HEALTHEAST (OUTPATIENT)
Dept: LAB | Facility: CLINIC | Age: 71
End: 2021-02-09

## 2021-02-09 ENCOUNTER — RECORDS - HEALTHEAST (OUTPATIENT)
Dept: ADMINISTRATIVE | Facility: OTHER | Age: 71
End: 2021-02-09

## 2021-02-09 LAB
CREAT SERPL-MCNC: 1.02 MG/DL (ref 0.6–1.1)
GFR SERPL CREATININE-BSD FRML MDRD: 53 ML/MIN/1.73M2
MAGNESIUM SERPL-MCNC: 2.1 MG/DL (ref 1.8–2.6)
PHOSPHATE SERPL-MCNC: 3.5 MG/DL (ref 2.5–4.5)
PTH-INTACT SERPL-MCNC: 151 PG/ML (ref 10–86)

## 2021-02-10 LAB — 25(OH)D3 SERPL-MCNC: 22.1 NG/ML (ref 30–80)

## 2021-02-11 ENCOUNTER — COMMUNICATION - HEALTHEAST (OUTPATIENT)
Dept: ADMINISTRATIVE | Facility: CLINIC | Age: 71
End: 2021-02-11

## 2021-02-12 ENCOUNTER — HOME INFUSION (PRE-WILLOW HOME INFUSION) (OUTPATIENT)
Dept: PHARMACY | Facility: CLINIC | Age: 71
End: 2021-02-12

## 2021-02-15 NOTE — PROGRESS NOTES
This is a recent snapshot of the patient's [facility] medical record.  For current drug dose and complete information and questions, call [phone] or In Basket pool, fv home infusion (48302)  CSN Number:  [id number]

## 2021-02-18 ENCOUNTER — COMMUNICATION - HEALTHEAST (OUTPATIENT)
Dept: INTERNAL MEDICINE | Facility: CLINIC | Age: 71
End: 2021-02-18

## 2021-02-18 ENCOUNTER — COMMUNICATION - HEALTHEAST (OUTPATIENT)
Dept: ADMINISTRATIVE | Facility: CLINIC | Age: 71
End: 2021-02-18

## 2021-02-18 DIAGNOSIS — F41.9 ANXIETY AND DEPRESSION: ICD-10-CM

## 2021-02-18 DIAGNOSIS — F32.A ANXIETY AND DEPRESSION: ICD-10-CM

## 2021-02-18 DIAGNOSIS — F32.A DEPRESSION: ICD-10-CM

## 2021-02-18 DIAGNOSIS — R10.9 ABDOMINAL PAIN: ICD-10-CM

## 2021-02-18 RX ORDER — TRAZODONE HYDROCHLORIDE 150 MG/1
450 TABLET ORAL AT BEDTIME
Qty: 270 TABLET | Refills: 3 | Status: SHIPPED | OUTPATIENT
Start: 2021-02-18 | End: 2022-02-15

## 2021-02-18 RX ORDER — DULOXETIN HYDROCHLORIDE 60 MG/1
60 CAPSULE, DELAYED RELEASE ORAL 2 TIMES DAILY
Qty: 180 CAPSULE | Refills: 3 | Status: SHIPPED | OUTPATIENT
Start: 2021-02-18 | End: 2022-02-09

## 2021-02-22 ENCOUNTER — COMMUNICATION - HEALTHEAST (OUTPATIENT)
Dept: INTERNAL MEDICINE | Facility: CLINIC | Age: 71
End: 2021-02-22

## 2021-02-22 ENCOUNTER — AMBULATORY - HEALTHEAST (OUTPATIENT)
Dept: LAB | Facility: CLINIC | Age: 71
End: 2021-02-22

## 2021-02-22 ENCOUNTER — OFFICE VISIT - HEALTHEAST (OUTPATIENT)
Dept: INTERNAL MEDICINE | Facility: CLINIC | Age: 71
End: 2021-02-22

## 2021-02-22 DIAGNOSIS — Z11.59 ENCOUNTER FOR SCREENING FOR OTHER VIRAL DISEASES: ICD-10-CM

## 2021-02-22 DIAGNOSIS — I82.5Y9 CHRONIC DEEP VEIN THROMBOSIS (DVT) OF PROXIMAL VEIN OF LOWER EXTREMITY, UNSPECIFIED LATERALITY (H): ICD-10-CM

## 2021-02-22 DIAGNOSIS — K62.89 RECTAL PAIN: ICD-10-CM

## 2021-02-22 DIAGNOSIS — Z90.49 S/P TOTAL COLECTOMY: ICD-10-CM

## 2021-02-22 DIAGNOSIS — J45.909 UNCOMPLICATED ASTHMA, UNSPECIFIED ASTHMA SEVERITY, UNSPECIFIED WHETHER PERSISTENT: ICD-10-CM

## 2021-02-22 DIAGNOSIS — I10 BENIGN ESSENTIAL HYPERTENSION: ICD-10-CM

## 2021-02-22 DIAGNOSIS — Z01.818 PREOP GENERAL PHYSICAL EXAM: ICD-10-CM

## 2021-02-22 DIAGNOSIS — E21.3 HYPERPARATHYROIDISM (H): ICD-10-CM

## 2021-02-23 ENCOUNTER — RECORDS - HEALTHEAST (OUTPATIENT)
Dept: ADMINISTRATIVE | Facility: OTHER | Age: 71
End: 2021-02-23

## 2021-02-23 ENCOUNTER — RECORDS - HEALTHEAST (OUTPATIENT)
Dept: LAB | Facility: HOSPITAL | Age: 71
End: 2021-02-23

## 2021-02-23 ENCOUNTER — ANESTHESIA - HEALTHEAST (OUTPATIENT)
Dept: SURGERY | Facility: AMBULATORY SURGERY CENTER | Age: 71
End: 2021-02-23

## 2021-02-23 LAB
ANION GAP SERPL CALCULATED.3IONS-SCNC: 6 MMOL/L (ref 5–18)
BUN SERPL-MCNC: 27 MG/DL (ref 8–28)
CALCIUM SERPL-MCNC: 7.7 MG/DL (ref 8.5–10.5)
CHLORIDE BLD-SCNC: 117 MMOL/L (ref 98–107)
CO2 SERPL-SCNC: 17 MMOL/L (ref 22–31)
CREAT SERPL-MCNC: 1.25 MG/DL (ref 0.6–1.1)
GFR SERPL CREATININE-BSD FRML MDRD: 42 ML/MIN/1.73M2
GLUCOSE BLD-MCNC: 130 MG/DL (ref 70–125)
POTASSIUM BLD-SCNC: 4.7 MMOL/L (ref 3.5–5)
SARS-COV-2 PCR COMMENT: NORMAL
SARS-COV-2 RNA SPEC QL NAA+PROBE: NEGATIVE
SARS-COV-2 VIRUS SPECIMEN SOURCE: NORMAL
SODIUM SERPL-SCNC: 140 MMOL/L (ref 136–145)

## 2021-02-23 ASSESSMENT — MIFFLIN-ST. JEOR: SCORE: 966.08

## 2021-02-24 ENCOUNTER — COMMUNICATION - HEALTHEAST (OUTPATIENT)
Dept: SCHEDULING | Facility: CLINIC | Age: 71
End: 2021-02-24

## 2021-02-25 ENCOUNTER — SURGERY - HEALTHEAST (OUTPATIENT)
Dept: SURGERY | Facility: AMBULATORY SURGERY CENTER | Age: 71
End: 2021-02-25

## 2021-02-25 ENCOUNTER — RECORDS - HEALTHEAST (OUTPATIENT)
Dept: ADMINISTRATIVE | Facility: OTHER | Age: 71
End: 2021-02-25

## 2021-02-25 ASSESSMENT — MIFFLIN-ST. JEOR: SCORE: 966.08

## 2021-02-26 ENCOUNTER — COMMUNICATION - HEALTHEAST (OUTPATIENT)
Dept: ADMINISTRATIVE | Facility: CLINIC | Age: 71
End: 2021-02-26

## 2021-03-04 ENCOUNTER — COMMUNICATION - HEALTHEAST (OUTPATIENT)
Dept: ADMINISTRATIVE | Facility: CLINIC | Age: 71
End: 2021-03-04

## 2021-03-04 ENCOUNTER — RECORDS - HEALTHEAST (OUTPATIENT)
Dept: ADMINISTRATIVE | Facility: OTHER | Age: 71
End: 2021-03-04

## 2021-03-04 DIAGNOSIS — M79.7 FIBROMYALGIA: ICD-10-CM

## 2021-03-08 ENCOUNTER — RECORDS - HEALTHEAST (OUTPATIENT)
Dept: LAB | Facility: HOSPITAL | Age: 71
End: 2021-03-08

## 2021-03-08 LAB
ALBUMIN SERPL-MCNC: 3.4 G/DL (ref 3.5–5)
ALBUMIN SERPL-MCNC: 3.4 G/DL (ref 3.5–5)
ALP SERPL-CCNC: 126 U/L (ref 45–120)
ALT SERPL W P-5'-P-CCNC: 30 U/L (ref 0–45)
ANION GAP SERPL CALCULATED.3IONS-SCNC: 6 MMOL/L (ref 5–18)
AST SERPL W P-5'-P-CCNC: 23 U/L (ref 0–40)
BILIRUB SERPL-MCNC: 0.2 MG/DL (ref 0–1)
BUN SERPL-MCNC: 26 MG/DL (ref 8–28)
CALCIUM SERPL-MCNC: 7.8 MG/DL (ref 8.5–10.5)
CHLORIDE BLD-SCNC: 116 MMOL/L (ref 98–107)
CO2 SERPL-SCNC: 19 MMOL/L (ref 22–31)
CREAT SERPL-MCNC: 1.09 MG/DL (ref 0.6–1.1)
CREAT SERPL-MCNC: 1.09 MG/DL (ref 0.6–1.1)
GFR SERPL CREATININE-BSD FRML MDRD: 49 ML/MIN/1.73M2
GFR SERPL CREATININE-BSD FRML MDRD: 49 ML/MIN/1.73M2
GLUCOSE BLD-MCNC: 94 MG/DL (ref 70–125)
MAGNESIUM SERPL-MCNC: 2 MG/DL (ref 1.8–2.6)
POTASSIUM BLD-SCNC: 4.4 MMOL/L (ref 3.5–5)
PROT SERPL-MCNC: 6 G/DL (ref 6–8)
SODIUM SERPL-SCNC: 141 MMOL/L (ref 136–145)

## 2021-03-09 ENCOUNTER — COMMUNICATION - HEALTHEAST (OUTPATIENT)
Dept: FAMILY MEDICINE | Facility: CLINIC | Age: 71
End: 2021-03-09

## 2021-03-09 ENCOUNTER — HOME INFUSION (PRE-WILLOW HOME INFUSION) (OUTPATIENT)
Dept: PHARMACY | Facility: CLINIC | Age: 71
End: 2021-03-09

## 2021-03-09 ENCOUNTER — COMMUNICATION - HEALTHEAST (OUTPATIENT)
Dept: SCHEDULING | Facility: CLINIC | Age: 71
End: 2021-03-09

## 2021-03-10 ENCOUNTER — COMMUNICATION - HEALTHEAST (OUTPATIENT)
Dept: INTERNAL MEDICINE | Facility: CLINIC | Age: 71
End: 2021-03-10

## 2021-03-10 DIAGNOSIS — M79.7 FIBROMYALGIA: ICD-10-CM

## 2021-03-10 NOTE — PROGRESS NOTES
This is a recent snapshot of the patient's [facility] medical record.  For current drug dose and complete information and questions, call [phone] or In Basket pool, fv home infusion (59272)  CSN Number:  [id number]

## 2021-03-16 ENCOUNTER — OFFICE VISIT - HEALTHEAST (OUTPATIENT)
Dept: INTERNAL MEDICINE | Facility: CLINIC | Age: 71
End: 2021-03-16

## 2021-03-16 DIAGNOSIS — I10 BENIGN ESSENTIAL HYPERTENSION: ICD-10-CM

## 2021-03-16 DIAGNOSIS — E21.3 HYPERPARATHYROIDISM (H): ICD-10-CM

## 2021-03-19 NOTE — PROGRESS NOTES
This is a recent snapshot of the patient's [facility] medical record.  For current drug dose and complete information and questions, call [phone] or In Basket pool, fv home infusion (75844)  CSN Number:  [id number]      
Ravin Macias(Resident)

## 2021-03-23 ENCOUNTER — COMMUNICATION - HEALTHEAST (OUTPATIENT)
Dept: ADMINISTRATIVE | Facility: CLINIC | Age: 71
End: 2021-03-23

## 2021-03-24 ENCOUNTER — RECORDS - HEALTHEAST (OUTPATIENT)
Dept: ADMINISTRATIVE | Facility: OTHER | Age: 71
End: 2021-03-24

## 2021-03-26 ENCOUNTER — COMMUNICATION - HEALTHEAST (OUTPATIENT)
Dept: INTERNAL MEDICINE | Facility: CLINIC | Age: 71
End: 2021-03-26

## 2021-03-26 DIAGNOSIS — N30.20 CHRONIC CYSTITIS: ICD-10-CM

## 2021-03-26 DIAGNOSIS — I26.99 OTHER PULMONARY EMBOLISM WITHOUT ACUTE COR PULMONALE, UNSPECIFIED CHRONICITY (H): ICD-10-CM

## 2021-03-26 DIAGNOSIS — E86.0 DEHYDRATION: ICD-10-CM

## 2021-03-29 ENCOUNTER — COMMUNICATION - HEALTHEAST (OUTPATIENT)
Dept: INTERNAL MEDICINE | Facility: CLINIC | Age: 71
End: 2021-03-29

## 2021-03-29 DIAGNOSIS — E86.0 DEHYDRATION: ICD-10-CM

## 2021-03-29 RX ORDER — PANCRELIPASE 30000; 6000; 19000 [USP'U]/1; [USP'U]/1; [USP'U]/1
CAPSULE, DELAYED RELEASE PELLETS ORAL
Qty: 540 CAPSULE | Refills: 0 | Status: SHIPPED | OUTPATIENT
Start: 2021-03-29 | End: 2021-09-21

## 2021-03-31 ENCOUNTER — COMMUNICATION - HEALTHEAST (OUTPATIENT)
Dept: PEDIATRICS | Facility: CLINIC | Age: 71
End: 2021-03-31

## 2021-04-06 ENCOUNTER — RECORDS - HEALTHEAST (OUTPATIENT)
Dept: LAB | Facility: HOSPITAL | Age: 71
End: 2021-04-06

## 2021-04-06 LAB
CREAT SERPL-MCNC: 1.09 MG/DL (ref 0.6–1.1)
GFR SERPL CREATININE-BSD FRML MDRD: 49 ML/MIN/1.73M2
MAGNESIUM SERPL-MCNC: 2 MG/DL (ref 1.8–2.6)

## 2021-04-08 ENCOUNTER — COMMUNICATION - HEALTHEAST (OUTPATIENT)
Dept: INTERNAL MEDICINE | Facility: CLINIC | Age: 71
End: 2021-04-08

## 2021-04-08 ENCOUNTER — COMMUNICATION - HEALTHEAST (OUTPATIENT)
Dept: ADMINISTRATIVE | Facility: CLINIC | Age: 71
End: 2021-04-08

## 2021-04-08 DIAGNOSIS — M79.7 FIBROMYALGIA: ICD-10-CM

## 2021-04-08 RX ORDER — PROMETHAZINE HYDROCHLORIDE 25 MG/1
TABLET ORAL
Qty: 30 TABLET | Refills: 0 | Status: SHIPPED | OUTPATIENT
Start: 2021-04-08 | End: 2021-09-08

## 2021-04-09 ENCOUNTER — RECORDS - HEALTHEAST (OUTPATIENT)
Dept: ADMINISTRATIVE | Facility: OTHER | Age: 71
End: 2021-04-09

## 2021-04-09 ENCOUNTER — HOME INFUSION (PRE-WILLOW HOME INFUSION) (OUTPATIENT)
Dept: PHARMACY | Facility: CLINIC | Age: 71
End: 2021-04-09

## 2021-04-12 ENCOUNTER — RECORDS - HEALTHEAST (OUTPATIENT)
Dept: ADMINISTRATIVE | Facility: OTHER | Age: 71
End: 2021-04-12

## 2021-04-12 NOTE — PROGRESS NOTES
This is a recent snapshot of the patient's [facility] medical record.  For current drug dose and complete information and questions, call [phone] or In Basket pool, fv home infusion (38482)  CSN Number:  [id number]

## 2021-04-13 ENCOUNTER — COMMUNICATION - HEALTHEAST (OUTPATIENT)
Dept: ADMINISTRATIVE | Facility: CLINIC | Age: 71
End: 2021-04-13

## 2021-04-13 DIAGNOSIS — M79.7 FIBROMYALGIA: ICD-10-CM

## 2021-04-15 ENCOUNTER — RECORDS - HEALTHEAST (OUTPATIENT)
Dept: ADMINISTRATIVE | Facility: OTHER | Age: 71
End: 2021-04-15

## 2021-04-18 ENCOUNTER — AMBULATORY - HEALTHEAST (OUTPATIENT)
Dept: SURGERY | Facility: AMBULATORY SURGERY CENTER | Age: 71
End: 2021-04-18

## 2021-04-18 DIAGNOSIS — Z11.59 ENCOUNTER FOR SCREENING FOR OTHER VIRAL DISEASES: ICD-10-CM

## 2021-04-20 ENCOUNTER — COMMUNICATION - HEALTHEAST (OUTPATIENT)
Dept: ADMINISTRATIVE | Facility: CLINIC | Age: 71
End: 2021-04-20

## 2021-04-20 ENCOUNTER — COMMUNICATION - HEALTHEAST (OUTPATIENT)
Dept: INTERNAL MEDICINE | Facility: CLINIC | Age: 71
End: 2021-04-20

## 2021-04-20 DIAGNOSIS — E53.8 VITAMIN B12 DEFICIENCY: ICD-10-CM

## 2021-05-06 ENCOUNTER — RECORDS - HEALTHEAST (OUTPATIENT)
Dept: ADMINISTRATIVE | Facility: OTHER | Age: 71
End: 2021-05-06

## 2021-05-06 ENCOUNTER — RECORDS - HEALTHEAST (OUTPATIENT)
Dept: LAB | Facility: CLINIC | Age: 71
End: 2021-05-06

## 2021-05-06 ENCOUNTER — COMMUNICATION - HEALTHEAST (OUTPATIENT)
Dept: FAMILY MEDICINE | Facility: CLINIC | Age: 71
End: 2021-05-06

## 2021-05-06 LAB
CREAT SERPL-MCNC: 1.08 MG/DL (ref 0.6–1.1)
GFR SERPL CREATININE-BSD FRML MDRD: 50 ML/MIN/1.73M2
MAGNESIUM SERPL-MCNC: 2.1 MG/DL (ref 1.8–2.6)

## 2021-05-10 ENCOUNTER — HOME INFUSION (PRE-WILLOW HOME INFUSION) (OUTPATIENT)
Dept: PHARMACY | Facility: CLINIC | Age: 71
End: 2021-05-10

## 2021-05-11 ENCOUNTER — COMMUNICATION - HEALTHEAST (OUTPATIENT)
Dept: ADMINISTRATIVE | Facility: CLINIC | Age: 71
End: 2021-05-11

## 2021-05-17 ENCOUNTER — OFFICE VISIT - HEALTHEAST (OUTPATIENT)
Dept: INTERNAL MEDICINE | Facility: CLINIC | Age: 71
End: 2021-05-17

## 2021-05-17 DIAGNOSIS — F32.A ANXIETY AND DEPRESSION: ICD-10-CM

## 2021-05-17 DIAGNOSIS — F41.9 ANXIETY AND DEPRESSION: ICD-10-CM

## 2021-05-17 DIAGNOSIS — Z01.818 PREOP GENERAL PHYSICAL EXAM: ICD-10-CM

## 2021-05-17 DIAGNOSIS — M79.7 FIBROMYALGIA: ICD-10-CM

## 2021-05-17 RX ORDER — HYDROMORPHONE HYDROCHLORIDE 4 MG/1
4 TABLET ORAL
Qty: 150 TABLET | Refills: 0 | Status: SHIPPED | OUTPATIENT
Start: 2021-05-17 | End: 2021-10-07

## 2021-05-17 NOTE — PROGRESS NOTES
This is a recent snapshot of the patient's [facility] medical record.  For current drug dose and complete information and questions, call [phone] or In Basket pool, fv home infusion (53565)  CSN Number:  [id number]

## 2021-05-18 ENCOUNTER — COMMUNICATION - HEALTHEAST (OUTPATIENT)
Dept: ADMINISTRATIVE | Facility: CLINIC | Age: 71
End: 2021-05-18

## 2021-05-18 LAB
ATRIAL RATE - MUSE: 67 BPM
DIASTOLIC BLOOD PRESSURE - MUSE: NORMAL
INTERPRETATION ECG - MUSE: NORMAL
P AXIS - MUSE: 76 DEGREES
PR INTERVAL - MUSE: 154 MS
QRS DURATION - MUSE: 68 MS
QT - MUSE: 382 MS
QTC - MUSE: 403 MS
R AXIS - MUSE: 24 DEGREES
SYSTOLIC BLOOD PRESSURE - MUSE: NORMAL
T AXIS - MUSE: 65 DEGREES
VENTRICULAR RATE- MUSE: 67 BPM

## 2021-05-20 ENCOUNTER — RECORDS - HEALTHEAST (OUTPATIENT)
Dept: ADMINISTRATIVE | Facility: OTHER | Age: 71
End: 2021-05-20

## 2021-05-20 ENCOUNTER — COMMUNICATION - HEALTHEAST (OUTPATIENT)
Dept: INTERNAL MEDICINE | Facility: CLINIC | Age: 71
End: 2021-05-20

## 2021-05-20 ENCOUNTER — AMBULATORY - HEALTHEAST (OUTPATIENT)
Dept: LAB | Facility: CLINIC | Age: 71
End: 2021-05-20

## 2021-05-20 DIAGNOSIS — Z11.59 ENCOUNTER FOR SCREENING FOR OTHER VIRAL DISEASES: ICD-10-CM

## 2021-05-20 DIAGNOSIS — Z90.49 S/P TOTAL COLECTOMY: ICD-10-CM

## 2021-05-20 RX ORDER — ERGOCALCIFEROL 1.25 MG/1
CAPSULE ORAL
Qty: 13 CAPSULE | Refills: 0 | Status: SHIPPED | OUTPATIENT
Start: 2021-05-20 | End: 2021-08-17

## 2021-05-20 ASSESSMENT — MIFFLIN-ST. JEOR: SCORE: 965.51

## 2021-05-21 ENCOUNTER — ANESTHESIA - HEALTHEAST (OUTPATIENT)
Dept: SURGERY | Facility: AMBULATORY SURGERY CENTER | Age: 71
End: 2021-05-21

## 2021-05-21 ENCOUNTER — RECORDS - HEALTHEAST (OUTPATIENT)
Dept: ADMINISTRATIVE | Facility: OTHER | Age: 71
End: 2021-05-21

## 2021-05-21 LAB
SARS-COV-2 PCR COMMENT: NORMAL
SARS-COV-2 RNA SPEC QL NAA+PROBE: NEGATIVE
SARS-COV-2 VIRUS SPECIMEN SOURCE: NORMAL

## 2021-05-22 ENCOUNTER — COMMUNICATION - HEALTHEAST (OUTPATIENT)
Dept: SCHEDULING | Facility: CLINIC | Age: 71
End: 2021-05-22

## 2021-05-24 ENCOUNTER — SURGERY - HEALTHEAST (OUTPATIENT)
Dept: SURGERY | Facility: AMBULATORY SURGERY CENTER | Age: 71
End: 2021-05-24
Payer: MEDICARE

## 2021-05-24 ENCOUNTER — RECORDS - HEALTHEAST (OUTPATIENT)
Dept: ADMINISTRATIVE | Facility: OTHER | Age: 71
End: 2021-05-24

## 2021-05-24 ENCOUNTER — RECORDS - HEALTHEAST (OUTPATIENT)
Dept: ADMINISTRATIVE | Facility: CLINIC | Age: 71
End: 2021-05-24

## 2021-05-24 ASSESSMENT — MIFFLIN-ST. JEOR: SCORE: 969.48

## 2021-05-25 ENCOUNTER — RECORDS - HEALTHEAST (OUTPATIENT)
Dept: ADMINISTRATIVE | Facility: CLINIC | Age: 71
End: 2021-05-25

## 2021-05-25 ENCOUNTER — COMMUNICATION - HEALTHEAST (OUTPATIENT)
Dept: ADMINISTRATIVE | Facility: CLINIC | Age: 71
End: 2021-05-25

## 2021-05-25 DIAGNOSIS — Z93.2 HIGH OUTPUT ILEOSTOMY (H): ICD-10-CM

## 2021-05-25 DIAGNOSIS — R19.8 HIGH OUTPUT ILEOSTOMY (H): ICD-10-CM

## 2021-05-26 ENCOUNTER — RECORDS - HEALTHEAST (OUTPATIENT)
Dept: ADMINISTRATIVE | Facility: CLINIC | Age: 71
End: 2021-05-26

## 2021-05-27 ENCOUNTER — RECORDS - HEALTHEAST (OUTPATIENT)
Dept: ADMINISTRATIVE | Facility: CLINIC | Age: 71
End: 2021-05-27

## 2021-05-27 ENCOUNTER — COMMUNICATION - HEALTHEAST (OUTPATIENT)
Dept: INTERNAL MEDICINE | Facility: CLINIC | Age: 71
End: 2021-05-27

## 2021-05-27 ENCOUNTER — COMMUNICATION - HEALTHEAST (OUTPATIENT)
Dept: ADMINISTRATIVE | Facility: CLINIC | Age: 71
End: 2021-05-27

## 2021-05-27 VITALS — WEIGHT: 114 LBS | BODY MASS INDEX: 21.72 KG/M2 | HEIGHT: 61 IN | BODY MASS INDEX: 21.72 KG/M2

## 2021-05-27 NOTE — TELEPHONE ENCOUNTER
Patient Returning Call  Reason for call:  HOME HEALTH NURSE  Information relayed to patient:  OKAYED VERBAL ORDERS FOR IV HYDRATION, NURSE IS REQUESTING MORE INFORMATION REGARDING THE IV ABX. WILL PATIENT NEED TO F/U WITH ID CLINIC? PLEASE CALL # LISTED IN PREVIOUS MESSAGE ASAP.    Patient has additional questions:  Yes  If YES, what are your questions/concerns:  SEE ABOVE  Okay to leave a detailed message?: Yes

## 2021-05-27 NOTE — TELEPHONE ENCOUNTER
Orders being requested: would like the ok to delay . It may be today. She is unsure  Reason service is needed/diagnosis: She needs to have help setting up the elderly waiver and financial assistance   When are orders needed by: Today  Where to send Orders: verbal.   Okay to leave detailed message?  Yes

## 2021-05-27 NOTE — TELEPHONE ENCOUNTER
Reason for Disposition    Systolic BP >= 160 OR Diastolic >= 100    Protocols used: HIGH BLOOD PRESSURE-A-OH

## 2021-05-27 NOTE — TELEPHONE ENCOUNTER
Attempted to call back but message said the number has not been assigned. Will wait for a call back. Please give them okay for orders if they return call.  Cassia Carl CMA ............... 9:17 AM, 03/28/19

## 2021-05-27 NOTE — TELEPHONE ENCOUNTER
No, the orders should be 1 peripherally and one via port, to evaluate if her port is a source of infection.  There is no reason that she cannot have a peripheral draw.  She usually gets her draws via port, yes, but it is not true that she cannot have a peripheral draw if needed

## 2021-05-27 NOTE — PROGRESS NOTES
Clinic Note    Assessment:     Assessment and Plan:  1. Anxiety and depression  Apparently, she has not been taking her BuSpar as prescribed.  I reordered this for her today and updated her chart.  - busPIRone (BUSPAR) 30 MG tablet; Take 1 tablet (30 mg total) by mouth 2 (two) times a day.  Dispense: 180 tablet; Refill: 1    2. 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  She like a new order for home care services through [facility].  I faxed over an order for her today as well as today's clinic note.  She is feeling okay from a hydration standpoint.    3. Elevated blood pressure reading without diagnosis of hypertension  Blood pressure looks okay for now.  I do not think she needs to be started on any antihypertensive medications.  There was a strong suspicion that her anxiety could be playing a role in her elevated blood pressure readings.       Patient Instructions   We are going to fax over the order for home nursing through [facility].    I reordered your BuSpar medication today.  Take 30 mg twice daily    There was some discussion on your hospital discharge summary about having blood cultures drawn on 4/9 and then following up with your primary care provider on 411.  Make sure that we follow through with this.    No labs today.    Blood pressure looks good.  No changes in medications for now.    Return in about 1 week (around 4/10/2019).         Subjective:      Patient comes to the clinic today for hospital discharge follow-up.    Patient was recently admitted to the hospital on 3/29 for dehydration and generalized weakness.    Patient has a history of short gut syndrome following total colectomy and ileostomy from colon cancer.  She also has a history of fibromyalgia and major depression.    There was an issue with her home health supplier.  Patient was unable to receive her IVF at home and so she became very fatigued, nauseated, and  "weak.    Patient had been having issues with bacteremia.  Prior to this latest admission.  She was admitted on 3/13 for severe sepsis due to community acquired pneumonia, complicated by bacteremia and partial small bowel obstruction.    Today, patient states that she is feeling better.  Feeling a bit fatigued but she has been getting her fluid infusions through her port.  No signs of port infection right now.  She has had issues with 3 different port infections in the past.    No fevers.  No chest pain or shortness of breath.  She is eating and drinking again.    She is requesting orders for home care through [facility].  She would like to have her old nursing staff again.  They were able to access her port without issue.  She is also like to restart physical therapy again.    The following portions of the patient's history were reviewed and updated as appropriate: Allergies, medications, problems, prior note.    Review of Systems:    Review is otherwise negative except for what is mentioned above.     Social Hx:    Social History     Tobacco Use   Smoking Status Former Smoker     Packs/day: 1.00     Years: 30.00     Pack years: 30.00     Types: Cigarettes     Last attempt to quit: 2000     Years since quittin.2   Smokeless Tobacco Never Used         Objective:     Vitals:    19 1624   BP: 138/88   Patient Site: Right Arm   Patient Position: Sitting   Cuff Size: Adult Regular   Pulse: 100   Weight: 125 lb 14.4 oz (57.1 kg)   Height: 5' 0.75\" (1.543 m)       Exam:    General: No apparent distress. Calm. Alert and Oriented X3. Pt behavior is appropriate.  Head:Atraumatic. Normocephalic, non-tender to palpation  Neck: Supple. No JVD. Full ROM. No adenopathy  Eyes: PERRL, No discharge. No strabismus. No nystagmus.  Ears: TMs pearly gray with landmarks visible.   Nose/Mouth/Throat: Patent nares, no oral lesions, pharynx clear and without exudate. Uvula mid-line. Nasal septum mid-line. Clear " turbinates.   Lymph: No axillar or cervical adenopathy.   Chest/Lungs: Normal chest wall, clear to auscultation, normal respiratory effort and rate.   Heart/Pulses: Regular rate and rhythm, strong and equal radial pulses, no murmurs. Capillary refill <2 seconds. No edema.   Abdomen: Soft, no palpable masses. No hepatosplenomegaly, no tenderness with palpation noted. Bowel sounds active in all quadrants. No increased tympany.   Genitalia: Not examined.   Musculoskeletal: No CVA tenderness with palpation. Good ROM with extremities.   Neurologic: Interactive, alert, no focal findings, CNs intact.   Skin: Port site on right chest inspected and found to be within normal limits.  No signs of infection.      Patient Active Problem List   Diagnosis     Chronic fatigue syndrome     Postinflammatory pulmonary fibrosis (H)     History of malignant neoplasm of large intestine     Personal history of lymphatic and hematopoietic neoplasm     Major depressive disorder, recurrent episode, moderate (H)     Asthma     GERD (gastroesophageal reflux disease)     Fibromyalgia     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     Chronic migraine     Vitamin B12 deficiency     Chronic cystitis     Hypomagnesemia     Anxiety and depression     S/P total colectomy     High output ileostomy (H)     CKD (chronic kidney disease), stage 2 (mild)     Chronic, continuous use of opioids     Current Outpatient Medications   Medication Sig Dispense Refill     albuterol (PROVENTIL) 2.5 mg /3 mL (0.083 %) nebulizer solution Take 2.5 mg by nebulization every 6 (six) hours as needed for wheezing.       busPIRone (BUSPAR) 30 MG tablet Take 1 tablet (30 mg total) by mouth 2 (two) times a day. 180 tablet 1     cholecalciferol, vitamin D3, (VITAMIN D3) 2,000 unit cap Take 2,000 Units by mouth every morning.       CREON 6,000-19,000 -30,000 unit CpDR capsule TAKE 2 CAPSULE BY MOUTH THREE TIMES DAILY WITH MEALS 540 capsule 0     cyanocobalamin 1,000 mcg/mL  injection ADMINISTER 1 ML IN THE MUSCLE EVERY 30 DAYS AS DIRECTED 3 mL 3     diphenoxylate-atropine (LOMOTIL) 2.5-0.025 mg per tablet Take 2 tablets by mouth 3 (three) times a day. 30 tablet 0     DULoxetine (CYMBALTA) 60 MG capsule Take 1 capsule (60 mg total) by mouth 2 (two) times a day. 180 capsule 3     enoxaparin (LOVENOX) 80 mg/0.8 mL syringe Inject 80 mg under the skin.       HYDROmorphone (DILAUDID) 4 MG tablet Take 1 tablet (4 mg total) by mouth every 6 (six) hours as needed for pain. 120 tablet 0     Lactobacillus acidophilus (ACIDOPHILUS ORAL) Take 1 tablet by mouth 2 (two) times a day.       mirtazapine (REMERON) 30 MG tablet Take 30 mg by mouth at bedtime.        multivit-min/folic acid/gta987 (ALIVE WOMEN'S GUMMY VITAMINS ORAL) Take 2 tablets by mouth Daily after breakfast.        nitrofurantoin (MACRODANTIN) 50 MG capsule TAKE 1 CAPSULE(50 MG) BY MOUTH AT BEDTIME 90 capsule 0     ondansetron (ZOFRAN ODT) 8 MG disintegrating tablet Take 1 tablet (8 mg total) by mouth every 8 (eight) hours as needed for nausea. 90 tablet 0     pantoprazole (PROTONIX) 40 MG tablet Take 40 mg by mouth.       potassium chloride (K-DUR,KLOR-CON) 20 MEQ tablet TAKE 1 TABLET(20 MEQ) BY MOUTH DAILY 90 tablet 3     pregabalin (LYRICA) 200 MG capsule TAKE 1 CAPSULE(200 MG) BY MOUTH THREE TIMES DAILY 270 capsule 0     traZODone (DESYREL) 150 MG tablet Take 150 mg by mouth at bedtime.        UNABLE TO FIND Infuse into a venous catheter at bedtime. Med Name: Normal saline with magnesium.  2 liters per patient       ergocalciferol (ERGOCALCIFEROL) 50,000 unit capsule Take 50,000 Units by mouth. Patient indicates taking once a month       fluconazole (DIFLUCAN) 150 MG tablet   0     loperamide (IMODIUM) 2 mg capsule Take 2-4 mg by mouth 2 (two) times a day as needed.   2     naloxegol (MOVANTIK) 12.5 mg Tab tablet Take 12.5 mg by mouth daily before breakfast.        nitrofurantoin (MACRODANTIN) 50 MG capsule TAKE 1 CAPSULE(50 MG) BY  MOUTH AT BEDTIME 90 capsule 0     nitroglycerin 0.4% (RECTIV) 0.4 % (w/w) Oint rectal ointment Use twice per day as needed 30 g 3     No current facility-administered medications for this visit.        I spent 25 minutes with patient face to face, of which >50% was counseling regarding the above plan       Duke Mccall (Rob), CNP    4/3/2019

## 2021-05-27 NOTE — TELEPHONE ENCOUNTER
Left detailed message with information below.  Cassia Carl CMA ............... 11:43 AM, 04/10/19

## 2021-05-27 NOTE — TELEPHONE ENCOUNTER
Orders being requested: Start of care  Reason service is needed/diagnosis: Home Care  When are orders needed by: 04-08-19  Where to send Orders: Phone:  Verbal order is okay #[phone] secure line  Okay to leave detailed message?  Yes    Prior agency is discharging care on Sunday 040719, and [facility] is taking over home care starting on Monday 040819. Thanks.

## 2021-05-27 NOTE — TELEPHONE ENCOUNTER
RN cannot approve Refill Request    RN can NOT refill this medication med is not covered by policy/route to provider     . Last office visit: 9/10/2018 Bhupendra Caldwell MD Last Physical: 2/15/2019 Last MTM visit: Visit date not found Last visit same specialty: 9/10/2018 Bhupendra Caldwell MD.  Next visit within 3 mo: Visit date not found  Next physical within 3 mo: Visit date not found      Cristin Fishman, [facility] Triage/Med Refill 4/2/2019    Requested Prescriptions   Pending Prescriptions Disp Refills     nitrofurantoin (MACRODANTIN) 50 MG capsule [Pharmacy Med Name: NITROFURANTOIN MACRO 50MG CAPSULES] 90 capsule 0     Sig: TAKE 1 CAPSULE(50 MG) BY MOUTH AT BEDTIME    There is no refill protocol information for this order

## 2021-05-27 NOTE — TELEPHONE ENCOUNTER
Orders being requested: IV hydration, patient was previously on 2 liters of saline with magnesium daily, plus a PRN liter of saline.   Reason service is needed/diagnosis: Chronic dehydration. Patient was discharged today from  [facility] and the hospital did not re-order the IV hydration as part of discharge, only IV anti-biotics. Patient was hospitalized with bacteremia/port infection.   When are orders needed by: ASAP  Where to send Orders: Phone:  [phone]  Okay to leave detailed message?  Yes

## 2021-05-27 NOTE — TELEPHONE ENCOUNTER
Orders being requested:OT to evaluate and treat.   for elderly waiver and financial issues  Home Health aid 1 x week for 4 weeks for shower/bathing.  Skilled nursing 1 x week for 3 weeks  1 x week for 6 wks to access/monitor/teach general condition catheter and dressing change.  B12 injection. labs  Reason service is needed/diagnosis: HOMECARE  When are orders needed by: ASAP  Where to send Orders: VERBAL  Okay to leave detailed message?  Yes

## 2021-05-27 NOTE — TELEPHONE ENCOUNTER
Orders being requested: Physical therapy    2 times per week for 3 weeks  1 time per week for 1 week    Reason service is needed/diagnosis: post hospitalization weakness   When are orders needed by: ASAP  Where to send Orders: Phone:  [phone]  Okay to leave detailed message?  Yes

## 2021-05-27 NOTE — TELEPHONE ENCOUNTER
Today 170/100 -130    Admitted to hospital last week for dehydration.    Had fluids last night.  No chest pain or shortness of breath     Not on blood pressure medication.    High anxiety.  No dizziness.  Home care says the evelated BP is anxiety related.  Has taken her medication for the day.  Has no prn anxiety relief meds on med list.    Wants to be seen for elevated BP.    Appointment made for tomorrow so she can arrange ride.     Caller agrees with care advice given. Agreed to call back if patient has additional symptoms or questions.    Lakeisha Camara, RN, [facility] Nurse Triage/Med Refills RN

## 2021-05-27 NOTE — TELEPHONE ENCOUNTER
Who is calling:  [facility] from [facility] called back today 3/28/19 at 9:20 am.  She can be reached at [phone] and is waiting for a call back.  Reason for Call: orders needed for iv antibiotic and iv hydration therapy (see previous notes).   Date of last appointment with primary care: 2/15/19  Okay to leave a detailed message: Yes

## 2021-05-27 NOTE — TELEPHONE ENCOUNTER
Patient Returning Call  Reason for call:  Return call  Information relayed to patient:  Patient was informed Bhupendra Caldwell MD is out today and was told she needed to reschedule her appointment today. Patient was transferred to scheduling.  Patient has additional questions:  No  If YES, what are your questions/concerns:  n/a  Okay to leave a detailed message?: No call back needed

## 2021-05-27 NOTE — PATIENT INSTRUCTIONS - HE
We are going to fax over the order for home nursing through [facility].    I reordered your BuSpar medication today.  Take 30 mg twice daily    There was some discussion on your hospital discharge summary about having blood cultures drawn on 4/9 and then following up with your primary care provider on 411.  Make sure that we follow through with this.    No labs today.    Blood pressure looks good.  No changes in medications for now.

## 2021-05-27 NOTE — TELEPHONE ENCOUNTER
FYI - Status Update  Who is Calling: Adri, nurse with [facility]   Update: Calling to state that [facility] will be taking patient for services starting Monday 4/8. [facility] will keep patient through the weekend. Once patient is back with [facility] on Monday, Adri will send a new care plan to PCP. FYI.   Okay to leave a detailed message?:  No return call needed

## 2021-05-27 NOTE — TELEPHONE ENCOUNTER
Orders being requested: Home Care  Occupational therapy    2 times a week for 3 weeks    1 time a week for 1 week  Reason service is needed/diagnosis: post hospital stay to work on weakness and ADLs  When are orders needed by: ASAP  Where to send Orders: Phone:  verbal orders to caller  Okay to leave detailed message?  Yes

## 2021-05-27 NOTE — TELEPHONE ENCOUNTER
Left detailed message for Hiral with information below. Im not sure what they need exactly for the IV antibiotics. If she returns call, please find out a good time for me to reach her. I need clarity about what they are needing as messages are not clear.  Cassia Carl CMA ............... 8:40 AM, 03/29/19

## 2021-05-27 NOTE — TELEPHONE ENCOUNTER
Dr. Caldwell is out today and pt will need to reschedule - VM is not set up - please try again - her appt is scheduled for 3:20    STACEY Cam  8:53 AM  4/11/2019

## 2021-05-28 ENCOUNTER — RECORDS - HEALTHEAST (OUTPATIENT)
Dept: ADMINISTRATIVE | Facility: CLINIC | Age: 71
End: 2021-05-28

## 2021-05-28 NOTE — PROGRESS NOTES
OUTPATIENT OSTOMY ASSESSMENT    Patients mode of arrival: Ambulatory    INTAKE  Type of Stoma: Permanent Ileostomy  Anticipated date of takedown: NA    Diagnosis Pertinent to Stoma:Precanerous Date of Diagnosis: 1999  Type of Surgery: Ileostomy    Surgery Date: 1999  Surgeon:unknown     Hospital: unknown    Purpose of this visit:Leaking Problem and Peristomal Complications    Present for Teaching Session: Patient   Present: NA    Pertinent Information: Patient having skin breakdown and leaking    Current Equipment:    Product # Brand Description   Pouch [address] 1 inch convex   Flange      Paste [address] [employer] ring   Powder [address] [employer] powder   Deodorizer                    Pouch Change Frequency: Every 2-4 days, up to multiple times a day  Provider of Care: Emptying: self Pouch Change: self    ASSESSMENT  Stoma Size: Round 1 1/8 inches  Protrusion: 2.5cm  Vascularity: Pink  Mucocutaneous Juncture: Intact  Peristomal Skin: Abnormal erythema under tape border at 4-6 o'clock which appears to be a healing skin stripping injury, 0.3x0.3cm partial thickness denudement at 2 o'clock outside of pouching surface where patient taped around area    Wound: No  Current Wound Care: NA    TREATMENT  Applied: stoma powder and no sting to denudement    INTERVENTIONS  Refitting done, Educated on peristomal skin treatment and Educated on pouch change procedure  Miscellaneous Interventions: Signed up for [facility] or [facility]    INSTRUCTIONS GIVEN  WOC Role, Anatomy, Diet, Fluids: Drink 6-8 glasses of fluids daily , Pouching Products: Showed pouching system  and Ordering supplies    PLAN  Change in Supplies: See Outpatient Ostomy Instructions and New Pouching Procedure: See Outpatient Ostomy Instructions      Total Time Spent with Patient: 40 minutes.  Education time: 20 minutes.

## 2021-05-28 NOTE — TELEPHONE ENCOUNTER
RN cannot approve Refill Request    RN can NOT refill this medication med is not covered by policy/route to provider     . Last office visit: 4/19/2019 Bhupendra Caldwell MD Last Physical: 2/15/2019 Last MTM visit: Visit date not found Last visit same specialty: 4/19/2019 Bhupendra Caldwell MD.  Next visit within 3 mo: Visit date not found  Next physical within 3 mo: Visit date not found      Cristin Fishman, [facility] Triage/Med Refill 5/6/2019    Requested Prescriptions   Pending Prescriptions Disp Refills     promethazine (PHENERGAN) 25 MG tablet [Pharmacy Med Name: PROMETHAZINE 25MG TABLETS] 20 tablet 0     Sig: TAKE 1 TABLET(25 MG) BY MOUTH EVERY 6 HOURS AS NEEDED FOR NAUSEA       There is no refill protocol information for this order

## 2021-05-28 NOTE — TELEPHONE ENCOUNTER
Orders being requested: orders to discontinue the home health aide Reason service is needed/diagnosis: because the patient has been refusing and doing her own bathing    When are orders needed by: as soon as possible  Where to send Orders: Phone:  [phone] if leaving a message please leave the name and credentials of the person calling with the orders  Okay to leave detailed message?  Yes

## 2021-05-28 NOTE — TELEPHONE ENCOUNTER
Pt is having nausea for a few days now since she seen you last  She was on zofran and now the new one is not working  Taking promethazine (phenergan) 25 mg and is not working  Pt is supposed to get a ct scan done and she afraid she will vomit what ever they give her  Pt is drinking fluids and feels hydrated  Constant diarrhea ans has dumping syndrome    appt is at 515 pm for her Ct scan of her bowels/small Intestine    [facility]  [phone] cell phone Dee-please advise    Can you send send compazine?    Monika Rodriguez, RN [facility] RN Triage      Reason for Disposition    Nausea persists > 1 week    Protocols used: NAUSEA-A-AH

## 2021-05-28 NOTE — TELEPHONE ENCOUNTER
Medication Request  Medication name:   enoxaparin (LOVENOX) 80 mg/0.8 mL syringe   3/30/2019     Sig - Route: Inject 80 mg under the skin. - Subcutaneous    Class: Historical Med        Pharmacy Name and Location: [facility] [place]  Reason for request: I am to be on this for at least 2 months pre hospital stay and I only have 4 days left  When did you use medication last?:  today  Patient offered appointment:  patient declined patient was just seen 4/19/19  Okay to leave a detailed message: yes

## 2021-05-28 NOTE — PROGRESS NOTES
Dee comes in today for follow-up of a hospitalization for Klebsiella sepsis.  She was hospitalized from 3/13 to 3/27 at [facility] after presenting to the ER with Reiger's.  She was noted to be febrile and tachycardic at that time.  Her blood cultures from her port returned positive, and eventually grew out Klebsiella.  Urinalysis and chest x-ray were normal.  A CT scan of the abdomen showed an intra-abdominal fluid collection, which was eventually aspirated by interventional radiology.  Cultures of this fluid were negative.  She also had a small bowel perforation noted on her CT scan on 3/20 as well.  Surgery was consulted and the decision was made to not bring her to surgery given her prior abdominal surgeries.  Serial abdominal exams were performed and a repeat CT scan on 3/22 showed a stable amount of free air.  No operative intervention was performed.    She had a CTA of the chest performed secondary to some pleuritic chest pain that she was having along with trying to find her source of bacteremia.  She was discovered to have a right lower lobe pulmonary embolism and a right upper lobe pneumonia.  She was started on ceftriaxone while as an inpatient.  Her hospital team discussed pulling her port, but given her chronic medical problems along with her daily use of the port it was decided to treat with antibiotics through it until 4/2.  Lower extremity ultrasound was negative for DVT.  She was started on Lovenox once per day which needs to be continued for the next 3 months.  It was thought secondary to poor absorption and poor nutrition secondary to her short bowel syndrome that she would not be a good candidate for either warfarin or 1 of the direct oral anticoagulants.    She had a repeat hospitalization from 3/28 3/30 for dizziness and dehydration.  She had blood cultures through her port at that time which were negative.  She was then discharged and was told to have her blood cultures redrawn.  On  order was given for this to have them done through her home health nurse, but unfortunately I do not have the results of these.  A peripheral draw at the same time was declined by the patient.    Currently Dee feels quite nauseous today, and is having some left lower quadrant pain.  She is having normal output from her ostomy, and is having normal urine output, however.  She is eating without trouble and is having no vomiting.  She does feel dizzy when standing.    Objective: Vital signs are as per the EMR.  In general the patient is alert pleasant and in no acute distress.  She appears healthy.    Abdomen is soft, nondistended, tender in the left lower quadrant.    Assessment and plan: Patient presenting for hospital follow-up for Klebsiella sepsis along with multiple other medical problems as noted above.  I believe her most pressing issue is that we need to repeat an abdominal CT to make sure that her perforation is stable and continuing to heal.  Orthostatic vital signs were performed today which were normal as well, and given her normal vitals and lack of an abdominal exam that is concerning for peritonitis I believe we have the luxury of time at this point.  I am going to obtain a CT scan of the abdomen to be performed on 4/22.  However, I told Dee in no uncertain terms that if her pain were to worsen or if she were to have abdominal distention that she needs to be seen on an emergent basis which she understands.  She will continue her Lovenox until 6/16 at least.  She was given a dose of Zofran in the office today.  We will obtain her blood culture results as well.    45 minutes were spent with the patient, over half of which was in counseling and coordination of care.

## 2021-05-28 NOTE — PATIENT INSTRUCTIONS - HE
OUTPATIENT OSTOMY INSTRUCTIONS    Type of Stoma: Ileostomy    Supplier: [id number] [phone]    Equipment:   Product # Brand Description   Pouch 66600 Coloplast 1 1/4 inch convex light   Flange      Paste 59751 Coloplast 1 1/8 inch protective seal   Powder 2306 Bettye Adapt powder   Deodorizer      No Sting 3344 3M Cavilon No Sting    Barrier strips  [id number] Coloplast Elastic barrier strips     Procedure:  Close the bottom of the pouch and then remove backings from adhesive surfaces.  Remove the soiled pouch and discard.  Wash skin with water and dry.  Then apply powder- use only on open area and brush off excess.  Apply No Sting over powdered area.  Apply ring: Around stoma  Then: Apply pouching system to stoma site and hold in place for 2-5 minutes.    Pouch change: Every 3-4 days  _____________________________________________________________________    [facility] Nurse Specialist: Kem Cordova RN CWOCN  Questions: [phone]      I have received a copy of these instructions, have had an opportunity to ask questions, and have had them answered to my satisfaction.    ________________________________________________________________  Patient Signature and Date    Follow-up Appointment: 1 year or as needed  To schedule an appointment call [facility] Scheduling at [phone]

## 2021-05-29 ENCOUNTER — RECORDS - HEALTHEAST (OUTPATIENT)
Dept: ADMINISTRATIVE | Facility: CLINIC | Age: 71
End: 2021-05-29

## 2021-05-29 NOTE — TELEPHONE ENCOUNTER
Controlled Substance Refill Request  Medication Name:   Requested Prescriptions     Pending Prescriptions Disp Refills     pregabalin (LYRICA) 200 MG capsule 270 capsule 0     Sig: TAKE 1 CAPSULE(200 MG) BY MOUTH THREE TIMES DAILY     HYDROmorphone (DILAUDID) 4 MG tablet 120 tablet 0     Sig: Take 1 tablet (4 mg total) by mouth every 6 (six) hours as needed for pain.     Date Last Fill: Lyrica: 03/07/2019 and Dilaudid: 05/21/2019   Pharmacy: [facility]     Submit electronically to pharmacy  Controlled Substance Agreement Date Scanned:   Encounter-Level CSA Scan Date - 10/24/2017:    Scan on 10/31/2017  2:02 PM (below)         Last office visit with prescriber/PCP: 4/19/2019 Bhupendra Caldwell MD OR same dept: 4/19/2019 Bhupendra Caldwell MD OR same specialty: 4/19/2019 Bhupendra Caldwell MD  Last physical: 2/15/2019 Last MTM visit: Visit date not found

## 2021-05-29 NOTE — TELEPHONE ENCOUNTER
Controlled Substance Refill Request  Medication Name:   Requested Prescriptions     Pending Prescriptions Disp Refills     HYDROmorphone (DILAUDID) 4 MG tablet 120 tablet 0     Sig: Take 1 tablet (4 mg total) by mouth every 6 (six) hours as needed for pain.     Date Last Fill: 04/19/2019  Pharmacy: [facility] [place]      Submit electronically to pharmacy  Controlled Substance Agreement Date Scanned:   Encounter-Level CSA Scan Date - 10/24/2017:    Scan on 10/31/2017  2:02 PM (below)         Last office visit with prescriber/PCP: 4/19/2019 Bhupendra Caldwell MD OR same dept: 4/19/2019 Bhupendra Caldwell MD OR same specialty: 4/19/2019 Bhupendra Caldwell MD  Last physical: 2/15/2019 Last MTM visit: Visit date not found

## 2021-05-29 NOTE — TELEPHONE ENCOUNTER
Orders being requested: skilled nursing 1 time a week for 9 weeks  Reason service is needed/diagnosis: Monthly B- 12 injections, requested labs, dressing changes and monitor general conditions   When are orders needed by: by the end of the week.   Where to send Orders: Phone:  [phone]  Okay to leave detailed message?  Yes

## 2021-05-29 NOTE — TELEPHONE ENCOUNTER
RN cannot approve Refill Request    RN can NOT refill this medication med is not covered by policy/route to provider.    Jason Burris, [facility] Triage/Med Refill 5/21/2019    Requested Prescriptions   Pending Prescriptions Disp Refills     CREON 6,000-19,000 -30,000 unit CpDR capsule [Pharmacy Med Name: CREON 6,000 CAPSULES] 540 capsule 0     Sig: TAKE 2 CAPSULES BY MOUTH THREE TIMES DAILY WITH FOOD       There is no refill protocol information for this order

## 2021-05-29 NOTE — TELEPHONE ENCOUNTER
RN cannot approve Refill Request    RN can NOT refill this medication med is not covered by policy/route to provider. Last office visit: 4/19/2019 Bhupendra Caldwell MD Last Physical: 2/15/2019 Last MTM visit: Visit date not found Last visit same specialty: 4/19/2019 Bhupendra Caldwell MD.  Next visit within 3 mo: Visit date not found  Next physical within 3 mo: Visit date not found      Giovanna Arnold, [facility] Triage/Med Refill 5/26/2019    Requested Prescriptions   Pending Prescriptions Disp Refills     enoxaparin (LOVENOX) 80 mg/0.8 mL syringe [Pharmacy Med Name: ENOXAPARIN 80MG/0.8ML SYR(10X0.8ML)] 24 mL 0     Sig: ADMINISTER 0.8 ML(80 MG) UNDER THE SKIN DAILY       Enoxaparin Refill Protocol   Failed - 5/25/2019 11:01 AM        Failed - Route to appropriate pool/provider     Last Anticoagulation Summary:           Passed - Provider visit in last year     Last office visit with prescriber/PCP: 4/19/2019 Bhupendra Caldwell MD OR same dept: 4/19/2019 Bhupendra Caldwell MD  Last physical: 2/15/2019       Next visit within 3 mo: Visit date not found  Next physical within 3 mo: Visit date not found  Prescriber OR PCP: Bhupendra Caldwell MD  Last diagnosis associated with med order: 1. Other pulmonary embolism without acute cor pulmonale, unspecified chronicity (H)  - enoxaparin (LOVENOX) 80 mg/0.8 mL syringe [Pharmacy Med Name: ENOXAPARIN 80MG/0.8ML SYR(10X0.8ML)]; ADMINISTER 0.8 ML(80 MG) UNDER THE SKIN DAILY  Dispense: 24 mL; Refill: 0     Requested Prescriptions     Pending Prescriptions Disp Refills     enoxaparin (LOVENOX) 80 mg/0.8 mL syringe [Pharmacy Med Name: ENOXAPARIN 80MG/0.8ML SYR(10X0.8ML)] 24 mL 0     Sig: ADMINISTER 0.8 ML(80 MG) UNDER THE SKIN DAILY     Refill for 10 day supply. May give one additional 5 day supply if INR not anticipated to be in therapeutic goal range in 5 days based on recent INR result.

## 2021-05-30 ENCOUNTER — RECORDS - HEALTHEAST (OUTPATIENT)
Dept: ADMINISTRATIVE | Facility: CLINIC | Age: 71
End: 2021-05-30

## 2021-05-30 VITALS — WEIGHT: 116 LBS | BODY MASS INDEX: 22.78 KG/M2 | HEIGHT: 60 IN

## 2021-05-30 VITALS — BODY MASS INDEX: 22.77 KG/M2 | WEIGHT: 116.6 LBS

## 2021-05-30 VITALS — HEIGHT: 62 IN | WEIGHT: 108 LBS | BODY MASS INDEX: 19.88 KG/M2

## 2021-05-30 NOTE — TELEPHONE ENCOUNTER
controlled Substance Refill Request     Medication Name: HYDROmorphone (DILAUDID) 4 MG tablet    Date Last Fill: 06/21/19  Pharmacy:  [id number] ([place]) - [place], [place] - [address] AT [facility] [address]    Submit electronically to pharmacy  Controlled Substance Agreement Date Scanned:   Encounter-Level CSA Scan Date - 10/24/2017:    Scan on 10/31/2017  2:02 PM (below)         Last office visit with prescriber/PCP: 4/19/2019 Bhupendra Caldwell MD OR same dept: 4/19/2019 Bhupendra Caldwell MD OR same specialty: 4/19/2019 Bhupendra Caldwell MD  Last physical: 2/15/2019 Last MTM visit: Visit date not found

## 2021-05-30 NOTE — TELEPHONE ENCOUNTER
RN cannot approve Refill Request    RN can NOT refill this medication Protocol failed and NO refill given. Last office visit: 4/19/2019 Bhupendra Caldwell MD Last Physical: 2/15/2019 Last MTM visit: Visit date not found Last visit same specialty: 4/19/2019 Bhupendra Caldwell MD.  Next visit within 3 mo: Visit date not found  Next physical within 3 mo: Visit date not found      Lashay Sandy, [facility] Triage/Med Refill 7/26/2019    Requested Prescriptions   Pending Prescriptions Disp Refills     enoxaparin (LOVENOX) 80 mg/0.8 mL syringe [Pharmacy Med Name: ENOXAPARIN 80MG/0.8ML SYR(10X0.8ML)] 24 mL 0     Sig: ADMINISTER 0.8 ML(80 MG) UNDER THE SKIN DAILY       Enoxaparin Refill Protocol   Failed - 7/26/2019 11:30 AM        Failed - Route to appropriate pool/provider     Last Anticoagulation Summary:           Passed - Provider visit in last year     Last office visit with prescriber/PCP: 4/19/2019 Bhupendra Caldwell MD OR same dept: 4/19/2019 Bhupendra Caldwell MD  Last physical: 2/15/2019       Next visit within 3 mo: Visit date not found  Next physical within 3 mo: Visit date not found  Prescriber OR PCP: Bhupendra Caldwell MD  Last diagnosis associated with med order: 1. Other pulmonary embolism without acute cor pulmonale, unspecified chronicity (H)  - enoxaparin (LOVENOX) 80 mg/0.8 mL syringe [Pharmacy Med Name: ENOXAPARIN 80MG/0.8ML SYR(10X0.8ML)]; ADMINISTER 0.8 ML(80 MG) UNDER THE SKIN DAILY  Dispense: 24 mL; Refill: 0     Requested Prescriptions     Pending Prescriptions Disp Refills     enoxaparin (LOVENOX) 80 mg/0.8 mL syringe [Pharmacy Med Name: ENOXAPARIN 80MG/0.8ML SYR(10X0.8ML)] 24 mL 0     Sig: ADMINISTER 0.8 ML(80 MG) UNDER THE SKIN DAILY     Refill for 10 day supply. May give one additional 5 day supply if INR not anticipated to be in therapeutic goal range in 5 days based on recent INR result.

## 2021-05-30 NOTE — TELEPHONE ENCOUNTER
Name of form/paperwork: Other:  Home Health Certification and Plan Of Care   Certification period:  6/17/2019-8/5/2019  Have you been seen for this request: N/A  Do we have the form: Yes- 6/20/2019   Caller will be faxing the form again.  When is form needed by: as soon as possible   How would you like the form returned: Fax  Fax Number: Fax number is provided on the form.  Patient Notified form requests are processed in 3-5 business days: Yes  (If patient needs form sooner, please note that in this message.)  Okay to leave a detailed message? No  [phone]   Ask for Nichol     within normal limits

## 2021-05-30 NOTE — TELEPHONE ENCOUNTER
Who is calling:  Nichol marie  [facility]  Reason for Call:  Faxed form certification of care.  Needs this form today.  Date of last appointment with primary care: 4/2/19  Okay to leave a detailed message: Yes

## 2021-05-30 NOTE — TELEPHONE ENCOUNTER
RN cannot approve Refill Request    RN can NOT refill this medication med is not covered by policy/route to provider.    Jason Burris, [facility] Triage/Med Refill 7/1/2019    Requested Prescriptions   Pending Prescriptions Disp Refills     nitrofurantoin (MACRODANTIN) 50 MG capsule [Pharmacy Med Name: NITROFURANTOIN MACRO 50MG CAPSULES] 90 capsule 0     Sig: TAKE 1 CAPSULE(50 MG) BY MOUTH AT BEDTIME       There is no refill protocol information for this order

## 2021-05-30 NOTE — TELEPHONE ENCOUNTER
RN cannot approve Refill Request    RN can NOT refill this medication med is not covered by policy/route to provider.    Jason Burris, [facility] Triage/Med Refill 6/27/2019    Requested Prescriptions   Pending Prescriptions Disp Refills     enoxaparin (LOVENOX) 80 mg/0.8 mL syringe [Pharmacy Med Name: ENOXAPARIN 80MG/0.8ML SYR(10X0.8ML)] 24 mL 0     Sig: ADMINISTER 0.8 ML(80 MG) UNDER THE SKIN DAILY       Enoxaparin Refill Protocol   Failed - 6/27/2019  1:03 PM        Failed - Route to appropriate pool/provider     Last Anticoagulation Summary:           Passed - Provider visit in last year     Last office visit with prescriber/PCP: 4/19/2019 Bhupendra Caldwell MD OR same dept: 4/19/2019 Bhupendra Caldwell MD  Last physical: 2/15/2019       Next visit within 3 mo: Visit date not found  Next physical within 3 mo: Visit date not found  Prescriber OR PCP: Bhupendra Caldwell MD  Last diagnosis associated with med order: 1. Other pulmonary embolism without acute cor pulmonale, unspecified chronicity (H)  - enoxaparin (LOVENOX) 80 mg/0.8 mL syringe [Pharmacy Med Name: ENOXAPARIN 80MG/0.8ML SYR(10X0.8ML)]; ADMINISTER 0.8 ML(80 MG) UNDER THE SKIN DAILY  Dispense: 24 mL; Refill: 0     Requested Prescriptions     Pending Prescriptions Disp Refills     enoxaparin (LOVENOX) 80 mg/0.8 mL syringe [Pharmacy Med Name: ENOXAPARIN 80MG/0.8ML SYR(10X0.8ML)] 24 mL 0     Sig: ADMINISTER 0.8 ML(80 MG) UNDER THE SKIN DAILY     Refill for 10 day supply. May give one additional 5 day supply if INR not anticipated to be in therapeutic goal range in 5 days based on recent INR result.

## 2021-05-31 ENCOUNTER — RECORDS - HEALTHEAST (OUTPATIENT)
Dept: ADMINISTRATIVE | Facility: CLINIC | Age: 71
End: 2021-05-31

## 2021-05-31 VITALS — BODY MASS INDEX: 20.39 KG/M2 | WEIGHT: 108 LBS | HEIGHT: 61 IN

## 2021-05-31 VITALS — BODY MASS INDEX: 20.06 KG/M2 | HEIGHT: 62 IN | WEIGHT: 109 LBS

## 2021-05-31 VITALS — BODY MASS INDEX: 20.2 KG/M2 | HEIGHT: 61 IN | WEIGHT: 107 LBS

## 2021-05-31 VITALS — HEIGHT: 61 IN | BODY MASS INDEX: 20.69 KG/M2 | WEIGHT: 109.6 LBS

## 2021-05-31 VITALS — HEIGHT: 62 IN | WEIGHT: 108 LBS | BODY MASS INDEX: 19.88 KG/M2

## 2021-05-31 VITALS — BODY MASS INDEX: 20.54 KG/M2 | HEIGHT: 61 IN | WEIGHT: 108.8 LBS

## 2021-05-31 VITALS — BODY MASS INDEX: 21.14 KG/M2 | HEIGHT: 61 IN | WEIGHT: 112 LBS

## 2021-05-31 VITALS — WEIGHT: 111 LBS | BODY MASS INDEX: 21.32 KG/M2

## 2021-05-31 VITALS — WEIGHT: 108 LBS | HEIGHT: 65 IN | BODY MASS INDEX: 17.99 KG/M2

## 2021-05-31 NOTE — TELEPHONE ENCOUNTER
RN cannot approve Refill Request    RN can NOT refill this medication med is not covered by policy/route to provider. Last office visit: 4/19/2019 Bhupendra Caldwell MD Last Physical: 2/15/2019 Last MTM visit: Visit date not found Last visit same specialty: 4/19/2019 Bhupendra Caldwell MD.  Next visit within 3 mo: Visit date not found  Next physical within 3 mo: Visit date not found      Angela Rivera, [facility] Triage/Med Refill 8/27/2019    Requested Prescriptions   Pending Prescriptions Disp Refills     enoxaparin (LOVENOX) 80 mg/0.8 mL syringe [Pharmacy Med Name: ENOXAPARIN 80MG/0.8ML SYR(10X0.8ML)] 24 mL 0     Sig: ADMINISTER 0.8 ML(80 MG) UNDER THE SKIN DAILY       Enoxaparin Refill Protocol   Failed - 8/27/2019  1:31 PM        Failed - Route to appropriate pool/provider     Last Anticoagulation Summary:           Passed - Provider visit in last year     Last office visit with prescriber/PCP: 4/19/2019 Bhupendra Caldwell MD OR same dept: 4/19/2019 Bhupendra Caldwell MD  Last physical: 2/15/2019       Next visit within 3 mo: Visit date not found  Next physical within 3 mo: Visit date not found  Prescriber OR PCP: Bhupendra Caldwell MD  Last diagnosis associated with med order: 1. Other pulmonary embolism without acute cor pulmonale, unspecified chronicity (H)  - enoxaparin (LOVENOX) 80 mg/0.8 mL syringe [Pharmacy Med Name: ENOXAPARIN 80MG/0.8ML SYR(10X0.8ML)]; ADMINISTER 0.8 ML(80 MG) UNDER THE SKIN DAILY  Dispense: 24 mL; Refill: 0     Requested Prescriptions     Pending Prescriptions Disp Refills     enoxaparin (LOVENOX) 80 mg/0.8 mL syringe [Pharmacy Med Name: ENOXAPARIN 80MG/0.8ML SYR(10X0.8ML)] 24 mL 0     Sig: ADMINISTER 0.8 ML(80 MG) UNDER THE SKIN DAILY     Refill for 10 day supply. May give one additional 5 day supply if INR not anticipated to be in therapeutic goal range in 5 days based on recent INR result.

## 2021-05-31 NOTE — TELEPHONE ENCOUNTER
RN cannot approve Refill Request    RN can NOT refill this medication Protocol failed and NO refill given. Last office visit: 4/19/2019 Bhupendra Caldwell MD Last Physical: 2/15/2019 Last MTM visit: Visit date not found Last visit same specialty: 4/19/2019 Bhupendra Caldwell MD.  Next visit within 3 mo: Visit date not found  Next physical within 3 mo: Visit date not found      Giovanna Arnold, [facility] Triage/Med Refill 8/18/2019    Requested Prescriptions   Pending Prescriptions Disp Refills     CREON 6,000-19,000 -30,000 unit CpDR capsule [Pharmacy Med Name: CREON 6,000 CAPSULES] 540 capsule 0     Sig: TAKE 2 CAPSULES BY MOUTH THREE TIMES DAILY WITH FOOD       There is no refill protocol information for this order

## 2021-05-31 NOTE — TELEPHONE ENCOUNTER
Orders being requested: Home care orders   Reason service is needed/diagnosis:Recertification, skilled nursing once a week for 9 weeks for weekly carlos cath needle and dressing change. Monthly B-12 injections, requested labs, and assess general condition   When are orders needed by: Verbal orders needed for Wednesday 8/7's visit. Verbal order okay, Can leave on Malia's voice mail   Where to send Orders: Per protocol after verbal.  Okay to leave detailed message?  Yes, Please do. Needs the order by Wednesday, 8/7.

## 2021-05-31 NOTE — TELEPHONE ENCOUNTER
Galina per Dr. Sinha. Verbal orders given to Maribel.  Cassia Carl [facility] ............... 4:16 PM, 08/05/19

## 2021-05-31 NOTE — TELEPHONE ENCOUNTER
RN cannot approve Refill Request    RN can NOT refill this medication med is not covered by policy/route to provider. Last office visit: 4/19/2019 Bhupendra Caldwell MD Last Physical: 2/15/2019 Last MTM visit: Visit date not found Last visit same specialty: 4/19/2019 Bhupendra Caldwell MD.  Next visit within 3 mo: Visit date not found  Next physical within 3 mo: Visit date not found      Kathleen Chinchilla, [facility] Triage/Med Refill 8/22/2019    Requested Prescriptions   Pending Prescriptions Disp Refills     promethazine (PHENERGAN) 25 MG tablet [Pharmacy Med Name: PROMETHAZINE 25MG TABLETS] 20 tablet 0     Sig: TAKE 1 TABLET(25 MG) BY MOUTH EVERY 6 HOURS AS NEEDED FOR NAUSEA       There is no refill protocol information for this order

## 2021-05-31 NOTE — TELEPHONE ENCOUNTER
Patient has 8 tablets left.        Controlled Substance Refill Request  Medication Name:   Requested Prescriptions     Pending Prescriptions Disp Refills     HYDROmorphone (DILAUDID) 4 MG tablet 120 tablet 0     Sig: Take 1 tablet (4 mg total) by mouth every 6 (six) hours as needed for pain.     Date Last Fill: 7/19/19  Pharmacy: [id number]      Submit electronically to pharmacy  Controlled Substance Agreement Date Scanned:   Encounter-Level CSA Scan Date - 10/24/2017:    Scan on 10/31/2017  2:02 PM (below)         Last office visit with prescriber/PCP: 4/19/2019 Bhupendra Caldwell MD OR same dept: 4/19/2019 Bhupendra Caldwell MD OR same specialty: 4/19/2019 Bhupendra Caldwell MD  Last physical: 2/15/2019 Last MTM visit: Visit date not found

## 2021-06-01 ENCOUNTER — RECORDS - HEALTHEAST (OUTPATIENT)
Dept: ADMINISTRATIVE | Facility: OTHER | Age: 71
End: 2021-06-01

## 2021-06-01 ENCOUNTER — RECORDS - HEALTHEAST (OUTPATIENT)
Dept: LAB | Facility: CLINIC | Age: 71
End: 2021-06-01

## 2021-06-01 ENCOUNTER — RECORDS - HEALTHEAST (OUTPATIENT)
Dept: ADMINISTRATIVE | Facility: CLINIC | Age: 71
End: 2021-06-01

## 2021-06-01 ENCOUNTER — HOME INFUSION (PRE-WILLOW HOME INFUSION) (OUTPATIENT)
Dept: PHARMACY | Facility: CLINIC | Age: 71
End: 2021-06-01

## 2021-06-01 VITALS — BODY MASS INDEX: 24.01 KG/M2 | WEIGHT: 125 LBS

## 2021-06-01 VITALS — BODY MASS INDEX: 21.82 KG/M2 | WEIGHT: 113.6 LBS

## 2021-06-01 VITALS — BODY MASS INDEX: 23.81 KG/M2 | WEIGHT: 126 LBS

## 2021-06-01 VITALS — BODY MASS INDEX: 23 KG/M2 | HEIGHT: 61 IN | WEIGHT: 121.8 LBS

## 2021-06-01 VITALS — WEIGHT: 114 LBS | BODY MASS INDEX: 21.9 KG/M2

## 2021-06-01 VITALS — WEIGHT: 125 LBS | BODY MASS INDEX: 24.01 KG/M2

## 2021-06-01 VITALS — BODY MASS INDEX: 23.6 KG/M2 | WEIGHT: 125 LBS | HEIGHT: 61 IN

## 2021-06-01 LAB
CREAT SERPL-MCNC: 1.11 MG/DL (ref 0.6–1.1)
GFR SERPL CREATININE-BSD FRML MDRD: 48 ML/MIN/1.73M2
MAGNESIUM SERPL-MCNC: 2 MG/DL (ref 1.8–2.6)

## 2021-06-01 NOTE — TELEPHONE ENCOUNTER
OK for B12 and nursing orders. B12 dosage in the chart (should be 1000 mcg q 30 days). Not sure at all what labs they are referring to

## 2021-06-01 NOTE — TELEPHONE ENCOUNTER
Orders being requested: Skilled Nursing   - One Time a week for Nine weeks Starting the week of 10/06/19 .  Reason service is needed/diagnosis: For health maintenance ,Port-A- Cath dressing and needle change .  When are orders needed by: As soon As Possible.  Where to send Orders: Phone:  [phone]   Okay to leave detailed message?  Yes              Orders being requested: As Needed Requested Labs   Reason service is needed/diagnosis: For health maintenance ,Port-A- Cath dressing and needle change .  When are orders needed by:  As soon As Possible.  Where to send Orders: Phone:  [phone]  Okay to leave detailed message?  Yes          Orders being requested: Monthly B 12 Injections   Reason service is needed/diagnosis: For health maintenance   When are orders needed by: As soon As Possible.  Where to send Orders: Phone:  [phone]  Okay to leave detailed message?  Yes

## 2021-06-01 NOTE — TELEPHONE ENCOUNTER
RN cannot approve 2 Refill Requests    RN can NOT refill this medication med is not covered by policy/route to provider. Last office visit: 4/19/2019 Bhupendra Caldwell MD Last Physical: 2/15/2019 Last MTM visit: Visit date not found Last visit same specialty: 4/19/2019 Bhupendra Caldwell MD.  Next visit within 3 mo: Visit date not found  Next physical within 3 mo: Visit date not found      Angelaradha Rivera, Care [facility] Triage/Med Refill 9/29/2019    Requested Prescriptions   Pending Prescriptions Disp Refills     nitrofurantoin (MACRODANTIN) 50 MG capsule [Pharmacy Med Name: NITROFURANTOIN MACRO 50MG CAPSULES] 90 capsule 0     Sig: TAKE 1 CAPSULE(50 MG) BY MOUTH AT BEDTIME       There is no refill protocol information for this order        enoxaparin (LOVENOX) 80 mg/0.8 mL syringe [Pharmacy Med Name: ENOXAPARIN 80MG/0.8ML SYR(10X0.8ML)] 24 mL 0     Sig: ADMINISTER 0.8 ML(80 MG) UNDER THE SKIN DAILY       Enoxaparin Refill Protocol   Failed - 9/29/2019 12:38 PM        Failed - Route to appropriate pool/provider     Last Anticoagulation Summary:           Passed - Provider visit in last year     Last office visit with prescriber/PCP: 4/19/2019 Bhupendra Caldwell MD OR same dept: 4/19/2019 Bhupendra Caldwell MD  Last physical: 2/15/2019       Next visit within 3 mo: Visit date not found  Next physical within 3 mo: Visit date not found  Prescriber OR PCP: Bhupendra Caldwell MD  Last diagnosis associated with med order: 1. Chronic cystitis  - nitrofurantoin (MACRODANTIN) 50 MG capsule [Pharmacy Med Name: NITROFURANTOIN MACRO 50MG CAPSULES]; TAKE 1 CAPSULE(50 MG) BY MOUTH AT BEDTIME  Dispense: 90 capsule; Refill: 0    2. Other pulmonary embolism without acute cor pulmonale, unspecified chronicity (H)  - enoxaparin (LOVENOX) 80 mg/0.8 mL syringe [Pharmacy Med Name: ENOXAPARIN 80MG/0.8ML SYR(10X0.8ML)]; ADMINISTER 0.8 ML(80 MG) UNDER THE SKIN DAILY  Dispense: 24 mL; Refill: 0     Requested  Prescriptions     Pending Prescriptions Disp Refills     nitrofurantoin (MACRODANTIN) 50 MG capsule [Pharmacy Med Name: NITROFURANTOIN MACRO 50MG CAPSULES] 90 capsule 0     Sig: TAKE 1 CAPSULE(50 MG) BY MOUTH AT BEDTIME     enoxaparin (LOVENOX) 80 mg/0.8 mL syringe [Pharmacy Med Name: ENOXAPARIN 80MG/0.8ML SYR(10X0.8ML)] 24 mL 0     Sig: ADMINISTER 0.8 ML(80 MG) UNDER THE SKIN DAILY     Refill for 10 day supply. May give one additional 5 day supply if INR not anticipated to be in therapeutic goal range in 5 days based on recent INR result.

## 2021-06-01 NOTE — TELEPHONE ENCOUNTER
Controlled Substance Refill Request  Medication Name:   Requested Prescriptions     Pending Prescriptions Disp Refills     pregabalin (LYRICA) 200 MG capsule 270 capsule 0     Sig: TAKE 1 CAPSULE(200 MG) BY MOUTH THREE TIMES DAILY     HYDROmorphone (DILAUDID) 4 MG tablet 120 tablet 0     Sig: Take 1 tablet (4 mg total) by mouth every 6 (six) hours as needed for pain.     Date Last Fill: 06/18/19, 08/20/19  Pharmacy:  [id number] - [place], [place] - [address] AT [facility] [address]    Submit electronically to pharmacy  Controlled Substance Agreement Date Scanned:   Encounter-Level CSA Scan Date - 10/24/2017:    Scan on 10/31/2017  2:02 PM (below)         Last office visit with prescriber/PCP: 4/19/2019 Bhupendra Caldwell MD OR same dept: 4/19/2019 Bhupendra Caldwell MD OR same specialty: 4/19/2019 Bhupendra Caldwell MD  Last physical: 2/15/2019 Last MTM visit: Visit date not found

## 2021-06-02 ENCOUNTER — RECORDS - HEALTHEAST (OUTPATIENT)
Dept: ADMINISTRATIVE | Facility: CLINIC | Age: 71
End: 2021-06-02

## 2021-06-02 VITALS — BODY MASS INDEX: 23.77 KG/M2 | HEIGHT: 61 IN | WEIGHT: 125.9 LBS

## 2021-06-02 VITALS — WEIGHT: 135.6 LBS | HEIGHT: 61 IN | BODY MASS INDEX: 25.6 KG/M2

## 2021-06-02 VITALS — WEIGHT: 120 LBS | BODY MASS INDEX: 22.67 KG/M2

## 2021-06-02 NOTE — PROGRESS NOTES
HPI: This patient presents to clinic today for cerumen removal. Has noticed some fullness of the ears and muffled hearing, but denies otalgia, otorrhea, vertigo, change in true hearing or tinnitus. Denies other symptoms.    Past medical history, surgical history, family history, social history, medications, and allergies have been reviewed and are documented above.     Review of Systems: a 10-system review was performed. Symptoms are noted in the HPI and on a scanned document in the computer chart.    Physical Examination:   GEN: no acute distress, alert and oriented  EYES: extraocular movements intact, pupils equal and round. Sclera clear.   EARS: PROCEDURE  Cerumen removal  The left ear is examined under the microscope and a cerumen impaction is removed with microdissection technique. There is a small amount of drainage medially which is suctioned free.  Ciprodex is instilled..  The right ear is examined and cleaned of a small amount of cerumen.  PULM: breathing comfortably on room air with no stertor or stridor. Chest expansion symmetric.  HEART: regular rate and rhythm, no peripheral edema    MEDICAL DECISION-MAKING: cerumen impactions cleared under binocular microscopy without difficulty. Hearing restored to baseline. Follow-up PRN.

## 2021-06-02 NOTE — TELEPHONE ENCOUNTER
Left voicemail with information below and verbal orders. Requested a call back regarding more information on lab orders.  'Cassia Carl CMA ............... 10:54 AM, 10/03/19

## 2021-06-02 NOTE — TELEPHONE ENCOUNTER
RN cannot approve Refill Request    RN can NOT refill this medication PCP messaged that patient is overdue for Labs. Last office visit: 4/19/2019 Bhupendra Caldwell MD Last Physical: 2/15/2019 Last MTM visit: Visit date not found Last visit same specialty: 4/19/2019 Bhupendra Caldwell MD.  Next visit within 3 mo: Visit date not found  Next physical within 3 mo: Visit date not found      Carlo Sethi, [facility] Triage/Med Refill 10/13/2019    Requested Prescriptions   Pending Prescriptions Disp Refills     cyanocobalamin 1,000 mcg/mL injection [Pharmacy Med Name: CYANOCOBALAMIN 1000MCG/ML INJ, 1ML] 3 mL 0     Sig: ADMINISTER 1 ML IN THE MUSCLE EVERY 30 DAYS AS DIRECTED       Cyanocobalamin (Vitamin B12)  Refill Protocol Failed - 10/11/2019  2:46 PM        Failed - Vitamin B12 level in last 12 months     Vitamin B-12   Date Value Ref Range Status   10/19/2015 263 213 - 816 pg/mL Final             Failed - CBC in last 12 months     WBC   Date Value Ref Range Status   09/07/2018 9.7 4.0 - 11.0 thou/uL Final     RBC   Date Value Ref Range Status   09/07/2018 3.77 (L) 3.80 - 5.40 mill/uL Final     Hemoglobin   Date Value Ref Range Status   09/07/2018 11.3 (L) 12.0 - 16.0 g/dL Final     Hematocrit   Date Value Ref Range Status   09/07/2018 34.8 (L) 35.0 - 47.0 % Final     MCV   Date Value Ref Range Status   09/07/2018 92 80 - 100 fL Final     MCH   Date Value Ref Range Status   09/07/2018 30.0 27.0 - 34.0 pg Final     MCHC   Date Value Ref Range Status   09/07/2018 32.5 32.0 - 36.0 g/dL Final     RDW   Date Value Ref Range Status   09/07/2018 13.7 11.0 - 14.5 % Final     Platelets   Date Value Ref Range Status   09/07/2018 186 140 - 440 thou/uL Final     MPV   Date Value Ref Range Status   09/07/2018 10.2 8.5 - 12.5 fL Final                Passed - PCP or prescribing provider visit in past 12 months       Last office visit with prescriber/PCP: 4/19/2019 Bhupendra Caldwell MD OR same dept: 4/19/2019  Bhupendra Caldwell MD OR same specialty: 4/19/2019 Bhupendra Caldwell MD Last physical: 2/15/2019 Last MTM visit: Visit date not found    Next visit within 3 mo: Visit date not found  Next physical within 3 mo: Visit date not found  Prescriber OR PCP: Bhupendra Caldwell MD  Last diagnosis associated with med order: 1. Vitamin B12 deficiency  - cyanocobalamin 1,000 mcg/mL injection [Pharmacy Med Name: CYANOCOBALAMIN 1000MCG/ML INJ, 1ML]; ADMINISTER 1 ML IN THE MUSCLE EVERY 30 DAYS AS DIRECTED  Dispense: 3 mL; Refill: 0

## 2021-06-02 NOTE — TELEPHONE ENCOUNTER
Patient Returning Call  Reason for call:  nurse  Information relayed to patient: The writer read the following to patient per CMA: Dr Caldwell is OK for  the Creatinine and Magnesium orders.   Patient has additional questions:  No  If YES, what are your questions/concerns:  Thank you  Okay to leave a detailed message?: No call back needed

## 2021-06-02 NOTE — TELEPHONE ENCOUNTER
RN cannot approve Refill Request    RN can NOT refill this medication Protocol failed and NO refill given. Last office visit: 4/19/2019 Bhupendra Caldwell MD Last Physical: 2/15/2019 Last MTM visit: Visit date not found Last visit same specialty: 4/19/2019 Bhupendra Caldwell MD.  Next visit within 3 mo: Visit date not found  Next physical within 3 mo: Visit date not found      Lashay Sandy, [facility] Triage/Med Refill 11/1/2019    Requested Prescriptions   Pending Prescriptions Disp Refills     enoxaparin (LOVENOX) 80 mg/0.8 mL syringe [Pharmacy Med Name: ENOXAPARIN 80MG/0.8ML SYR(10X0.8ML)] 24 mL 0     Sig: ADMINISTER 0.8 ML(80 MG) UNDER THE SKIN DAILY       Enoxaparin Refill Protocol   Failed - 11/1/2019 10:13 AM        Failed - Route to appropriate pool/provider     Last Anticoagulation Summary:           Passed - Provider visit in last year     Last office visit with prescriber/PCP: 4/19/2019 Bhupendra Caldwell MD OR same dept: 4/19/2019 Bhupendra Caldwell MD  Last physical: 2/15/2019       Next visit within 3 mo: Visit date not found  Next physical within 3 mo: Visit date not found  Prescriber OR PCP: Bhupendra Caldwell MD  Last diagnosis associated with med order: 1. Other pulmonary embolism without acute cor pulmonale, unspecified chronicity (H)  - enoxaparin (LOVENOX) 80 mg/0.8 mL syringe [Pharmacy Med Name: ENOXAPARIN 80MG/0.8ML SYR(10X0.8ML)]; ADMINISTER 0.8 ML(80 MG) UNDER THE SKIN DAILY  Dispense: 24 mL; Refill: 0     Requested Prescriptions     Pending Prescriptions Disp Refills     enoxaparin (LOVENOX) 80 mg/0.8 mL syringe [Pharmacy Med Name: ENOXAPARIN 80MG/0.8ML SYR(10X0.8ML)] 24 mL 0     Sig: ADMINISTER 0.8 ML(80 MG) UNDER THE SKIN DAILY     Refill for 10 day supply. May give one additional 5 day supply if INR not anticipated to be in therapeutic goal range in 5 days based on recent INR result.

## 2021-06-02 NOTE — TELEPHONE ENCOUNTER
"Pt calls to request Dilaudid refill.  Has sufficient supply for 5+ days.  States \"I know it takes a few days for a refill, so decided to call promptly.\"  Will route through refill protocol ....  "

## 2021-06-02 NOTE — TELEPHONE ENCOUNTER
Refill Approved    Rx renewed per Medication Renewal Policy. Medication was last renewed on 4/3/19.    Cristin Fishman, [facility] Triage/Med Refill 10/22/2019     Requested Prescriptions   Pending Prescriptions Disp Refills     busPIRone (BUSPAR) 15 MG tablet [Pharmacy Med Name: BUSPIRONE 15MG TABLETS] 360 tablet 0     Sig: TAKE 2 TABLETS(30MG) BY MOUTH TWICE DAILY       Tricyclics/Misc Antidepressant/Antianxiety Meds Refill Protocol Passed - 10/22/2019  3:25 AM        Passed - PCP or prescribing provider visit in last year     Last office visit with prescriber/PCP: 4/3/2019 Duke Mccall CNP OR same dept: 4/19/2019 Bhupendra Caldwell MD OR same specialty: 4/19/2019 Bhupendra Caldwell MD  Last physical: 8/27/2018 Last MTM visit: Visit date not found   Next visit within 3 mo: Visit date not found  Next physical within 3 mo: Visit date not found  Prescriber OR PCP: Duke Mccall CNP  Last diagnosis associated with med order: 1. Anxiety and depression  - busPIRone (BUSPAR) 15 MG tablet [Pharmacy Med Name: BUSPIRONE 15MG TABLETS]; TAKE 2 TABLETS(30MG) BY MOUTH TWICE DAILY  Dispense: 360 tablet; Refill: 0    If protocol passes may refill for 12 months if within 3 months of last provider visit (or a total of 15 months).

## 2021-06-02 NOTE — TELEPHONE ENCOUNTER
Controlled Substance Refill Request  Medication:   Requested Prescriptions     Pending Prescriptions Disp Refills     HYDROmorphone (DILAUDID) 4 MG tablet 120 tablet 0     Sig: Take 1 tablet (4 mg total) by mouth every 6 (six) hours as needed for pain.     Date Last Fill: 9/16/19  Pharmacy: [id number]   Submit electronically to pharmacy  Controlled Substance Agreement on File:   No  Encounter-Level CSA Scan Date - 10/24/2017:    Scan on 10/31/2017  2:02 PM       Last office visit: Last office visit pertaining to requested medication was 4/19/2019.

## 2021-06-03 ENCOUNTER — RECORDS - HEALTHEAST (OUTPATIENT)
Dept: ADMINISTRATIVE | Facility: OTHER | Age: 71
End: 2021-06-03

## 2021-06-03 VITALS — WEIGHT: 127 LBS | BODY MASS INDEX: 24.19 KG/M2

## 2021-06-03 NOTE — TELEPHONE ENCOUNTER
RN Assessment/Reason for Call:   Okay to leave Detailed Message  [facility] nurse calling in, port a cath chg and flush.   B/P 212/94 rt arm; lt arm 198/90.   Last week 160/80.ER had double vision. CT WNL;  dx headaches.  Blurred vision daily.  Thought it was anxiety.  Refuses to go to ER.  Not on B/P med's.[facility] nurse taught her how to check her B/P.  Discussed increasing [facility] visits to monitor and teach her B/P management.  B/P 201/101 pulse 81. Checked manually 212/100; pulse 82.    RN Action/Disposition:  Protocol recommends see Dr in 4 hrs.  Doesn't have a ride.She will call her son.  Offered WIC. She prefers Urgent Care in [place].  Offered appt for PCP to discuss high B/P and symptoms.   Call back if worse symptoms  Discussed home care measures.Asked her to check & log  her B/P daily and prn.   Agrees to plan.     Maddy Aceves, RN    [facility] Triage/med refill  11/12/2019  4:20 PM        Reason for Disposition    [1] Systolic BP  >= 200 OR Diastolic >= 120  AND [2] having NO cardiac or neurologic symptoms    Protocols used: HIGH BLOOD PRESSURE-A-AH

## 2021-06-03 NOTE — PROGRESS NOTES
Clinic Note    Assessment:     Assessment and Plan:    1. Benign essential hypertension  Her blood pressure is 177/88 today.  She saw an urgent care in [place] 3 days ago and was started on lisinopril 5 mg daily.  With her history of renal insufficiency and chronic dehydration, I will switch her to amlodipine 5 mg daily.  I would like her to follow-up with her PCP in about 10 days for recheck.         Patient Instructions   Start Amlodipine 5 mg daily.     New prescription sent into pharmacy.    Follow-up with Dr. Bhupendra Caldwell in 10 days for blood pressure recheck.    Return in about 10 days (around 11/25/2019).         Subjective:      Patient comes to clinic today for blood pressure follow-up.    9 days ago, she was seen at the [facility] ED.  She had been having issues with diplopia at that time, stating that episodes would last between 15 and 60 seconds at a time associated with lateral neck pain, frontal headache, dizziness, and feeling off balance.    Her blood pressure was elevated at that time.    She called into the nurse triage line 3 days ago stating that she had been checking her blood pressure at home, with some systolic measurements in the 190s.    She went to an urgent care in [place].  She was started on lisinopril 5 mg, and told to follow-up with her PCP.    Today, her blood pressure is 177/88.  She brings a log of blood pressures with her today, with most measurements close to the same.    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    The following portions of the patient's history were reviewed and updated as appropriate: Allergies, medications, problem list, prior note.     Review of Systems:    Review is otherwise negative except for what is mentioned above.     Social Hx:    Social History     Tobacco Use   Smoking Status Former Smoker     Packs/day:  1.00     Years: 30.00     Pack years: 30.00     Types: Cigarettes     Last attempt to quit: 2000     Years since quittin.8   Smokeless Tobacco Never Used         Objective:     Vitals:    11/15/19 1457   BP: 177/88   Pulse: 64   Weight: 127 lb (57.6 kg)       Exam:    General: No apparent distress. Calm. Alert and Oriented X3. Pt behavior is appropriate.  Chest/Lungs: Normal chest wall, clear to auscultation, normal respiratory effort and rate.   Heart/Pulses: Regular rate and rhythm, strong and equal radial pulses, no murmurs. Capillary refill <2 seconds. No edema.       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       busPIRone (BUSPAR) 15 MG tablet TAKE 2 TABLETS(30MG) BY MOUTH TWICE DAILY 360 tablet 1     cholecalciferol, vitamin D3, (VITAMIN D3) 2,000 unit cap Take 2,000 Units by mouth every morning.       CREON 6,000-19,000 -30,000 unit CpDR capsule TAKE 2 CAPSULES BY MOUTH THREE TIMES DAILY WITH FOOD 540 capsule 0     cyanocobalamin 1,000 mcg/mL injection ADMINISTER 1 ML IN THE MUSCLE EVERY 30 DAYS AS DIRECTED 3 mL 0     diphenoxylate-atropine (LOMOTIL) 2.5-0.025 mg per tablet Take 2 tablets by mouth 3  (three) times a day. 30 tablet 0     DULoxetine (CYMBALTA) 60 MG capsule Take 1 capsule (60 mg total) by mouth 2 (two) times a day. 180 capsule 3     enoxaparin (LOVENOX) 80 mg/0.8 mL syringe ADMINISTER 0.8 ML(80 MG) UNDER THE SKIN DAILY 24 mL 0     HYDROmorphone (DILAUDID) 4 MG tablet Take 1 tablet (4 mg total) by mouth every 6 (six) hours as needed for pain. 120 tablet 0     Lactobacillus acidophilus (ACIDOPHILUS ORAL) Take 1 tablet by mouth 2 (two) times a day.       lisinopril (PRINIVIL,ZESTRIL) 20 MG tablet Take 1 tablet (20 mg total) by mouth daily. 30 tablet 0     loperamide (IMODIUM) 2 mg capsule Take 2-4 mg by mouth 2 (two) times a day as needed.   2     mirtazapine (REMERON) 30 MG tablet Take 30 mg by mouth at bedtime.        multivit-min/folic acid/[id number] (ALIVE WOMEN'S GUMMY VITAMINS ORAL) Take 2 tablets by mouth Daily after breakfast.        nitrofurantoin (MACRODANTIN) 50 MG capsule TAKE 1 CAPSULE(50 MG) BY MOUTH AT BEDTIME 90 capsule 0     nitrofurantoin (MACRODANTIN) 50 MG capsule TAKE 1 CAPSULE(50 MG) BY MOUTH AT BEDTIME 90 capsule 0     potassium chloride (K-DUR,KLOR-CON) 20 MEQ tablet TAKE 1 TABLET(20 MEQ) BY MOUTH DAILY 90 tablet 3     pregabalin (LYRICA) 200 MG capsule TAKE 1 CAPSULE(200 MG) BY MOUTH THREE TIMES DAILY 270 capsule 0     prochlorperazine (COMPAZINE) 10 MG tablet Take 1 tablet (10 mg total) by mouth every 6 (six) hours as needed for nausea. 30 tablet 0     promethazine (PHENERGAN) 25 MG tablet TAKE 1 TABLET(25 MG) BY MOUTH EVERY 6 HOURS AS NEEDED FOR NAUSEA 20 tablet 0     traZODone (DESYREL) 150 MG tablet Take 150 mg by mouth at bedtime.        UNABLE TO FIND Infuse into a venous catheter at bedtime. Med Name: Normal saline with magnesium.  2 liters per patient       No current facility-administered medications for this visit.            Duke Mccall (Rob), [facility]    11/15/2019

## 2021-06-03 NOTE — TELEPHONE ENCOUNTER
RN triage   Call from pt   Pt states she has had double and blurred vision for the past week - comes and goes  and now has headache -- rates 5-6/10 --   Pt states she is not sleeping well -- has been awake since 0300 -- and had diff breathing and shortness of breath when laying down -- no shortness of breath when up and about   Pt denies any numbness or tingling or weakness on one side of body   Pt states she also has cheek pain - bilateral   Per protocol = should go to ED   Pt indicates she will call family for ride to ED   Rosey Alanis RN [facility] RN triage        Reason for Disposition    Double vision    Protocols used: VISION LOSS OR CHANGE-A-OH

## 2021-06-03 NOTE — TELEPHONE ENCOUNTER
RN Assessment/Reason for Call:   Okay to leave Detailed Message  Patient calling in, went to the Urgent Care for B/P;  175/90.  Put on medication lisinopril 5 mg one every day.  Needs to see PCP ASAP per urgent care.    RN Action/Disposition:  Appt. Unable to come until Thursday; no appt available.  Fri 2:10p 11/15/19 Jeffery Blanca  Call back if worse symptoms  Discussed home care measures.  Agrees to plan.     Maddy Aceves RN    [facility] Triage/med refill  11/12/2019  8:06 PM

## 2021-06-03 NOTE — PATIENT INSTRUCTIONS - HE
Start Amlodipine 5 mg daily.     New prescription sent into pharmacy.    Follow-up with Dr. Bhupendra Caldwell in 10 days for blood pressure recheck.

## 2021-06-03 NOTE — PROGRESS NOTES
ASSESSMENT and PLAN:    #1.  Hypertension, uncontrolled.  I am going to stop her amlodipine and start her on chlorthalidone at 25 mg/day and lisinopril at 20 mg/day.  She is going to follow-up in 3 weeks for reevaluation.  We will have her get her labs checked prior to the visit.    2.Chronic rectal pain and fibromyalgia.  She has had chiropractic care in the past which has helped tremendously.  However, her insurance has ceased covering this, and because of this (and since topicals do not work well for her) we will need to increase her dilaudid dose to 20 mg per day.  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    Medications Discontinued During This Encounter   Medication Reason     nitrofurantoin (MACRODANTIN) 50 MG capsule      diphenoxylate-atropine (LOMOTIL) 2.5-0.025 mg per tablet Reorder     CREON 6,000-19,000 -30,000 unit CpDR capsule Reorder     amLODIPine (NORVASC) 5 MG tablet      HYDROmorphone (DILAUDID) 4 MG tablet Reorder       No follow-ups on file.    CHIEF COMPLAINT:  Chief Complaint   Patient presents with     Follow-up     BP     Rectal Pain     9/10/18 you saw her for this - she states the chiro helps her        HISTORY OF PRESENT ILLNESS:  Dee Mattson is a 69 y.o. female  presenting to  the clinic today for follow-up of hypertension.  She was seen in the office by JO ANN Mccall on 11/15 for ER follow-up.  In the ER she was started on 5 mg of lisinopril per day.  At her visit on 11/15 her lisinopril was stopped and she was started on 5 mg of amlodipine per day.  This was secondary to what was deemed renal insufficiency, but in review of her labs her creatinine has been 1.0-1.1 and her last GFR was 54.  She is otherwise feeling fairly well.  She continues to have perianal pain which chiropractic care did help with.  Unfortunately insurance has ceased covering this.  She is requesting an increase in Dilaudid to 20 mg per day (from 16)    REVIEW OF SYSTEMS:   Pertinent positives noted in HPI, remainder of ROS is negative.    MEDICATIONS:  Current Outpatient Medications   Medication Sig Dispense Refill     albuterol (PROVENTIL) 2.5 mg /3 mL (0.083 %) nebulizer solution Take 2.5 mg by nebulization every 6 (six) hours as needed for wheezing.       busPIRone (BUSPAR) 15 MG tablet TAKE 2 TABLETS(30MG) BY MOUTH TWICE DAILY 360 tablet 1     cholecalciferol, vitamin D3, (VITAMIN D3) 2,000 unit cap Take 2,000 Units by mouth every morning.       cyanocobalamin 1,000 mcg/mL injection ADMINISTER 1 ML IN THE MUSCLE EVERY 30 DAYS AS DIRECTED 3 mL 0     DULoxetine (CYMBALTA) 60 MG capsule Take 1 capsule (60 mg total) by mouth 2 (two) times a day. 180 capsule 3     enoxaparin (LOVENOX) 80 mg/0.8 mL syringe ADMINISTER 0.8 ML(80 MG) UNDER THE SKIN DAILY 24 mL 0     HYDROmorphone (DILAUDID) 4 MG tablet Take 1 tablet (4 mg total) by mouth 5 (five) times a day. 30 tablet 0     Lactobacillus acidophilus (ACIDOPHILUS ORAL) Take 1 tablet by mouth 2 (two) times a day.       loperamide (IMODIUM) 2 mg capsule Take 2-4 mg by mouth 2 (two) times a day as needed.   2     mirtazapine (REMERON) 30 MG tablet Take 30 mg by mouth at bedtime.        multivit-min/folic acid/[id number] (ALIVE WOMEN'S GUMMY VITAMINS ORAL) Take 2 tablets by mouth  Daily after breakfast.        nitrofurantoin (MACRODANTIN) 50 MG capsule TAKE 1 CAPSULE(50 MG) BY MOUTH AT BEDTIME 90 capsule 0     potassium chloride (K-DUR,KLOR-CON) 20 MEQ tablet TAKE 1 TABLET(20 MEQ) BY MOUTH DAILY 90 tablet 0     pregabalin (LYRICA) 200 MG capsule TAKE 1 CAPSULE(200 MG) BY MOUTH THREE TIMES DAILY 270 capsule 0     prochlorperazine (COMPAZINE) 10 MG tablet Take 1 tablet (10 mg total) by mouth every 6 (six) hours as needed for nausea. 30 tablet 0     promethazine (PHENERGAN) 25 MG tablet TAKE 1 TABLET(25 MG) BY MOUTH EVERY 6 HOURS AS NEEDED FOR NAUSEA 20 tablet 0     traZODone (DESYREL) 150 MG tablet Take 150 mg by mouth at bedtime.        UNABLE TO FIND Infuse into a venous catheter at bedtime. Med Name: Normal saline with magnesium.  2 liters per patient       chlorthalidone (HYGROTEN) 25 MG tablet Take 1 tablet (25 mg total) by mouth daily. 30 tablet 11     diphenoxylate-atropine (LOMOTIL) 2.5-0.025 mg per tablet Take 2 tablets by mouth 3 (three) times a day. 180 tablet 0     lipase-protease-amylase (CREON) 6,000-19,000 -30,000 unit CpDR capsule TAKE 2 CAPSULES BY MOUTH THREE TIMES DAILY WITH FOOD 540 capsule 0     lisinopril (PRINIVIL,ZESTRIL) 20 MG tablet Take 1 tablet (20 mg total) by mouth daily. 30 tablet 11     No current facility-administered medications for this visit.        TOBACCO USE:  Social History     Tobacco Use   Smoking Status Former Smoker     Packs/day: 1.00     Years: 30.00     Pack years: 30.00     Types: Cigarettes     Last attempt to quit: 2000     Years since quittin.9   Smokeless Tobacco Never Used       VITALS:  Vitals:    19 1357   BP: (!) 186/100   Pulse: 76   Weight: 125 lb (56.7 kg)     Wt Readings from Last 3 Encounters:   19 125 lb (56.7 kg)   11/15/19 127 lb (57.6 kg)   19 127 lb (57.6 kg)         PHYSICAL EXAM:  Constitutional:  Reveals an alert, pleasant female.   HEET: Normocephalic, without obvious abnormality, atraumatic. .    Neurologic: Normal gait and station  Psychologic: Normal affect

## 2021-06-03 NOTE — TELEPHONE ENCOUNTER
RN cannot approve Refill Request    RN can NOT refill this medication med is not covered by policy/route to provider. Last office visit: 4/19/2019 Bhupendra Caldwell MD Last Physical: 2/15/2019 Last MTM visit: Visit date not found Last visit same specialty: 11/15/2019 Duke Mccall CNP.  Next visit within 3 mo: Visit date not found  Next physical within 3 mo: Visit date not found      Angela Rivera, [facility] Triage/Med Refill 11/21/2019    Requested Prescriptions   Pending Prescriptions Disp Refills     CREON 6,000-19,000 -30,000 unit CpDR capsule [Pharmacy Med Name: CREON 6,000 CAPSULES] 540 capsule 0     Sig: TAKE 2 CAPSULES BY MOUTH THREE TIMES DAILY WITH FOOD       There is no refill protocol information for this order

## 2021-06-03 NOTE — TELEPHONE ENCOUNTER
RN cannot approve Refill Request    RN can NOT refill this medication Protocol failed and NO refill given. Last office visit: 4/19/2019 Bhupendra Caldwell MD Last Physical: 2/15/2019 Last MTM visit: Visit date not found Last visit same specialty: 11/15/2019 Duke Mccall CNP.  Next visit within 3 mo: Visit date not found  Next physical within 3 mo: Visit date not found      Lashay Sandy, [facility] Triage/Med Refill 11/16/2019    Requested Prescriptions   Pending Prescriptions Disp Refills     potassium chloride (K-DUR,KLOR-CON) 20 MEQ tablet [Pharmacy Med Name: POTASSIUM CL 20MEQ ER TABLETS] 90 tablet 0     Sig: TAKE 1 TABLET(20 MEQ) BY MOUTH DAILY       Potassium Supplements Refill Protocol Failed - 11/16/2019  3:26 AM        Failed - Potassium level in last 12 months     No results found for: LN-POTASSIUM          Passed - PCP or prescribing provider visit in past 12 months       Last office visit with prescriber/PCP: 4/19/2019 Bhupendra Caldwell MD OR same dept: 11/15/2019 Duke Mccall CNP OR same specialty: 11/15/2019 Duke Mccall CNP  Last physical: 2/15/2019 Last MTM visit: Visit date not found   Next visit within 3 mo: Visit date not found  Next physical within 3 mo: Visit date not found  Prescriber OR PCP: Bhupendra Caldwell MD  Last diagnosis associated with med order: 1. Abdominal pain  - potassium chloride (K-DUR,KLOR-CON) 20 MEQ tablet [Pharmacy Med Name: POTASSIUM CL 20MEQ ER TABLETS]; TAKE 1 TABLET(20 MEQ) BY MOUTH DAILY  Dispense: 90 tablet; Refill: 0    If protocol passes may refill for 12 months if within 3 months of last provider visit (or a total of 15 months).

## 2021-06-03 NOTE — TELEPHONE ENCOUNTER
Controlled Substance Refill Request  Medication: HYDROmorphone (DILAUDID) 4 MG tablet  Requested Prescriptions      No prescriptions requested or ordered in this encounter     Date Last Fill: 10/151/9  Pharmacy: [facility] #[id number] - [place], [place] - [address] AT [facility] [address]  [phone]    Submit electronically to pharmacy  Controlled Substance Agreement on File:   Encounter-Level CSA Scan Date - 10/24/2017:    Scan on 10/31/2017  2:02 PM       Last office visit: 4/19/2019 Bhupendra Caldwell MD

## 2021-06-03 NOTE — TELEPHONE ENCOUNTER
Orders being requested:   1. Cavilon , No sting Barrier Wipes   Quantity , 50 Per Month     Reason service is needed/diagnosis:   Z93.2,  Z93.3     When are orders needed by:   ASAP     Where to send Orders: Fax: [phone]     Okay to leave detailed message?  No    Handi Medical Rep states : She sent a Fax requesting these orders on 10 /30, Please bring resolution to this matter ASAP.  Thank You

## 2021-06-04 ENCOUNTER — COMMUNICATION - HEALTHEAST (OUTPATIENT)
Dept: PEDIATRICS | Facility: CLINIC | Age: 71
End: 2021-06-04

## 2021-06-04 VITALS
DIASTOLIC BLOOD PRESSURE: 60 MMHG | WEIGHT: 124 LBS | SYSTOLIC BLOOD PRESSURE: 118 MMHG | HEART RATE: 80 BPM | BODY MASS INDEX: 23.62 KG/M2

## 2021-06-04 VITALS
WEIGHT: 125 LBS | SYSTOLIC BLOOD PRESSURE: 186 MMHG | DIASTOLIC BLOOD PRESSURE: 100 MMHG | HEART RATE: 76 BPM | BODY MASS INDEX: 23.81 KG/M2

## 2021-06-04 VITALS
DIASTOLIC BLOOD PRESSURE: 88 MMHG | BODY MASS INDEX: 24.19 KG/M2 | HEART RATE: 64 BPM | WEIGHT: 127 LBS | SYSTOLIC BLOOD PRESSURE: 177 MMHG

## 2021-06-04 VITALS — WEIGHT: 124 LBS | HEIGHT: 61 IN | BODY MASS INDEX: 23.41 KG/M2

## 2021-06-04 VITALS — BODY MASS INDEX: 23.41 KG/M2 | HEIGHT: 61 IN | WEIGHT: 124 LBS

## 2021-06-04 VITALS — HEIGHT: 60 IN | BODY MASS INDEX: 24.18 KG/M2

## 2021-06-04 VITALS — BODY MASS INDEX: 24.35 KG/M2 | HEIGHT: 60 IN | WEIGHT: 124 LBS

## 2021-06-04 VITALS — BODY MASS INDEX: 20.66 KG/M2 | HEIGHT: 65 IN | WEIGHT: 124 LBS

## 2021-06-04 NOTE — TELEPHONE ENCOUNTER
RN cannot approve Refill Request    RN can NOT refill this medication med is not covered by policy/route to provider. Last office visit: 12/17/2019 Bhupendra Caldwell MD Last Physical: 2/15/2019 Last MTM visit: Visit date not found Last visit same specialty: 12/17/2019 Bhupendra Caldwell MD.  Next visit within 3 mo: Visit date not found  Next physical within 3 mo: Visit date not found      Maddy Aceves, [facility] Triage/Med Refill 12/31/2019    Requested Prescriptions   Pending Prescriptions Disp Refills     nitrofurantoin (MACRODANTIN) 50 MG capsule [Pharmacy Med Name: NITROFURANTOIN MACRO 50MG CAPSULES] 90 capsule 0     Sig: TAKE 1 CAPSULE(50 MG) BY MOUTH AT BEDTIME       There is no refill protocol information for this order

## 2021-06-04 NOTE — TELEPHONE ENCOUNTER
FYI - Status Update  Who is Calling: Home Care  Update: Caller stated that the patient's double vision came back 2-3 days ago. Caller stated that she was informed by the patient that she did not have this during her office visit. Caller stated that the patient stated that her last episode was this morning and no other accompanies it. Caller stated that the patient stated that she has tingling in her finger tips that lasts a few seconds intermittently and only when laying down. Caller also stated that the patient's blood pressure is better is at 148/68. Caller stated that the patient's vitals were ok. Caller stated that the patient was feeling good during the home visit. Caller stated that the patient has an appointment scheduled with Bhupendra Caldwell MD on 12/19/19 and that the patient plans to discuss this with Bhupendra Caldwell MD during the visit.  Okay to leave a detailed message?:  Yes   [phone]

## 2021-06-04 NOTE — TELEPHONE ENCOUNTER
RN cannot approve Refill Request    RN can NOT refill this medication Protocol failed and NO refill given.     Cristin Fishman, [facility] Triage/Med Refill 12/6/2019    Requested Prescriptions   Pending Prescriptions Disp Refills     enoxaparin ANTICOAGULANT (LOVENOX) 80 mg/0.8 mL syringe [Pharmacy Med Name: ENOXAPARIN 80MG/0.8ML SYR(10X0.8ML)] 24 mL 0     Sig: ADMINISTER 0.8 ML(80 MG) UNDER THE SKIN DAILY       Enoxaparin Refill Protocol   Failed - 12/4/2019  1:04 PM        Failed - Route to appropriate pool/provider     Last Anticoagulation Summary:           Passed - Provider visit in last year     Last office visit with prescriber/PCP: 11/25/2019 Bhupendra Caldwell MD OR same dept: 11/25/2019 Bhupendra Caldwell MD  Last physical: 2/15/2019       Next visit within 3 mo: Visit date not found  Next physical within 3 mo: Visit date not found  Prescriber OR PCP: Bhupendra Caldwell MD  Last diagnosis associated with med order: 1. Other pulmonary embolism without acute cor pulmonale, unspecified chronicity (H)  - enoxaparin (LOVENOX) 80 mg/0.8 mL syringe [Pharmacy Med Name: ENOXAPARIN 80MG/0.8ML SYR(10X0.8ML)]; ADMINISTER 0.8 ML(80 MG) UNDER THE SKIN DAILY  Dispense: 24 mL; Refill: 0     Requested Prescriptions     Pending Prescriptions Disp Refills     enoxaparin ANTICOAGULANT (LOVENOX) 80 mg/0.8 mL syringe [Pharmacy Med Name: ENOXAPARIN 80MG/0.8ML SYR(10X0.8ML)] 24 mL 0     Sig: ADMINISTER 0.8 ML(80 MG) UNDER THE SKIN DAILY     Refill for 10 day supply. May give one additional 5 day supply if INR not anticipated to be in therapeutic goal range in 5 days based on recent INR result.

## 2021-06-04 NOTE — TELEPHONE ENCOUNTER
Controlled Substance Refill Request  Medication Name:   Requested Prescriptions     Pending Prescriptions Disp Refills     HYDROmorphone (DILAUDID) 4 MG tablet 30 tablet 0     Sig: Take 1 tablet (4 mg total) by mouth 5 (five) times a day.     Date Last Fill: 11/25/19  Pharmacy: [facility] #[id number] - [place], [place] - [address] AT [facility] [address]      Submit electronically to pharmacy  Controlled Substance Agreement Date Scanned:   Encounter-Level CSA Scan Date - 10/24/2017:    Scan on 10/31/2017  2:02 PM       Last office visit with prescriber/PCP: 11/25/2019 Bhupendra Caldwell MD OR same dept: 11/25/2019 Bhupendra Caldwell MD OR same specialty: 11/25/2019 Bhupendra Caldwell MD  Last physical: 2/15/2019 Last MTM visit: Visit date not found      Please adjust to quantity amount, patient states Disp #150  Patient is out the med.

## 2021-06-04 NOTE — PROGRESS NOTES
ASSESSMENT and PLAN:    #1.  Hypertension, controlled.  She will get her labs drawn tomorrow and her home care team will send me her kidney function and electrolytes.    2.  Chronic rectal pain.  It sounds like she may benefit from an intervention such as a nerve block, ablation, etc.  She has used both topical along with oral medications which have not helped much.  I will refer her to the pain clinic for an evaluation.    3. Fibromyalgia.  Dilaudid was refilled today.  She has been on this for many years.    Problem List Items Addressed This Visit     Fibromyalgia (Chronic)    Relevant Medications    promethazine (PHENERGAN) 25 MG tablet    pregabalin (LYRICA) 200 MG capsule    HYDROmorphone (DILAUDID) 4 MG tablet      Other Visit Diagnoses     Chronic rectal pain    -  Primary    Relevant Orders    Ambulatory referral to Pain Clinic          There are no Patient Instructions on file for this visit.    Medications Discontinued During This Encounter   Medication Reason     promethazine (PHENERGAN) 25 MG tablet Reorder     pregabalin (LYRICA) 200 MG capsule Reorder     HYDROmorphone (DILAUDID) 4 MG tablet Reorder       No follow-ups on file.    CHIEF COMPLAINT:  Chief Complaint   Patient presents with     Follow-up     BP - buttock pain - double vision        HISTORY OF PRESENT ILLNESS:  Dee Mattson is a 69 y.o. female  presenting to the clinic today for follow-up of some of her chronic medical conditions.  I stopped her amlodipine at her last visit and started her on a combination of chlorthalidone and lisinopril.  She has been tolerating this well.  Blood pressure is under excellent control today.  Sure home care team is going to come out and draw her labs from her port tomorrow.  She continues to have chronic rectal pain which is significantly interfering with the quality of her life.  She has trialed both topical and oral medications, both of which have not had much efficacy.  I told her that I would be  curious whether a nerve block or other interventional procedure would be of benefit for her.  She is also due for a refill of her Dilaudid today.    REVIEW OF SYSTEMS:   Pertinent positives noted in HPI, remainder of ROS is negative.    MEDICATIONS:  Current Outpatient Medications   Medication Sig Dispense Refill     albuterol (PROVENTIL) 2.5 mg /3 mL (0.083 %) nebulizer solution Take 2.5 mg by nebulization every 6 (six) hours as needed for wheezing.       amLODIPine (NORVASC) 5 MG tablet TAKE 1 TABLET(5 MG) BY MOUTH DAILY 30 tablet 0     busPIRone (BUSPAR) 15 MG tablet TAKE 2 TABLETS(30MG) BY MOUTH TWICE DAILY 360 tablet 1     chlorthalidone (HYGROTEN) 25 MG tablet Take 1 tablet (25 mg total) by mouth daily. 30 tablet 11     cholecalciferol, vitamin D3, (VITAMIN D3) 2,000 unit cap Take 2,000 Units by mouth every morning.       cyanocobalamin 1,000 mcg/mL injection ADMINISTER 1 ML IN THE MUSCLE EVERY 30 DAYS AS DIRECTED 3 mL 0     diphenoxylate-atropine (LOMOTIL) 2.5-0.025 mg per tablet Take 2 tablets by mouth 3 (three) times a day. 180 tablet 0     DULoxetine (CYMBALTA) 60 MG capsule Take 1 capsule (60 mg total) by mouth 2 (two) times a day. 180 capsule 3     enoxaparin ANTICOAGULANT (LOVENOX) 80 mg/0.8 mL syringe ADMINISTER 0.8 ML(80 MG) UNDER THE SKIN DAILY 24 mL 0     HYDROmorphone (DILAUDID) 4 MG tablet Take 1 tablet (4 mg total) by mouth 5 (five) times a day. 150 tablet 0     Lactobacillus acidophilus (ACIDOPHILUS ORAL) Take 1 tablet by mouth 2 (two) times a day.       lipase-protease-amylase (CREON) 6,000-19,000 -30,000 unit CpDR capsule TAKE 2 CAPSULES BY MOUTH THREE TIMES DAILY WITH FOOD 540 capsule 0     lisinopril (PRINIVIL,ZESTRIL) 20 MG tablet Take 1 tablet (20 mg total) by mouth daily. 30 tablet 11     loperamide (IMODIUM) 2 mg capsule Take 2-4 mg by mouth 2 (two) times a day as needed.   2     mirtazapine (REMERON) 30 MG tablet Take 30 mg by mouth at bedtime.        multivit-min/folic acid/yjs022  (ALIVE WOMEN'S GUMMY VITAMINS ORAL) Take 2 tablets by mouth Daily after breakfast.        nitrofurantoin (MACRODANTIN) 50 MG capsule TAKE 1 CAPSULE(50 MG) BY MOUTH AT BEDTIME 90 capsule 0     potassium chloride (K-DUR,KLOR-CON) 20 MEQ tablet TAKE 1 TABLET(20 MEQ) BY MOUTH DAILY 90 tablet 0     pregabalin (LYRICA) 200 MG capsule TAKE 1 CAPSULE(200 MG) BY MOUTH THREE TIMES DAILY 270 capsule 0     prochlorperazine (COMPAZINE) 10 MG tablet Take 1 tablet (10 mg total) by mouth every 6 (six) hours as needed for nausea. 30 tablet 0     promethazine (PHENERGAN) 25 MG tablet TAKE 1 TABLET(25 MG) BY MOUTH EVERY 6 HOURS AS NEEDED FOR NAUSEA 20 tablet 0     traZODone (DESYREL) 150 MG tablet Take 150 mg by mouth at bedtime.        UNABLE TO FIND Infuse into a venous catheter at bedtime. Med Name: Normal saline with magnesium.  2 liters per patient       No current facility-administered medications for this visit.        TOBACCO USE:  Social History     Tobacco Use   Smoking Status Former Smoker     Packs/day: 1.00     Years: 30.00     Pack years: 30.00     Types: Cigarettes     Last attempt to quit: 2000     Years since quittin.9   Smokeless Tobacco Never Used       VITALS:  Vitals:    19 1403   BP: 118/60   Pulse: 80   Weight: 124 lb (56.2 kg)     Wt Readings from Last 3 Encounters:   19 124 lb (56.2 kg)   19 125 lb (56.7 kg)   11/15/19 127 lb (57.6 kg)         PHYSICAL EXAM:  Constitutional:  Reveals an alert, pleasant female.   HEET: Normocephalic, without obvious abnormality, atraumatic.   Neurologic: Normal gait and station  Psychologic: Normal affect

## 2021-06-04 NOTE — TELEPHONE ENCOUNTER
Medication Question or Clarification  Who is calling: Patient  What medication are you calling about? (include dose and sig)   lipase-protease-amylase (CREON) 6,000-19,000 -30,000 unit CpDR capsule 540 capsule 0 11/25/2019     Sig: TAKE 2 CAPSULES BY MOUTH THREE TIMES DAILY WITH FOOD        Who prescribed the medication?: Dr Caldwell  What is your question/concern?: Patient states she noted that the side effect of this medication was anal pain. Patient is having anal pain and did cut back on medication down to 1 three times a day. Pain was better, but is still there.  Patient states does not want to take this medication.   Please call and advise per patient request.  Pharmacy: [facility] to leave a detailed message?: Yes  Site CMT - Please call the pharmacy to obtain any additional needed information.

## 2021-06-04 NOTE — TELEPHONE ENCOUNTER
RN cannot approve Refill Request    RN can NOT refill this medication medication not on med list.     Cristin Fishman, [facility] Triage/Med Refill 12/14/2019    Requested Prescriptions   Pending Prescriptions Disp Refills     amLODIPine (NORVASC) 5 MG tablet [Pharmacy Med Name: AMLODIPINE BESYLATE 5MG TABLETS] 30 tablet 0     Sig: TAKE 1 TABLET(5 MG) BY MOUTH DAILY       Calcium-Channel Blockers Protocol Passed - 12/12/2019  3:26 AM        Passed - PCP or prescribing provider visit in past 12 months or next 3 months     Last office visit with prescriber/PCP: 11/15/2019 Duke Mccall CNP OR same dept: 11/25/2019 Bhupendra Caldwell MD OR same specialty: 11/25/2019 Bhupendra Caldwell MD  Last physical: 8/27/2018 Last MTM visit: Visit date not found   Next visit within 3 mo: Visit date not found  Next physical within 3 mo: Visit date not found  Prescriber OR PCP: Duke Mccall CNP  Last diagnosis associated with med order: 1. Benign essential hypertension  - amLODIPine (NORVASC) 5 MG tablet [Pharmacy Med Name: AMLODIPINE BESYLATE 5MG TABLETS]; TAKE 1 TABLET(5 MG) BY MOUTH DAILY  Dispense: 30 tablet; Refill: 0    If protocol passes may refill for 12 months if within 3 months of last provider visit (or a total of 15 months).             Passed - Blood pressure filed in past 12 months     BP Readings from Last 1 Encounters:   11/25/19 (!) 186/100

## 2021-06-05 VITALS — HEIGHT: 65 IN | BODY MASS INDEX: 20.66 KG/M2 | WEIGHT: 124 LBS

## 2021-06-05 VITALS — WEIGHT: 124 LBS | BODY MASS INDEX: 20.63 KG/M2

## 2021-06-05 NOTE — TELEPHONE ENCOUNTER
Who is calling:  Georgette -medical records with [facility]  Reason for Call:  I faxed over an order dated 12/18/19 for a signature by Bhupendra Caldwell MD 3 times to both [phone] and [phone] last time yesterday without response. Please return call to discuss.  Date of last appointment with primary care: 12/17/19  Okay to leave a detailed message: Yes

## 2021-06-05 NOTE — TELEPHONE ENCOUNTER
Refill Request  Did you contact pharmacy: No  Medication name:   Requested Prescriptions     Pending Prescriptions Disp Refills     cyanocobalamin 1,000 mcg/mL injection 3 mL 0     Who prescribed the medication: Bhupendra Caldwell MD   Requested Pharmacy: [facility]

## 2021-06-05 NOTE — TELEPHONE ENCOUNTER
An ophthalmologist is an MD with complete ordering privileges in the [facility].  This physician is, again, more than capable of ordering these tests

## 2021-06-05 NOTE — TELEPHONE ENCOUNTER
Orders being requested: Re-certification for Skill Nursing .  One time a Week for Nine weeks .  Two As needed Visits for Port Issues and Labs .  Reason service is needed/diagnosis: Weekly Portal Cathter dressing and Needle changes , Monthly B 12 IM injections monthly labs for Creatinine and magnesium , Health maintenance   When are orders needed by: As soon as possible   Where to send Orders: Phone:  [phone]  Okay to leave detailed message?  No

## 2021-06-05 NOTE — TELEPHONE ENCOUNTER
Orders being requested: Home care is requesting orders for the patient to take three 200 mg tablets of Advil twice daily.   Reason service is needed/diagnosis: For pain management, home care is concerned this might affect the patient's kidneys and would like to know if the provider would agree with this medication.  When are orders needed by: as soon as possible  Where to send Orders: Phone:  [phone]  Okay to leave detailed message?  Yes

## 2021-06-05 NOTE — TELEPHONE ENCOUNTER
Orders being requested: Magnesium and creatinine   Reason service is needed/diagnosis: Hypomagnesemia and chronic kidney disease  When are orders needed by: as soon as possible   Where to send Orders: Phone:  [phone]  Okay to leave detailed message?  Yes    FYI:  Caller stated that she would like to clarify that it is the creatinine that was missed on 12/24/19. Caller stated that she is taking over for this patient and would like month verbal orders for the above tests.

## 2021-06-05 NOTE — TELEPHONE ENCOUNTER
RN cannot approve Refill Request    RN can NOT refill this medication Protocol failed and NO refill given.      Cristin Fishman, [facility] Triage/Med Refill 1/21/2020    Requested Prescriptions   Pending Prescriptions Disp Refills     cyanocobalamin 1,000 mcg/mL injection 3 mL 11     Sig: ADMINISTER 1 ML IN THE MUSCLE EVERY 30 DAYS AS DIRECTED       Cyanocobalamin (Vitamin B12)  Refill Protocol Failed - 1/20/2020  2:31 PM        Failed - Vitamin B12 level in last 12 months     Vitamin B-12   Date Value Ref Range Status   10/19/2015 263 213 - 816 pg/mL Final             Failed - CBC in last 12 months     WBC   Date Value Ref Range Status   09/07/2018 9.7 4.0 - 11.0 thou/uL Final     RBC   Date Value Ref Range Status   09/07/2018 3.77 (L) 3.80 - 5.40 mill/uL Final     Hemoglobin   Date Value Ref Range Status   09/07/2018 11.3 (L) 12.0 - 16.0 g/dL Final     Hematocrit   Date Value Ref Range Status   09/07/2018 34.8 (L) 35.0 - 47.0 % Final     MCV   Date Value Ref Range Status   09/07/2018 92 80 - 100 fL Final     MCH   Date Value Ref Range Status   09/07/2018 30.0 27.0 - 34.0 pg Final     MCHC   Date Value Ref Range Status   09/07/2018 32.5 32.0 - 36.0 g/dL Final     RDW   Date Value Ref Range Status   09/07/2018 13.7 11.0 - 14.5 % Final     Platelets   Date Value Ref Range Status   09/07/2018 186 140 - 440 thou/uL Final     MPV   Date Value Ref Range Status   09/07/2018 10.2 8.5 - 12.5 fL Final                Passed - PCP or prescribing provider visit in past 12 months       Last office visit with prescriber/PCP: 12/17/2019 Bhupendra Caldwell MD OR same dept: 12/17/2019 Bhupendra Caldwell MD OR same specialty: 12/17/2019 Bhupendra Caldwell MD Last physical: 2/15/2019 Last MTM visit: Visit date not found    Next visit within 3 mo: Visit date not found  Next physical within 3 mo: Visit date not found  Prescriber OR PCP: Bhupendra Caldwell MD  Last diagnosis associated with med order: 1. Vitamin B12  deficiency  - cyanocobalamin 1,000 mcg/mL injection  Dispense: 3 mL; Refill: 0

## 2021-06-05 NOTE — TELEPHONE ENCOUNTER
Spoke with Mariella- unfortunately they cannot take orders from an ophthalmologist. The only way pt can have her blood drawn tomorrow is if the order comes from her PCP. Mariella said the ophthalmologist ordered the test to rule out myasthenia gravis

## 2021-06-05 NOTE — TELEPHONE ENCOUNTER
RN cannot approve Refill Request    RN can NOT refill this medication med is not covered by policy/route to provider. Last office visit: 12/17/2019 Bhupendra Caldwell MD Last Physical: 2/15/2019 Last MTM visit: Visit date not found Last visit same specialty: 12/17/2019 Bhupendra Caldwell MD.  Next visit within 3 mo: Visit date not found  Next physical within 3 mo: Visit date not found      Maddy Aceves, Care Connection Triage/Med Refill 1/8/2020    Requested Prescriptions   Pending Prescriptions Disp Refills     enoxaparin ANTICOAGULANT (LOVENOX) 80 mg/0.8 mL syringe [Pharmacy Med Name: ENOXAPARIN 80MG/0.8ML SYR(10X0.8ML)] 24 mL 0     Sig: ADMINISTER 0.8 ML(80 MG) UNDER THE SKIN DAILY       Enoxaparin Refill Protocol   Failed - 1/8/2020 12:35 PM        Failed - Route to appropriate pool/provider     Last Anticoagulation Summary:           Passed - Provider visit in last year     Last office visit with prescriber/PCP: 12/17/2019 Bhupendra Caldwell MD OR same dept: 12/17/2019 Bhupendra Caldwell MD  Last physical: 2/15/2019       Next visit within 3 mo: Visit date not found  Next physical within 3 mo: Visit date not found  Prescriber OR PCP: Bhupendra Caldwell MD  Last diagnosis associated with med order: 1. Other pulmonary embolism without acute cor pulmonale, unspecified chronicity (H)  - enoxaparin ANTICOAGULANT (LOVENOX) 80 mg/0.8 mL syringe [Pharmacy Med Name: ENOXAPARIN 80MG/0.8ML SYR(10X0.8ML)]; ADMINISTER 0.8 ML(80 MG) UNDER THE SKIN DAILY  Dispense: 24 mL; Refill: 0     Requested Prescriptions     Pending Prescriptions Disp Refills     enoxaparin ANTICOAGULANT (LOVENOX) 80 mg/0.8 mL syringe [Pharmacy Med Name: ENOXAPARIN 80MG/0.8ML SYR(10X0.8ML)] 24 mL 0     Sig: ADMINISTER 0.8 ML(80 MG) UNDER THE SKIN DAILY     Refill for 10 day supply. May give one additional 5 day supply if INR not anticipated to be in therapeutic goal range in 5 days based on recent INR result.

## 2021-06-05 NOTE — TELEPHONE ENCOUNTER
Medication Question or Clarification  Who is calling: NADEEM Crystal  What medication are you calling about (include dose and sig)?: lidocaine gel  Who prescribed the medication?: Bhupendra Caldwell MD  What is your question/concern?: The patient is not finding the gel effective for coccyx pain.  RN is asking for a Tylenol order for coccyx pain until patient is seen at pain clinic. Please advise.   Requested Pharmacy: [facility]  Okay to leave a detailed message?: Yes  [phone]

## 2021-06-05 NOTE — TELEPHONE ENCOUNTER
Refill Approved    Rx renewed per Medication Renewal Policy. Medication was last renewed on 12/16/19.    Cristin Fishman, Care [facility] Triage/Med Refill 1/17/2020     Requested Prescriptions   Pending Prescriptions Disp Refills     amLODIPine (NORVASC) 5 MG tablet [Pharmacy Med Name: AMLODIPINE BESYLATE 5MG TABLETS] 30 tablet 0     Sig: TAKE 1 TABLET(5 MG) BY MOUTH DAILY       Calcium-Channel Blockers Protocol Passed - 1/15/2020  3:26 AM        Passed - PCP or prescribing provider visit in past 12 months or next 3 months     Last office visit with prescriber/PCP: 12/17/2019 Bhupendra Caldwell MD OR same dept: 12/17/2019 Bhupendra Caldwell MD OR same specialty: 12/17/2019 Bhupendra Caldwell MD  Last physical: 2/15/2019 Last MTM visit: Visit date not found   Next visit within 3 mo: Visit date not found  Next physical within 3 mo: Visit date not found  Prescriber OR PCP: Bhupendra Caldwell MD  Last diagnosis associated with med order: 1. Benign essential hypertension  - amLODIPine (NORVASC) 5 MG tablet [Pharmacy Med Name: AMLODIPINE BESYLATE 5MG TABLETS]; TAKE 1 TABLET(5 MG) BY MOUTH DAILY  Dispense: 30 tablet; Refill: 0    If protocol passes may refill for 12 months if within 3 months of last provider visit (or a total of 15 months).             Passed - Blood pressure filed in past 12 months     BP Readings from Last 1 Encounters:   12/17/19 118/60

## 2021-06-05 NOTE — TELEPHONE ENCOUNTER
FYI - Status Update  Who is Calling: Mariella with [facility]  Update: Calling to report patient received her B-12 injection today late, last one done 11/20/19. Her port-a-cath on her right chest was unable to be accessed tried 3 unsuccessful attempts.They will have patient go to [facility] in [facility] to have this checked.  Okay to leave a detailed message?:  No return call needed

## 2021-06-05 NOTE — TELEPHONE ENCOUNTER
Who is calling:  Mariella  Reason for Call:  Caller stated that she received an order for Thyroid antibodies panel from [facility] to be drawn on Tuesday with diagnose of diplopia for studies for myesthesia gravis. Caller stated that she'd reached out to the lab and was informed that will need to be more specific and there is not really a specific test for this and will need more information. Caller stated that she thought she'll bring it up to Bhupendra Caldwell MD first before she checks with [facility]. Caller stated that she would like a call back.  Okay to leave a detailed message: Yes  [phone]

## 2021-06-05 NOTE — TELEPHONE ENCOUNTER
Controlled Substance Refill Request  Medication Name:   Requested Prescriptions     Pending Prescriptions Disp Refills     HYDROmorphone (DILAUDID) 4 MG tablet 150 tablet 0     Sig: Take 1 tablet (4 mg total) by mouth 5 (five) times a day.     Date Last Fill: 12/17/19  Requested Pharmacy: [id number]  Submit electronically to pharmacy  Controlled Substance Agreement on file:   Encounter-Level CSA Scan Date - 10/24/2017:    Scan on 10/31/2017  2:02 PM        Last office visit:  12/17/19

## 2021-06-05 NOTE — TELEPHONE ENCOUNTER
I would not be comfortable with Dee taking this amount of Advil daily given the risk of kidney dysfunction

## 2021-06-05 NOTE — TELEPHONE ENCOUNTER
Patient Returning Call  Reason for call:  Mariella stated she spoke to [facility] and they need a TSH, T3, free T4. Caller stated she can draw these labs tomorrow with the patient's magnesium level.   Information relayed to patient:  n/a  Patient has additional questions:  Yes  If YES, what are your questions/concerns:  Caller stated she just needs a verbal so she can draw these labs tomorrow. Caller stated she also needs a diagnosis code to go with the labs as well.  Okay to leave a detailed message?: Yes   [phone]

## 2021-06-05 NOTE — TELEPHONE ENCOUNTER
Controlled Substance Refill Request  Medication Name:   Requested Prescriptions     Pending Prescriptions Disp Refills     diphenoxylate-atropine (LOMOTIL) 2.5-0.025 mg per tablet [Pharmacy Med Name: DIPHENOXYLATE/ATROPINE 2.5MG TABS] 180 tablet 0     Sig: TAKE 2 TABLETS BY MOUTH THREE TIMES DAILY     Date Last Fill:   diphenoxylate-atropine (LOMOTIL) 2.5-0.025 mg per tablet 180 tablet 0 11/25/2019  No   Sig - Route: Take 2 tablets by mouth 3 (three) times a day. - Oral   Sent to pharmacy as: diphenoxylate-atropine 2.5 mg-0.025 mg tablet (LOMOTIL)   E-Prescribing Status: Receipt confirmed by pharmacy (11/25/2019  2:07 PM CST)   [facility]ed [facility]: [facility]  Submit electronically to pharmacy  Controlled Substance Agreement on file:   Encounter-Level CSA Scan Date - 10/24/2017:    Scan on 10/31/2017  2:02 PM        Last office visit:  12/17/19

## 2021-06-05 NOTE — TELEPHONE ENCOUNTER
Left message informing Mariella that there is not a specific Thyroid antibody panel test. There are 4 options that I am aware of:  -TSH  -T3  -T4  -Thyrotropin Receptor Antibody    Advised to have her contact  to see which test is actually needed and then to get a written order for that lab test so pt can have it drawn

## 2021-06-05 NOTE — TELEPHONE ENCOUNTER
Medication Question or Clarification  Who is calling: RN  What medication are you calling about (include dose and sig)?: Protonix.    Who prescribed the medication?: historic  What is your question/concern?: RN states the patient is not taking the medication and has no signs of GERD.  Will Dr Caldwell  confirm  this order is not current?  Please  update RN.   [facility]ed [facility]  Okay to leave a detailed message?: Yes [phone]

## 2021-06-05 NOTE — TELEPHONE ENCOUNTER
Who is calling:  Mariella  Reason for Call:  Caller stated that she did not get a call back and is checking on the status of the message below. Writer informed the caller of Bhupendra Caldwell MD's response below. Caller verbalized understanding and has no further questions at this time.  Okay to leave a detailed message: No

## 2021-06-05 NOTE — TELEPHONE ENCOUNTER
FYI - Status Update  Who is Calling: Home Care  Update: Home care would like the PCP to be aware that there is a potential major drug interaction with cymbalta and trazadone.  Okay to leave a detailed message?:  No return call needed

## 2021-06-06 NOTE — TELEPHONE ENCOUNTER
Why would she need to be excused from jury duty?  I don't see a reason she would not be able to perform this duty.

## 2021-06-06 NOTE — TELEPHONE ENCOUNTER
Who is calling:  Mariella with [facility], [phone]  Reason for Call:    Please see lab tests from 3/10/2020  Magnesium is good  Creatinine is abnormal!!  Date of last appointment with primary care: 12/17/19  Okay to leave a detailed message: Yes    Does Provider have any recommendations?

## 2021-06-06 NOTE — PROGRESS NOTES
[facility]  New patient consultation        CC-  Chief Complaint   Patient presents with     Consult         Dee Mattson 70 y.o. is here today, sent to me by  to discuss the patients pain.  Associated symptoms with pain include pain that is located in her rectal area, started about 3 months ago for unknown reasons.     Alleviating factors: Include- nothing. Unable to sit, stand, has to lay on her left side for relief.   Aggravating factors: activity including bending, standing, laying down or lifting  The patient denies using any assistive devices to help with mobilization.  Pain level today: On a scale of 1-10, the patient rates their pain at a 10  Function Rating:   sitting, standing, laying flat, driving and any activities.     HPI  Past Medical History:   Diagnosis Date     Anxiety and depression      Asthma 4/10/2012     Bowel obstruction (H)      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 7/4/2014     Chronic fatigue syndrome 7/3/2014     Disease of thyroid gland      Fibromyalgia 7/3/2014     GERD (gastroesophageal reflux disease)      History of blood clots      Hodgkin's lymphoma (H) 10/1979     Hypertension      Major depressive disorder, recurrent episode, moderate (H) 9/6/2005     Past Surgical History:   Procedure Laterality Date     ANAL SPHINCTEROPLASTY       APPENDECTOMY       CHOLECYSTECTOMY       COLECTOMY       HYSTERECTOMY  05/2000     ILEOSTOMY       OOPHORECTOMY Bilateral 05/2000     CT ABDOMEN SURGERY PROC UNLISTED      Description: Hernia Repair;  Recorded: 08/05/2009;  Comments: perineal     CT DILATION/CURETTAGE,DIAGNOSTIC      Description: Dilation And Curettage;  Recorded: 08/05/2009;     CT INSJ TUNNELED CTR VAD W/SUBQ PORT AGE 5 YR/> N/A 10/29/2014    Procedure: REMOVAL PICC LINE RIGHT ARM and PLACEMENT PORTACATH;  Surgeon: Jason Kirkpatrick MD;  Location: [facility];  Service: General     CT REMOVAL GALLBLADDER      Description: Cholecystectomy;   Recorded: 2014;     NM REMOVAL OF TONSILS,<13 Y/O      Description: Tonsillectomy;  Recorded: 2009;     NM RMVL JENY CTR VAD W/SUBQ PORT/ CTR/PRPH INSJ N/A 2018    Procedure: REMOVAL OF PORT A CATH;  Surgeon: Jason Kirkpatrick MD;  Location: [facility];  Service: General     NM TOTAL ABDOM HYSTERECTOMY      Description: Total Abdominal Hysterectomy;  Recorded: 2014;     SMALL INTESTINE SURGERY       THYROIDECTOMY, PARTIAL       TUNNELED VENOUS CATHETER PLACEMENT N/A 2018    Procedure: PORT-A-CATH INSERTION;  Surgeon: Jason Kirkpatrick MD;  Location: [facility];  Service:      Family History   Problem Relation Age of Onset     Colon cancer Father      Liver cancer Brother      Social History     Tobacco Use   Smoking Status Former Smoker     Packs/day: 1.00     Years: 30.00     Pack years: 30.00     Types: Cigarettes     Last attempt to quit: 2000     Years since quittin.1   Smokeless Tobacco Never Used     Social History     Substance and Sexual Activity   Alcohol Use No     Social History     Substance and Sexual Activity   Drug Use No     Social History     Substance and Sexual Activity   Sexual Activity Not Currently       Chemical Dependency History: Patient Denies tobacco use, alcohol use, illicit substance use including THC.  Denies any chemical dependency evaluation or treatment in the past.  Denies any legal issues related to substance use.    Psychiatric History:  Patient reports no current psychiatrist or psychologist.  Denies any suicidal ideation.  Denies any hospitalizations for mental illness.    ORT     low     Pertinent Pain Medications:    She currently takes lyrica and dilaudid by her PCP for her fibromyalgia type pain       Current Outpatient Medications:      acetaminophen (TYLENOL) 325 MG tablet, Take 2 tablets (650 mg total) by mouth every 4 (four) hours as needed for pain., Disp: 100 tablet, Rfl: 0     amLODIPine (NORVASC) 5 MG tablet,  TAKE 1 TABLET(5 MG) BY MOUTH DAILY, Disp: 90 tablet, Rfl: 3     busPIRone (BUSPAR) 15 MG tablet, TAKE 2 TABLETS(30MG) BY MOUTH TWICE DAILY, Disp: 360 tablet, Rfl: 1     chlorthalidone (HYGROTEN) 25 MG tablet, Take 1 tablet (25 mg total) by mouth daily., Disp: 30 tablet, Rfl: 11     cholecalciferol, vitamin D3, (VITAMIN D3) 2,000 unit cap, Take 2,000 Units by mouth every morning., Disp: , Rfl:      DULoxetine (CYMBALTA) 60 MG capsule, Take 1 capsule (60 mg total) by mouth 2 (two) times a day., Disp: 180 capsule, Rfl: 3     enoxaparin ANTICOAGULANT (LOVENOX) 80 mg/0.8 mL syringe, ADMINISTER 0.8 ML(80 MG) UNDER THE SKIN DAILY, Disp: 24 mL, Rfl: 0     HYDROmorphone (DILAUDID) 4 MG tablet, Take 1 tablet (4 mg total) by mouth 5 (five) times a day., Disp: 150 tablet, Rfl: 0     Lactobacillus acidophilus (ACIDOPHILUS ORAL), Take 1 tablet by mouth 2 (two) times a day., Disp: , Rfl:      lipase-protease-amylase (CREON) 6,000-19,000 -30,000 unit CpDR capsule, TAKE 2 CAPSULES BY MOUTH THREE TIMES DAILY WITH FOOD, Disp: 540 capsule, Rfl: 0     lisinopril (PRINIVIL,ZESTRIL) 20 MG tablet, Take 1 tablet (20 mg total) by mouth daily., Disp: 30 tablet, Rfl: 11     loperamide (IMODIUM) 2 mg capsule, Take 2-4 mg by mouth 2 (two) times a day as needed. , Disp: , Rfl: 2     mirtazapine (REMERON) 30 MG tablet, Take 30 mg by mouth at bedtime. , Disp: , Rfl:      multivit-min/folic acid/npv249 (ALIVE WOMEN'S GUMMY VITAMINS ORAL), Take 2 tablets by mouth Daily after breakfast. , Disp: , Rfl:      nitrofurantoin (MACRODANTIN) 50 MG capsule, TAKE 1 CAPSULE(50 MG) BY MOUTH AT BEDTIME, Disp: 90 capsule, Rfl: 0     potassium chloride (K-DUR,KLOR-CON) 20 MEQ tablet, TAKE 1 TABLET(20 MEQ) BY MOUTH DAILY, Disp: 90 tablet, Rfl: 0     pregabalin (LYRICA) 200 MG capsule, TAKE 1 CAPSULE(200 MG) BY MOUTH THREE TIMES DAILY, Disp: 270 capsule, Rfl: 0     promethazine (PHENERGAN) 25 MG tablet, TAKE 1 TABLET(25 MG) BY MOUTH EVERY 6 HOURS AS NEEDED FOR  "NAUSEA, Disp: 20 tablet, Rfl: 0     traZODone (DESYREL) 150 MG tablet, Take 150 mg by mouth at bedtime. , Disp: , Rfl:      UNABLE TO FIND, Infuse into a venous catheter at bedtime. Med Name: Normal saline with magnesium.  2 liters per patient, Disp: , Rfl:      albuterol (PROVENTIL) 2.5 mg /3 mL (0.083 %) nebulizer solution, Take 2.5 mg by nebulization every 6 (six) hours as needed for wheezing., Disp: , Rfl:      cyanocobalamin 1,000 mcg/mL injection, ADMINISTER 1 ML IN THE MUSCLE EVERY 30 DAYS AS DIRECTED, Disp: 3 mL, Rfl: 11     diphenoxylate-atropine (LOMOTIL) 2.5-0.025 mg per tablet, TAKE 2 TABLETS BY MOUTH THREE TIMES DAILY, Disp: 180 tablet, Rfl: 0    Therapeutic interventions previously tried-   Previously a fair amount of surgical interventions and alterations and vaginal injections.     Review of Systems:  12 point systems were reviewed with pt as documented on pt health form of 2/14/2020. ROS was positive for pain the rest of the systems were pertinent negative.    Exam  Vitals:    02/14/20 1036   BP: 124/78   Patient Site: Right Arm   Patient Position: Sitting   Pulse: 86   Resp: 18   Weight: 124 lb (56.2 kg)   Height: 5' 0.75\" (1.543 m)     Constitutional-General:  Pleasant, straightforward, healthy appearing individual.   Psych-Mental Status: A & O in no acute distress. Speech is fluent.  Recent and remote memory are intact.  Attention span and concentration are normal. Displays appropriate mood and affect.   H,E,N,T- Symmetrical, Eyes- Perrla, Nares- patents bilaterally, throat- trachea is midline, airway is patent, unlabored respiratory effort.  Lymph-cervical lymph system (anterior and posterior) without palpable nodules or tenderness throughout. Supraclavicular chain without palpable nodules or tenderness throughout.   Cardiovascular- Regular S1, S2 rhythm without murmurs, gallops, clicks or rubs. legs and feet are warm.  Pulses are palpable and grade 2 at the posterior tibial arteries " bilaterally, no edema noted.  Pulmonary- lungs are clear to auscultation throughout   Musckuloskeletal  Gait:  Gait is normal.   Gross Motor: Toe walking, heel walking, and Romberg tests are normal.   Strength:  Bilateral upper and lower extremity strength is normal and symmetric 5/5. No atrophy or tone abnormalities noted.   Sensation:  No loss of sensation noted to light touch in both upper and lower extremities. No dermatomal abnormal distributions noted.  Spine ROM:  Normal range of motion with no pain reproduction in the cervical, thoracic and lumbar spine.   Provocative Maneuvers:  Upper extremity, Hip, sacroiliac, and knee provocative maneuvers are negative.  Palpation:  Inspection and palpatory examination of the spine and upper/lower extremities is unremarkable. Tenderness is noted in the well healed scar of the sphincteroplasty and proctectomy between the gluteal folds.  Neuro:  Bilateral upper and lower extremity coordination and muscle stretch reflexes are physiologic and symmetric 2/4.  Babinski responses are downgoing.  No clonus bilaterally. Negative hoffmans sign bilaterally.   Integumentary: no rashes or breaks in the skin, no open wounds. Colostomy present on the RLQ.     Lab:  Last UDS on 2/14/2020 was taken today and is pending.      Imaging:  No new imaging available to review    :  Dated 2/14/2020 was reviewed with the patient  Paper copy reviewed     Assessment -  Todays diagnosis includes   1. Chronic migraine    2. Hypersensitivity, sequela    3. S/P total colectomy      After reviewing the patients chart and physical findings I agree that the patient would benefit from what the pain center has to offer. I have discussed with the  patient today the diagnosis and treatment recommendations.  We reviewed the natural progression of this problem at great length.  Some possible benefits and detriments of physical therapy, chiropractic care, medication management, behavorial therapy,  anti-inflammatory diet and other alternative treatments were discussed.  We also discussed future possible treatment options in a stepwise fashion, including interventional pain procedures and injections and possible surgical referral if needed.     Patient presents to the clinic today, she has had a very extensive history surgically including hodgkin's lymphoma, colon cancer with multiples surgeries, surgeries include anal repair, hoshkins for cancer, gallbladder removal, thyroid tumor removal, sphincteroplasty, colectomy, prostatectomy in 2000.     She has tried quite a few things and notes that the OTC creams do no help, she is taking lyrica as well as dilaudid for ongoing fibromyalgia type pain. She is also had pelvic floor injections that helped but she is unable to remember where she has had it. Currently the provider at the pain center does not provide pelvic floor injections.    However after evaluating the patient she has a more local hypersensitivity around the scar that is residual from the sphincteroplasty. There is no skin breakdown or redness noted, just a well healed scar. She may benefit from trigger point injections or a caudal injection to reduce her flare up. Will also order topical ketamine/amitripyline and lidocaine without any menthol from a compound pharmacy to try to reduce her hypersensitivity. As a plan B a referral was sent to the [facility] center at [facility] as they do extensive pelvic floor treatments to assist in management if the injections from the pain center do not work.     In regards to the fibromyalgia type pain she is taking lyrica as well as dilaudid. Currently opioids are not recommended for fibromyalgia type pain however she is nutritionally challenged due to all of the surgical revisions in her bowels, she has a port and does take in fluids nightly. Thus she also has dumping syndrome. Likely the dilaudid assists to some degree a slowing of this. Recommend that she  continue to follow with her PCP for this. No UDT was taken today.      Patient set goals today in the office to achieve with the pain centers help. They are:  1.  To reduce her current flare up of neuralgia type pain in her rectal area.     Plan Interventions recommended today:  Assessment tool-        Follow up in 2-3 weeks after injection with Dr. Bey if needed       Behavioral Therapy-if needed this is available here      [facility] at [facility] for ongoing pelvic type pain- referral will be placed- call the number provided and set up an appointment- inquire if they do vaginal injections for the chronic pelvic pain.       Injections- try trigger point injections over the scarring where the rectum used to be, schedule this on your way out today. However if this does not work will order a caudal.       Acupuncture- will order this to assist in breaking up the scarring that may be participating in the nerve type pain. Ok to schedule this       Non-opoid medical management includes- order ketamine topical compound cream. This was sent to [facility], they will call you to arrange delivery or pickup.       Agree with the PCP managing your use of the dilaudid- you report that this helps with your fibromyalgia type pain but is likely assisting with the dumping syndrome you have. No urine was taken today       Education was provided to the patient today in regards to their specific diagnosis.    As a reminder- chronic pain is generally stable, if you experience a new injury or new different pain it is expected that you present to the ED or urgent care for evaluation. DO NOT use your chronic pain medications to treat any new or different pain other then what it is intended for. We do not replace any lost or stolen prescriptions or provide early refills for overtaking your medications.     Orders placed today   Orders Placed This Encounter   Procedures     Ambulatory referral to Obstetrics / Gynecology      Referral Priority:   Urgent     Referral Type:   Consultation     Referral Reason:   Evaluation and Treatment     Requested Specialty:   Obstetrics and Gynecology     Number of Visits Requested:   1     Ambulatory referral to Acupuncture     Referral Priority:   Routine     Referral Type:   Acupuncture     Referral Reason:   Evaluation and Treatment     Number of Visits Requested:   1       Patient reminders:   Diagnostics: UDT/SWAB collected 2/14/2020 and results are pending.  UDT/SWAB:  Patient required a random Urine Drug Testing, due to the need to comply with [place] [employer] Policy Guidelines and CDC Guideline for the use of any controlled substances. This is to ensure that patient is compliant with treatment, and monitor for risks such as diversion, abuse, or any other aberrant behaviors. Patient is either being considered for or taking a controlled substance. Unexpected findings will be discussed and treatment decision may be adjusted. Testing is being implemented across the board randomly w/o bias related to age, race, gender, socioeconomic status or L[employer] affiliation.     SAFETY REMINDERS  No alcohol while taking controlled substances. Alcohol is not an illegal substance, it is unsafe to use in combination. It is a build up of substances in the body that can be extremely hazardous and may cause respirations to slow to a dangerous rate resulting in hospitalization, brain damage, or death.    Opioid medications have been associated with sharp rise in unintentional overdose and death.  Overdose is a condition characterized by the consumption in excess of a particular drug causing adverse effects. This can happen b/c you are sick, accidentally or intentionally took an extra dose, are on multiple medication that can interact. Someone took your medication and they are not use to the medication.  Symptoms of overdose include:   !breathing slow and shallow, erratic or not at all  !pinpoint pupils,  hallucinations  !confusion  !muscle jerks, slack muscles   !extreme sleepiness or loss of alertness   !awake but not able to talk   !face pale or clammy, vomiting, for lighter skinned people, the skin tone turns bluish purple, for darker skinned people, it turns grayish or ashen   If in a situation where overdose is a concern engage the emergency response system (dial 911).    In one study it was noted that 80% of unintentional overdoses occurred in people who were taking a combination of opioids and benzodiazepines.    Do not sell, loan, borrow or share your opioid medication with anyone. Deaths have occurred as a result of this practice. It is illegal and patients are being prosecuted.     Prevent unexpected access/loss of medication: Keep medication locked. Only carry what you need with you.    The patient agrees to the plan and has no further questions, if questions arise the patient knows to call [phone].     11:26 AM        Thank you for this consult and opportunity to assist with this patients care.    Mariella Lopes, [facility]  [address]  [place] [address]  Ph: [phone]  Fax: [phone]

## 2021-06-06 NOTE — PROGRESS NOTES
Injection - Other  Date/Time: 3/3/2020 1:35 PM  Performed by: José Miguel Bey MD  Authorized by: José Miguel Bey MD   Comments:     TRIGGER POINT INJECTION  Performed on: 3/3/2020    Ms. Mattson is a 70-year-old woman who has had a colectomy.  She has had the anal opening close.  She is having pain around the scar from the surgery.  This happened to her once several years ago in the past.  The pain then resolved.  A few weeks ago she had a return of this pain.  It is making it difficult for her to set or even lie on her back.  She is referred today for trial of trigger point injections around the scar tissue.    Pre Procedure Diagnosis:  Myofascial pain  Vital Signs:  As per intra-procedure documentation    The procedure was discussed with Dee Mattson in detail along with the attendant risks, including but not limited to: nerve injury, infection, bleeding, allergic reaction, or worsening of pain.  Informed consent was obtained and patient elected to proceed.    After informed consent was obtained the patient was placed in a left lateral decubitus position on the examination table.  The anal scar area was prepped sterilely with alcohol.  A 1 inch number 30-gauge needle was used.  There were injections made around the scar area.  A total of 10 mL of 0.25% Marcaine with 10 mg of Decadron was used in divided doses.    The patient tolerated the procedure well.  There were no complications.      Pre Procedure Pain Scale: 8  Pain Score:   4    If Dee Mattson has any questions or concerns after discharge, she was instructed to call us.

## 2021-06-06 NOTE — TELEPHONE ENCOUNTER
"Who is requesting the letter?  Patient   Why is the letter needed? Caller stated that she is needing this letter before the 20th due to her jury duties are those days. Caller is wondering if Bhupendra Caldwell MD can write a excuse letter for her due to her medical problem and they told her that the doctor had to write up a letter excusing her from the jury duties.   How would you like this letter returned? Mailed to home address, was informed of the business days process, caller stated \"if its late than its late oh well\".   Okay to leave a detailed message? Yes  [phone]  "

## 2021-06-06 NOTE — TELEPHONE ENCOUNTER
Controlled Substance Refill Request  Medication Name:   Requested Prescriptions     Pending Prescriptions Disp Refills     HYDROmorphone (DILAUDID) 4 MG tablet 150 tablet 0     Sig: Take 1 tablet (4 mg total) by mouth 5 (five) times a day.     Date Last Fill: 1/20/2020  [facility]ed [id number]  Submit electronically to pharmacy  Controlled Substance Agreement on file:   Encounter-Level CSA Scan Date - 10/24/2017:    Scan on 10/31/2017  2:02 PM        Last office visit:  12/17/19

## 2021-06-06 NOTE — TELEPHONE ENCOUNTER
Orders being requested: skill nurse   Reason service is needed/diagnosis: wounds in the 4-5th toe, daily dressing, cleansing with either saline or wound cleanser, pat dry and re-wrap and tape.   When are orders needed by: as soon as possible   Where to send Orders: Phone:  [phone]  Okay to leave detailed message?  Yes

## 2021-06-06 NOTE — PROGRESS NOTES
Patient presents to the clinic today for a consult with Mariella Lopes CNP regarding anal pain. Patient describes their pain as constant Her function score is 8.

## 2021-06-06 NOTE — TELEPHONE ENCOUNTER
RN cannot approve Refill Request    RN can NOT refill this medication med is not covered by policy/route to provider. Last office visit: 12/17/2019 Bhupendra Caldwell MD Last Physical: 2/15/2019 Last MTM visit: Visit date not found Last visit same specialty: 12/17/2019 Bhupendra Caldwell MD.  Next visit within 3 mo: Visit date not found  Next physical within 3 mo: Visit date not found      Angela Rivera, [facility] Triage/Med Refill 2/22/2020    Requested Prescriptions   Pending Prescriptions Disp Refills     enoxaparin ANTICOAGULANT (LOVENOX) 80 mg/0.8 mL syringe [Pharmacy Med Name: ENOXAPARIN 80MG/0.8ML SYR(10X0.8ML)] 24 mL 0     Sig: ADMINISTER 0.8MLS(80MG) UNDER THE SKIN DAILY       Enoxaparin Refill Protocol   Failed - 2/21/2020 11:18 AM        Failed - Route to appropriate pool/provider     Last Anticoagulation Summary:           Passed - Provider visit in last year     Last office visit with prescriber/PCP: 12/17/2019 Bhupendra Caldwell MD OR same dept: 12/17/2019 Bhupendra Caldwell MD  Last physical: 2/15/2019       Next visit within 3 mo: Visit date not found  Next physical within 3 mo: Visit date not found  Prescriber OR PCP: Bhupendra Caldwell MD  Last diagnosis associated with med order: 1. Other pulmonary embolism without acute cor pulmonale, unspecified chronicity (H)  - enoxaparin ANTICOAGULANT (LOVENOX) 80 mg/0.8 mL syringe [Pharmacy Med Name: ENOXAPARIN 80MG/0.8ML SYR(10X0.8ML)]; ADMINISTER 0.8MLS(80MG) UNDER THE SKIN DAILY  Dispense: 24 mL; Refill: 0     Requested Prescriptions     Pending Prescriptions Disp Refills     enoxaparin ANTICOAGULANT (LOVENOX) 80 mg/0.8 mL syringe [Pharmacy Med Name: ENOXAPARIN 80MG/0.8ML SYR(10X0.8ML)] 24 mL 0     Sig: ADMINISTER 0.8MLS(80MG) UNDER THE SKIN DAILY     Refill for 10 day supply. May give one additional 5 day supply if INR not anticipated to be in therapeutic goal range in 5 days based on recent INR result.

## 2021-06-06 NOTE — TELEPHONE ENCOUNTER
Refill Approved    Rx renewed per Medication Renewal Policy. Medication was last renewed on 11/18/19.    Cristin Fishamn, [facility] Connection Triage/Med Refill 2/19/2020     Requested Prescriptions   Pending Prescriptions Disp Refills     traZODone (DESYREL) 150 MG tablet [Pharmacy Med Name: TRAZODONE 150MG (HUNDRED-FIFTY) TAB] 270 tablet 0     Sig: TAKE 3 TABLETS BY MOUTH EVERY NIGHT AT BEDTIME       Tricyclics/Misc Antidepressant/Antianxiety Meds Refill Protocol Passed - 2/15/2020  3:26 AM        Passed - PCP or prescribing provider visit in last year     Last office visit with prescriber/PCP: 12/17/2019 Bhupendra Caldwell MD OR same dept: 12/17/2019 Bhupendra Caldwell MD OR same specialty: 12/17/2019 Bhupendra Caldwell MD  Last physical: 2/15/2019 Last MTM visit: Visit date not found   Next visit within 3 mo: Visit date not found  Next physical within 3 mo: Visit date not found  Prescriber OR PCP: Bhupendra Caldwell MD  Last diagnosis associated with med order: 1. Abdominal pain  - potassium chloride (K-DUR,KLOR-CON) 20 MEQ tablet [Pharmacy Med Name: POTASSIUM CL 20MEQ ER TABLETS]; TAKE 1 TABLET(20 MEQ) BY MOUTH DAILY  Dispense: 90 tablet; Refill: 0    If protocol passes may refill for 12 months if within 3 months of last provider visit (or a total of 15 months).             potassium chloride (K-DUR,KLOR-CON) 20 MEQ tablet [Pharmacy Med Name: POTASSIUM CL 20MEQ ER TABLETS] 90 tablet 0     Sig: TAKE 1 TABLET(20 MEQ) BY MOUTH DAILY       Potassium Supplements Refill Protocol Passed - 2/15/2020  3:26 AM        Passed - PCP or prescribing provider visit in past 12 months       Last office visit with prescriber/PCP: 12/17/2019 Bhupendra Caldwell MD OR same dept: 12/17/2019 Bhupendra Caldwell MD OR same specialty: 12/17/2019 Bhupendra Caldwell MD  Last physical: 2/15/2019 Last MTM visit: Visit date not found   Next visit within 3 mo: Visit date not found  Next physical within 3 mo: Visit date not  found  Prescriber OR PCP: Bhupendra Caldwell MD  Last diagnosis associated with med order: 1. Abdominal pain  - potassium chloride (K-DUR,KLOR-CON) 20 MEQ tablet [Pharmacy Med Name: POTASSIUM CL 20MEQ ER TABLETS]; TAKE 1 TABLET(20 MEQ) BY MOUTH DAILY  Dispense: 90 tablet; Refill: 0    If protocol passes may refill for 12 months if within 3 months of last provider visit (or a total of 15 months).             Passed - Potassium level in last 12 months     Lab Results   Component Value Date    Potassium 5.0 12/18/2019

## 2021-06-06 NOTE — TELEPHONE ENCOUNTER
Refill Approved    Rx renewed per Medication Renewal Policy. Medication was last renewed on 2/20/2019.    Mihai Chase, [facility] Triage/Med Refill 2/16/2020     Requested Prescriptions   Pending Prescriptions Disp Refills     DULoxetine (CYMBALTA) 60 MG capsule [Pharmacy Med Name: DULOXETINE DR 60MG CAPSULES] 180 capsule 3     Sig: TAKE ONE CAPSULE BY MOUTH TWICE DAILY       Tricyclics/Misc Antidepressant/Antianxiety Meds Refill Protocol Passed - 2/12/2020  3:26 AM        Passed - PCP or prescribing provider visit in last year     Last office visit with prescriber/PCP: 12/17/2019 Bhupendra Caldwell MD OR same dept: 12/17/2019 Bhupendra Caldwell MD OR same specialty: 12/17/2019 Bhupendra Caldwell MD  Last physical: 2/15/2019 Last MTM visit: Visit date not found   Next visit within 3 mo: Visit date not found  Next physical within 3 mo: Visit date not found  Prescriber OR PCP: Bhupendra Caldwell MD  Last diagnosis associated with med order: 1. Depression  - DULoxetine (CYMBALTA) 60 MG capsule [Pharmacy Med Name: DULOXETINE DR 60MG CAPSULES]; TAKE ONE CAPSULE BY MOUTH TWICE DAILY  Dispense: 180 capsule; Refill: 3    If protocol passes may refill for 12 months if within 3 months of last provider visit (or a total of 15 months).

## 2021-06-06 NOTE — PATIENT INSTRUCTIONS - HE
POST PROCEDURE INSTRUCTIONS  PROCEDURE DONE TODAY: Trigger point injections    Rest today. Resume activity tomorrow as able.  Patient demonstrates the ability to ambulate independently with a steady gait at the time of discharge.      It is not unusual to have a temporary increase in your pain after a procedure. You can ice the area 24-48 hrs. 15 min at a time.      You received a steroid injection. This medication can take 2-7 days to take effect. Some temporary side effects include:    Heartburn    Flushed-red face    Insomnia-trouble sleeping-jitters    Diabetics may see a rise in blood sugar for a few days    Low back or SI joint (sacroiliac) injection: you may experience numbness in one or both legs. Please walk with help. This is temporary and will wear off in a few hours. Do not drive if you are tingling, numbness or weakness in your hands/arms or legs/feet.    Headache:  If you experience a headache after a lumbar epidural injection, lie down and drink fluids (especially caffeine). The headache will go away gradually. If it persists notify our clinic.    Fever: call if fever 100 or over, redness/warmth/swelling over the injection site.    No public hot tubs/pools for a couple days    Physical therapy: check with pain center provider regarding resuming therapy.    Monitor your response to the injection and report this at your next visit.    If you have been referred for a procedure only, please call your referring provider for a follow up.    IF YOU PLAN TO GET A FLU SHOT YOU MUST WAIT 2 WEEKS AFTER THIS INJECTION.      CALL IF ANY QUESTIONS [phone]

## 2021-06-06 NOTE — TELEPHONE ENCOUNTER
RN cannot approve Refill Request    RN can NOT refill this medication med is not covered by policy/route to provider. Last office visit: 12/17/2019 Bhupendra Caldwell MD Last Physical: 2/15/2019 Last MTM visit: Visit date not found Last visit same specialty: 12/17/2019 Bhupendra Caldwell MD.  Next visit within 3 mo: Visit date not found  Next physical within 3 mo: Visit date not found      Angela Rivera, [facility] Triage/Med Refill 2/22/2020    Requested Prescriptions   Pending Prescriptions Disp Refills     lipase-protease-amylase (CREON) 6,000-19,000 -30,000 unit CpDR capsule [Pharmacy Med Name: CREON 6,000 CAPSULES] 540 capsule 0     Sig: TAKE 2 CAPSULES BY MOUTH THREE TIMES DAILY WITH FOOD       There is no refill protocol information for this order

## 2021-06-06 NOTE — TELEPHONE ENCOUNTER
FYI - Status Update  Who is Calling: Home Care  Update: Caller states patient went pain clinic on 02/14/20 they prescribed Kepamine cream for her per caller  patient cannot afford paying for that medication she is going to call pain clinic and ask alternative medication  . Caller is updating provider .  Okay to leave a detailed message?:  No

## 2021-06-06 NOTE — TELEPHONE ENCOUNTER
Controlled Substance Refill Request  Medication Name:   Requested Prescriptions     Pending Prescriptions Disp Refills     pregabalin (LYRICA) 200 MG capsule 270 capsule 0     Sig: TAKE 1 CAPSULE(200 MG) BY MOUTH THREE TIMES DAILY     Date Last Fill: 12/17/2019  Requested Pharmacy: [facility]  Submit electronically to pharmacy  Controlled Substance Agreement on file:   Encounter-Level CSA Scan Date - 10/24/2017:    Scan on 10/31/2017  2:02 PM        Last office visit:  12/17/2019

## 2021-06-06 NOTE — PATIENT INSTRUCTIONS - HE
Plan Interventions recommended today:  Assessment tool-        Follow up in 2-3 weeks after injection with Dr. Bey if needed       Behavioral Therapy-if needed this is available here      [facility] for ongoing pelvic type pain- referral will be placed- call the number provided and set up an appointment       Injections- try trigger point injections over the scarring where the rectum used to be, schedule this on your way out today. However if this does not work will order a caudal.       Acupuncture- will order this to assist in breaking up the scarring that may be participating in the nerve type pain. Ok to schedule this       Non-opoid medical management includes- order ketamine topical compound cream. This was sent to [facility], they will call you to arrange delivery or pickup.       Agree with the PCP managing your use of the dilaudid- you report that this helps with your fibromyalgia type pain but is likely assisting with the dumping syndrome you have. No urine was taken today       Education was provided to the patient today in regards to their specific diagnosis.    As a reminder- chronic pain is generally stable, if you experience a new injury or new different pain it is expected that you present to the ED or urgent care for evaluation. DO NOT use your chronic pain medications to treat any new or different pain other then what it is intended for. We do not replace any lost or stolen prescriptions or provide early refills for overtaking your medications.     Orders placed today   Orders Placed This Encounter   Procedures     Ambulatory referral to Obstetrics / Gynecology     Referral Priority:   Urgent     Referral Type:   Consultation     Referral Reason:   Evaluation and Treatment     Requested Specialty:   Obstetrics and Gynecology     Number of Visits Requested:   1     Ambulatory referral to Acupuncture     Referral Priority:   Routine     Referral Type:   Acupuncture      Referral Reason:   Evaluation and Treatment     Number of Visits Requested:   1       Patient reminders:   Diagnostics: UDT/SWAB collected 2/14/2020 and results are pending.  UDT/SWAB:  Patient required a random Urine Drug Testing, due to the need to comply with [place] [employer] Policy Guidelines and CDC Guideline for the use of any controlled substances. This is to ensure that patient is compliant with treatment, and monitor for risks such as diversion, abuse, or any other aberrant behaviors. Patient is either being considered for or taking a controlled substance. Unexpected findings will be discussed and treatment decision may be adjusted. Testing is being implemented across the board randomly w/o bias related to age, race, gender, socioeconomic status or [employer] affiliation.     SAFETY REMINDERS  No alcohol while taking controlled substances. Alcohol is not an illegal substance, it is unsafe to use in combination. It is a build up of substances in the body that can be extremely hazardous and may cause respirations to slow to a dangerous rate resulting in hospitalization, brain damage, or death.    Opioid medications have been associated with sharp rise in unintentional overdose and death.  Overdose is a condition characterized by the consumption in excess of a particular drug causing adverse effects. This can happen b/c you are sick, accidentally or intentionally took an extra dose, are on multiple medication that can interact. Someone took your medication and they are not use to the medication.  Symptoms of overdose include:   !breathing slow and shallow, erratic or not at all  !pinpoint pupils, hallucinations  !confusion  !muscle jerks, slack muscles   !extreme sleepiness or loss of alertness   !awake but not able to talk   !face pale or clammy, vomiting, for lighter skinned people, the skin tone turns bluish purple, for darker skinned people, it turns grayish or ashen   If in a situation where overdose is a  concern engage the emergency response system (dial 911).    In one study it was noted that 80% of unintentional overdoses occurred in people who were taking a combination of opioids and benzodiazepines.    Do not sell, loan, borrow or share your opioid medication with anyone. Deaths have occurred as a result of this practice. It is illegal and patients are being prosecuted.     Prevent unexpected access/loss of medication: Keep medication locked. Only carry what you need with you.    The patient agrees to the plan and has no further questions, if questions arise the patient knows to call [phone].     11:26 AM        Thank you for this consult and opportunity to assist with this patients care.    Mariella Lopes, [facility]  [address]  [place] [address]  Ph: [phone]  Fax: [phone]

## 2021-06-07 NOTE — TELEPHONE ENCOUNTER
Orders being requested: Home Care re-cert  Skilled Nursing    1 time a week for 9 weeks    2 prn visits  Reason service is needed/diagnosis: Re-cert home care visits to assist with weekly port cath care, dressings, monthly b12 injections and monthly lab draws of creatinine and magnesium.  When are orders needed by: ASAP  Where to send Orders: Phone:  verbal orders to caller  Okay to leave detailed message?  Yes

## 2021-06-07 NOTE — TELEPHONE ENCOUNTER
Controlled Substance Refill Request  Medication Name:   Requested Prescriptions     Pending Prescriptions Disp Refills     diphenoxylate-atropine (LOMOTIL) 2.5-0.025 mg per tablet [Pharmacy Med Name: DIPHENOXYLATE/ATROPINE 2.5MG TABS] 180 tablet 0     Sig: TAKE 2 TABLETS BY MOUTH THREE TIMES DAILY     Date Last Fill: 2/25/20  Requested Pharmacy: [facility]  Submit electronically to pharmacy  Controlled Substance Agreement on file:   Encounter-Level CSA Scan Date - 10/24/2017:    Scan on 10/31/2017  2:02 PM        Last office visit:  12/17/19

## 2021-06-07 NOTE — TELEPHONE ENCOUNTER
Medication Request  Medication name: Refill Dilaudid. Patient gets this medication every month. Patient will be out on 3/26 needs it ASAP. Also, needs Nitrofurantoin ASAP. She will be out of this as well   Requested Pharmacy: [facility]   Reason for request: Needs the medication  When did you use medication last?: Today, this morning (she uses it 4 times a day)  Patient offered appointment:  No  Okay to leave a detailed message: Yes

## 2021-06-07 NOTE — TELEPHONE ENCOUNTER
Contact patient.  Her primary physician, Dr. Bhupendra Caldwell, was not available in clinic this week.    There is not a narcotic contract on chart in the last 12 months.    Patient has been on chronic Dilaudid it appears for over a year for chronic fibromyalgia pain.    Patient needs discussion about Narcan availability option.    Have patient schedule a phone consult with available provider to discuss her narcotic pain medication treatment plan.

## 2021-06-07 NOTE — TELEPHONE ENCOUNTER
Orders being requested: Change As needed Hydration Order to Cad Pump to Gravity flow Normal Saline IV one liter via gravity infusion over Two to Four Hours Three Times per Week As needed .  Reason service is needed/diagnosis: For Singings and symptoms of GI obstruction   When are orders needed by: As soon as possible   Where to send Orders: Phone:  [phone]  Okay to leave detailed message?  Yes

## 2021-06-07 NOTE — PATIENT INSTRUCTIONS - HE
Plan:   Interventions-    Follow up PRN for trigger point injections while you are awaiting for the [facility] to be established in regards to the pelvic pain and Dr. Cade. Add to waitlist.     Currently you have a good regimen with the opioids, cymbalta and lyrica and do not want to add more to it. Defer to PCP while you are awaiting the [facility].     This limited telehealth encounter is due to the Covid 19,  as required by the governmental agencies at this time due to risk of transmission of the pandemic, therefore testing availability is limited as well as physical exams.      Prescription Drug Management will be continued by the [facility] center    Orders placed today  Medications that were ordered today   Requested Prescriptions      No prescriptions requested or ordered in this encounter     No orders of the defined types were placed in this encounter.        The patient understand todays plan and has their questions answered in regards to expectations and current treatment plan.     Prevent unexpected access/loss of medication: Keep medication locked. Only carry what you need with you    The plan of care will be adjusted to accommodate the issues discussed, discussing management of their care and follow up that is recommended. 4/8/2020         Mariella Lopes CNP  [facility]  [address]  [place] [address]  Ph: [phone]  Fax: [phone]

## 2021-06-07 NOTE — TELEPHONE ENCOUNTER
RN cannot approve Refill Request    RN can NOT refill this medication Protocol failed and NO refill given.       Cristin Fishman, [facility] Triage/Med Refill 3/31/2020    Requested Prescriptions   Pending Prescriptions Disp Refills     enoxaparin ANTICOAGULANT (LOVENOX) 80 mg/0.8 mL syringe [Pharmacy Med Name: ENOXAPARIN 80MG/0.8ML SYR(10X0.8ML)] 24 mL 0     Sig: ADMINISTER 0.8 MLS UNDER THE SKIN DAILY       Enoxaparin Refill Protocol   Failed - 3/30/2020  2:44 PM        Failed - Route to appropriate pool/provider     Last Anticoagulation Summary:           Passed - Provider visit in last year     Last office visit with prescriber/PCP: 12/17/2019 Bhupendra Caldwell MD OR same dept: 12/17/2019 Bhupendra Caldwell MD  Last physical: 2/15/2019       Next visit within 3 mo: Visit date not found  Next physical within 3 mo: Visit date not found  Prescriber OR PCP: Bhupendra Caldwell MD  Last diagnosis associated with med order: 1. Other pulmonary embolism without acute cor pulmonale, unspecified chronicity (H)  - enoxaparin ANTICOAGULANT (LOVENOX) 80 mg/0.8 mL syringe [Pharmacy Med Name: ENOXAPARIN 80MG/0.8ML SYR(10X0.8ML)]; ADMINISTER 0.8 MLS UNDER THE SKIN DAILY  Dispense: 24 mL; Refill: 0     Requested Prescriptions     Pending Prescriptions Disp Refills     enoxaparin ANTICOAGULANT (LOVENOX) 80 mg/0.8 mL syringe [Pharmacy Med Name: ENOXAPARIN 80MG/0.8ML SYR(10X0.8ML)] 24 mL 0     Sig: ADMINISTER 0.8 MLS UNDER THE SKIN DAILY     Refill for 10 day supply. May give one additional 5 day supply if INR not anticipated to be in therapeutic goal range in 5 days based on recent INR result.

## 2021-06-07 NOTE — TELEPHONE ENCOUNTER
Controlled Substance Refill Request  Medication Name:   Requested Prescriptions     Pending Prescriptions Disp Refills     HYDROmorphone (DILAUDID) 4 MG tablet 120 tablet 0     Sig: Take 1 tablet (4 mg total) by mouth 4 (four) times a day.     Date Last Fill: 03/23/20  Requested Pharmacy: [id number]  Submit electronically to pharmacy  Controlled Substance Agreement on file:   Encounter-Level CSA Scan Date - 10/24/2017:    Scan on 10/31/2017  2:02 PM        Last office visit:

## 2021-06-07 NOTE — TELEPHONE ENCOUNTER
FYI - Status Update  Who is Calling: Home Care  Update: Caller was checking to see if Bhupendra Caldwell MD received the patient's creatinine and magnesium results from today yet. Caller was informed the results are back. Caller stated to call her back if there are any changes that need to be made.  Okay to leave a detailed message?:  Yes   [phone]

## 2021-06-07 NOTE — TELEPHONE ENCOUNTER
FYI - Status Update  Who is Calling: Home Care  Update: caller asked to update Bhupendra Caldwell MD on the status of the 2 foot ulcers (caller stated these are already documented on):     stage 1 ulcer is healed     stage 2 ulcer is healing. currently using a dry gauze dressing on this wound.  Okay to leave a detailed message?:  Yes

## 2021-06-07 NOTE — TELEPHONE ENCOUNTER
Spoke with the patient who requested that this be brought to Dr. Bhupendra Caldwell's attention when he returns on Monday. She declined a telephone consult in the meantime.       Bhakti Bailey, [facility]  9:50 AM  4/23/2020

## 2021-06-07 NOTE — TELEPHONE ENCOUNTER
Left detailed message for Crystal with this information.  Cassia Carl CMA ............... 10:42 AM, 03/19/20

## 2021-06-08 NOTE — TELEPHONE ENCOUNTER
Spoke with pt   Answered NO to the following  Has had contact with suspected or confirmed covid 19   Rash cough shortness of breath fever  Has traveled internationally last month

## 2021-06-08 NOTE — TELEPHONE ENCOUNTER
Who is calling:  [facility]  Reason for Call:  Confirming the result have been received and  by PCP, and that there are no changes . Please confirm.  Date of last appointment with primary care: 12/17/19  Okay to leave a detailed message: Yes

## 2021-06-08 NOTE — TELEPHONE ENCOUNTER
RN cannot approve Refill Request    RN can NOT refill this medication med is not covered by policy/route to provider. Last office visit: 12/17/2019 Bhupendra Caldwell MD Last Physical: 2/15/2019 Last MTM visit: Visit date not found Last visit same specialty: 12/17/2019 Bhupendra Caldwell MD.  Next visit within 3 mo: Visit date not found  Next physical within 3 mo: Visit date not found      Angela Rivera, [facility] Triage/Med Refill 6/14/2020    Requested Prescriptions   Pending Prescriptions Disp Refills     enoxaparin ANTICOAGULANT (LOVENOX) 80 mg/0.8 mL syringe [Pharmacy Med Name: ENOXAPARIN 80MG/0.8ML SYR(10X0.8ML)] 24 mL 0     Sig: ADMINISTER 0.8 ML UNDER THE SKIN DAILY       Enoxaparin Refill Protocol   Failed - 6/14/2020  9:42 AM        Failed - Route to appropriate pool/provider     Last Anticoagulation Summary:           Passed - Provider visit in last year     Last office visit with prescriber/PCP: 12/17/2019 Bhupendra Caldwell MD OR same dept: 12/17/2019 Bhupendra Caldwell MD  Last physical: 2/15/2019       Next visit within 3 mo: Visit date not found  Next physical within 3 mo: Visit date not found  Prescriber OR PCP: Bhupendra Caldwell MD  Last diagnosis associated with med order: 1. Other pulmonary embolism without acute cor pulmonale, unspecified chronicity (H)  - enoxaparin ANTICOAGULANT (LOVENOX) 80 mg/0.8 mL syringe [Pharmacy Med Name: ENOXAPARIN 80MG/0.8ML SYR(10X0.8ML)]; ADMINISTER 0.8 ML UNDER THE SKIN DAILY  Dispense: 24 mL; Refill: 0     Requested Prescriptions     Pending Prescriptions Disp Refills     enoxaparin ANTICOAGULANT (LOVENOX) 80 mg/0.8 mL syringe [Pharmacy Med Name: ENOXAPARIN 80MG/0.8ML SYR(10X0.8ML)] 24 mL 0     Sig: ADMINISTER 0.8 ML UNDER THE SKIN DAILY     Refill for 10 day supply. May give one additional 5 day supply if INR not anticipated to be in therapeutic goal range in 5 days based on recent INR result.

## 2021-06-08 NOTE — TELEPHONE ENCOUNTER
RN cannot approve Refill Request    RN can NOT refill this medication med is not covered by policy/route to provider. Last office visit: 12/17/2019 Bhupendra Caldwell MD Last Physical: 2/15/2019 Last MTM visit: Visit date not found Last visit same specialty: 12/17/2019 Bhupendra Caldwell MD.  Next visit within 3 mo: Visit date not found  Next physical within 3 mo: Visit date not found      Angela Rivera, [facility] Triage/Med Refill 5/10/2020    Requested Prescriptions   Pending Prescriptions Disp Refills     enoxaparin ANTICOAGULANT (LOVENOX) 80 mg/0.8 mL syringe [Pharmacy Med Name: ENOXAPARIN 80MG/0.8ML SYR(10X0.8ML)] 24 mL 0     Sig: ADMINISTER 0.8 ML UNDER THE SKIN DAILY       Enoxaparin Refill Protocol   Failed - 5/10/2020 12:50 PM        Failed - Route to appropriate pool/provider     Last Anticoagulation Summary:           Passed - Provider visit in last year     Last office visit with prescriber/PCP: 12/17/2019 Bhupendra Cladwell MD OR same dept: 12/17/2019 Bhupendra Caldwell MD  Last physical: 2/15/2019       Next visit within 3 mo: Visit date not found  Next physical within 3 mo: Visit date not found  Prescriber OR PCP: Bhupendra Caldwell MD  Last diagnosis associated with med order: 1. Other pulmonary embolism without acute cor pulmonale, unspecified chronicity (H)  - enoxaparin ANTICOAGULANT (LOVENOX) 80 mg/0.8 mL syringe [Pharmacy Med Name: ENOXAPARIN 80MG/0.8ML SYR(10X0.8ML)]; ADMINISTER 0.8 ML UNDER THE SKIN DAILY  Dispense: 24 mL; Refill: 0     Requested Prescriptions     Pending Prescriptions Disp Refills     enoxaparin ANTICOAGULANT (LOVENOX) 80 mg/0.8 mL syringe [Pharmacy Med Name: ENOXAPARIN 80MG/0.8ML SYR(10X0.8ML)] 24 mL 0     Sig: ADMINISTER 0.8 ML UNDER THE SKIN DAILY     Refill for 10 day supply. May give one additional 5 day supply if INR not anticipated to be in therapeutic goal range in 5 days based on recent INR result.

## 2021-06-08 NOTE — TELEPHONE ENCOUNTER
Okay for those orders, as Dr. Bhupendra Caldwell is not in clinic today.    If patient has symptoms that need evaluation, patient will need to make a virtual appointment with available provider.

## 2021-06-08 NOTE — TELEPHONE ENCOUNTER
Controlled Substance Refill Request  Medication Name:   Requested Prescriptions     Pending Prescriptions Disp Refills     diphenoxylate-atropine (LOMOTIL) 2.5-0.025 mg per tablet 180 tablet 0     Sig: Take 2 tablets by mouth 3 (three) times a day.     Date Last Fill: 4/9/2020  Requested Pharmacy: [facility] #[id number]  Submit electronically to pharmacy  Controlled Substance Agreement on file:   Encounter-Level CSA Scan Date - 10/24/2017:    Scan on 10/31/2017  2:02 PM        Last office visit:  12/17/2019

## 2021-06-08 NOTE — TELEPHONE ENCOUNTER
Test Results    Who is calling?:  NADEEM Wolff    Who ordered the test:  PCP    Type of test: Lab     Date of test:  06/02/2020    Where was the test performed:  [facility]    What are your questions/concerns?:    Calling for lab results and any treatment options.    Okay to leave a detailed message?:  Yes
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
## 2021-06-08 NOTE — TELEPHONE ENCOUNTER
Controlled Substance Refill Request  Medication Name:   Requested Prescriptions     Pending Prescriptions Disp Refills     HYDROmorphone (DILAUDID) 4 MG tablet 120 tablet 0     Sig: Take 1 tablet (4 mg total) by mouth 4 (four) times a day.     Date Last Fill: 4/24/2020  Requested Pharmacy: [facility] #[id number]  Submit electronically to pharmacy  Controlled Substance Agreement on file:   Encounter-Level CSA Scan Date - 10/24/2017:    Scan on 10/31/2017  2:02 PM        Last office visit:  12/17/19

## 2021-06-08 NOTE — TELEPHONE ENCOUNTER
RN cannot approve Refill Request    RN can NOT refill this medication med is not covered by policy/route to provider. Last office visit: 12/17/2019 Bhupendra Caldwell MD Last Physical: 2/15/2019 Last MTM visit: Visit date not found Last visit same specialty: 12/17/2019 Bhupendra Caldwell MD.  Next visit within 3 mo: Visit date not found  Next physical within 3 mo: Visit date not found      Angela Rivera, [facility] Triage/Med Refill 6/10/2020    Requested Prescriptions   Pending Prescriptions Disp Refills     promethazine (PHENERGAN) 25 MG tablet [Pharmacy Med Name: PROMETHAZINE 25MG TABLETS] 20 tablet 0     Sig: TAKE 1 TABLET(25 MG) BY MOUTH EVERY 6 HOURS AS NEEDED FOR NAUSEA       There is no refill protocol information for this order

## 2021-06-08 NOTE — PATIENT INSTRUCTIONS - HE
POST PROCEDURE INSTRUCTIONS  PROCEDURE DONE TODAY: TRIGGER POINT INJECTION    Rest today. Resume activity tomorrow as able.  Patient demonstrates the ability to ambulate independently with a steady gait at the time of discharge.      It is not unusual to have a temporary increase in your pain after a procedure. You can ice the area 24-48 hrs. 15 min at a time.      You received a steroid injection. This medication can take 2-7 days to take effect. Some temporary side effects include:    Heartburn    Flushed-red face    Insomnia-trouble sleeping-jitters    Diabetics may see a rise in blood sugar for a few days    Low back or SI joint (sacroiliac) injection: you may experience numbness in one or both legs. Please walk with help. This is temporary and will wear off in a few hours. Do not drive if you are tingling, numbness or weakness in your hands/arms or legs/feet.    Headache:  If you experience a headache after a lumbar epidural injection, lie down and drink fluids (especially caffeine). The headache will go away gradually. If it persists notify our clinic.    Fever: call if fever 100 or over, redness/warmth/swelling over the injection site.    No public hot tubs/pools for a couple days    Physical therapy: check with pain center provider regarding resuming therapy.    Monitor your response to the injection and report this at your next visit.    If you have been referred for a procedure only, please call your referring provider for a follow up.    IF YOU PLAN TO GET A FLU SHOT YOU MUST WAIT 2 WEEKS AFTER THIS INJECTION.      CALL IF ANY QUESTIONS [phone]

## 2021-06-08 NOTE — TELEPHONE ENCOUNTER
Controlled Substance Refill Request  Medication Name:   Requested Prescriptions     Pending Prescriptions Disp Refills     pregabalin (LYRICA) 200 MG capsule [Pharmacy Med Name: PREGABALIN 200MG CAPSULES] 270 capsule 0     Sig: TAKE 1 CAPSULE(200 MG) BY MOUTH THREE TIMES DAILY     Date Last Fill: 3/16/20  Requested Pharmacy: [facility]  Submit electronically to pharmacy  Controlled Substance Agreement on file:   Encounter-Level CSA Scan Date - 10/24/2017:    Scan on 10/31/2017  2:02 PM        Last office visit:  12/17/19

## 2021-06-08 NOTE — TELEPHONE ENCOUNTER
Mariella RN with [facility] is calling.  Mariella is requesting recertification orders as of today, for weekly skilled RN visit for 9 weeks and for weekly port a cath and dressing changes or needle change.  Monthly B12 injections and monthly creatinine and magnesium lab draw.  Two prn visits for poor issues or labs.  Today Dee is having skipped heart beats occasional and racing heart beat at times.  Denies chest pain.  Dee states that she has a history of Afib, but no diagnosis.  RN noticed one skipped beat when listening.  Vitals were good today.  122/58 bp, heart rate was 90 and no fever.  TERRY has anxiety and RN advised relaxation techniques.  Mariella states that it also alright to leave a detailed voice mail if needed.      Reason for Disposition    Nursing judgment or information in reference    Protocols used: NO GUIDELINE [id number]

## 2021-06-08 NOTE — TELEPHONE ENCOUNTER
RN cannot approve Refill Request    RN can NOT refill this medication med is not covered by policy/route to provider. Last office visit: 12/17/2019 Bhupendra Caldwell MD Last Physical: 2/15/2019 Last MTM visit: Visit date not found Last visit same specialty: 12/17/2019 Bhupendra Caldwell MD.  Next visit within 3 mo: Visit date not found  Next physical within 3 mo: Visit date not found      Angela Rivera, [facility] Triage/Med Refill 5/23/2020    Requested Prescriptions   Pending Prescriptions Disp Refills     lipase-protease-amylase (CREON) 6,000-19,000 -30,000 unit CpDR capsule [Pharmacy Med Name: CREON 6,000 CAPSULES] 540 capsule 0     Sig: TAKE 2 CAPSULES BY MOUTH THREE TIMES DAILY WITH FOOD       There is no refill protocol information for this order

## 2021-06-08 NOTE — TELEPHONE ENCOUNTER
Left detailed voicemail for Crystal from [facility].  Cassia Carl CMA ............... 4:16 PM, 05/28/20

## 2021-06-09 NOTE — TELEPHONE ENCOUNTER
FYI - Status Update  Who is Calling: [facility]  Update: Patient will be given her B12 on 7.29.20 - they were unable to give it today because the patient did not  the prescription.   Last B12 injection was given on 6.23.2020.    Okay to leave a detailed message?:  No return call needed

## 2021-06-09 NOTE — TELEPHONE ENCOUNTER
Who is calling:  Wilma SILVER From [facility]  Reason for Call:  Caller Home care Nurse states she faxed patient recent Lab results from 06/30/20 to clinic caller is asking is there any changes in treatment with recent lab results . For questions please reach out patient .  Date of last appointment with primary care: Unknown   Okay to leave a detailed message: No

## 2021-06-09 NOTE — TELEPHONE ENCOUNTER
Tianna states she would get the shot on the 20th - due on the 23rd     Dorina, CMA  1:20 PM ........ 7/14/2020

## 2021-06-09 NOTE — PROGRESS NOTES
Dee was checking in for her appointment today at which time she was unable to stand up she felt significantly dizzy.  She lied on the floor for a couple of minutes at which time I was called over to her.  Her mental status was intact and her blood pressure was in the 120s systolic.  However, when we tried to stand her up she was significantly weak and almost fell over again.  She also stated that the room started to spin as well.  She feels that she may be dehydrated again which I agree with.  She was sent to the ER if she is going to need some IV fluids as well as a lab workup.  This should be a no charge visit.

## 2021-06-09 NOTE — TELEPHONE ENCOUNTER
RN cannot approve Refill Request    RN can NOT refill this medication med is not covered by policy/route to provider. Last office visit: 12/17/2019 Bhupendra Caldwell MD Last Physical: 2/15/2019 Last MTM visit: Visit date not found Last visit same specialty: 12/17/2019 Bhupendra Caldwell MD.  Next visit within 3 mo: Visit date not found  Next physical within 3 mo: Visit date not found      Angela Rivera, [facility] Triage/Med Refill 7/21/2020    Requested Prescriptions   Pending Prescriptions Disp Refills     enoxaparin ANTICOAGULANT (LOVENOX) 80 mg/0.8 mL syringe [Pharmacy Med Name: ENOXAPARIN 80MG/0.8ML SYR(10X0.8ML)] 24 mL 0     Sig: ADMINISTER 0.8 ML UNDER THE SKIN DAILY       Enoxaparin Refill Protocol   Failed - 7/21/2020 12:50 PM        Failed - Route to appropriate pool/provider     Last Anticoagulation Summary:           Passed - Provider visit in last year     Last office visit with prescriber/PCP: 12/17/2019 Bhupendra Caldwell MD OR same dept: 12/17/2019 Bhupendra Caldwell MD  Last physical: 2/15/2019       Next visit within 3 mo: Visit date not found  Next physical within 3 mo: Visit date not found  Prescriber OR PCP: Bhupendra Caldwell MD  Last diagnosis associated with med order: 1. Other pulmonary embolism without acute cor pulmonale, unspecified chronicity (H)  - enoxaparin ANTICOAGULANT (LOVENOX) 80 mg/0.8 mL syringe [Pharmacy Med Name: ENOXAPARIN 80MG/0.8ML SYR(10X0.8ML)]; ADMINISTER 0.8 ML UNDER THE SKIN DAILY  Dispense: 24 mL; Refill: 0     Requested Prescriptions     Pending Prescriptions Disp Refills     enoxaparin ANTICOAGULANT (LOVENOX) 80 mg/0.8 mL syringe [Pharmacy Med Name: ENOXAPARIN 80MG/0.8ML SYR(10X0.8ML)] 24 mL 0     Sig: ADMINISTER 0.8 ML UNDER THE SKIN DAILY     Refill for 10 day supply. May give one additional 5 day supply if INR not anticipated to be in therapeutic goal range in 5 days based on recent INR result.

## 2021-06-09 NOTE — TELEPHONE ENCOUNTER
Controlled Substance Refill Request  Medication Name:   Requested Prescriptions     Pending Prescriptions Disp Refills     diphenoxylate-atropine (LOMOTIL) 2.5-0.025 mg per tablet [Pharmacy Med Name: DIPHENOXYLATE/ATROPINE 2.5MG TABS] 180 tablet 0     Sig: TAKE 2 TABLETS BY MOUTH THREE TIMES DAILY     Date Last Fill: 5/28/20  Requested Pharmacy: [facility]  Submit electronically to pharmacy  Controlled Substance Agreement on file:   Encounter-Level CSA Scan Date - 10/24/2017:    Scan on 10/31/2017  2:02 PM        Last office visit:  12/17/19      2

## 2021-06-09 NOTE — TELEPHONE ENCOUNTER
Medication Request  Medication name: Refill Nitrofurantoin macro 50 mgs for bladder infections, Hydromorphone pain medication. She says she gets both of these every month    Requested Pharmacy: [facility]  Reason for request: Needs the medication has 3 days left   When did you use medication last?:  Today Patient offered appointment:  N/A  Okay to leave a detailed message: N/A

## 2021-06-09 NOTE — TELEPHONE ENCOUNTER
Orders being requested: verbal ok to give patients B12 injection on Monday which is a litter early.  Reason service is needed/diagnosis: B12 deficiency  When are orders needed by: soon  Where to send Orders: Phone:  Tianna at [phone]  Okay to leave detailed message?  Yes

## 2021-06-09 NOTE — PROGRESS NOTES
ASSESSMENT:    Hospital follow-up for hypovolemia and hypomagnesemia    Short bowel syndrome    Dyspnea on exertion    PLAN:  We will check a CMP and a magnesium level.  I am going to increase her daily IV fluids to 2 L a day of normal saline for 1 week, then revert back to 1 L per day.  We will also get an echocardiogram.  I will call her with results and further recommendations.  Problem List Items Addressed This Visit     None          Medications Discontinued During This Encounter   Medication Reason     pediatric multivitamin (FLINTSTONES) Chew chewable tablet Therapy completed       No Follow-up on file.    There are no Patient Instructions on file for this visit.    CHIEF COMPLAINT:  Chief Complaint   Patient presents with     Follow-up     Hosp f/u on Hypomagnesemia     All medications have been reconciled.     HISTORY OF PRESENT ILLNESS:  Dee Mattson is a 67 y.o. female presenting to the clinic today for follow up for a recent hospitalization. She presented to the ED at [facility] on February 20 for evaluation after a syncopal episode. She received an EKG in the ED, which indicated normal sinus rhythm with nonspecific ST segment findings not meeting criteria for STEMI activation. She received labs which indicated hypomagnesemia at 0.9. She received a CT of her abdomen in the ED was negative for acute abnormality. She was admitted at that time for monitoring and fluid administration. She responded well to IV hydration during her admission and did not have further syncopal episodes. She was evaluated by gastroenterology during her admission, after passing red colored ileostomy output on February 20. She was discharged in stable condition on February 22 and instructed to follow up with her primary care physician within 1 week.     S/P total colectomy: She empties her ileostomy bag 5-6 times per day. She clarifies that her ileostomy bag is filled with predominantly liquid during each bowel movement  and that her frequency of 5-6 bowel movements per day has persisted for approximately 6 months. She changes her ileostomy bag once every 3 days. She is currently administering 1 unit of normal saline via IV at home each day, with assistance from [facility].  She follows with Dr. Barrientos in gastroenterology with [facility] for her h/o of total colectomy.     Shortness of breath: She develops significant shortness of breath after walking up 1-2 flights of stairs. She clarifies that she has experienced this shortness of breath with exertion for multiple months. She has attempted treatment of her shortness of breath with albuterol usage but denies experiencing significant relief as a result. She is agreeable to receiving an echocardiogram for evaluation of her current cardiac health.    Medication management: She requests a refill of her albuterol inhaler.     REVIEW OF SYSTEMS:   She feels lightheaded and fatigued at this time. She fell yesterday while attempting to climb 2 stairs while entering the bus secondary to lower extremity weakness. All other systems are negative.    PFSH:  Allergies   Allergen Reactions     Citalopram Analogues      GI affects       Methadone      Hallucinations       Metoclopramide Hives     GI upset     Metronidazole Hives     Mirtazapine      GI affects       Morphine Other (See Comments)     confusion     Neuromuscular Blockers, Steroidal      Unsure,per [facility]      Norfloxacin      C.Diff     Other Drug Allergy (See Comments)      Steroidal Neuromuscular blocking agents- unsure  Pancuronium, vecuronium, rocuronium, rapacuronium, dacuronium, malouètine, duador, dipyrandium, pipecuronium, chandonium, pt unsure of this reaction, thinks it was painful, muscle aches     Oxycodone      Gi distress       TOBACCO USE:  History   Smoking Status     Former Smoker     Packs/day: 1.00     Years: 30.00     Quit date: 1/1/2000   Smokeless Tobacco     Not on file       VITALS:  Vitals:     02/28/17 1232   BP: 102/68   Patient Site: Right Arm   Patient Position: Sitting   Cuff Size: Adult Regular   Pulse: 62   Weight: 116 lb 9.6 oz (52.9 kg)     Wt Readings from Last 3 Encounters:   02/28/17 116 lb 9.6 oz (52.9 kg)   02/22/17 119 lb 9.6 oz (54.3 kg)   01/28/17 121 lb 3.2 oz (55 kg)         PHYSICAL EXAM:  Constitutional:  Reveals an alert and oriented x3, pleasant female with no acute distress.       ADDITIONAL HISTORY SUMMARIZED (FROM OLD RECORDS OR HISTORY FROM SOMEONE OTHER THAN THE PATIENT OR ANOTHER HEALTHCARE PROVIDER), COLONOSCOPY (2 TOTAL): Reviewed discharge summary from Dr. Wong on February 22 regarding hypomagnesemia.     DECISION TO OBTAIN EXTRA INFORMATION (OLD RECORDS REQUESTED OR HISTORY FROM ANOTHER PERSON OR ACCESSING CARE EVERYWHERE) (1 TOTAL):none    RADIOLOGY TESTS SUMMARIZED OR ORDERED (XRAY/CT/MRI/DXA/MAMMO) (1 TOTAL): Reviewed chest x-ray and abdominal CT reports from February 20.     LABS REVIEWED OR ORDERED (1 TOTAL): Reviewed labs from admission February 20-22.    MEDICINE TESTS SUMMARIZED OR ORDERED (EKG/ECHO/EGD) (1 TOTAL): Reviewed EKG report from February 20. Echocardiogram ordered.     INDEPENDENT REVIEW OF EKG OR X-RAY (2 EACH): none      The visit lasted a total of 14 minutes face to face with the patient. Over 50% of the time was spent counseling and educating the patient about h/o of total colectomy.    IAnthony, am scribing for and in the presence of, Dr. Bhupendra Caldwell.    I, Dr. Bhupendra Caldwell, personally performed the services described in this documentation, as scribed by Anthony Rice in my presence, and it is both accurate and complete.    MEDICATIONS:  Current Outpatient Prescriptions   Medication Sig Dispense Refill     albuterol (PROVENTIL HFA;VENTOLIN HFA) 90 mcg/actuation inhaler Inhale 2 puffs every 6 (six) hours as needed for wheezing or shortness of breath.       busPIRone (BUSPAR) 15 MG tablet Take 15 mg by mouth 2 (two) times a day.        cholecalciferol, vitamin D3, (VITAMIN D3) 2,000 unit cap Take 2,000 Units by mouth every morning.       cyanocobalamin 1,000 mcg/mL injection Inject 1,000 mcg into the shoulder, thigh, or buttocks every 30 (thirty) days. Last injection not known to patient       diphenoxylate-atropine (LOMOTIL) 2.5-0.025 mg per tablet Take 2 tablets by mouth 3 (three) times a day.        DULoxetine (CYMBALTA) 60 MG capsule Take 60 mg by mouth 2 (two) times a day.       HYDROmorphone (DILAUDID) 4 MG tablet Take 1 tablet (4 mg total) by mouth 4 (four) times a day. 120 tablet 0     ibuprofen (ADVIL,MOTRIN) 200 MG tablet Take 200 mg by mouth 3 (three) times a day as needed for pain (takes 3 tablets temo time 600mg).        lactobacillus rhamnosus GG (CULTURELLE) 10-15 Billion cell capsule Take 1 capsule by mouth 2 (two) times a day.        lipase-protease-amylase (CREON) 6,000-19,000 -30,000 unit CpDR capsule Take 12,000 units of lipase by mouth 3 (three) times a day with meals.       magnesium oxide (MAGOX) 400 mg tablet Take 1 tablet (400 mg total) by mouth daily.  0     multivitamin therapeutic (THERAGRAN) tablet Take 1 tablet by mouth daily.       nitrofurantoin (MACRODANTIN) 50 MG capsule Take 50 mg by mouth bedtime.       omeprazole (PRILOSEC) 40 MG capsule Take 40 mg by mouth Daily before breakfast.       pregabalin (LYRICA) 200 MG capsule Take 1 capsule (200 mg total) by mouth 3 (three) times a day. 270 capsule 3     traZODone (DESYREL) 150 MG tablet Take 450 mg by mouth bedtime.       No current facility-administered medications for this visit.        Total data points: 5

## 2021-06-10 ENCOUNTER — RECORDS - HEALTHEAST (OUTPATIENT)
Dept: ADMINISTRATIVE | Facility: OTHER | Age: 71
End: 2021-06-10

## 2021-06-10 NOTE — PROGRESS NOTES
Dee presents today for follow-up of the hospitalization.  She was hospitalized about 1 month ago with pneumonia and acute renal failure.  She was hospitalized and given IV fluids and antibiotics.  She was then discharged to a TCU for rehab.  She was discharged home approximately 2 weeks ago.  She is still having some weakness in her lower extremities and is using her walker to get around the house.  She is using approximately 1 L of IV fluid per day.  She is also drinking a significant amount of water as well.  She states that her anxiety has worsened recently and is taking her medications on a regular basis.  She is due for some follow-up labs today including a BMP and magnesium.    Objective: Vital signs are as per the EMR.  In general patient is alert pleasant and in no acute distress.  She appears healthy.    Assessment and plan: Community-acquired pneumonia and acute renal failure, now resolved.  I explained that her strength will gradually return but it may take a number of weeks.  We will check a BMP and magnesium.  I will call her with results and further recommendations.  We will also increase her IV fluids to 2 L per day rather than one as she is had at least 2 hospitalizations in the last 6 weeks for dehydration.

## 2021-06-10 NOTE — TELEPHONE ENCOUNTER
Refill request received from [employer] via fax for a 90 day refill of lisinopil. Order pended.  Cassia Carl CMA ............... 5:17 PM, 07/29/20

## 2021-06-10 NOTE — TELEPHONE ENCOUNTER
RN cannot approve Refill Request    RN can NOT refill this medication med is not covered by policy/route to provider. Last office visit: 12/17/2019 Bhupendra Caldwell MD Last Physical: 2/15/2019 Last MTM visit: Visit date not found Last visit same specialty: 12/17/2019 Bhupendra Caldwell MD.  Next visit within 3 mo: Visit date not found  Next physical within 3 mo: Visit date not found      Cristin Fishman, [facility] Triage/Med Refill 8/28/2020    Requested Prescriptions   Pending Prescriptions Disp Refills     CREON 6,000-19,000 -30,000 unit CpDR capsule [Pharmacy Med Name: CREON 6,000 CAPSULES] 540 capsule 0     Sig: TAKE 2 CAPSULES BY MOUTH THREE TIMES DAILY WITH FOOD       There is no refill protocol information for this order

## 2021-06-10 NOTE — TELEPHONE ENCOUNTER
Controlled Substance Refill Request  Medication Name:   Requested Prescriptions     Pending Prescriptions Disp Refills     HYDROmorphone (DILAUDID) 4 MG tablet 120 tablet 0     Sig: Take 1 tablet (4 mg total) by mouth 4 (four) times a day.     Date Last Fill: 6/25/2020  Requested Pharmacy: [id number]  Submit electronically to pharmacy  Controlled Substance Agreement on file:   Encounter-Level CSA Scan Date - 10/24/2017:    Scan on 10/31/2017  2:02 PM        Last office visit:  12/17/19

## 2021-06-10 NOTE — PROGRESS NOTES
Injection - Other    Date/Time: 8/20/2020 2:00 PM  Performed by: José Miguel Bey MD  Authorized by: José Miguel Bey MD   Comments:     TRIGGER POINT INJECTION  Performed on: 8/20/2020    Ms. Mattson is a 70-year-old woman who has had a colectomy and rectal resection secondary to cancer.  More recently she has been having pain in the area where the anus and has been sewn shut.  Trigger point injections do give her some relief of her symptoms for over a month.  She has had some increase in the pain recently and returns for a repeat injection.    Pre Procedure Diagnosis:  Myofascial pain  Vital Signs:  As per intra-procedure documentation    The procedure was discussed with Dee Mattson in detail along with the attendant risks, including but not limited to: nerve injury, infection, bleeding, allergic reaction, or worsening of pain.  Informed consent was obtained and patient elected to proceed.    After informed consent was obtained the patient was placed in a left lateral decubitus position on the examination table.  The area around the scar of the anus was prepped sterilely with alcohol.  A one 1/4 inch number 27-gauge needle was used.  There were injections made on either side of the anal scar.  A total of 10 mL of 0.25% Marcaine with 10 mg of Decadron was used in divided doses.    The patient tolerated the procedure well.  There were no complications.      Pre Procedure Pain Scale: 6  Pain Score:   6(rectal)    If Dee Mattson has any questions or concerns after discharge, she was instructed to call us.

## 2021-06-10 NOTE — TELEPHONE ENCOUNTER
Controlled Substance Refill Request  Medication Name:   Requested Prescriptions     Pending Prescriptions Disp Refills     diphenoxylate-atropine (LOMOTIL) 2.5-0.025 mg per tablet 180 tablet 0     Sig: Take 2 tablets by mouth 3 (three) times a day.     HYDROmorphone (DILAUDID) 4 MG tablet 120 tablet 0     Sig: Take 1 tablet (4 mg total) by mouth 4 (four) times a day.     Date Last Fill: Lomotil - 7/14/20, hydromorphone - 7/29/20  Is patient out of medication?: No, 3 days left  Patient notified refills processed within 3 business days:  Yes  Requested Pharmacy: [facility]  Submit electronically to pharmacy  Controlled Substance Agreement on file:   Encounter-Level CSA Scan Date - 10/24/2017:    Scan on 10/31/2017  2:02 PM        Last office visit:  12/17/19

## 2021-06-10 NOTE — TELEPHONE ENCOUNTER
Test Results  Who is calling?:  Mariella with [facility]  Who ordered the test: Bhupendra Caldwell MD  Type of test: Lab  Date of test:  08/25/2020  Where was the test performed:  [facility]  What are your questions/concerns?:  Mariella would like a callback to discuss lab results.  Okay to leave a detailed message?:  Yes

## 2021-06-10 NOTE — TELEPHONE ENCOUNTER
Spoke to rodney and she wanted to make sure Dr. Caldwell didn't have any advise on treatment plan - no change in plan now. Labs look good per GJ

## 2021-06-10 NOTE — PATIENT INSTRUCTIONS - HE
POST PROCEDURE INSTRUCTIONS  PROCEDURE DONE TODAY:    Rest today. Resume activity tomorrow as able.  Patient demonstrates the ability to ambulate independently with a steady gait at the time of discharge.      It is not unusual to have a temporary increase in your pain after a procedure. You can ice the area 24-48 hrs. 15 min at a time.      You received a steroid injection. This medication can take 2-7 days to take effect. Some temporary side effects include:    Heartburn    Flushed-red face    Insomnia-trouble sleeping-jitters    Diabetics may see a rise in blood sugar for a few days    Low back or SI joint (sacroiliac) injection: you may experience numbness in one or both legs. Please walk with help. This is temporary and will wear off in a few hours. Do not drive if you are tingling, numbness or weakness in your hands/arms or legs/feet.    Headache:  If you experience a headache after a lumbar epidural injection, lie down and drink fluids (especially caffeine). The headache will go away gradually. If it persists notify our clinic.    Fever: call if fever 100 or over, redness/warmth/swelling over the injection site.    No public hot tubs/pools for a couple days    Physical therapy: check with pain center provider regarding resuming therapy.    Monitor your response to the injection and report this at your next visit.    If you have been referred for a procedure only, please call your referring provider for a follow up.    IF YOU PLAN TO GET A FLU SHOT YOU MUST WAIT 2 WEEKS AFTER THIS INJECTION.      CALL IF ANY QUESTIONS [phone]
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
## 2021-06-11 ENCOUNTER — COMMUNICATION - HEALTHEAST (OUTPATIENT)
Dept: INTERNAL MEDICINE | Facility: CLINIC | Age: 71
End: 2021-06-11

## 2021-06-11 DIAGNOSIS — M79.7 FIBROMYALGIA: ICD-10-CM

## 2021-06-11 RX ORDER — PREGABALIN 200 MG/1
CAPSULE ORAL
Qty: 270 CAPSULE | Refills: 0 | Status: SHIPPED | OUTPATIENT
Start: 2021-06-11 | End: 2021-09-09

## 2021-06-11 NOTE — PROGRESS NOTES
Dee presents today for follow-up of a hospitalization for hypomagnesemia.  She was admitted overnight from 6/15-6/16 with hypomagnesemia secondary to excessive GI loss from her ileostomy.  She was given IV fluids and magnesium replacement and started to feel better at that time.  Magnesium at discharge was 2.7.  She was instructed to follow-up with her primary gastroenterologist, Dr. Barrientos, whom she saw on 6/20.  Dr. Barrientos thought that the omeprazole that she was taking may be contributing to her hypomagnesemia and stop this.  He also thought that the magnesium supplement was contributing to her increased ostomy output as it serves as an osmotic diuretic.  However if he wanted to stop the omeprazole first to see how much her magnesium would recover with this intervention.  Currently Dee feels fairly weak in her lower extremities and she is fatigued as per usual.  I explained that hypomagnesemia can easily cause these symptoms.  I advised that we should recheck her magnesium and renal function at her next lab draw with her home nurse.  She is otherwise well.    Objective: Vital signs are as per the EMR.  In general the patient is alert pleasant and in no acute distress.  She appears healthy.    Assessment and plan: Hypomagnesemia likely secondary to increased GI loss from her ileostomy.  We will recheck her magnesium and renal function and send these to her gastroenterologist as well.  I explained that it may take a few weeks for her magnesium to completely recover which she understands.  I will contact her with results and further recommendations.  Of note, she was wondering if she could do magnesium replacement through her IV fluids which I believe would not be a bad idea for her, but I would have to look into how to do it with her infusion service.

## 2021-06-11 NOTE — TELEPHONE ENCOUNTER
RN cannot approve Refill Request    RN can NOT refill this medication med is not covered by policy/route to provider. Last office visit: Visit date not found Last Physical: Visit date not found Last MTM visit: Visit date not found Last visit same specialty: 12/17/2019 Bhupendra Caldwell MD.  Next visit within 3 mo: Visit date not found  Next physical within 3 mo: Visit date not found      Giovanna Arnold, [facility] Triage/Med Refill 8/29/2020    Requested Prescriptions   Pending Prescriptions Disp Refills     enoxaparin ANTICOAGULANT (LOVENOX) 80 mg/0.8 mL syringe [Pharmacy Med Name: ENOXAPARIN 80MG/0.8ML SYR(10X0.8ML)] 24 mL 0     Sig: ADMINISTER 0.8 ML UNDER THE SKIN DAILY       Enoxaparin Refill Protocol   Failed - 8/28/2020  9:52 AM        Failed - Route to appropriate pool/provider     Last Anticoagulation Summary:           Passed - Provider visit in last year     Last office visit with prescriber/PCP: Visit date not found OR same dept: 12/17/2019 Bhupendra Caldwell MD  Last physical: Visit date not found       Next visit within 3 mo: Visit date not found  Next physical within 3 mo: Visit date not found  Prescriber OR PCP: Brennan Siegel MD  Last diagnosis associated with med order: 1. Other pulmonary embolism without acute cor pulmonale, unspecified chronicity (H)  - enoxaparin ANTICOAGULANT (LOVENOX) 80 mg/0.8 mL syringe [Pharmacy Med Name: ENOXAPARIN 80MG/0.8ML SYR(10X0.8ML)]; ADMINISTER 0.8 ML UNDER THE SKIN DAILY  Dispense: 24 mL; Refill: 0     Requested Prescriptions     Pending Prescriptions Disp Refills     enoxaparin ANTICOAGULANT (LOVENOX) 80 mg/0.8 mL syringe [Pharmacy Med Name: ENOXAPARIN 80MG/0.8ML SYR(10X0.8ML)] 24 mL 0     Sig: ADMINISTER 0.8 ML UNDER THE SKIN DAILY     Refill for 10 day supply. May give one additional 5 day supply if INR not anticipated to be in therapeutic goal range in 5 days based on recent INR result.

## 2021-06-11 NOTE — TELEPHONE ENCOUNTER
Controlled Substance Refill Request  Medication Name:   Requested Prescriptions     Pending Prescriptions Disp Refills     pregabalin (LYRICA) 200 MG capsule [Pharmacy Med Name: PREGABALIN 200MG CAPSULES] 270 capsule 0     Sig: TAKE 1 CAPSULE(200 MG) BY MOUTH THREE TIMES DAILY     Date Last Fill: 6/12/20  Requested Pharmacy: [facility]  Submit electronically to pharmacy  Controlled Substance Agreement on file:   Encounter-Level CSA Scan Date - 10/24/2017:    Scan on 10/31/2017  2:02 PM        Last office visit:  12/17/19

## 2021-06-11 NOTE — TELEPHONE ENCOUNTER
RN cannot approve Refill Request    RN can NOT refill this medication med is not covered by policy/route to provider. Last office visit: 12/17/2019 Bhupendra Caldwell MD Last Physical: 2/15/2019 Last MTM visit: Visit date not found Last visit same specialty: 12/17/2019 Bhupendra Caldwell MD.  Next visit within 3 mo: Visit date not found  Next physical within 3 mo: Visit date not found      Giovanna Arnold, [facility] Triage/Med Refill 9/27/2020    Requested Prescriptions   Pending Prescriptions Disp Refills     nitrofurantoin (MACRODANTIN) 50 MG capsule [Pharmacy Med Name: NITROFURANTOIN MACRO 50MG CAPSULES] 90 capsule 0     Sig: TAKE 1 CAPSULE(50 MG) BY MOUTH AT BEDTIME       There is no refill protocol information for this order

## 2021-06-11 NOTE — TELEPHONE ENCOUNTER
Mariella calling from home care to report some symptoms.    Patient had lightheadedness and faint feeling that resolved on Thursday and Friday. Feeling more nausea and leg weakness over the last couple weeks.    PRN 1 L of ivy fluids every other day ordered by the infusion center. Usually does 2000 ML sodium chloride with magnesium sulfate 1.6 over a 12 hours period through the night.    Normally empties 6-8 times per day but has been emptying about 12 times a day.    Wondering about COVID testing.    Mariella transferred to Dr. Caldwell for further instruction.  Cassia Carl CMA ............... 12:43 PM, 09/01/20

## 2021-06-11 NOTE — TELEPHONE ENCOUNTER
RN cannot approve Refill Request    RN can NOT refill this medication med is not covered by policy/route to provider. Last office visit: 12/17/2019 Bhupendra Caldwell MD Last Physical: 2/15/2019 Last MTM visit: Visit date not found Last visit same specialty: 12/17/2019 Bhupendra Caldwell MD.  Next visit within 3 mo: Visit date not found  Next physical within 3 mo: Visit date not found      Cristin Fishman, [facility] Triage/Med Refill 9/8/2020    Requested Prescriptions   Pending Prescriptions Disp Refills     promethazine (PHENERGAN) 25 MG tablet [Pharmacy Med Name: PROMETHAZINE 25MG TABLETS] 20 tablet 0     Sig: TAKE 1 TABLET(25 MG) BY MOUTH EVERY 6 HOURS AS NEEDED FOR NAUSEA       There is no refill protocol information for this order

## 2021-06-11 NOTE — PROGRESS NOTES
Dee comes in for hospital follow-up for hypomagnesemia.  This was her third hospitalization in the last 2 months for this problem.  She initially presented with heart palpitations and weakness in her legs.  She was given IV magnesium and her symptoms rapidly improved.  She was discharged the next day.  Apparently they looked into Dee getting IV magnesium through her home infusion company, but apparently insurance would not pay for this.  They increased her oral magnesium supplement to 800 mg twice daily.  However she has not increased it yet as she wanted to talk to me first.  She knows that oral magnesium as an osmotic laxative and may increase her ostomy output further.  She is feeling well today.    Objective: Vital signs are as per the EMR.  In general patient is alert pleasant and in no acute distress.  She appears healthy.    Assessment and plan: High output ileostomy with recurrent episodes of hypomagnesemia.  I discussed Dee's case with her gastroenterologist today.  He stated that it would be ideal if she could get IV magnesium through her daily IV fluids as this would avoid the osmotic laxative effect of the oral magnesium supplement.  I am going to try to petition her insurance company to cover this.  Her gastroenterologist discussed other medications, but they would both have significant adverse reactions.  We will check her magnesium in 2 days.  Follow-up with us as needed.

## 2021-06-11 NOTE — TELEPHONE ENCOUNTER
Who is calling:  NADEEM Wolff [place]  Reason for Call:  The patient seems to become very dehydrated when she goes to stay with her family. The patient was at her family's house over the weekend which  she has diet changes when she goes. The patient's ileostomy output is more liquid, the patient complains of a headache and  lightheaded yesterday with intermittent standing. Caller states the patient has an order for sodium chloride three times a week whenever needed which the caller states she will give her 1000ML today and  if symptoms are persistent she will get an additional bag tomorrow. Any other recommendations?   Date of last appointment with primary care:   Okay to leave a detailed message: Yes

## 2021-06-11 NOTE — TELEPHONE ENCOUNTER
Who is calling:  [facility]  Reason for Call:  Patient was instructed to add her as needed 1 liter sodium chloride IV from Wednesday through Friday.  Patient did do the order but the days were Thursday- Saturday.  Patient relays that she is feeling much better.  There has only been one episode lasting one minute when walking to the bathroom of [facility].  RN shared to keep there activity to low .   Also RN instructed patient to get an extra bag of IV for PRN when supplies come tomorrow.  Call with any feedback. Date of last appointment with primary care: NA  Okay to leave a detailed message: Yes

## 2021-06-11 NOTE — TELEPHONE ENCOUNTER
Form has been faxed x three per caller.  Starting 8/25/20 and last 9/17/2020.  Please update today as to the status of form    Forms Request  Name of form/paperwork: Other:  supplemental order for 8/25/2020. It is similar to document scanned in Identify dated 9/1/2020.  Please call to update today.  Have you been seen for this request: N/A  Do we have the form: Yes- faxed  When is form needed by: Today  How would you like the form returned: Fax:  see form  Patient Notified form requests are processed in 3-5 business days: No    Okay to leave a detailed message? Yes

## 2021-06-11 NOTE — TELEPHONE ENCOUNTER
Orders being requested: 1 prn skilled nursing visit, 1 x a week for 9 nine weeks, 3 prn  Reason service is needed/diagnosis: for re-certification, carlos cath needle changes, monthly labs, for creatinine and magnesium, monthly B-12 injections  When are orders needed by: today, asap, hoping to see patient @ 12 pm today  Where to send Orders: Phone:  [phone]  Okay to leave detailed message?  Yes

## 2021-06-11 NOTE — TELEPHONE ENCOUNTER
Controlled Substance Refill Request  Medication Name:   Requested Prescriptions     Pending Prescriptions Disp Refills     HYDROmorphone (DILAUDID) 4 MG tablet 120 tablet 0     Sig: Take 1 tablet (4 mg total) by mouth 4 (four) times a day.     Date Last Fill: 8/28/2020  Is patient out of medication?: No, 2 days left  Patient notified refills processed within 3 business days:  Yes  Requested Pharmacy: [facility]  Submit electronically to pharmacy  Controlled Substance Agreement on file:   Encounter-Level CSA Scan Date - 10/24/2017:    Scan on 10/31/2017  2:02 PM        Last office visit:  12/17/19

## 2021-06-12 NOTE — TELEPHONE ENCOUNTER
Controlled Substance Refill Request  Medication Name:   Requested Prescriptions     Pending Prescriptions Disp Refills     diphenoxylate-atropine (LOMOTIL) 2.5-0.025 mg per tablet [Pharmacy Med Name: DIPHENOXYLATE/ATROPINE 2.5MG TABS] 180 tablet 0     Sig: TAKE 2 TABLETS BY MOUTH THREE TIMES DAILY     enoxaparin ANTICOAGULANT (LOVENOX) 80 mg/0.8 mL syringe [Pharmacy Med Name: ENOXAPARIN 80MG/0.8ML SYR(10X0.8ML)] 24 mL 0     Sig: ADMINISTER 0.8 ML UNDER THE SKIN DAILY     Date Last Fill: 8/28/20  Requested Pharmacy: [facility]  Submit electronically to pharmacy  Controlled Substance Agreement on file:   Encounter-Level CSA Scan Date - 10/24/2017:    Scan on 10/31/2017  2:02 PM        Last office visit:  12/17/19  Giovanna Arnold RN, MA  [facility]    Triage Nurse Advisor

## 2021-06-12 NOTE — PROGRESS NOTES
Clinic Note    Assessment:     Assessment and Plan:  1. Palpitations  7-day SURI monitor ordered today, to better evaluate her palpitation symptoms.  EKG reassuring today.  I printed some requisition orders so that she can have her home health nursing agency check a CMP, CBC, and TSH early next week.    - Electrocardiogram Perform and Read  - SURI Monitor Hook-Up; Future  - HM2(CBC w/o Differential)  - Comprehensive Metabolic Panel  - Thyroid Stimulating Hormone (TSH)    2. Cough  Low clinical suspicion for Covid although she is having some cough and shortness of breath symptoms.  - Symptomatic COVID-19 Virus (CORONAVIRUS) PCR       Patient Instructions   Vital signs look okay today.    EKG looks okay today.    I will give you some paper orders to give to your home health agency so that we can check some additional lab work on Tuesday.  I want to check your thyroid labs, electrolytes, blood counts, and liver labs.    I placed an order so that you can have a 7-day Holter monitor study.  Please call the cardiology clinic to arrange for this.  Call [phone].    I do not suspect that you are dealing with Covid.  However, I want you to try and isolate yourself as best you can before you get your results back.  Your Covid results should be back within a couple of days.           Subjective:      Patient comes the clinic today for some palpitations, cough, and shortness of breath.    Symptoms have been ongoing for about 3 months.    Cough and shortness of breath have been worse within the last week or so.    She has palpitations in which she feels like it is hard to catch her breath.    No fevers.    No chest pain.    No close COVID-19 contacts as far as she knows; however, her son cleans ambulances and is in close contact with paramedics.    She uses home health nursing to help with IV infusions for chronic dehydration.    The following portions of the patient's history were reviewed and updated as appropriate:  Allergies, medications, problems, prior note.    Review of Systems:    Review is otherwise negative except for what is mentioned above.     Social Hx:    Social History     Tobacco Use   Smoking Status Former Smoker     Packs/day: 1.00     Years: 30.00     Pack years: 30.00     Types: Cigarettes     Quit date: 2000     Years since quittin.8   Smokeless Tobacco Never Used         Objective:     Vitals:    10/29/20 1405   BP: 150/80   Pulse: 74   SpO2: 96%       Exam:    The patient stayed in the exam room throughout the entire course of the visit.  The assessment was conducted via telephone.  The clinical assistant was able to obtain the COVID-19 sample and obtain a set of vital signs.    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 100 tablet 0     albuterol (PROVENTIL) 2.5 mg /3 mL (0.083 %) nebulizer solution Take 2.5 mg by nebulization every 6 (six) hours as needed for wheezing.       amLODIPine (NORVASC) 5 MG tablet TAKE 1 TABLET(5 MG) BY MOUTH DAILY 90 tablet 3     busPIRone (BUSPAR) 15 MG tablet TAKE 2 TABLETS BY MOUTH TWICE DAILY 360 tablet 2     chlorthalidone (HYGROTEN) 25 MG tablet Take 1 tablet (25 mg total) by mouth daily. 30 tablet 11     cholecalciferol, vitamin D3, (VITAMIN D3) 2,000  unit cap Take 2,000 Units by mouth every morning.       CREON 6,000-19,000 -30,000 unit CpDR capsule TAKE 2 CAPSULES BY MOUTH THREE TIMES DAILY WITH FOOD 540 capsule 0     cyanocobalamin 1,000 mcg/mL injection ADMINISTER 1 ML IN THE MUSCLE EVERY 30 DAYS AS DIRECTED 3 mL 11     diphenoxylate-atropine (LOMOTIL) 2.5-0.025 mg per tablet TAKE 2 TABLETS BY MOUTH THREE TIMES DAILY 180 tablet 0     DULoxetine (CYMBALTA) 60 MG capsule TAKE ONE CAPSULE BY MOUTH TWICE DAILY 180 capsule 3     enoxaparin ANTICOAGULANT (LOVENOX) 80 mg/0.8 mL syringe ADMINISTER 0.8 ML UNDER THE SKIN DAILY 24 mL 0     HYDROmorphone (DILAUDID) 4 MG tablet Take 1 tablet (4 mg total) by mouth 4 (four) times a day. 120 tablet 0     Lactobacillus acidophilus (ACIDOPHILUS ORAL) Take 1 tablet by mouth 2 (two) times a day.       lisinopriL (PRINIVIL,ZESTRIL) 20 MG tablet Take 1 tablet (20 mg total) by mouth daily. 90 tablet 1     loperamide (IMODIUM) 2 mg capsule Take 2-4 mg by mouth 2 (two) times a day as needed.   2     mirtazapine (REMERON) 30 MG tablet Take 30 mg by mouth at bedtime.        multivit-min/folic acid/qyv280 (ALIVE WOMEN'S GUMMY VITAMINS ORAL) Take 2 tablets by mouth Daily after breakfast.        nitrofurantoin (MACRODANTIN) 50 MG capsule TAKE 1 CAPSULE(50 MG) BY MOUTH AT BEDTIME 90 capsule 0     potassium chloride (K-DUR,KLOR-CON) 20 MEQ tablet TAKE 1 TABLET(20 MEQ) BY MOUTH DAILY 90 tablet 3     pregabalin (LYRICA) 200 MG capsule TAKE 1 CAPSULE(200 MG) BY MOUTH THREE TIMES DAILY 270 capsule 0     promethazine (PHENERGAN) 25 MG tablet TAKE 1 TABLET(25 MG) BY MOUTH EVERY 6 HOURS AS NEEDED FOR NAUSEA 20 tablet 0     traZODone (DESYREL) 150 MG tablet TAKE 3 TABLETS BY MOUTH EVERY NIGHT AT BEDTIME 270 tablet 3     UNABLE TO FIND Infuse into a venous catheter at bedtime. Med Name: Normal saline with magnesium.  2 liters per patient       No current facility-administered medications for this visit.            Duke Mccall (Rob)  CNP    10/29/2020

## 2021-06-12 NOTE — TELEPHONE ENCOUNTER
Home Care Nurse (Mariella) is calling back about message that was left this morning about labs that needed to be addressed.  Labs were done Tuesday they are abnormal, Calcuim is 7.9. Pt is having cramping in her legs. Pt has reported irregular rhythm which she was seen for last week. Pt is having a feeling of irregular activity, which was previously reported. On call doctor for Dr. Javi escobar (Dr. Blandon) at 632 pm. Message relayed to Barber Ramirez. Dr. Blandon wants pt to schedule a virtual visit with the clinic tomorrow. Pt was called to schedule virtual visit, and was transferred to scheduling to make a virtual visit for tomorrow. Pt was able to schedule a visit for tomorrow with her PCP. Pt was advised to call back if symptoms worsen. Pt verbalize understanding.      Mihai Chase, RN [facility] Triage/Medication Refill

## 2021-06-12 NOTE — PROGRESS NOTES
"Dee Mattson is a 70 y.o. female who is being evaluated via a billable telephone visit.      The patient has been notified of following:     \"This telephone visit will be conducted via a call between you and your physician/provider. We have found that certain health care needs can be provided without the need for a physical exam.  This service lets us provide the care you need with a short phone conversation.  If a prescription is necessary we can send it directly to your pharmacy.  If lab work is needed we can place an order for that and you can then stop by our lab to have the test done at a later time.    Telephone visits are billed at different rates depending on your insurance coverage. During this emergency period, for some insurers they may be billed the same as an in-person visit.  Please reach out to your insurance provider with any questions.    If during the course of the call the physician/provider feels a telephone visit is not appropriate, you will not be charged for this service.\"    Patient has given verbal consent to a Telephone visit? Yes    What phone number would you like to be contacted at? [phone]    Patient would like to receive their AVS by AVS Preference: Mail a copy.    Additional provider notes: eDe is a 70-year-old female with a history of short bowel syndrome who calls in today for follow-up of a few different symptoms she has been having.  Over the last 3 weeks she states that her ostomy output has been about twice what it has normally been.  She normally changes her ostomy bag about 5 times per day, but over the last 3 to 4 weeks has been changing it about 10 times per day.  No changes in diet.  She states that the stool is fairly watery.  She also states that she has been having some lower extremity muscle cramping along with some intermittent palpitations, which feels like \"skipped heartbeats\".  She had labs drawn recently on 11/3 which showed creatinine of 1.5, normal " potassium and sodium.  She had a hyperchloremic metabolic acidosis which is consistent with her short bowel syndrome, but also had a calcium level of 7.9.  When corrected for her albumin of 3.5 (just at the lower end of normal) her calcium would still be low at 8.2.  She is otherwise doing well.    I explained that we will need to get her back into see her gastroenterologist, Dr. Barrientos, in order to see what else we can do about slowing her bowel transit time down.  In the meantime I am going to check a vitamin D level, PTH level, and a phosphorus level.  She will start taking 1-2 calcium carbonate tablets per day as well.  I will call her with the results and further recommendations.      Phone call duration: 10 minutes    Lupis Bates [facility]

## 2021-06-12 NOTE — TELEPHONE ENCOUNTER
Controlled Substance Refill Request  Medication Name:   Requested Prescriptions     Pending Prescriptions Disp Refills     HYDROmorphone (DILAUDID) 4 MG tablet 120 tablet 0     Sig: Take 1 tablet (4 mg total) by mouth 4 (four) times a day.     Date Last Fill: 09/30/2020  Requested Pharmacy: [facility]  Submit electronically to pharmacy  Controlled Substance Agreement on file:   Encounter-Level CSA Scan Date - 10/24/2017:    Scan on 10/31/2017  2:02 PM        Last office visit:  10/29/2020

## 2021-06-12 NOTE — TELEPHONE ENCOUNTER
Spoke with Dee, pt states she will be having a holter monitor placed soon. She was advised of her negative Covid test, and told to recheck labs in 2 months.

## 2021-06-12 NOTE — TELEPHONE ENCOUNTER
"Pt calls to report \"Heart has been skipping beats.\"  \"Ongoing for at least three months.\"  NOT occurring now -> Confirmed by Visiting RN who comes to house to change needle in pt's port weekly.    \"Yesterday heart was skipping beats like crazy.\"  \"Felt short of breath yesterday.\"  NOT occurring today.    Reports \"history long ago of Atrial-Fib.\"  \"Must be happening again.\"    Pt agrees to schedule in-person clinic eval.  No suspicion of covid symptoms per screening.  Transferred to a  for an appointment now.  (Discussed however, that if in the interim symptoms change or worsen, pt should seek ED eval. .... Pt verbalizes understanding.)    Deja Dumont RN  [facility] Triage     Reason for Disposition    Age > 60 years    Additional Information    Negative: Passed out (i.e., fainted, collapsed and was not responding)    Negative: Shock suspected (e.g., cold/pale/clammy skin, too weak to stand, low BP, rapid pulse)    Negative: Difficult to awaken or acting confused (e.g., disoriented, slurred speech)    Negative: Visible sweat on face or sweat dripping down face    Negative: Unable to walk, or can only walk with assistance (e.g., requires support)    Negative: Received SHOCK from implantable cardiac defibrillator and has persisting symptoms (i.e., palpitations, lightheadedness)    Negative: Sounds like a life-threatening emergency to the triager    Negative: Chest pain    Negative: Difficulty breathing    Negative: Dizziness, lightheadedness, or weakness    Negative: Heart beating very rapidly (e.g., > 140 / minute) and present now (EXCEPTION: during exercise)    Negative: Heart beating very slowly (e.g., < 50 / minute) (EXCEPTION: athlete)    Negative: New or worsened shortness of breath with activity (dyspnea on exertion)    Negative: Patient sounds very sick or weak to the triager    Negative: Wearing a \"holter monitor\" or \"cardiac event monitor\"    Negative: Received SHOCK from implantable cardiac " "defibrillator (and now feels well)    Negative: Heart beating very rapidly (e.g., > 140 / minute) and not present now (EXCEPTION: during exercise)    Negative: Skipped or extra beat(s) and increases with exercise or exertion    Negative: Skipped or extra beat(s) and occurs 4 or more times per minute    Negative: History of heart disease (i.e., heart attack, bypass surgery, angina, angioplasty)    Protocols used: HEART RATE AND HEARTBEAT [id number]    _____________________    COVID 19 Nurse Triage Plan/Patient Instructions    Please be aware that novel coronavirus (COVID-19) may be circulating in the community. If you develop symptoms such as fever, cough, or SOB or if you have concerns about the presence of another infection including coronavirus (COVID-19), please contact your health care provider or visit [email or web address].     Disposition/Instructions    Additional COVID19 information to add for patients.   How can I protect others?  If you have symptoms (fever, cough, body aches or trouble breathing): Stay home and away from others (self-isolate) until:    At least 10 days have passed since your symptoms started, And     You ve had no fever--and no medicine that reduces fever--for 1 full day (24 hours), And      Your other symptoms have resolved (gotten better).     If you don t have symptoms, but a test showed that you have COVID-19 (you tested positive):    Stay home and away from others (self-isolate). Follow the tips under \"How do I self-isolate?\" below for 10 days (20 days if you have a weak immune system).    You don't need to be retested for COVID-19 before going back to school or work. As long as you're fever-free and feeling better, you can go back to school, work and other activities after waiting the 10 or 20 days.     How do I self-isolate?    Stay in your own room, even for meals. Use your own bathroom if you can.     Stay away from others in your home. No hugging, kissing or shaking hands. No " visitors.    Don t go to work, school or anywhere else.     Clean  high touch  surfaces often (doorknobs, counters, handles, etc.). Use a household cleaning spray or wipes. You ll find a full list on the EPA website:  www.epa.gov/pesticide-registration/list-n-disinfectants-use-against-sars-cov-2.    Cover your mouth and nose with a mask, tissue or washcloth to avoid spreading germs.    Wash your hands and face often. Use soap and water.    Caregivers in these groups are at risk for severe illness due to COVID-19:  o People 65 years and older  o People who live in a nursing home or long-term care facility  o People with chronic disease (lung, heart, cancer, diabetes, kidney, liver, immunologic)  o People who have a weakened immune system, including those who:  - Are in cancer treatment  - Take medicine that weakens the immune system, such as corticosteroids  - Had a bone marrow or organ transplant  - Have an immune deficiency  - Have poorly controlled HIV or AIDS  - Are obese (body mass index of 40 or higher)  - Smoke regularly    Caregivers should wear gloves while washing dishes, handling laundry and cleaning bedrooms and bathrooms.    Use caution when washing and drying laundry: Don t shake dirty laundry, and use the warmest water setting that you can.    For more tips, go to www.cdc.gov/coronavirus/2019-ncov/downloads/10Things.pdf.    How can I take care of myself?  1. Get lots of rest. Drink extra fluids (unless a doctor has told you not to).     2. Take Tylenol (acetaminophen) for fever or pain. If you have liver or kidney problems, ask your family doctor if it s okay to take Tylenol.     Adults can take either:     650 mg (two 325 mg pills) every 4 to 6 hours, or     1,000 mg (two 500 mg pills) every 8 hours as needed.     Note: Don t take more than 3,000 mg in one day.   Acetaminophen is found in many medicines (both prescribed and over-the-counter medicines). Read all labels to be sure you don t take too  much.     For children, check the Tylenol bottle for the right dose. The dose is based on the child s age or weight.    3. If you have other health problems (like cancer, heart failure, an organ transplant or severe kidney disease): Call your specialty clinic if you don t feel better in the next 2 days.    4. Know when to call 911: Emergency warning signs include:    Trouble breathing or shortness of breath    Pain or pressure in the chest that doesn t go away    Feeling confused like you haven t felt before, or not being able to wake up    Bluish-colored lips or face    What are the symptoms of COVID-19?     The most common symptoms are cough, fever and trouble breathing.     Less common symptoms include body aches, chills, diarrhea (loose, watery poops), fatigue (feeling very tired), headache, runny nose, sore throat and loss of smell.    COVID-19 can cause severe coughing (bronchitis) and lung infection (pneumonia).    How does it spread?     The virus may spread when a person coughs or sneezes into the air. The virus can travel about 6 feet this way, and it can live on surfaces.      Common  (household disinfectants) will kill the virus.    Who is at risk?  Anyone can catch COVID-19 if they re around someone who has the virus.    How can others protect themselves?     Stay away from people who have COVID-19 (or symptoms of COVID-19).    Wash hands often with soap and water. Or, use hand  with at least 60% alcohol.    Avoid touching the eyes, nose or mouth.     Wear a face mask when you go out in public, when sick or when caring for a sick person.    Where can I get more information?     [facility]: About COVID-19: [email or web address]    CDC: What to Do If You re Sick: www.cdc.gov/coronavirus/2019-ncov/about/steps-when-sick.html    CDC: Ending Home Isolation: www.cdc.gov/coronavirus/2019-ncov/hcp/disposition-in-home-patients.html     CDC: Caring for Someone:  www.cdc.gov/coronavirus/2019-ncov/if-you-are-sick/care-for-someone.html     [facility]: Interim Guidance for Hospital Discharge to Home: www.[facility]/diseases/coronavirus/hcp/hospdischarge.pdf    [facility] clinical trials (COVID-19 research studies): clinicalaffairs.[facility]-clinical-trials     Below are the COVID-19 hotlines at the [place] [facility] ([facility]). Interpreters are available.   o For health questions: Call 626-840-3356 or 1-821.802.4078 (7 a.m. to 7 p.m.)  o For questions about schools and childcare: Call 103-998-8791 or 1-853.760.7100 (7 a.m. to 7 p.m.)              Thank you for taking steps to prevent the spread of this virus.  o Limit your contact with others.  o Wear a simple mask to cover your cough.  o Wash your hands well and often.    Resources    [facility]: About COVID-19: [email or web address]    CDC: What to Do If You're Sick: www.cdc.gov/coronavirus/2019-ncov/about/steps-when-sick.html    CDC: Ending Home Isolation: www.cdc.gov/coronavirus/2019-ncov/hcp/disposition-in-home-patients.html     CDC: Caring for Someone: www.cdc.gov/coronavirus/2019-ncov/if-you-are-sick/care-for-someone.html     [facility]: Interim Guidance for Hospital Discharge to Home: www.[facility]./diseases/coronavirus/hcp/hospdischarge.pdf    [facility] clinical trials (COVID-19 research studies): clinicalaffairs.[facility]-clinical-trials     Below are the COVID-19 hotlines at the [place] [facility] ([facility]). Interpreters are available.   o For health questions: Call 658-723-2498 or 1-806.524.4020 (7 a.m. to 7 p.m.)  o For questions about schools and childcare: Call 647-155-7135 or 1-470.862.2504 (7 a.m. to 7 p.m.)

## 2021-06-12 NOTE — TELEPHONE ENCOUNTER
Who is calling:  NADEEM Rosario with [facility]   Reason for Call:  She is calling for the following items:    1. She is wanting to confirm that Bhupendra Caldwell MD saw the lab results that were faxed to the clinic dated 10/20/2020.    2. While she was visiting the patient this morning, the patient noted she had a feeling of a rapid heartbeat that last for only seconds and then resolved. Caller advised the patient to schedule a follow up visit to discuss this. Patient's vital signs were stable during visit this morning. But, she also wanted to make sure Dr Caldwell had no immediate concerns.    Date of last appointment with primary care: 12/17/2019  Okay to leave a detailed message: Yes

## 2021-06-12 NOTE — TELEPHONE ENCOUNTER
Recheck creat and mag 2 months.  If the palpitations are just with activity, we can continue to monitor.  Otherwise we will need to see her in clinic for an eval

## 2021-06-12 NOTE — PATIENT INSTRUCTIONS - HE
POST PROCEDURE INSTRUCTIONS  PROCEDURE DONE TODAY: TRIGGER POINT INJECTION    Rest today. Resume activity tomorrow as able.  Patient demonstrates the ability to ambulate independently with a steady gait at the time of discharge.      It is not unusual to have a temporary increase in your pain after a procedure. You can ice the area 24-48 hrs. 15 min at a time.      You received a steroid injection. This medication can take 2-7 days to take effect. Some temporary side effects include:    Heartburn    Flushed-red face    Insomnia-trouble sleeping-jitters    Diabetics may see a rise in blood sugar for a few days    Low back or SI joint (sacroiliac) injection: you may experience numbness in one or both legs. Please walk with help. This is temporary and will wear off in a few hours. Do not drive if you are tingling, numbness or weakness in your hands/arms or legs/feet.    Headache:  If you experience a headache after a lumbar epidural injection, lie down and drink fluids (especially caffeine). The headache will go away gradually. If it persists notify our clinic.    Fever: call if fever 100 or over, redness/warmth/swelling over the injection site.    No public hot tubs/pools for a couple days    Physical therapy: check with pain center provider regarding resuming therapy.    Monitor your response to the injection and report this at your next visit.    If you have been referred for a procedure only, please call your referring provider for a follow up.    IF YOU PLAN TO GET A FLU SHOT YOU MUST WAIT 2 WEEKS AFTER THIS INJECTION.      CALL IF ANY QUESTIONS [phone]

## 2021-06-12 NOTE — TELEPHONE ENCOUNTER
FYI - Status Update  Who is Calling: Home Care  Update: Home care nurse is calling in to update about patients irregular heart rhythm she had sort of breath with irregular heart rhythm , before she use to have rhythms with activity , last Sunday she had heart rhythms all day .  Okay to leave a detailed message?:  No

## 2021-06-12 NOTE — PATIENT INSTRUCTIONS - HE
Vital signs look okay today.    EKG looks okay today.    I will give you some paper orders to give to your home health agency so that we can check some additional lab work on Tuesday.  I want to check your thyroid labs, electrolytes, blood counts, and liver labs.    I placed an order so that you can have a 7-day Holter monitor study.  Please call the cardiology clinic to arrange for this.  Call [phone].    I do not suspect that you are dealing with Covid.  However, I want you to try and isolate yourself as best you can before you get your results back.  Your Covid results should be back within a couple of days.

## 2021-06-12 NOTE — TELEPHONE ENCOUNTER
Who is calling:  Alvin Mera  Reason for Call:  Caller needs forms returned from 08/25/2020 and 09/17/2020 and has since re-faxed. Caller states a compliance will have to be filed soon if forms aren't returned. Please advise.   Date of last appointment with primary care: N/A  Okay to leave a detailed message: Yes, Please ask for Georgette SAINZ

## 2021-06-12 NOTE — TELEPHONE ENCOUNTER
"Patient Returning Call  Reason for call:  Returning call  Information relayed to patient:  Please call home care back.  Are \"rhythms\" palpitations?  Does she feel them?  Patient has additional questions:  Yes  If YES, what are your questions/concerns:  Please review lab work from 11/3/20.  Any changes needed.  When does provider want magnesium and creatinine checked again?    Okay to leave a detailed message?: Yes     Patient is having regular rhythm with one extra beat.  Patient had palpitations all day on 10/31/20, otherwise only them with activity.    Please return call to both patient and home care nurse.        "

## 2021-06-12 NOTE — TELEPHONE ENCOUNTER
Patient Returning Call  Reason for call:  Call back  Information relayed to patient: Writer relayed ,essage to Caller: Recheck creat and mag 2 months.  If the palpitations are just with activity, we can continue to monitor.  Otherwise we will need to see her in clinic for an eval  Patient has additional questions:  Yes  If YES, what are your questions/concerns:  Caller states she has sent over abnormal labs that need to be addressed. Caller also states that Pt gets Magnesium and Creatinine monthly and is asking if Pt should continue this regimen. Please call.  Okay to leave a detailed message?: Yes

## 2021-06-12 NOTE — PROGRESS NOTES
Injection - Other    Date/Time: 11/3/2020 2:55 PM  Performed by: José Miguel Bey MD  Authorized by: José Miguel Bey MD   Comments:     TRIGGER POINT INJECTION  Performed on: 11/3/2020    Ms. Mattson is a 70 year old woman who has had a colon resection and had her anus surgically closed.  She gets severe pain right around the anus area.  Trigger point injections give her some significant, but only last a few weeks.  She is her today to repeat the injection.    Pre Procedure Diagnosis:  Myofascial pain  Vital Signs:  As per intra-procedure documentation    The procedure was discussed with Dee Mattson in detail along with the attendant risks, including but not limited to: nerve injury, infection, bleeding, allergic reaction, or worsening of pain.  Informed consent was obtained and patient elected to proceed.    After informed consent was obtained, the patient was placed in a left lateral decubitus position on the examination table.  The area of the anal surgery was prepped sterilely with alcohol.  A 1 1/4 inch #27 gauge needle was used.  Injections were made around the surgically closed anal area.  A total of 10 mL of 0.25% Marcaine with 10 mg of Decadron was used in divided doses.      The patient tolerated the procedure well.  There were no complications.      Pre Procedure Pain Scale: 8  Pain Score:   8    If Dee Mattson has any questions or concerns after discharge, she was instructed to call us.

## 2021-06-12 NOTE — PROGRESS NOTES
[facility] Heart Care Office Consult     Assessment:   1.  Chest discomfort.  She describes a 6 month history of exertional chest discomfort reporting that she walks upwards of 2 hours per day and towards the end of her walk she developed chest discomfort which radiates to her neck and right arm.  This sounds predictable with exertion but has not been progressive.  However she is a little bit vague when asked if the symptoms occur at rest and tells me on occasion she has noted some chest discomfort at rest but has difficulty describing further.  She describes dyspnea but again is walking fairly lengthy periods of time.  She has a history of Hodgkin's lymphoma and in 1979 received presumptive chest radiation.  She has risk factors include history of tobacco use and radiation.  She does have some variability in her renal function had been seen for renal insufficiency in March 2017.  I initially considered CT angiogram because of her history of Hodgkin's lymphoma but decided upon stress echocardiogram to avoid contrast at least initially.  I have asked her to seek more immediate attention if symptoms are worsening or progressive.  I elected not to give her nitroglycerin secondary to her tendency to having symptomatic dizziness.  She has had prior troponins that have been within normal limits.  An echocardiogram in March 2017 demonstrated normal left ventricular systolic function.  We are going to plan a lipid panel which I could not locate in the chart record.    2.  Episodic dizziness and palpitation.  She has some difficulty describing this further tells me that she may have had atrial fibrillation postoperatively although cannot recall details a number of years ago.  I have elected to obtain a Holter monitor.  She also describes daytime sleeping apnea and have elected to recommend split-night sleep study.  1. Chest pain  ECG Clinic - Today    Echo Stress Exercise    Lipid Profile   2. Dizziness  Holter Monitor   3.  Sleep apnea  Split Night Sleep Study      Plan:   1.  Stress echocardiogram  2.  Holter monitor  3.  Lipid panel  4.  Split-night sleep study  5.  Need further clarification if she can be on low-dose aspirin.  Will correspond further with her primary care physician.  Patient tells me in the past she has been told not to take aspirin  6.  Follow-up of her magnesium and treatment of her magnesium per her primary care physician and GI physicians.    ECG today demonstrates normal sinus rhythm to sinus bradycardia otherwise normal-appearing EKG        History of Present Illness:   Thank you for asking the [facility] Heart Care team to see Dee Mattson a 67 y.o.  female  in consultation  to evaluate chest discomfort, palpitations, shortness of breath, and intermittent dizziness.. She has a complicated history with small bowel syndrome and chronic dehydration and low magnesium.  She tells me she was referred by her GI physician because of symptoms of chest discomfort although I do not have the GI physicians note for review.  She tells me that over the past 6 months she has had exertional chest discomfort.  She reports that she walks upwards of 2 hours on a regular basis and towards the end of her walk at approximately 1-1/2 hours she developed some mid substernal chest discomfort that will radiate into her right neck and into her right arm.  She states that the discomfort lasts approximately 5 minutes and then resolves.  She states that on occasion she has had chest discomfort at rest although cannot recall details.  She does not believe the chest discomfort has awakened her from sleep but then mentioned that she does have some occasional heartburn sensation at night.  She states that she does have breathlessness with exertion but again is able to walk 2 hours most days of the week.  He does not describe clear-cut orthopnea or PND.  She does report chronic issues with respect to dizziness and has been admitted with near  syncope in March 2017.  At that time she was felt to be dehydrated.  She has had chronic issues with low magnesium.  She has a history of Hodgkin's lymphoma and I presume received radiation.  In addition she underwent bowel resection on 2 separate occasions and has been described as having small bowel syndrome and high output ileostomy complicated in the past by renal insufficiency and dehydration.  Interesting enough she denies chest discomfort or shortness of breath to hospitalist who wrote on July 12, 2017 the most recent admission.  She tells me that she has had follow-up magnesiums the most recent one that I could locate was from July 2017 and I did write to her primary care physician further understand the follow-up of her magnesium.    She denies a history of hypertension.  She denies diabetes.  She quit smoking tobacco 12 years ago with 1 pack per day for 30 years, she does not drink alcohol, she denies family history of heart disease reporting that her brother had liver cancer.  She does report that she has fibromyalgia.  She tells me that despite sleeping well at night she is very sleepy during the day and has to take naps on a regular basis.    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 7/4/2014     Chronic fatigue syndrome 7/3/2014     Fibromyalgia 7/3/2014     GERD (gastroesophageal reflux disease)      Major depressive disorder, recurrent episode, moderate 9/6/2005   Hodgkin's lymphoma 1979  Thyroid tumor    Past Surgical History:     Past Surgical History:   Procedure Laterality Date     ANAL SPHINCTEROPLASTY       APPENDECTOMY       CHOLECYSTECTOMY       COLECTOMY       IN ABDOMEN SURGERY PROC UNLISTED      Description: Hernia Repair;  Recorded: 08/05/2009;  Comments: perineal     IN DILATION/CURETTAGE,DIAGNOSTIC      Description: Dilation And Curettage;  Recorded:  08/05/2009;     RI INSJ TUNNELED CTR VAD W/SUBQ PORT AGE 5 YR/> N/A 10/29/2014    Procedure: REMOVAL PICC LINE RIGHT ARM and PLACEMENT PORTACATH;  Surgeon: Jason Kirkpatrick MD;  Location: [facility];  Service: General     RI REMOVAL GALLBLADDER      Description: Cholecystectomy;  Recorded: 07/18/2014;     RI REMOVAL OF TONSILS,<11 Y/O      Description: Tonsillectomy;  Recorded: 08/05/2009;     RI TOTAL ABDOM HYSTERECTOMY      Description: Total Abdominal Hysterectomy;  Recorded: 07/18/2014;     SMALL INTESTINE SURGERY       THYROIDECTOMY, PARTIAL         Family History:     Family History   Problem Relation Age of Onset     Colon cancer Father      Liver cancer Brother        Social History:    reports that she quit smoking about 17 years ago. She has a 30.00 pack-year smoking history. She does not have any smokeless tobacco history on file. She reports that she does not drink alcohol or use illicit drugs.  The primary care physician is Bhupendra Caldwell MD  Meds:   Scheduled Meds:  Current Outpatient Prescriptions   Medication Sig Dispense Refill     albuterol (PROVENTIL HFA;VENTOLIN HFA) 90 mcg/actuation inhaler Inhale 2 puffs every 6 (six) hours as needed for wheezing or shortness of breath.       albuterol (PROVENTIL) 2.5 mg /3 mL (0.083 %) nebulizer solution Take 3 mL (2.5 mg total) by nebulization every 6 (six) hours as needed for wheezing or shortness of breath (per [facility]). 75 mL 12     busPIRone (BUSPAR) 15 MG tablet Take 30 mg by mouth 2 (two) times a day.        cholecalciferol, vitamin D3, (VITAMIN D3) 2,000 unit cap Take 2,000 Units by mouth every morning.       cyanocobalamin 1,000 mcg/mL injection Inject 1,000 mcg into the shoulder, thigh, or buttocks every 30 (thirty) days. Last injection not known to patient       diphenoxylate-atropine (LOMOTIL) 2.5-0.025 mg per tablet Take 2 tablets by mouth 3 (three) times a day. 30 tablet 0     DULoxetine (CYMBALTA) 60 MG capsule Take 60 mg by mouth 2  "(two) times a day.       HYDROmorphone (DILAUDID) 2 MG tablet Take 1 tablet (2 mg total) by mouth every 4 (four) hours as needed for pain. 90 tablet 0     lipase-protease-amylase (CREON) 6,000-19,000 -30,000 unit CpDR capsule Take 12,000 units of lipase by mouth 3 (three) times a day with meals.       magnesium oxide (MAGOX) 400 mg tablet Take 2 tablets (800 mg total) by mouth 2 (two) times a day. 60 tablet 0     multivitamin therapeutic (THERAGRAN) tablet Take 1 tablet by mouth daily.       nitrofurantoin (MACRODANTIN) 50 MG capsule Take 50 mg by mouth at bedtime.       potassium chloride SA (K-DUR,KLOR-CON) 20 MEQ tablet TAKE 1 TABLET(20 MEQ) BY MOUTH DAILY 90 tablet 0     pregabalin (LYRICA) 200 MG capsule Take 200 mg by mouth 3 (three) times a day.       traZODone (DESYREL) 150 MG tablet Take 450 mg by mouth bedtime.       No current facility-administered medications for this visit.        PRN Meds:.    Allergies:   Citalopram analogues; Methadone; Metoclopramide; Metronidazole; Mirtazapine; Morphine; Neuromuscular blockers, steroidal; Norfloxacin; Other drug allergy (see comments); and Oxycodone          Objective:      Physical Exam  108 lb 12.8 oz (49.4 kg)  5' 0.5\" (1.537 m)  Body mass index is 20.9 kg/(m^2).  /68 (Patient Site: Right Arm, Patient Position: Sitting, Cuff Size: Adult Regular)  Pulse 68  Resp 20  Ht 5' 0.5\" (1.537 m)  Wt 108 lb 12.8 oz (49.4 kg)  BMI 20.9 kg/m2    General Appearance:   Alert, cooperative and in no acute distress.   HEENT:  No scleral icterus; the mucous membranes were pink and moist.   Neck: JVP within normal limits. No thyromegaly. No HJR   Chest: The spine was straight. The chest was symmetric.  Port-A-Cath in the left heart.   Lungs:   Respirations unlabored; the lungs are clear to auscultation.   Cardiovascular:   S1 and S2 without murmur, clicks or rubs. Brachial, radial, carotid and posterior tibial pulses are intact and symetrical.  No carotid bruits noted "   Abdomen:  No organomegaly, masses, bruits, or tenderness. Bowels sounds are present   Extremities: No cyanosis, clubbing, or edema.   Skin: No xanthelasma.   Neurologic: Mood and affect are appropriate.             Lab Reviewed Personally by myself    Lab Results   Component Value Date     2017    K 4.0 2017     (H) 2017    CO2 19 (L) 2017    BUN 23 (H) 2017    CREATININE 0.99 2017    CALCIUM 7.9 (L) 2017     Lab Results   Component Value Date    TRIG 155 (H) 2014     No results found for: BNP  Creatinine (mg/dL)   Date Value   2017 0.99   2017 1.31 (H)   2017 0.96   06/15/2017 1.03     No results found for: LDLCALC  Lab Results   Component Value Date    WBC 5.3 2017    WBC 7.3 10/17/2014    HGB 10.6 (L) 2017    HCT 32.5 (L) 2017    MCV 89 2017     (L) 2017       Cardiac Testing:  Patient Information      Patient Name MRN Sex              Age     Dee Mattson [id number] Female 1950 (67 y.o.)       Indications      Dyspnea, SOB, Wheezing, Tachypnea     Dx: Shortness of breath [R06.02 (ICD-10-CM)]       Summary        Left ventricle ejection fraction is normal. The calculated left ventricular ejection fraction is 64%.    The aortic valve is sclerotic without reduced excursion.       2017 20:15:14 HE JOES/JOHNS/HIREN/ELENITA  Sinus bradycardia  Possible Left atrial enlargement  Borderline ECG  When compared with ECG of 15-JONATHAN-2017 17:59,  No significant change was found  Confirmed by YOSEPH FELDER MD LOC: ([id number]) on 2017 8:29:02 AM                 Review of Systems:       Review of Systems:   General: Fever, Weight Loss  Eyes: WNL  Ears/Nose/Throat: WNL  Lungs: Shortness of Breath, Wheezing  Heart: Shortness of Breath with activity, Irregular Heartbeat, Fainting (palpitations)  Stomach: Diarrhea, Nausea  Bladder: WNL  Muscle/Joints: Muscle Weakness, Muscle Pain  Skin: WNL  Nervous  System: Falls, Daytime Sleepiness, Dizziness, Loss of Balance  Mental Health: Confusion, Anxiety     Blood: WNL      This note has been dictated using voice recognition software. Any grammatical or context distortions are unintentional and inherent to the software.

## 2021-06-12 NOTE — TELEPHONE ENCOUNTER
FYI - Status Update  Who is Calling: Home Care  Update: Questioning the status of the below forms.   Okay to leave a detailed message?:  No return call needed

## 2021-06-12 NOTE — TELEPHONE ENCOUNTER
Left detailed voicemail for Crystal.  Cassia [facility] ............... 2:26 PM, 10/27/20

## 2021-06-13 NOTE — PROGRESS NOTES
Injection - Other    Date/Time: 12/17/2020 2:55 PM  Performed by: José Miguel Bey MD  Authorized by: José Miguel Bey MD   Comments:     TRIGGER POINT INJECTION  Performed on: 12/17/2020    Ms. Mattson is a 70-year-old woman who had colon cancer and had a colectomy.  She had a colostomy with closure of the anal opening.  She continues to have pain in the perianal area.  Initially trigger point injections had given her some fairly good relief, but this is becoming less and less effective.  The last injection was over a month ago but she did not have much benefit from it.  We will try once again with a slightly higher dose of steroid and see if we can get some relief, otherwise we will need to consider other therapies.    Pre Procedure Diagnosis:  Myofascial pain  Vital Signs:  As per intra-procedure documentation    The procedure was discussed with Dee Mattson in detail along with the attendant risks, including but not limited to: nerve injury, infection, bleeding, allergic reaction, or worsening of pain.  Informed consent was obtained and patient elected to proceed.    After informed consent was obtained the patient was placed in a left lateral decubitus position on the examination table.  The right knee was brought forward.  The perianal area was prepped sterilely with alcohol.  A 1-1/4 inch number 27-gauge needle was used.  Injections were made in a fanlike distribution around the perianal area.  A total of 10 mL of 0.25% Marcaine with 20 mg of Decadron was used in divided doses.    The patient tolerated the procedure well.  There were no complications.      Pre Procedure Pain Scale: 7  Pain Score:   7(rectal pain)    If Dee Mattson has any questions or concerns after discharge, she was instructed to call us.

## 2021-06-13 NOTE — PROGRESS NOTES
Pulmonary Clinic Outpatient Consultation    Assessment and Plan:   Dee Mattson is a 67 y.o. female with a history of anxiety, fibromyalgia, GERD, depression, referred for evaluation of chronically progressive SOB. She has many different complaints today which may be unrelated Review of chest CT cuts from the coronary calcium testing are unrevealing. A cardiac etiology has effectively been ruled out.    I personally reviewed the PFT and there actually isn't evidence of restriction as her TLC is within normal range, however there are reductions in FEV1 and FVC which may be related to testing difficulties since she did not meet the criteria for 3 acceptable maneuvers. We will need to repeat this test to see if the findings from the initial testing are spurious. She does report occasional wheezing and cough and though the spirometry is normal this may be related to underlying airway hyperresponsiveness or even intermittent asthma.    She has some nonspecific joint, muscle and sicca symptoms which may suggest autoimmune disease. Although I am not sure how likely this is it could tie in some of her breathing complaints with her other systemic symptoms and I think it's worth doing some autoimmune labs to rule this out. Additionally respiratory muscle weakness, for example in the setting of SLE or an autoimmune inflammatory myopathy such as DM or PM could explain some of her dyspnea.    Recommendations:  -Trial of ICS (Arnuity) to see if this helps with her breathing. Inhaler teaching done in clinic today. Instructed to rinse mouth after each use  -Will check some autoimmune serologies today  -Follow up in 3 months with repeat PFTs and MIP/MEP/MVV to assess for respiratory muscle strength. -If above unrevealing will consider further testing such as additional chest imaging and/or CPET to further define etiology of dyspnea.    I appreciate the opportunity to participate in the care of Ms. Mattson.  Please feel free to  contact me at any time.    CCx: shortness of breath    HPI: 67 female with history of anxiety, fibromyalgia, GERD, depression, referred for evaluation of SOB. She had an episode of pneumonia this past Spring and she says ever since then her breathing has been poor. She gets dyspneic with minimal exertion, even with walking. Feels like she has a hard time taking deep breaths. She says it's been particularly bad in the past 3 months to the point where she has trouble singing at her [facility]. When pressed a little bit she admits that her breathing problems actually pre-date her hospitalization in March. She does wheeze occasionally and has non-productive cough. She used to take Advair; she's not sure if this helped but says she was taken off of it when it was determined that she didn't need it. Upon chart review she has had multiple admissions this past year for abdominal cramping and muscle cramping which have been attributed to her low magnesium levels in the setting of short bowel syndrome. She also reports difficulty getting up from a chair and with upper limb movements such as combing her hair.    She has a variety of symptoms today, including muscle weakness, joint aches/pains, dry eyes and dry mouth, abdominal pain, leg pain, and headaches. She says she has been suffering from these for a long time. She has a family hx of RA and lung cancer but not family hx of emphysema, asthma or other chronic lung disease.    ROS:  A 12-system review was obtained and was negative with the exception of the symptoms endorsed in the history of present illness.    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 7/4/2014     Chronic fatigue syndrome 7/3/2014     Fibromyalgia 7/3/2014     GERD (gastroesophageal reflux disease)      Major depressive disorder, recurrent episode, moderate 9/6/2005       PSH:  Past Surgical  History:   Procedure Laterality Date     ANAL SPHINCTEROPLASTY       APPENDECTOMY       CHOLECYSTECTOMY       COLECTOMY       HI ABDOMEN SURGERY PROC UNLISTED      Description: Hernia Repair;  Recorded: 08/05/2009;  Comments: perineal     HI DILATION/CURETTAGE,DIAGNOSTIC      Description: Dilation And Curettage;  Recorded: 08/05/2009;     HI INSJ TUNNELED CTR VAD W/SUBQ PORT AGE 5 YR/> N/A 10/29/2014    Procedure: REMOVAL PICC LINE RIGHT ARM and PLACEMENT PORTACATH;  Surgeon: Jason Kirkpatrick MD;  Location: [facility] OR;  Service: General     HI REMOVAL GALLBLADDER      Description: Cholecystectomy;  Recorded: 07/18/2014;     HI REMOVAL OF TONSILS,<11 Y/O      Description: Tonsillectomy;  Recorded: 08/05/2009;     HI TOTAL ABDOM HYSTERECTOMY      Description: Total Abdominal Hysterectomy;  Recorded: 07/18/2014;     SMALL INTESTINE SURGERY       THYROIDECTOMY, PARTIAL         Allergies:  Allergies   Allergen Reactions     Citalopram Analogues      GI affects       Methadone      Hallucinations       Metoclopramide Hives     GI upset     Metronidazole Hives     Mirtazapine      GI affects       Morphine Other (See Comments)     confusion     Neuromuscular Blockers, Steroidal      Unsure,per [facility] records      Norfloxacin      C.Diff     Other Drug Allergy (See Comments)      Steroidal Neuromuscular blocking agents- unsure  Pancuronium, vecuronium, rocuronium, rapacuronium, dacuronium, malouètine, duador, dipyrandium, pipecuronium, chandonium, pt unsure of this reaction, thinks it was painful, muscle aches     Oxycodone      Gi distress       Family HX:  Family History   Problem Relation Age of Onset     Colon cancer Father      Liver cancer Brother        Social Hx:  Social History     Social History     Marital status: Single     Spouse name: N/A     Number of children: N/A     Years of education: N/A     Occupational History     Not on file.     Social History Main Topics     Smoking status: Former Smoker      Packs/day: 1.00     Years: 30.00     Types: Cigarettes     Quit date: 1/1/2000     Smokeless tobacco: Never Used     Alcohol use No     Drug use: No     Sexual activity: Not Currently     Other Topics Concern     Not on file     Social History Narrative       Current Meds:  Current Outpatient Prescriptions   Medication Sig Dispense Refill     albuterol (PROVENTIL HFA;VENTOLIN HFA) 90 mcg/actuation inhaler Inhale 2 puffs every 6 (six) hours as needed for wheezing or shortness of breath.       busPIRone (BUSPAR) 15 MG tablet Take 30 mg by mouth 2 (two) times a day.        cholecalciferol, vitamin D3, (VITAMIN D3) 2,000 unit cap Take 2,000 Units by mouth every morning.       CREON 6,000-19,000 -30,000 unit CpDR capsule TAKE 2 CAPSULES BY MOUTH THREE TIMES DAILY WITH MEALS 540 capsule 0     cyanocobalamin 1,000 mcg/mL injection INJECT INTO THE MUSCLE 1 ML AS DIRECTED EVERY 30 DAYS 3 mL 0     diphenoxylate-atropine (LOMOTIL) 2.5-0.025 mg per tablet Take 2 tablets by mouth 3 (three) times a day. 30 tablet 0     DULoxetine (CYMBALTA) 60 MG capsule Take 60 mg by mouth 2 (two) times a day.       HYDROmorphone (DILAUDID) 4 MG tablet Take 1 tablet (4 mg total) by mouth every 6 (six) hours as needed for pain. 120 tablet 0     magnesium sulfate in water 4 gram/100 mL (4 %) infusion Infuse 1 g/hr into a venous catheter at bedtime.       multivitamin therapeutic (THERAGRAN) tablet Take 1 tablet by mouth daily.       nitrofurantoin (MACRODANTIN) 50 MG capsule Take 1 capsule (50 mg total) by mouth at bedtime. 90 capsule 0     ondansetron (ZOFRAN ODT) 8 MG disintegrating tablet Take 1 tablet (8 mg total) by mouth every 8 (eight) hours as needed for nausea. 90 tablet 0     potassium chloride SA (K-DUR,KLOR-CON) 20 MEQ tablet TAKE 1 TABLET(20 MEQ) BY MOUTH DAILY 90 tablet 0     pregabalin (LYRICA) 200 MG capsule Take 200 mg by mouth 3 (three) times a day.       traZODone (DESYREL) 150 MG tablet Take 450 mg by mouth bedtime.        "albuterol (PROVENTIL) 2.5 mg /3 mL (0.083 %) nebulizer solution Take 3 mL (2.5 mg total) by nebulization every 6 (six) hours as needed for wheezing or shortness of breath (per RCAT). 75 mL 12     fluticasone furoate 200 mcg/actuation DsDv Inhale 1 Inhalation Daily at 8:00 am.. 30 each 3     No current facility-administered medications for this visit.        Physical Exam:  /66  Pulse 72  Resp 16  Ht 5' 0.5\" (1.537 m)  Wt 109 lb 9.6 oz (49.7 kg)  SpO2 100% Comment: RA  BMI 21.05 kg/m2  Gen: alert, oriented, no distress. Cachectic lady appearing older than stated age  HEENT: nasal turbinates are unremarkable, no oropharyngeal lesions, no cervical or supraclavicular lymphadenopathy  CV: RRR, no M/G/R  Resp: CTAB, no focal crackles or wheezes  Abd: soft, nontender, no palpable organomegaly  Skin: no apparent rashes  Ext: no cyanosis, clubbing or edema. Some bony prominences of the PIP and DIP joints of the hands bilaterally but no joint effusions or crepitus.  Neuro: alert, nonfocal    Labs:  reviewed    Imaging studies:  CT chest from coronary calcium study:  FINDINGS:    LIMITED CHEST: Mild dependent atelectasis slight scarring at posterior costophrenic angles.  LIMITED UPPER ABDOMEN: Negative.     IMPRESSION:   CONCLUSION:    1.  No significant incidental extracardiac findings.  2.  Please refer to cardiologist's dictation for the cardiac CT report.    PFT's 10/9:  FEV1/FVC is 86% and is normal.  FEV1 is 1.46L (73%) predicted and is normal.  FVC is 1.7L (67%) predicted and reduced.  There was no improvement in spirometry after a single inhaled dose of bronchodilator.  TLC is 3.87L (90%) predicted and is normal.  RV is 2.09L (113%) predicted and is normal.  DLCO is 12.66ml/min/hg (66%) predicted and is reduced when it is corrected for hemoglobin.  Flow volume loops indicate unreproducible FV loops.     Impression:  Full Pulmonary Function Test is abnormal.  PFTs are consistent with mild restrictive " disease.  Spirometry is not consistent with reversibility.  There is no hyperinflation.  There is air-trapping.  Diffusion capacity when corrected for hemoglobin is normal.      The results of this test appear to be valid although the ATS standards for 3 acceptable maneuvers was not met.    Exercise stress echo:    Stress echocardiogram is negative for inducible ischemia.    The stress electrocardiogram is negative for inducible ischemic EKG changes.    Left ventricle ejection fraction is normal. The estimated left ventricular ejection fraction is 60%.    No symptoms were reported by the patient during exercise.    The aortic valve is sclerotic without reduced excursion. No regurgitation.    No previous study for comparison.    Hussein Cordova MD  [facility] Pulmonary & Critical Care  Pager [phone]  Clinic [phone]

## 2021-06-13 NOTE — TELEPHONE ENCOUNTER
Mariella RN  with [facility] is calling regarding verbal orders.  Today Mariella is calling back and is needing to know when labs for Vitamin D, Phodphorus and Parathyroid need to be drawn as well.  Please phone Mariella at [phone].    COVID 19 Nurse Triage Plan/Patient Instructions    Please be aware that novel coronavirus (COVID-19) may be circulating in the community. If you develop symptoms such as fever, cough, or SOB or if you have concerns about the presence of another infection including coronavirus (COVID-19), please contact your health care provider or visit [email or web address].     Disposition/Instructions    Home care recommended. Follow home care protocol based instructions.    Thank you for taking steps to prevent the spread of this virus.  o Limit your contact with others.  o Wear a simple mask to cover your cough.  o Wash your hands well and often.    Resources    [facility]: About COVID-19: [email or web address]    CDC: What to Do If You're Sick: www.cdc.gov/coronavirus/2019-ncov/about/steps-when-sick.html    CDC: Ending Home Isolation: www.cdc.gov/coronavirus/2019-ncov/hcp/disposition-in-home-patients.html     CDC: Caring for Someone: www.cdc.gov/coronavirus/2019-ncov/if-you-are-sick/care-for-someone.html     [facility]: Interim Guidance for Hospital Discharge to Home: www.health.[facility].[place]/diseases/coronavirus/hcp/hospdischarge.pdf    [facility] clinical trials (COVID-19 research studies): [email or web address]-clinical-trials     Below are the COVID-19 hotlines at the [place] Department of Health ([facility]). Interpreters are available.   o For health questions: Call 959-375-4650 or 1-379.385.4241 (7 a.m. to 7 p.m.)  o For questions about schools and childcare: Call 380-439-0127 or 1-332.695.2290 (7 a.m. to 7 p.m.)       Reason for Disposition    Nursing judgment    Additional Information    Negative: Nursing judgment    Negative: Nursing judgment     Negative: Nursing judgment    Protocols used: INFORMATION ONLY CALL - NO TRIAGE-A-[place]

## 2021-06-13 NOTE — TELEPHONE ENCOUNTER
Reason for Call:  Lab results    Detailed comments: Just wanted to make sure that you received her labs results that was done on 12/15 of her Creatnine and Magnesium. No need to call back.     Phone Number to be reached at: [phone]  Best Time: Anytime    Can we leave a detailed message on this number?: No call back needed    Call taken on 12/21/2020 at 12:43 PM by Elle Devlin

## 2021-06-13 NOTE — PROGRESS NOTES
[facility] Care Clinic Progress Note    Assessment:  1.  Dyspnea with some chest heaviness.  Cardiovascular workup thus far demonstrates normal left ventricular systolic function.  Normal estimate of RV systolic pressure.  Negative exercise stress echocardiogram with reasonable exercise tolerance and a CT angiogram that demonstrated normal coronary anatomy.  She does have an underlying history of fibromyalgia and wonder if this is a contributing factor.  In addition she does report some improvement with inhalers and I think it is reasonable for her to visit with 1 of the pulmonary consultants.  I have taken the liberty of scheduling a consult with pulmonary as well as a BNP.  I also wondered about the possibility of sleep apnea but I do not believe she follow through with the sleep study will need to confirm this.        Plan: Pulmonary consultation            Follow-up with her primary care physician            BNP            Question sleep study.  Will ask pulmonary to comment as well.            I had made recommendations for cholesterol panel which does not appear to have been completed.  She had a normal calcium score and will defer further evaluation to Dr. Caldwell at this time.    1. Dyspnea  BNP(B-type Natriuretic Peptide)    Ambulatory referral to Pulmonology         An After Visit Summary was printed and given to the patient.    Subjective:    Dee Mattson is a 67 y.o. female who returned for a planned  follow up visit.  We met in August 2017 in consultation.  She has been describing some exertional shortness of breath for at least 6 months as well as some chest discomfort.  She states that she continues to have breathlessness with exertion and believes that has been mildly progressive.  She states she feels short of breath if she walks up a flight of stairs or even relatively short distances.  We pursued a stress echocardiogram that demonstrated reasonable exercise tolerance and that she  exercised 8 minutes and 45 seconds.  The echocardiographic response to exercise as outlined below was within normal limits.  She subsequently because of recurrent and ongoing symptoms underwent CT angiogram that demonstrated normal coronary anatomy without obstructive plaque.  She has a history of Hodgkin's lymphoma with presumed radiation in the 1970s.  She has a history of tobacco utilization quitting 10-15 years ago.  She does report underlying fibromyalgia.  The cardiogram in March 2017 demonstrated normal systolic function with an estimate of RV systolic pressure to be normal at 26 mmHg plus right atrial pressure.    Review of Systems:   General: WNL  Eyes: WNL  Ears/Nose/Throat: Hearing Loss  Lungs: Shortness of Breath  Heart: Arm Pain, Shortness of Breath with activity, Irregular Heartbeat  Stomach: Diarrhea, Nausea  Bladder: WNL  Muscle/Joints: Muscle Weakness, Muscle Pain  Skin: WNL  Nervous System: Loss of Balance  Mental Health: Confusion, Anxiety     Blood: WNL      Problem List:    Patient Active Problem List   Diagnosis     Chronic fatigue syndrome     Postinflammatory pulmonary fibrosis     History of malignant neoplasm of large intestine     Personal history of lymphatic and hematopoietic neoplasm     Major depressive disorder, recurrent episode, moderate     Asthma     GERD (gastroesophageal reflux disease)     Acute renal failure, unspecified acute renal failure type     Fibromyalgia     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     Thrombocytopenia     Ileostomy in place     Chronic migraine     Vitamin B12 deficiency     Chronic cystitis     Small bowel obstruction     Hypomagnesemia     Syncope and collapse     Anxiety and depression     S/P total colectomy     Electrolyte abnormality     Nausea     Traumatic rhabdomyolysis, initial encounter     Fall, initial encounter     Somnolence     Generalized weakness     Near syncope     Acute renal failure superimposed on stage 3 chronic kidney disease      CAP (community acquired pneumonia)     Moderate protein malnutrition     Lactic acid acidosis     Chest pain, atypical     Hypocalcemia     High output ileostomy     CKD (chronic kidney disease), stage 2 (mild)     Abnormal EKG       Social History     Social History     Marital status: Single     Spouse name: N/A     Number of children: N/A     Years of education: N/A     Occupational History     Not on file.     Social History Main Topics     Smoking status: Former Smoker     Packs/day: 1.00     Years: 30.00     Quit date: 1/1/2000     Smokeless tobacco: Never Used     Alcohol use No     Drug use: No     Sexual activity: Not Currently     Other Topics Concern     Not on file     Social History Narrative       Family History   Problem Relation Age of Onset     Colon cancer Father      Liver cancer Brother        Current Outpatient Prescriptions   Medication Sig Dispense Refill     albuterol (PROVENTIL HFA;VENTOLIN HFA) 90 mcg/actuation inhaler Inhale 2 puffs every 6 (six) hours as needed for wheezing or shortness of breath.       albuterol (PROVENTIL) 2.5 mg /3 mL (0.083 %) nebulizer solution Take 3 mL (2.5 mg total) by nebulization every 6 (six) hours as needed for wheezing or shortness of breath (per RCAT). 75 mL 12     busPIRone (BUSPAR) 15 MG tablet Take 30 mg by mouth 2 (two) times a day.        cholecalciferol, vitamin D3, (VITAMIN D3) 2,000 unit cap Take 2,000 Units by mouth every morning.       CREON 6,000-19,000 -30,000 unit CpDR capsule TAKE 2 CAPSULES BY MOUTH THREE TIMES DAILY WITH MEALS 540 capsule 0     cyanocobalamin 1,000 mcg/mL injection Inject 1,000 mcg into the shoulder, thigh, or buttocks every 30 (thirty) days. Last injection not known to patient       diphenoxylate-atropine (LOMOTIL) 2.5-0.025 mg per tablet Take 2 tablets by mouth 3 (three) times a day. 30 tablet 0     DULoxetine (CYMBALTA) 60 MG capsule Take 60 mg by mouth 2 (two) times a day.       HYDROmorphone (DILAUDID) 2 MG tablet  "Take 1 tablet (2 mg total) by mouth every 4 (four) hours as needed for pain. 120 tablet 0     magnesium sulfate in water 4 gram/100 mL (4 %) infusion Infuse 1 g/hr into a venous catheter at bedtime.       multivitamin therapeutic (THERAGRAN) tablet Take 1 tablet by mouth daily.       nitrofurantoin (MACRODANTIN) 50 MG capsule Take 50 mg by mouth at bedtime.       potassium chloride SA (K-DUR,KLOR-CON) 20 MEQ tablet TAKE 1 TABLET(20 MEQ) BY MOUTH DAILY 90 tablet 0     pregabalin (LYRICA) 200 MG capsule Take 200 mg by mouth 3 (three) times a day.       traZODone (DESYREL) 150 MG tablet Take 450 mg by mouth bedtime.       magnesium oxide (MAGOX) 400 mg tablet Take 2 tablets (800 mg total) by mouth 2 (two) times a day. 60 tablet 0     nitrofurantoin (MACRODANTIN) 50 MG capsule Take 1 capsule (50 mg total) by mouth at bedtime. 90 capsule 0     No current facility-administered medications for this visit.        Objective:     /68 (Patient Site: Left Arm, Patient Position: Sitting, Cuff Size: Adult Regular)  Pulse 72  Resp 16  Ht 5' 0.5\" (1.537 m)  Wt 107 lb (48.5 kg)  BMI 20.55 kg/m2  107 lb (48.5 kg)       Physical Exam:    GENERAL APPEARANCE: alert, no apparent distress  HEENT: no scleral icterus or xanthelasma, mildly hoarse voice  NECK: jugular venous pressure within normal limits  CHEST: symmetric, the lungs are clear to auscultation  CARDIOVASCULAR: regular rhythm without murmur, click, or gallop; no carotid bruits  Abdomen: No Organomegaly, masses, bruits, or tenderness. Bowels sounds are present      EXTREMITIES: no cyanosis, clubbing or edema    Cardiac Testing:  Patient Information      Patient Name MRN Sex              Age     Dee Mattson [id number] Female 1950 (67 y.o.)       Indications      Dyspnea, SOB, Wheezing, Tachypnea     Dx: Shortness of breath [R06.02 (ICD-10-CM)]       Summary        Left ventricle ejection fraction is normal. The calculated left ventricular ejection fraction " is 64%.    The aortic valve is sclerotic without reduced excursion.            Indications      Chest pain       Interpretation Summary        Stress echocardiogram is negative for inducible ischemia.    The stress electrocardiogram is negative for inducible ischemic EKG changes.    Left ventricle ejection fraction is normal. The estimated left ventricular ejection fraction is 60%.    No symptoms were reported by the patient during exercise.    The aortic valve is sclerotic without reduced excursion. No regurgitation.    No previous study for comparison.           Patient Name MRN Sex              Age     Dee Mattson [id number] Female 1950 (67 y.o.)       Indications      Chest pain, chronic, high probability of CAD; radiation to chest for lymphoma     Dx: Chest pain with high risk for cardiac etiology [R07.9 (ICD-10-CM)]       Interpretation Summary        The total Agatston calcium score is 0. A calcium score of zero places the individual in the lowest quartile when compared to an age and gender matched control group and implies a low risk of cardiac events in the next 10 years. (less than 1% per year).    Normal coronary anatomy with no evidence of stenosis or plaque.     Study Result      OVERREAD: DETAILED [facility] EXTRACARDIAC OVERREAD OF CARDIAC CT   2017 3:29 PM     INDICATION: Chest pain. History of lymphoma and chest radiation.  TECHNIQUE: Dose reduction techniques were used.  COMPARISON: 10/9/2013     FINDINGS:    LIMITED CHEST: Mild dependent atelectasis slight scarring at posterior costophrenic angles.  LIMITED UPPER ABDOMEN: Negative.     IMPRESSION:   CONCLUSION:    1.  No significant incidental extracardiac findings.  2.  Please refer to cardiologist's dictation for the cardiac CT report.           Lab Results:    Lab Results   Component Value Date     2017    K 4.0 2017     (H) 2017    CO2 19 (L) 2017    BUN 23 (H) 2017     CREATININE 0.99 07/13/2017    CALCIUM 7.9 (L) 07/13/2017     Lab Results   Component Value Date    TRIG 155 (H) 07/04/2014     No results found for: BNP  Creatinine (mg/dL)   Date Value   07/13/2017 0.99   07/12/2017 1.31 (H)   06/16/2017 0.96   06/15/2017 1.03     No results found for: LDLCALC  Lab Results   Component Value Date    WBC 5.3 07/13/2017    WBC 7.3 10/17/2014    HGB 10.6 (L) 07/13/2017    HCT 32.5 (L) 07/13/2017    MCV 89 07/13/2017     (L) 07/13/2017               This note has been dictated using voice recognition software. Any grammatical or context distortions are unintentional and inherent to the software.      Jason Corona M.D., F.A.C..  [facility]  [phone]

## 2021-06-13 NOTE — TELEPHONE ENCOUNTER
I left verbal approval per Dr. Caldwell on confidential voicemail of Yue with [facility].  Corina ORELLANA, CMA

## 2021-06-13 NOTE — TELEPHONE ENCOUNTER
Orders being requested: Labs   Monthly Magnesium and Creatine will be drawn next week and I am asking if the future labs of Vitamin D, phosphorus and parathyroid need to be drawn as well?  We also need an order for the calcium carbonate 1000 mg 2 tablets daily that was added in the 11/6/2020.   Reason service is needed/diagnosis: new orders from visit 11/9/2020  When are orders needed by: next week when nurse returns for scheduled home visit.  Where to send Orders: Phone:  verbal orders to caller  Okay to leave detailed message?  Yes

## 2021-06-13 NOTE — PROGRESS NOTES
"Dee Mattson is a 70 y.o. female who is being evaluated via a billable telephone visit.      The patient has been notified of following:     \"This telephone visit will be conducted via a call between you and your physician/provider. We have found that certain health care needs can be provided without the need for a physical exam.  This service lets us provide the care you need with a short phone conversation.  If a prescription is necessary we can send it directly to your pharmacy.  If lab work is needed we can place an order for that and you can then stop by our lab to have the test done at a later time.    Telephone visits are billed at different rates depending on your insurance coverage. During this emergency period, for some insurers they may be billed the same as an in-person visit.  Please reach out to your insurance provider with any questions.    If during the course of the call the physician/provider feels a telephone visit is not appropriate, you will not be charged for this service.\"    Patient has given verbal consent to a Telephone visit? Yes    What phone number would you like to be contacted at? [phone]    Patient would like to receive their AVS by AVS Preference: Mail a copy.    Telephone visit consultation today with regard to follow-up of her Holter monitor resultsPatient seeks.    She had a visit with me at the end of October for some palpitations.  She was also having some issues with shortness of breath.  Symptoms have been getting worse for about 3 months leading up until that appointment.    She had negative COVID-19 PCR testing.    I ordered a TSH, CBC, and CMP.  Her electrolytes were reassuring.  Slightly anemic with hemoglobin at 10.4.  TSH at 1.11.    Her Holter monitor revealed infrequent PACs with overall burden less than 1%.  No evidence of paroxysmal atrial fibrillation, atrial flutter, or supraventricular tachycardia.    Today, she says that her symptoms are about the same.  " Of note, she did recently have a vitamin D level checked which was quite low.  Her PCP sent in a prescription for high-dose vitamin D supplementation, 50,000 units weekly for 12 weeks.      Assessment/Plan:    1. Palpitations  We reviewed her Holter monitor results today.  She does have some infrequent PACs but her results were otherwise benign.  We reviewed her lab work, which was largely normal.  She is slightly anemic but this is likely related to chronic disease/chronic renal insufficiency/GERD.  I offered reassurance.    2. Vitamin D deficiency  We discussed her vitamin D deficiency.  She was recently prescribed high-dose replacement therapy at 50,000 units for 12 weeks.  I recommended that she proceed with taking this and rechecking her labs in 3 months.        Phone call duration:  6 minutes

## 2021-06-13 NOTE — TELEPHONE ENCOUNTER
Patient Returning Call  Reason for call:  Lab draws  Information relayed to patient:  See Bhupendra Caldwell MD's message  Patient has additional questions:  Yes  If YES, what are your questions/concerns:  The patient's next scheduled lab and nurse visit is 11/17/20, is this okay?  Okay to leave a detailed message?: Yes

## 2021-06-13 NOTE — PROGRESS NOTES
Patient instructed in use of Arnuity ellipta inhaler (100mcg).  Patient able to use the ellipta device without any difficulty. Return demo and first dose given in clinic.  Samples given to patient.  Patient also instructed to rinse mouth after each use.

## 2021-06-13 NOTE — TELEPHONE ENCOUNTER
Test Results  Who is calling?:  Home care  Who ordered the test:  Dr Caldwell  Type of test: Lab  Date of test:  11/17/2020  Where was the test performed:  [facility]  What are your questions/concerns?:  Please review labs as thyroid results and others are abnormal.  Please call Mariella.  Okay to leave a detailed message?:  Yes

## 2021-06-13 NOTE — TELEPHONE ENCOUNTER
Orders being requested: Parathyroid,  Vitamin D phosphorus labs for 3 month follow up  Reason service is needed/diagnosis: The caller would like to know if she should draw the labs for the 3 months follow up?  When are orders needed by: Before  2/2021  Where to send Orders: Phone:  [phone]  Okay to leave detailed message?  Yes

## 2021-06-13 NOTE — TELEPHONE ENCOUNTER
Medication Question or Clarification  Who is calling: NADEEM Wynn [facility]   What medication are you calling about (include dose and sig)?: cholecalciferol, vitamin D3, (VITAMIN D3) 2,000 unit cap, ergocalciferol (ERGOCALCIFEROL) 1,250 mcg (50,000 unit) capsule   Who prescribed the medication?: Bhupendra Caldwell MD   What is your question/concern?: The patient asked the caller to clarify if she needs to be on both of these medications?  Requested Pharmacy: [facility] to leave a detailed message?: Yes  1. Caller would like to know if any further testing is recommended for the elevated Parathyroid level. Please advise. #[phone]

## 2021-06-13 NOTE — PROGRESS NOTES
Dee comes in today for follow-up of her chronic headaches.  She has had these for a number of years, and has been on hydromorphone 4 times daily for them for a number of years as well, even before she started to see me 3-1/2 years ago.  At that time she was on a total of 16 mg per day.  She has been weaned down over time to a total of 8 mg per day, specifically when she was hospitalized for acute renal failure and dehydration a number of months ago.  She states her headaches have been gradually worsening and she is wondering if she can go up on her dose of hydromorphone.  She is also been having some long-standing shortness of breath.  This has been worked up by cardiology with a negative CT coronary calcium score.  Cardiology has referred her to pulmonology and she has an appointment in 2 days.  She underwent PFTs 2 weeks ago which showed a mild restrictive deficit, which is likely secondary to her history of chest radiation in the 70s secondary to her non-Hodgkin's lymphoma.  She is otherwise feeling well today.  Also of note, since she has been on IV magnesium instead of oral magnesium she does feel much better overall.    Objective: Vital signs are as per the EMR.  In general the patient is alert pleasant and in no acute distress.  She appears healthy.    Assessment and plan:    1.  Chronic headaches.  I do believe it is reasonable, given the fact that she is tolerated dose in the past, to increase her hydromorphone to 4 mg 4 times daily.  This is a total of 64 morphine equivalents which I am comfortable prescribing at this time.  She will follow-up as needed for this.  However, I will be quite hesitant to prescribe anything >90 morphine equivalents.  2.  Shortness of breath.  Follow-up with pulmonology as noted above.

## 2021-06-13 NOTE — TELEPHONE ENCOUNTER
Refills Approved x 2    Rx renewed per Medication Renewal Policy. Medication was last renewed on 07/30/2020-lisinopril, 11/25/2019-clorthalidone.  Last office visit was 11/24/2020 with PCP office.    Josefina Chino, [facility] Triage/Med Refill 11/29/2020     Requested Prescriptions   Pending Prescriptions Disp Refills     chlorthalidone (HYGROTEN) 25 MG tablet [Pharmacy Med Name: CHLORTHALIDONE 25MG TABLETS] 90 tablet 0     Sig: TAKE 1 TABLET(25 MG) BY MOUTH DAILY       Diuretics/Combination Diuretics Refill Protocol  Passed - 11/28/2020  3:27 AM        Passed - Visit with PCP or prescribing provider visit in past 12 months     Last office visit with prescriber/PCP: 12/17/2019 Bhupendra Caldwell MD OR same dept: 12/17/2019 Bhupendra Caldwell MD OR same specialty: 12/17/2019 Bhupendra Caldwell MD  Last physical: 2/15/2019 Last MTM visit: Visit date not found   Next visit within 3 mo: Visit date not found  Next physical within 3 mo: Visit date not found  Prescriber OR PCP: Bhupendra Caldwell MD  Last diagnosis associated with med order: 1. Benign essential hypertension  - chlorthalidone (HYGROTEN) 25 MG tablet [Pharmacy Med Name: CHLORTHALIDONE 25MG TABLETS]; TAKE 1 TABLET(25 MG) BY MOUTH DAILY  Dispense: 90 tablet; Refill: 0  - lisinopriL (PRINIVIL,ZESTRIL) 20 MG tablet [Pharmacy Med Name: LISINOPRIL 20MG TABLETS]; TAKE 1 TABLET(20 MG) BY MOUTH DAILY  Dispense: 90 tablet; Refill: 1    If protocol passes may refill for 12 months if within 3 months of last provider visit (or a total of 15 months).             Passed - Serum Potassium in past 12 months      Lab Results   Component Value Date    Potassium 4.9 11/03/2020             Passed - Serum Sodium in past 12 months      Lab Results   Component Value Date    Sodium 139 11/03/2020             Passed - Blood pressure on file in past 12 months     BP Readings from Last 1 Encounters:   11/03/20 135/63             Passed - Serum Creatinine in past  12 months      Creatinine   Date Value Ref Range Status   11/17/2020 1.16 (H) 0.60 - 1.10 mg/dL Final                lisinopriL (PRINIVIL,ZESTRIL) 20 MG tablet [Pharmacy Med Name: LISINOPRIL 20MG TABLETS] 90 tablet 1     Sig: TAKE 1 TABLET(20 MG) BY MOUTH DAILY       Ace Inhibitors Refill Protocol Passed - 11/28/2020  3:27 AM        Passed - PCP or prescribing provider visit in past 12 months       Last office visit with prescriber/PCP: 12/17/2019 Bhupendra Caldwell MD OR same dept: 12/17/2019 Bhupendra Caldwell MD OR same specialty: 12/17/2019 Bhupendra Caldwell MD  Last physical: 2/15/2019 Last MTM visit: Visit date not found   Next visit within 3 mo: Visit date not found  Next physical within 3 mo: Visit date not found  Prescriber OR PCP: Bhupendra Caldwell MD  Last diagnosis associated with med order: 1. Benign essential hypertension  - chlorthalidone (HYGROTEN) 25 MG tablet [Pharmacy Med Name: CHLORTHALIDONE 25MG TABLETS]; TAKE 1 TABLET(25 MG) BY MOUTH DAILY  Dispense: 90 tablet; Refill: 0  - lisinopriL (PRINIVIL,ZESTRIL) 20 MG tablet [Pharmacy Med Name: LISINOPRIL 20MG TABLETS]; TAKE 1 TABLET(20 MG) BY MOUTH DAILY  Dispense: 90 tablet; Refill: 1    If protocol passes may refill for 12 months if within 3 months of last provider visit (or a total of 15 months).             Passed - Serum Potassium in past 12 months     Lab Results   Component Value Date    Potassium 4.9 11/03/2020             Passed - Blood pressure filed in past 12 months     BP Readings from Last 1 Encounters:   11/03/20 135/63             Passed - Serum Creatinine in past 12 months     Creatinine   Date Value Ref Range Status   11/17/2020 1.16 (H) 0.60 - 1.10 mg/dL Final

## 2021-06-13 NOTE — TELEPHONE ENCOUNTER
Medication Question or Clarification  Who is calling: Patient  What medication are you calling about (include dose and sig)?:   HYDROmorphone (DILAUDID) 4 MG tablet 120 tablet 0 10/30/2020     Sig - Route: Take 1 tablet (4 mg total) by mouth 4 (four) times a day. - Oral    Sent to pharmacy as: HYDROmorphone 4 mg tablet (DILAUDID)    Earliest Fill Date: 10/30/2020    E-Prescribing Status: Receipt confirmed by pharmacy (10/30/2020  4:03 PM CDT)        Who prescribed the medication?: Bhupendra Caldwell MD    What is your question/concern?: The patient is requesting to increase medication back up to 5 doses in 24 hours, #150, as is experiencing an increase in her bottom discomfort.  Requested [facility]acy: [facility] # [id number]  Okay to leave a detailed message?: Yes

## 2021-06-13 NOTE — TELEPHONE ENCOUNTER
Who is calling:  Patient  Reason for Call:  Patient missed the call for Attila Mccall/patient is ready for the call/please call again.   Date of last appointment with primary care: na   Okay to leave a detailed message: Yes

## 2021-06-13 NOTE — TELEPHONE ENCOUNTER
OK with orders.  She had vitamin D deficiency which could cause her symptoms and her lab abnormalities. I sent in a prescription for vitamin D and we should recheck labs in 2 months.

## 2021-06-13 NOTE — TELEPHONE ENCOUNTER
RN called Mariella SILVER (Home Care) and notified of new recert and medication orders. She is aware of new Vitamin D prescription. Labs (Mg and creatinine) are drawn monthly and will continue as planned.    Message routed to Dr. Caldwell's care team to notify patient of new Vitamin D orders.    Clare Crow RN/[place] Nurse Advisor

## 2021-06-13 NOTE — PATIENT INSTRUCTIONS - HE
POST PROCEDURE INSTRUCTIONS  PROCEDURE DONE TODAY: TRIGGER POINT INJECTIONS    Rest today. Resume activity tomorrow as able.  Patient demonstrates the ability to ambulate independently with a steady gait at the time of discharge.      It is not unusual to have a temporary increase in your pain after a procedure. You can ice the area 24-48 hrs. 15 min at a time.      You received a steroid injection. This medication can take 2-7 days to take effect. Some temporary side effects include:    Heartburn    Flushed-red face    Insomnia-trouble sleeping-jitters    Diabetics may see a rise in blood sugar for a few days    Low back or SI joint (sacroiliac) injection: you may experience numbness in one or both legs. Please walk with help. This is temporary and will wear off in a few hours. Do not drive if you are tingling, numbness or weakness in your hands/arms or legs/feet.    Headache:  If you experience a headache after a lumbar epidural injection, lie down and drink fluids (especially caffeine). The headache will go away gradually. If it persists notify our clinic.    Fever: call if fever 100 or over, redness/warmth/swelling over the injection site.    No public hot tubs/pools for a couple days    Physical therapy: check with pain center provider regarding resuming therapy.    Monitor your response to the injection and report this at your next visit.    If you have been referred for a procedure only, please call your referring provider for a follow up.    IF YOU PLAN TO GET A FLU SHOT YOU MUST WAIT 2 WEEKS AFTER THIS INJECTION.      CALL IF ANY QUESTIONS [phone]

## 2021-06-13 NOTE — TELEPHONE ENCOUNTER
Please call patient and let her know that her holter monitor results came back and are reassuring. The symptomatic episodes that she recorded in her log were associated with benign premature beats.     If she wants to schedule a telephone visit to review the results in greater detail please help her schedule that.

## 2021-06-13 NOTE — PROGRESS NOTES
Pt arrived from lab for a port lab draw and flush, Pt has a port already accessed , labs drawn, port was flushed with ns then heparin, Needle stay inplace for  iv fluids, she gets every night  Marissa David

## 2021-06-13 NOTE — TELEPHONE ENCOUNTER
RN cannot approve Refill Request    RN can NOT refill this medication med is not covered by policy/route to provider. Last office visit: 12/17/2019 Bhupendra Caldwell MD Last Physical: 2/15/2019 Last MTM visit: Visit date not found Last visit same specialty: 12/17/2019 Bhupendra Caldwell MD.  Next visit within 3 mo: Visit date not found  Next physical within 3 mo: Visit date not found      Giovanna Arnold, [facility] Triage/Med Refill 11/28/2020    Requested Prescriptions   Pending Prescriptions Disp Refills     enoxaparin ANTICOAGULANT (LOVENOX) 80 mg/0.8 mL syringe [Pharmacy Med Name: ENOXAPARIN 80MG/0.8ML SYR(10X0.8ML)] 24 mL 0     Sig: ADMINISTER 0.8 ML UNDER THE SKIN DAILY       Enoxaparin Refill Protocol   Failed - 11/27/2020 12:21 PM        Failed - Route to appropriate pool/provider     Last Anticoagulation Summary:           Passed - Provider visit in last year     Last office visit with prescriber/PCP: 12/17/2019 Bhupendra Caldwell MD OR same dept: 12/17/2019 Bhupendra Caldwell MD  Last physical: 2/15/2019       Next visit within 3 mo: Visit date not found  Next physical within 3 mo: Visit date not found  Prescriber OR PCP: Bhupendra Caldwell MD  Last diagnosis associated with med order: 1. Other pulmonary embolism without acute cor pulmonale, unspecified chronicity (H)  - enoxaparin ANTICOAGULANT (LOVENOX) 80 mg/0.8 mL syringe [Pharmacy Med Name: ENOXAPARIN 80MG/0.8ML SYR(10X0.8ML)]; ADMINISTER 0.8 ML UNDER THE SKIN DAILY  Dispense: 24 mL; Refill: 0     Requested Prescriptions     Pending Prescriptions Disp Refills     enoxaparin ANTICOAGULANT (LOVENOX) 80 mg/0.8 mL syringe [Pharmacy Med Name: ENOXAPARIN 80MG/0.8ML SYR(10X0.8ML)] 24 mL 0     Sig: ADMINISTER 0.8 ML UNDER THE SKIN DAILY     Refill for 10 day supply. May give one additional 5 day supply if INR not anticipated to be in therapeutic goal range in 5 days based on recent INR result.

## 2021-06-13 NOTE — TELEPHONE ENCOUNTER
Caller: Mariella SILVER  [facility]  Phone #: [phone]  Okay to leave detailed message.    Requests for recertification orders:  - skilled nursing 1x/wk for 9 weeks  For:  Weekly carlos-cath dressing changes  Monthly Mg and creatinine lab draw  Overall health maintenance    -3 PRN visits for carlos-cath issues      Also reports that:  BP this AM elevated 160/72. Per Mariella SILVER, patient has been rushing this AM, anxious with holter monitor. Has an appointment this afternoon   Has an appointment with provider (Dr. Mccall) for holter monitor.   Recently had labs on 11/17 with abnormal values, hopefully can get addressed during today's visit.    Clare Crow RN/[place] Nurse Advisor        Reason for Disposition    Nursing judgment    Protocols used: INFORMATION ONLY CALL - NO TRIAGE-A-[place]

## 2021-06-14 NOTE — PROGRESS NOTES
Dee Mattson is a 70 y.o. female who is being treated today via telephone visit with Mariella Lopes CNP, for f/u of rectal pain.      What phone number would you like to be contacted at? [phone]  How would you like to obtain your AVS? AVS Preference: Mail a copy.     Pain score 10  Constant   What does your pain like feel during a flare? Burning, throbbing  Does the pain interfere with:  Work ----- NA  Walking ------ yes  Sleep ------- no  Daily activities ------yes  Relationships -------yes  F=6    UDT/CSA=NA

## 2021-06-14 NOTE — TELEPHONE ENCOUNTER
Reason for Call:  Medication or medication refill:    Do you use a [facility]?  Name of the pharmacy and phone number for the current request: [facility]  [phone]    Name of the medication requested: HYDROmorphone (DILAUDID) 4 MG tablet    Other request: Patient requesting  Qty 150  Only two pills left, needs as soon as possible     Can we leave a detailed message on this number? Yes    Phone number patient can be reached at: Home number on file [phone] (home)    Best Time: any     Call taken on 12/30/2020 at 1:08 PM by Laura L Goldberg

## 2021-06-14 NOTE — PROGRESS NOTES
"Dee Mattson is a 70 y.o. female who is being evaluated with [facility] or [facility] services- via telephone, patient was not able to connect with video    \"This telephone/video visit will be conducted via a call between you and your physician/provider. We have found that certain health care needs can be provided without the need for a physical exam.  This service lets us provide the care you need with a short phone conversation.  If a prescription is necessary we can send it directly to your pharmacy.  If lab work is needed we can place an order for that and you can then stop by our lab to have the test done at a later time.    If during the course of the call the physician/provider feels a telephone/video visit is not appropriate, you will not be charged for this service.\"     Start time- 10:26 am   End time- 10:48 am     Dee Mattson complains of    Chief Complaint   Patient presents with     Follow-up     rectal pain       I have reviewed and updated the patient's Past Medical History, Social History, Family History and Medication List as well as the Precharting workup by the [facility].       PAIN CLINIC FOLLOW UP PROGRESS NOTE    CC:  Chief Complaint   Patient presents with     Follow-up     rectal pain       HPI  Dee Mattson is a 70 y.o. female who presents for evaluation   Chief Complaint   Patient presents with     Follow-up     rectal pain    that is causing continued pain. Specific questions indicated the patient wanted addressed today include: chronic pain care       Major issues:  1. Chronic pain syndrome    2. S/P total colectomy    3. Rectal pain        Pain score 10  Constant   What does your pain like feel during a flare? Burning, throbbing  Does the pain interfere with:  Work ----- NA  Walking ------ yes  Sleep ------- no  Daily activities ------yes  Relationships -------yes  F=6     UDT/CSA=NA    ALLERGIES  Ketorolac tromethamine; Citalopram analogues; Methadone; Metoclopramide; Metronidazole; " Mirtazapine; Morphine; Neuromuscular blockers, steroidal; Norfloxacin; Other drug allergy (see comments); and Oxycodone    Previous Medical History  Social History     Substance and Sexual Activity   Alcohol Use No     Social History     Substance and Sexual Activity   Drug Use No     Social History     Tobacco Use   Smoking Status Former Smoker     Packs/day: 1.00     Years: 30.00     Pack years: 30.00     Types: Cigarettes     Quit date: 2000     Years since quittin.0   Smokeless Tobacco Never Used       Pertinent Pain Medications/interventions:    Patient reports that she has been getting trigger point steriod shots by Dr. Bey and it is not longer helping.     Has tried ketamine cream which did not help at all.       She currently takes lyrica and dilaudid by her PCP for her fibromyalgia type pain     Review of Systems:  12 point systems were reviewed with pt as documented on on previous exams. Any new diagnosis or new medication is reviewed today.     Medications    Current Outpatient Medications:      acetaminophen (TYLENOL) 325 MG tablet, Take 2 tablets (650 mg total) by mouth every 4 (four) hours as needed for pain., Disp: 100 tablet, Rfl: 0     busPIRone (BUSPAR) 15 MG tablet, TAKE 2 TABLETS BY MOUTH TWICE DAILY, Disp: 360 tablet, Rfl: 2     chlorthalidone (HYGROTEN) 25 MG tablet, TAKE 1 TABLET(25 MG) BY MOUTH DAILY, Disp: 90 tablet, Rfl: 3     cholecalciferol, vitamin D3, (VITAMIN D3) 2,000 unit cap, Take 2,000 Units by mouth every morning., Disp: , Rfl:      cyanocobalamin 1,000 mcg/mL injection, ADMINISTER 1 ML IN THE MUSCLE EVERY 30 DAYS AS DIRECTED, Disp: 3 mL, Rfl: 11     diphenoxylate-atropine (LOMOTIL) 2.5-0.025 mg per tablet, TAKE 2 TABLETS BY MOUTH THREE TIMES DAILY, Disp: 180 tablet, Rfl: 0     DULoxetine (CYMBALTA) 60 MG capsule, TAKE ONE CAPSULE BY MOUTH TWICE DAILY, Disp: 180 capsule, Rfl: 3     enoxaparin ANTICOAGULANT (LOVENOX) 80 mg/0.8 mL syringe, ADMINISTER 0.8 ML UNDER THE  SKIN DAILY, Disp: 24 mL, Rfl: 0     ergocalciferol (ERGOCALCIFEROL) 1,250 mcg (50,000 unit) capsule, Take 1 capsule (50,000 Units total) by mouth once a week for 12 doses., Disp: 12 capsule, Rfl: 0     HYDROmorphone (DILAUDID) 4 MG tablet, Take 1 tablet (4 mg total) by mouth 5 (five) times a day., Disp: 150 tablet, Rfl: 0     Lactobacillus acidophilus (ACIDOPHILUS ORAL), Take 1 tablet by mouth 2 (two) times a day., Disp: , Rfl:      lipase-protease-amylase (CREON) 6,000-19,000 -30,000 unit CpDR capsule, TAKE 2 CAPSULES BY MOUTH THREE TIMES DAILY WITH FOOD, Disp: 540 capsule, Rfl: 0     lisinopriL (PRINIVIL,ZESTRIL) 20 MG tablet, TAKE 1 TABLET(20 MG) BY MOUTH DAILY, Disp: 90 tablet, Rfl: 3     loperamide (IMODIUM) 2 mg capsule, Take 2-4 mg by mouth 2 (two) times a day as needed. , Disp: , Rfl: 2     multivit-min/folic acid/jbt052 (ALIVE WOMEN'S GUMMY VITAMINS ORAL), Take 2 tablets by mouth Daily after breakfast. , Disp: , Rfl:      nitrofurantoin (MACRODANTIN) 50 MG capsule, Take one capsule by mouth daily at bedtime., Disp: 90 capsule, Rfl: 0     potassium chloride (K-DUR,KLOR-CON) 20 MEQ tablet, TAKE 1 TABLET(20 MEQ) BY MOUTH DAILY, Disp: 90 tablet, Rfl: 3     pregabalin (LYRICA) 200 MG capsule, TAKE 1 CAPSULE(200 MG) BY MOUTH THREE TIMES DAILY, Disp: 270 capsule, Rfl: 0     traZODone (DESYREL) 150 MG tablet, TAKE 3 TABLETS BY MOUTH EVERY NIGHT AT BEDTIME, Disp: 270 tablet, Rfl: 3     UNABLE TO FIND, Infuse into a venous catheter at bedtime. Med Name: Normal saline with magnesium.  2 liters per patient, Disp: , Rfl:       Lab:  Last UDS none taken     Imaging:  No new imaging reviewed with patient over the phone, no new orders placed       Recent   Dated 1/7/2021 was reviewed with the patient today.       Assessment:     Dee Mattson is a 70 y.o. female seen in clinic today for   Chief Complaint   Patient presents with     Follow-up     rectal pain       They are here for follow up and continued medical  management of their pain. Today we reviewed the plan of care for their chronic pain and determined that the patient will need a refill of the current prescription.      Due to the Covid 19 precautions all visits are converted to telephone encounters until the government restrictions are lifted and the precautions have been lifted.     New concerns today- her rectal pain is constant from 30 minutes after she wakes up to when she goes to bed, she cant sit and she reports that she can lay down but then her other joints hurt.   Patient denies side effects from the current regimen    Plan of care going forward for ongoing pain control- discussed the the trigger point injections were helpful in reducing her pain about 50 % but now they no longer work, will refer her to Dr. Cade for evaluation and treatment, she is already maximizing cymbalta, lyrica as well as dilaudid by her PCP and it is still not effective enough to assist her in reducing her pain so she has function.     For review the patients pain is in the scarring from her total colectomy and removal of the rectum. On a side note a SCS would be something to explore in regards to appropriateness for pain coverage.     Patients current MME is 64 per her PCP    Plan:   Interventions-    Follow up as needed     Patient to get trigger points if needed and desired     Referral to Dr. Cade for the pelvic floor, eval and treat.     Continue treatment with PCP in regards to the lyrica, cymbalta and dilaudid. Pending  treatment may be able to wean off of dilaudid.     This limited telehealth/televideo encounter is due to the Covid 19,  as required by the governmental agencies at this time due to risk of transmission of the pandemic, therefore testing availability is limited as well as physical exams.      Prescription Drug Management will be continued by the [facility]    Orders placed today  Medications that were ordered today   Requested  Prescriptions      No prescriptions requested or ordered in this encounter     Orders Placed This Encounter   Procedures     Ambulatory referral to Obstetrics / Gynecology     Referral Priority:   Urgent     Referral Type:   Consultation     Referral Reason:   Evaluation and Treatment     Referred to Provider:   José Antonio Cade MD     Requested Specialty:   Obstetrics and Gynecology     Number of Visits Requested:   1         The patient understand todays plan and has their questions answered in regards to expectations and current treatment plan.     Prevent unexpected access/loss of medication: Keep medication locked. Only carry what you need with you    The plan of care will be adjusted to accommodate the issues discussed, discussing management of their care and follow up that is recommended. 1/7/2021         Mariella Lopes [facility]  [address]  [place] [address]  Ph: [phone]  Fax: [phone]

## 2021-06-14 NOTE — TELEPHONE ENCOUNTER
Reason for Call:  Lab results    Name of test or procedure:  Magnesium and Creatnine lab    Date of test of procedure: 1/13/2021    Location of the test or procedure: homecare    OK to leave the result message on voice mail or with a family member? Yes    Phone number to be reached at: [phone]    Additional comments: Want to make sure you received her monthly lab result for magnesium and creatnine,she faxed over results yesterday. If there are any treatment plans please all her at [phone].    Call taken on 1/14/2021 at 1:34 PM by Elle Devlin

## 2021-06-14 NOTE — TELEPHONE ENCOUNTER
Reason for Call:  Medication or medication refill:    Do you use a [facility]?  Name of the pharmacy and phone number for the current request: [facility]  [phone]    Name of the medication requested:  lipase-protease-amylase (CREON) 6,000-19,000 -30,000 unit CpDR capsule  nitrofurantoin (MACRODANTIN) 50 MG capsule    Other request: out of medication    Can we leave a detailed message on this number? Yes    Phone number patient can be reached at: Home number on file [phone] (home)    Best Time: any    Call taken on 12/24/2020 at 11:16 AM by Laura L Goldberg

## 2021-06-14 NOTE — TELEPHONE ENCOUNTER
RN cannot approve Refill Request    RN can NOT refill this medication Protocol failed and NO refill given. Last office visit: 12/17/2019 Bhupendra Caldwell MD Last Physical: 2/15/2019 Last MTM visit: Visit date not found Last visit same specialty: 12/17/2019 Bhupendra Caldwell MD.  Next visit within 3 mo: Visit date not found  Next physical within 3 mo: Visit date not found      Cristin Fishman, [facility] Triage/Med Refill 1/22/2021    Requested Prescriptions   Pending Prescriptions Disp Refills     enoxaparin ANTICOAGULANT (LOVENOX) 80 mg/0.8 mL syringe [Pharmacy Med Name: ENOXAPARIN 80MG/0.8ML SYR(10X0.8ML)] 24 mL 0     Sig: ADMINISTER 0.8 ML UNDER THE SKIN DAILY       Enoxaparin Refill Protocol   Failed - 1/22/2021 11:27 AM        Failed - Route to appropriate pool/provider     Last Anticoagulation Summary:           Passed - Provider visit in last year     Last office visit with prescriber/PCP: 12/17/2019 Bhupendra Caldwell MD OR same dept: Visit date not found  Last physical: 2/15/2019       Next visit within 3 mo: Visit date not found  Next physical within 3 mo: Visit date not found  Prescriber OR PCP: Bhupendra Caldwell MD  Last diagnosis associated with med order: 1. Other pulmonary embolism without acute cor pulmonale, unspecified chronicity (H)  - enoxaparin ANTICOAGULANT (LOVENOX) 80 mg/0.8 mL syringe [Pharmacy Med Name: ENOXAPARIN 80MG/0.8ML SYR(10X0.8ML)]; ADMINISTER 0.8 ML UNDER THE SKIN DAILY  Dispense: 24 mL; Refill: 0     Requested Prescriptions     Pending Prescriptions Disp Refills     enoxaparin ANTICOAGULANT (LOVENOX) 80 mg/0.8 mL syringe [Pharmacy Med Name: ENOXAPARIN 80MG/0.8ML SYR(10X0.8ML)] 24 mL 0     Sig: ADMINISTER 0.8 ML UNDER THE SKIN DAILY     Refill for 10 day supply. May give one additional 5 day supply if INR not anticipated to be in therapeutic goal range in 5 days based on recent INR result.

## 2021-06-14 NOTE — TELEPHONE ENCOUNTER
Refill Approved    Rx renewed per Medication Renewal Policy. Medication was last renewed on 4/16/20.    Cristin Fishman, [facility] Triage/Med Refill 1/11/2021     Requested Prescriptions   Pending Prescriptions Disp Refills     amLODIPine (NORVASC) 5 MG tablet [Pharmacy Med Name: AMLODIPINE BESYLATE 5MG TABLETS] 90 tablet 3     Sig: TAKE 1 TABLET(5 MG) BY MOUTH DAILY       Calcium-Channel Blockers Protocol Passed - 1/10/2021 10:26 AM        Passed - PCP or prescribing provider visit in past 12 months or next 3 months     Last office visit with prescriber/PCP: 12/17/2019 Bhupendra Caldwell MD OR same dept: Visit date not found OR same specialty: 12/17/2019 Bhupendra Caldwell MD  Last physical: 2/15/2019 Last MTM visit: Visit date not found   Next visit within 3 mo: Visit date not found  Next physical within 3 mo: Visit date not found  Prescriber OR PCP: Bhupendra Caldwell MD  Last diagnosis associated with med order: 1. Benign essential hypertension  - amLODIPine (NORVASC) 5 MG tablet [Pharmacy Med Name: AMLODIPINE BESYLATE 5MG TABLETS]; TAKE 1 TABLET(5 MG) BY MOUTH DAILY  Dispense: 90 tablet; Refill: 3    2. Anxiety and depression  - busPIRone (BUSPAR) 15 MG tablet [Pharmacy Med Name: BUSPIRONE 15MG TABLETS]; TAKE 2 TABLETS BY MOUTH TWICE DAILY  Dispense: 360 tablet; Refill: 2    If protocol passes may refill for 12 months if within 3 months of last provider visit (or a total of 15 months).             Passed - Blood pressure filed in past 12 months     BP Readings from Last 1 Encounters:   12/17/20 106/58                busPIRone (BUSPAR) 15 MG tablet [Pharmacy Med Name: BUSPIRONE 15MG TABLETS] 360 tablet 2     Sig: TAKE 2 TABLETS BY MOUTH TWICE DAILY       Tricyclics/Misc Antidepressant/Antianxiety Meds Refill Protocol Passed - 1/10/2021 10:26 AM        Passed - PCP or prescribing provider visit in last year     Last office visit with prescriber/PCP: 12/17/2019 Bhupendra Caldwell MD OR same  dept: Visit date not found OR same specialty: 12/17/2019 Bhupendra Caldwell MD  Last physical: 2/15/2019 Last MTM visit: Visit date not found   Next visit within 3 mo: Visit date not found  Next physical within 3 mo: Visit date not found  Prescriber OR PCP: Bhupendra Caldwell MD  Last diagnosis associated with med order: 1. Benign essential hypertension  - amLODIPine (NORVASC) 5 MG tablet [Pharmacy Med Name: AMLODIPINE BESYLATE 5MG TABLETS]; TAKE 1 TABLET(5 MG) BY MOUTH DAILY  Dispense: 90 tablet; Refill: 3    2. Anxiety and depression  - busPIRone (BUSPAR) 15 MG tablet [Pharmacy Med Name: BUSPIRONE 15MG TABLETS]; TAKE 2 TABLETS BY MOUTH TWICE DAILY  Dispense: 360 tablet; Refill: 2    If protocol passes may refill for 12 months if within 3 months of last provider visit (or a total of 15 months).                  amLODIPine (NORVASC) 5 MG tablet [[id number]]    Electronically signed by: Cristin Fishman RN on 01/17/20 1347 Status: Discontinued   Ordering user: Cristin Fishman RN 01/17/20 1347 Ordering provider: Bhupendra Caldwell MD   Authorized by: Bhupendra Caldwell MD   Frequency:  01/17/20 - 01/07/21  Released by: Cristin Fishman RN 01/17/20 1347   Discontinued by: Rita Rice CMA 01/07/21 1006   Diagnoses

## 2021-06-14 NOTE — TELEPHONE ENCOUNTER
Reason for Call:  TERRY    Detailed comments: She was unable to do her vit b 12 injection today, pt was unable to pick it up from pharm, pt wants it done at next visit which is next Tues the 19th.     Phone Number Patient can be reached at: Home number on file [phone] (home)    Best Time: anytime    Can we leave a detailed message on this number?: No call back needed    Call taken on 1/13/2021 at 11:20 AM by Elle Devlin

## 2021-06-14 NOTE — TELEPHONE ENCOUNTER
Reason for Call:  Other     Detailed comments: Already received the re certification order, calling to get a change correction of re certification for 1x a wk for 8 wk effective 1/31/2021.     Phone Number Patient can be reached at: Other phone number:  [phone]    Best Time: anytime    Can we leave a detailed message on this number?: Yes    Call taken on 1/28/2021 at 2:40 PM by Elle Devlin

## 2021-06-14 NOTE — TELEPHONE ENCOUNTER
Reason for Call:  Medication or medication refill: refill    Do you use a [facility]?  Name of the pharmacy and phone number for the current request: [id number] [address] [facility] [address]    Name of the medication requested: HYDROmorphone (DILAUDID) 4 MG tablet    Other request: patient calling to request refill on medication. Last refilled on 12/30/2020    Can we leave a detailed message on this number? Yes    Phone number patient can be reached at: Home number on file [phone] (home)    Best Time: Any    Call taken on 2/1/2021 at 1:26 PM by Valerie Adams

## 2021-06-14 NOTE — TELEPHONE ENCOUNTER
Refill request for medication: Hydromorphone  Last visit addressing this medication: 12/17/2019  Follow up plan 12 months  Last refill on 11/30/2020, quantity 150   CSA completed could not find CSA on file   checked Not applicable    Appointment: Patient will call back to schedule appointment     Lisa M Bloch, CMA

## 2021-06-14 NOTE — TELEPHONE ENCOUNTER
Reason for Call:  Home Health Care    Crystal with [facility] called regarding (reason for call): Recertification ordered    Orders are needed for this patient. Skilled Nursing    PT: nely    OT: nely    Skilled Nursinx week 9 weeks  Weekly portacath dressing change  Monthly magnusium and creatine lab draw,Health Maintence, 3 PRN for portacath issues and labs  3month benjy for:  Vit D, Phosphorus and Parathyroid hormone  Can this be drawn week of     Pt Provider: Dr Bhupendra Caldwell    Phone Number Homecare Nurse can be reached at: [phone]    Can we leave a detailed message on this number? Yes     Best Time: any    Call taken on 2021 at 11:54 AM by Laura L Goldberg

## 2021-06-14 NOTE — PATIENT INSTRUCTIONS - HE
Plan:   Interventions-    Follow up as needed     Patient to get trigger points if needed and desired     Referral to Dr. Cade for the pelvic floor, eval and treat.     Continue treatment with PCP in regards to the lyrica, cymbalta and dilaudid. Pending  treatment may be able to wean off of dilaudid.     This limited telehealth/televideo encounter is due to the Covid 19,  as required by the governmental agencies at this time due to risk of transmission of the pandemic, therefore testing availability is limited as well as physical exams.      Prescription Drug Management will be continued by the [facility]    Orders placed today  Medications that were ordered today   Requested Prescriptions      No prescriptions requested or ordered in this encounter     Orders Placed This Encounter   Procedures     Ambulatory referral to Obstetrics / Gynecology     Referral Priority:   Urgent     Referral Type:   Consultation     Referral Reason:   Evaluation and Treatment     Referred to Provider:   José Antonio Cade MD     Requested Specialty:   Obstetrics and Gynecology     Number of Visits Requested:   1         The patient understand todays plan and has their questions answered in regards to expectations and current treatment plan.     Prevent unexpected access/loss of medication: Keep medication locked. Only carry what you need with you    The plan of care will be adjusted to accommodate the issues discussed, discussing management of their care and follow up that is recommended. 1/7/2021         FRITZ Stein [facility]  [address]  [place] [address]  Ph: [phone]  Fax: [phone]

## 2021-06-15 PROBLEM — E83.42 HYPOMAGNESEMIA: Status: ACTIVE | Noted: 2017-02-20

## 2021-06-15 NOTE — TELEPHONE ENCOUNTER
Reason for Call:  Medication or medication refill:    Do you use a [facility]?  Name of the pharmacy and phone number for the current request: [facility]  [phone]    Name of the medication requested:   DULoxetine (CYMBALTA) 60 MG capsule  potassium chloride (K-DUR,KLOR-CON) 20 MEQ tablet    Patient states she talked to the Pharmacy and they states they called in the cymbalta refill request x5  Patient is out of her medication and needs asap    Can we leave a detailed message on this number? Yes    Phone number patient can be reached at: Home number on file [phone] (home)    Best Time: any    Call taken on 2/18/2021 at 1:44 PM by Laura L Goldberg

## 2021-06-15 NOTE — TELEPHONE ENCOUNTER
3-9-21  Mariella called from [facility] and stated yesterday 3-8-21 abnormal labs were faxed for:  MICHELLE Wolff wants to know if there is any treatment changes  bakari

## 2021-06-15 NOTE — ANESTHESIA PREPROCEDURE EVALUATION
Anesthesia Evaluation      Patient summary reviewed   No history of anesthetic complications     Airway   Mallampati: II  Neck ROM: full   Pulmonary - negative ROS and normal exam   (+) asthma  mild, shortness of breath (Chronic fatigue syndrome),     ROS comment: Pulmonary fibrosis                         Cardiovascular - negative ROS and normal exam  (+) hypertension well controlled, ,      Neuro/Psych    (+) neuromuscular disease (fibromylagia),  CVA (h/o TIA) , depression, anxiety/panic attacks, chronic pain (pelvic floor - baseline 5/10)    Comments: Chronic migraines    Endo/Other - negative ROS      Comments: H/o lymphoma    GI/Hepatic/Renal    (+) GERD well controlled,   chronic renal disease (CKD II),     Comments: Colon CA  Short bowel syndrome - infuses 2L IV fluids via port every night          Dental - normal exam   (+) lower dentures and upper dentures                         Anesthesia Plan  Planned anesthetic: MAC  Versed/fentanyl/propofol  Ketamine PRN  Decadron/zofran    ASA 3   Induction: intravenous   Anesthetic plan and risks discussed with: patient and sibling  Anesthesia plan special considerations: antiemetics,   Post-op plan: routine recovery      2/22/21 covid pcr negative      Results for orders placed or performed in visit on 02/23/21   Basic Metabolic Panel   Result Value Ref Range    Sodium 140 136 - 145 mmol/L    Potassium 4.7 3.5 - 5.0 mmol/L    Chloride 117 (H) 98 - 107 mmol/L    CO2 17 (L) 22 - 31 mmol/L    Anion Gap, Calculation 6 5 - 18 mmol/L    Glucose 130 (H) 70 - 125 mg/dL    Calcium 7.7 (L) 8.5 - 10.5 mg/dL    BUN 27 8 - 28 mg/dL    Creatinine 1.25 (H) 0.60 - 1.10 mg/dL    GFR MDRD Af Amer 51 (L) >60 mL/min/1.73m2    GFR MDRD Non Af Amer 42 (L) >60 mL/min/1.73m2     *Note: Due to a large number of results and/or encounters for the requested time period, some results have not been displayed. A complete set of results can be found in Results Review.          none

## 2021-06-15 NOTE — TELEPHONE ENCOUNTER
No treatment changes.  Corrected calcium in respect to her albumin is 8.3, which is just barely below the lower limit of normal.  We will recheck this the next time she is in

## 2021-06-15 NOTE — TELEPHONE ENCOUNTER
Reason for Call: BMP results    Detailed comments: Mariella RN with [facility] requesting a return call regarding BMP results. Should there be any treatment changes? Concerns of the calcium at 7.7.     Phone Number Patient can be reached at: [phone]    Best Time: anytime    Can we leave a detailed message on this number?: Yes    Call taken on 2/26/2021 at 11:37 AM by Michelle Lui

## 2021-06-15 NOTE — TELEPHONE ENCOUNTER
RN cannot approve Refill Request    RN can NOT refill this medication med is not covered by policy/route to provider. Last office visit: 12/17/2019 Bhupendra Caldwell MD Last Physical: 2/15/2019 Last MTM visit: Visit date not found Last visit same specialty: 12/17/2019 Bhupendra Caldwell MD.  Next visit within 3 mo: Visit date not found  Next physical within 3 mo: 2/22/2021 Bhupendra Caldwell MD Julianne M Scott, [facility] Triage/Med Refill 2/22/2021    Requested Prescriptions   Pending Prescriptions Disp Refills     ergocalciferol (ERGOCALCIFEROL) 1,250 mcg (50,000 unit) capsule [Pharmacy Med Name: VITAMIN D2 50,000IU (ERGO) CAP RX] 13 capsule 0     Sig: TAKE 1 CAPSULE BY MOUTH 1 TIME A WEEK FOR 12 DOSES       There is no refill protocol information for this order

## 2021-06-15 NOTE — TELEPHONE ENCOUNTER
Controlled Substance Refill Request  Medication Name:   Requested Prescriptions     Pending Prescriptions Disp Refills     pregabalin (LYRICA) 200 MG capsule [Pharmacy Med Name: PREGABALIN 200MG CAPSULES] 270 capsule 0     Sig: TAKE 1 CAPSULE(200 MG) BY MOUTH THREE TIMES DAILY     Date Last Fill: 12/15/20  Requested Pharmacy: [facility]  Submit electronically to pharmacy  Controlled Substance Agreement on file:   Encounter-Level CSA Scan Date - 10/24/2017:    Scan on 10/31/2017  2:02 PM        Last office visit:  2/22/21

## 2021-06-15 NOTE — TELEPHONE ENCOUNTER
Reason for Call:  Medication or medication refill:    Do you use a [facility]?  Name of the pharmacy and phone number for the current request: [facility] in [facility]    Name of the medication requested: Hydromorphone    Other request: NO    Can we leave a detailed message on this number? Yes    Phone number patient can be reached at: Home number on file [phone] (home)    Best Time: anytime    Call taken on 3/4/2021 at 10:58 AM by Elle Devlin

## 2021-06-15 NOTE — PROGRESS NOTES
Cpft with pre and post spirometry, mip, mep and mvv done. Albuterol  2.5 mg neb used for BD.  Ats standards met, patient scooby well, results scanned to patients chart

## 2021-06-15 NOTE — TELEPHONE ENCOUNTER
Mariella RN with [facility] calling to verify message left regarding lab values.  Caller verbalized understanding.    Additional Information    Health Information question, no triage required and triager able to answer question    Protocols used: INFORMATION ONLY CALL-A-AH

## 2021-06-15 NOTE — TELEPHONE ENCOUNTER
Mariella called back. Says patients next lab draw is Tuesday, March 9th. Will draw CMP at this visit.
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
## 2021-06-15 NOTE — ANESTHESIA CARE TRANSFER NOTE
Last vitals:   Vitals:    02/25/21 1302   BP: 139/66   Pulse: 78   Resp: 16   Temp: 36.9  C (98.4  F)   SpO2: 100%     Patient's level of consciousness is drowsy  Spontaneous respirations: yes  Maintains airway independently: yes  Dentition unchanged: yes  Oropharynx: oropharynx clear of all foreign objects    QCDR Measures:  ASA# 20 - Surgical Safety Checklist: WHO surgical safety checklist completed prior to induction    PQRS# 430 - Adult PONV Prevention: 4558F - Pt received => 2 anti-emetic agents (different classes) preop & intraop  ASA# 8 - Peds PONV Prevention: NA - Not pediatric patient, not GA or 2 or more risk factors NOT present  PQRS# 424 - Comfort-op Temp Management: 4559F - At least one body temp DOCUMENTED => 35.5C or 95.9F within required timeframe  PQRS# 426 - PACU Transfer Protocol: - Transfer of care checklist used  ASA# 14 - Acute Post-op Pain: ASA14B - Patient did NOT experience pain >= 7 out of 10

## 2021-06-15 NOTE — TELEPHONE ENCOUNTER
Reason for Call:  Verifying that you received her labs results done on 2/9/2021    Name of test or procedure: labs- Vit D, Creatinine, Magnesium, Phosphorus, Parathyroid Hormone    Date of test of procedure: 02/09/2021     Location of the test or procedure: [facility]    OK to leave the result message on voice mail or with a family member? Yes    Phone number to be reached at: [phone]    Additional comments: Please call her to verify and if there are any new orders    Call taken on 2/11/2021 at 9:32 AM by Elle Devlin

## 2021-06-15 NOTE — TELEPHONE ENCOUNTER
PTH continues to be elevated and vitamin D level is decreased as well, and we should see her back in clinic to discuss this further

## 2021-06-15 NOTE — ANESTHESIA POSTPROCEDURE EVALUATION
Patient: Dee Mattson  Procedure(s):  PELVIC FLOOR BOTOX  Anesthesia type: MAC    Patient location: Phase II Recovery  Last vitals:   Vitals Value Taken Time   /61 02/25/21 1331   Temp 36.9  C (98.4  F) 02/25/21 1302   Pulse 144 02/25/21 1341   Resp 16 02/25/21 1322   SpO2 88 % 02/25/21 1341   Vitals shown include unvalidated device data.  Post vital signs: stable  Level of consciousness: awake and responds to simple questions  Post-anesthesia pain: pain controlled  Post-anesthesia nausea and vomiting: no  Pulmonary: unassisted, return to baseline  Cardiovascular: stable and blood pressure at baseline  Hydration: adequate  Anesthetic events: no    QCDR Measures:  ASA# 11 - Comfort-op Cardiac Arrest: ASA11B - Patient did NOT experience unanticipated cardiac arrest  ASA# 12 - Comfort-op Mortality Rate: ASA12B - Patient did NOT die  ASA# 13 - PACU Re-Intubation Rate: NA - No ETT / LMA used for case  ASA# 10 - Composite Anes Safety: ASA10A - No serious adverse event    Additional Notes:

## 2021-06-15 NOTE — TELEPHONE ENCOUNTER
Saw that as well.  We need to do additional labs at her next draw.  We'll need a CMP in order to recheck the calcium as well as her albumin, as if her albumin is low it will falsely lower the calcium level

## 2021-06-15 NOTE — PATIENT INSTRUCTIONS - HE
1) Hold Lovenox (enoxaparin) 3 days prior to surgery    2) Hold lisinopril the morning of surgery    3) Labs tomorrow (BMP)

## 2021-06-15 NOTE — PROGRESS NOTES
[facility]  [address] [place] [address]  Dept: [phone]  Dept Fax: [phone]  Primary Provider: Bhupendra Caldwell MD  Pre-op Performing Provider: BHUPENDRA CALDWELL    PREOPERATIVE EVALUATION:  Today's date: 2/22/2021    Dee Mattson is a 71 y.o. female who presents for a preoperative evaluation.    Surgical Information:  Surgery/Procedure: PELVIC FLOOR BOTOX  Surgery Location: [facility]  Surgeon: Dr. Cade  Surgery Date: 2/25/21  Where patient plans to recover: At home with family  Fax number for surgical facility: Note does not need to be faxed, will be available electronically in Epic.    Type of Anesthesia Anticipated: to be determined    Assessment & Plan      The proposed surgical procedure is considered INTERMEDIATE risk.    Rectal pain      Preop general physical exam      Benign essential hypertension    Hypotensive today and feeling dizzy secondary to this.  I am going to stop her chlorthalidone along with her potassium supplement.  She will get labs drawn tomorrow from her visiting nurse.  She will see me back on either March 8 or March 9 for follow-up.    Uncomplicated asthma, unspecified asthma severity, unspecified whether persistent  Well-controlled.    Hyperparathyroidism (H)  I am going to check a parathyroid uptake scan.  We will discuss this at her follow-up visit.    Hx DVT.  Hold Lovenox 3 days prior to surgery      RECOMMENDATION:  APPROVAL GIVEN to proceed with proposed procedure, without further diagnostic evaluation.  Hold Lovenox 3 days prior to surgery.  Hold lisinopril the morning of surgery.  No aspirin or NSAIDs prior to surgery.          Subjective     HPI related to upcoming procedure: Dee has been having persistent rectal pain of unknown etiology and is going to undergo botox in her pelvic floor muscles for hopefully definitive treatment.  She states that she is feeling a bit dizzy today and blood pressure is  hypotensive today.  She also has an elevated PTH level and a low vitamin D level, 151 and 22 respectively.  Her PTH continues to go up and we will need to obtain a parathyroid uptake scan.  She has a history of asthma, well controlled.  No history of obstructive sleep apnea.    Preop Questions 2/22/2021   Have you ever had a heart attack or stroke? YES -    Have you ever had surgery on your heart or blood vessels, such as a stent placement, a coronary artery bypass, or surgery on an artery in your head, neck, heart, or legs? No   Do you have chest pain with activity? No   Do you have a history of  heart failure? No   Do you currently have a cold, bronchitis or symptoms of other infection? No   Do you have a cough, shortness of breath, or wheezing? No   Do you or anyone in your family have previous history of blood clots? YES -    Do you or does anyone in your family have a serious bleeding problem such as prolonged bleeding following surgeries or cuts? No   Have you ever had problems with anemia or been told to take iron pills? No   Have you had any abnormal blood loss such as black, tarry or bloody stools, or abnormal vaginal bleeding? No   Have you ever had a blood transfusion? YES -    Have you ever had a transfusion reaction? No   Are you willing to have a blood transfusion if it is medically needed before, during, or after your surgery? Yes   Have you or any of your relatives ever had problems with anesthesia? No   Do you have sleep apnea, excessive snoring or daytime drowsiness? No   Do you have any artifical heart valves or other implanted medical devices like a pacemaker, defibrillator, or continuous glucose monitor? No   Do you have artificial joints? No   Are you allergic to latex? No     Health Care Directive:  Patient has a Health Care Directive on file.     Review of Systems  Constitutional, neuro, ENT, endocrine, pulmonary, cardiac, gastrointestinal, genitourinary, musculoskeletal, integument and  psychiatric systems are negative, except as otherwise noted.      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 07/04/2014     Chronic fatigue syndrome 07/03/2014     Fibromyalgia 07/03/2014     GERD (gastroesophageal reflux disease)      History of malignant neoplasm of large intestine 04/10/2012     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/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     Asthma 04/10/2012     Past Medical History:   Diagnosis Date     Anxiety and depression      Asthma 4/10/2012     Bowel obstruction (H)      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 7/4/2014     Chronic fatigue syndrome 7/3/2014     Disease of thyroid gland      Fibromyalgia 7/3/2014     GERD (gastroesophageal reflux disease)      History of blood clots      Hodgkin's lymphoma (H) 10/1979     Hypertension      Major depressive disorder, recurrent episode, moderate (H) 9/6/2005     Past Surgical History:   Procedure Laterality Date     ANAL SPHINCTEROPLASTY       APPENDECTOMY       CHOLECYSTECTOMY       COLECTOMY       HYSTERECTOMY  05/2000     ILEOSTOMY       OOPHORECTOMY Bilateral 05/2000     NY ABDOMEN SURGERY PROC UNLISTED      Description: Hernia Repair;  Recorded: 08/05/2009;  Comments: perineal     NY DILATION/CURETTAGE,DIAGNOSTIC      Description: Dilation And Curettage;  Recorded: 08/05/2009;     NY INSJ TUNNELED CTR VAD W/SUBQ PORT AGE 5 YR/> N/A 10/29/2014    Procedure: REMOVAL PICC LINE RIGHT ARM and PLACEMENT PORTACATH;  Surgeon: Jason Kirkpatrick MD;  Location: [facility];  Service: General     NY REMOVAL GALLBLADDER      Description:  Cholecystectomy;  Recorded: 07/18/2014;     HI REMOVAL OF TONSILS,<13 Y/O      Description: Tonsillectomy;  Recorded: 08/05/2009;     HI RMVL JENY CTR VAD W/SUBQ PORT/ CTR/PRPH INSJ N/A 7/20/2018    Procedure: REMOVAL OF PORT A CATH;  Surgeon: Jason Kirkpatrick MD;  Location: [facility];  Service: General     HI TOTAL ABDOM HYSTERECTOMY      Description: Total Abdominal Hysterectomy;  Recorded: 07/18/2014;     SMALL INTESTINE SURGERY       THYROIDECTOMY, PARTIAL       TUNNELED VENOUS CATHETER PLACEMENT N/A 8/29/2018    Procedure: PORT-A-CATH INSERTION;  Surgeon: Jason Kirkpatrick MD;  Location: [facility];  Service:      Current Outpatient Medications   Medication Sig Dispense Refill     acetaminophen (TYLENOL) 325 MG tablet Take 2 tablets (650 mg total) by mouth every 4 (four) hours as needed for pain. 100 tablet 0     busPIRone (BUSPAR) 15 MG tablet TAKE 2 TABLETS BY MOUTH TWICE DAILY 360 tablet 3     chlorthalidone (HYGROTEN) 25 MG tablet TAKE 1 TABLET(25 MG) BY MOUTH DAILY 90 tablet 3     cholecalciferol, vitamin D3, (VITAMIN D3) 2,000 unit cap Take 2,000 Units by mouth every morning.       cyanocobalamin 1,000 mcg/mL injection ADMINISTER 1 ML IN THE MUSCLE EVERY 30 DAYS AS DIRECTED 3 mL 11     diphenoxylate-atropine (LOMOTIL) 2.5-0.025 mg per tablet TAKE 2 TABLETS BY MOUTH THREE TIMES DAILY 180 tablet 0     DULoxetine (CYMBALTA) 60 MG capsule Take 1 capsule (60 mg total) by mouth 2 (two) times a day. 180 capsule 3     enoxaparin ANTICOAGULANT (LOVENOX) 80 mg/0.8 mL syringe ADMINISTER 0.8 ML UNDER THE SKIN DAILY 24 mL 0     ergocalciferol (ERGOCALCIFEROL) 1,250 mcg (50,000 unit) capsule Take 1 capsule (50,000 Units total) by mouth once a week for 12 doses. 12 capsule 0     HYDROmorphone (DILAUDID) 4 MG tablet Take 1 tablet (4 mg total) by mouth 5 (five) times a day. 150 tablet 0     Lactobacillus acidophilus (ACIDOPHILUS ORAL) Take 1 tablet by mouth 2 (two) times a day.        lipase-protease-amylase (CREON) 6,000-19,000 -30,000 unit CpDR capsule TAKE 2 CAPSULES BY MOUTH THREE TIMES DAILY WITH FOOD 540 capsule 0     lisinopriL (PRINIVIL,ZESTRIL) 20 MG tablet TAKE 1 TABLET(20 MG) BY MOUTH DAILY 90 tablet 3     loperamide (IMODIUM) 2 mg capsule Take 2-4 mg by mouth 2 (two) times a day as needed.   2     multivit-min/folic acid/atx473 (ALIVE WOMEN'S GUMMY VITAMINS ORAL) Take 2 tablets by mouth Daily after breakfast.        nitrofurantoin (MACRODANTIN) 50 MG capsule Take one capsule by mouth daily at bedtime. 90 capsule 0     potassium chloride (K-DUR,KLOR-CON) 20 MEQ tablet TAKE 1 TABLET(20 MEQ) BY MOUTH DAILY 90 tablet 3     pregabalin (LYRICA) 200 MG capsule TAKE 1 CAPSULE(200 MG) BY MOUTH THREE TIMES DAILY 270 capsule 0     traZODone (DESYREL) 150 MG tablet Take 3 tablets (450 mg total) by mouth at bedtime. 270 tablet 3     UNABLE TO FIND Infuse into a venous catheter at bedtime. Med Name: Normal saline with magnesium.  2 liters per patient       No current facility-administered medications for this visit.        Allergies   Allergen Reactions     Ketorolac Tromethamine Palpitations     Citalopram Analogues      GI affects       Methadone      Hallucinations       Metoclopramide Hives     GI upset     Metronidazole Hives     Mirtazapine      GI affects       Morphine Other (See Comments)     confusion     Neuromuscular Blockers, Steroidal      Unsure,[facility]      Norfloxacin      C.Diff     Other Drug Allergy (See Comments)      Steroidal Neuromuscular blocking agents- unsure  Pancuronium, vecuronium, rocuronium, rapacuronium, dacuronium, malouètine, duador, dipyrandium, pipecuronium, chandonium, pt unsure of this reaction, thinks it was painful, muscle aches     Oxycodone      Gi distress       Social History     Tobacco Use     Smoking status: Former Smoker     Packs/day: 1.00     Years: 30.00     Pack years: 30.00     Types: Cigarettes     Quit date: 1/1/2000     Years  since quittin.1     Smokeless tobacco: Never Used   Substance Use Topics     Alcohol use: No      Family History   Problem Relation Age of Onset     Colon cancer Father      Liver cancer Brother      Social History     Substance and Sexual Activity   Drug Use No        Objective     There were no vitals taken for this visit.  Physical Exam    GENERAL APPEARANCE: healthy, alert and no distress     EYES: EOMI, PERRL     HENT: ear canals and TM's normal and nose and mouth without ulcers or lesions     NECK: no adenopathy, no asymmetry, masses, or scars and thyroid normal to palpation     RESP: lungs clear to auscultation - no rales, rhonchi or wheezes     CV: regular rates and rhythm, normal S1 S2, no S3 or S4 and no murmur, click or rub     ABDOMEN:  soft, nontender, no HSM or masses and bowel sounds normal     MS: extremities normal- no gross deformities noted, no evidence of inflammation in joints, FROM in all extremities.     SKIN: no suspicious lesions or rashes     NEURO: Normal strength and tone, sensory exam grossly normal, mentation intact and speech normal     PSYCH: mentation appears normal. and affect normal/bright     LYMPHATICS: No cervical adenopathy    Recent Labs   Lab Test 21  1426 21  1050 20  1115 20  1115 19  1315 19  1315   HGB  --   --   --  10.4*  --   --    PLT  --   --   --  171  --   --    NA  --   --   --  139  --  139   K  --   --   --  4.9  --  5.0   CREATININE 1.02 1.16*   < > 1.15*   < > 1.08    < > = values in this interval not displayed.        PRE-OP Diagnostics:   Labs pending at this time. Results will be reviewed when available.  No EKG this visit, completed in the last 90 days.    REVISED CARDIAC RISK INDEX (RCRI)   The patient has the following serious cardiovascular risks for perioperative complications:   - No serious cardiac risks = 0 points    RCRI INTERPRETATION: 0 points: Class I (very low risk - 0.4% complication rate)            Signed Electronically by: Bhupendra Caldwell MD    Copy of this evaluation report is provided to requesting physician.    Preop [facility] Preop Guidelines    Revised Cardiac Risk Index

## 2021-06-16 PROBLEM — E21.3 HYPERPARATHYROIDISM (H): Status: ACTIVE | Noted: 2021-03-16

## 2021-06-16 PROBLEM — I82.5Y9 CHRONIC DEEP VEIN THROMBOSIS (DVT) OF PROXIMAL VEIN OF LOWER EXTREMITY (H): Status: ACTIVE | Noted: 2021-02-22

## 2021-06-16 PROBLEM — F11.90 CHRONIC, CONTINUOUS USE OF OPIOIDS: Status: ACTIVE | Noted: 2018-09-10

## 2021-06-16 PROBLEM — I10 BENIGN ESSENTIAL HYPERTENSION: Status: ACTIVE | Noted: 2019-11-15

## 2021-06-16 NOTE — TELEPHONE ENCOUNTER
Telephone Encounter by Sabina Renteria LPN at 10/3/2019 11:32 AM     Author: Sabina Renteria LPN Service: -- Author Type: Licensed Nurse    Filed: 10/3/2019 11:35 AM Encounter Date: 10/2/2019 Status: Signed    : Sabina Renteria LPN (Licensed Nurse)       Patient Returning Call  Reason for call:  Justina Nurse From [facility] returning call  Information relayed to patient:    Cassia Carl CMACeraparna Medical AssistantSigned  10:53 AM              []Hide copied text    []Hover for details  Left voicemail with information below and verbal orders. Requested a call back regarding more information on lab orders.  'Cassia Carl CMA ............... 10:54 AM, 10/03/19                  Patient has additional questions:  No  If YES, what are your questions/concerns:  Caller states the lab orders are Creatinine and Magnesium that patient gets every month .   Okay to leave a detailed message?: No

## 2021-06-16 NOTE — TELEPHONE ENCOUNTER
Reason for Call:  Form, our goal is to have forms completed with 72 hours, however, some forms may require a visit or additional information.    Type of letter, form or note:  [employer]    Who is the form from?: [employer] (if other please explain)    Where did the form come from: form was faxed in    What clinic location was the form placed at?: [facility]-Dr. Bhupendra Caldwell's office    Where the form was placed: unknown to [facility], Internal [place]'s please address/find    What number is listed as a contact on the form?:      ([phone]-Patient's number) Please inform patient when this form has been taken care of. She needs this form completed ASAP.  Additional comments: Urgent request    Call taken on 3/31/2021 at 12:47 PM by Prem Dorsey

## 2021-06-16 NOTE — TELEPHONE ENCOUNTER
Galina per Dr. Caldwell. Crystal notified.  Cassia Carl CMA ............... 10:28 AM, 03/23/21

## 2021-06-16 NOTE — TELEPHONE ENCOUNTER
Refill Request  Medication name:   Requested Prescriptions      No prescriptions requested or ordered in this encounter     Who prescribed the medication: carlos  Last refill on medication: 12/24/2020  Requested Pharmacy: [facility]  Last appointment with PCP: 3/16/2021  Next appointment: none    Floresita Colón, [facility]

## 2021-06-16 NOTE — PROGRESS NOTES
Pt arrived amb  From the lung clinic, the has a port but didn't know where to have her labs drawn so DR Cordova sent orders for labs and portacath flush per hospital protocol, Pt has her port already accessed becase she gets iv fluids everyday , labs drawn, capped changed  Flushed with ns then heparin, swab cap placed Pt verblaizes undrstanding of todays procedure and follow PT left amb at 1540  Marissa David

## 2021-06-16 NOTE — TELEPHONE ENCOUNTER
Reason for Call:  Home Health Care    Crystal with Homecare called regarding (reason for call): Nursing Orders    Orders are needed for this patient. Skilled Nursing Orders    Skilled Nursing: Requesting for nursing orders.   1 time a week for 9 weeks  Weekly implanted port dressing and port/needle changes. Monthly B12, monthly creatinine and magnesium lab draws. Health maintenance.   3 PRN visits for nursing for port issues and labs    Pt Provider: Dr. Caldwell    Phone Number Homecare Nurse can be reached at: [phone]    Can we leave a detailed message on this number? Yes  - secured vm    Best Time: anytime     Call taken on 3/23/2021 at 10:01 AM by Michelle Lui

## 2021-06-16 NOTE — TELEPHONE ENCOUNTER
Tianna with [facility] calling to confirm the PCP has received lab results for pt.  Tianna states they were faxed on 4/6/21.  If they have not been received please call to let them know so they can fax them again.      Ok to leave detailed message.        Beth Padilla

## 2021-06-16 NOTE — TELEPHONE ENCOUNTER
Reason for Call:  Other      Detailed comments: she called last week that patient will get her Vit b12 shot today but when they went to go  the rx the provider did not authorize a rx, so patient will not get her shot until next week Tues the 27th.     Please send in a rx to pharm, [facility] in [facility].     Phone Number to be reached at: [phone]    Best Time: anytime    Can we leave a detailed message on this number?: Yes    Call taken on 4/20/2021 at 11:00 AM by Elle Devlin

## 2021-06-16 NOTE — TELEPHONE ENCOUNTER
RN cannot approve Refill Request    RN can NOT refill this medication med is not covered by policy/route to provider. Last office visit: 3/16/2021 Bhupendra Caldwell MD Last Physical: 2/22/2021 Last MTM visit: Visit date not found Last visit same specialty: 3/16/2021 Bhupendra Caldwell MD.  Next visit within 3 mo: Visit date not found  Next physical within 3 mo: Visit date not found      Gale Asif, [facility] Triage/Med Refill 4/8/2021    Requested Prescriptions   Pending Prescriptions Disp Refills     promethazine (PHENERGAN) 25 MG tablet [Pharmacy Med Name: PROMETHAZINE 25MG TABLETS] 30 tablet 0     Sig: TAKE 1 TABLET(25 MG) BY MOUTH EVERY 6 HOURS AS NEEDED FOR NAUSEA       There is no refill protocol information for this order

## 2021-06-16 NOTE — PROGRESS NOTES
Assessment & Plan   Problem List Items Addressed This Visit     Hyperparathyroidism (H)    Benign essential hypertension - Primary           #1.  Hypertension, with hypotension now improved off of her chlorthalidone and potassium supplement.  We will continue her lisinopril.  Follow-up as needed.    2.  Hyperparathyroidism of unknown etiology.  I am going to obtain a nuclear medicine scan and she is going to set this up in the next 2 to 3 weeks.  I will call her with results and further recommendations.        No follow-ups on file.    Bhupendra Caldwell MD  [facility]      Subjective   Dee Mattson is 71 y.o. and presents today for the following health issues     #1.  Follow-up of hypertension with hypotension and dizziness at her last visit.  Her chlorthalidone and potassium supplement were stopped and she was told to follow-up with me today.  This is improved somewhat subjectively.  Blood pressure is under much better control, but she is still having some dizziness and weakness in her legs.  However, she feels that she is dehydrated and is going to give herself some more IV fluids at home.  No additional ostomy output that she has noticed recently.    2.  Her PTH continues to climb.  The etiology of her hyperparathyroidism is currently unknown.  I placed an order for parathyroid uptake scan which was scheduled for tomorrow but she unfortunately has to reschedule this.  She is going to get this done within the next 2 to 3 weeks.        Objective    /62   Pulse 79   Wt 114 lb (51.7 kg)   SpO2 98%   BMI 21.90 kg/m    Body mass index is 21.9 kg/m .  Physical Exam

## 2021-06-16 NOTE — TELEPHONE ENCOUNTER
Medication:   Disp Refills Start End LENA   HYDROmorphone (DILAUDID) 4 MG tablet 150 tablet 0 3/4/2021  No   Sig - Route: Take 1 tablet (4 mg total) by mouth 5 (five) times a day. - Oral   Sent to pharmacy as: HYDROmorphone 4 mg tablet (DILAUDID)   Earliest Fill Date: 3/4/2021       Pharmacy:  [id number] - [place], [place] - [address] AT [facility] [address]  Last Office Visit:    3/16/2021

## 2021-06-16 NOTE — TELEPHONE ENCOUNTER
RN cannot approve Refill Request    RN can NOT refill this medication med is not covered by policy/route to provider. Last office visit: 3/16/2021 Bhupendra Caldwell MD Last Physical: 2/22/2021 Last MTM visit: Visit date not found Last visit same specialty: 3/16/2021 Bhupendra Caldwell MD.  Next visit within 3 mo: Visit date not found  Next physical within 3 mo: Visit date not found      Giovanna Arnold, [facility] Triage/Med Refill 3/26/2021    Requested Prescriptions   Pending Prescriptions Disp Refills     nitrofurantoin (MACRODANTIN) 50 MG capsule [Pharmacy Med Name: NITROFURANTOIN MACRO 50MG CAPSULES] 90 capsule 0     Sig: TAKE 1 CAPSULE(50 MG) BY MOUTH AT BEDTIME       There is no refill protocol information for this order        enoxaparin ANTICOAGULANT (LOVENOX) 80 mg/0.8 mL syringe [Pharmacy Med Name: ENOXAPARIN 80MG/0.8ML SYR(10X0.8ML)] 24 mL 0     Sig: ADMINISTER 0.8 ML UNDER THE SKIN DAILY       Enoxaparin Refill Protocol   Failed - 3/26/2021  3:56 PM        Failed - Route to appropriate pool/provider     Last Anticoagulation Summary:           Passed - Provider visit in last year     Last office visit with prescriber/PCP: 3/16/2021 Bhupendra Caldwell MD OR same dept: 3/16/2021 Bhupendra Caldwell MD  Last physical: 2/22/2021       Next visit within 3 mo: Visit date not found  Next physical within 3 mo: Visit date not found  Prescriber OR PCP: Bhupendra Caldwell MD  Last diagnosis associated with med order: 1. Chronic cystitis  - nitrofurantoin (MACRODANTIN) 50 MG capsule [Pharmacy Med Name: NITROFURANTOIN MACRO 50MG CAPSULES]; TAKE 1 CAPSULE(50 MG) BY MOUTH AT BEDTIME  Dispense: 90 capsule; Refill: 0    2. Other pulmonary embolism without acute cor pulmonale, unspecified chronicity (H)  - enoxaparin ANTICOAGULANT (LOVENOX) 80 mg/0.8 mL syringe [Pharmacy Med Name: ENOXAPARIN 80MG/0.8ML SYR(10X0.8ML)]; ADMINISTER 0.8 ML UNDER THE SKIN DAILY  Dispense: 24 mL; Refill: 0     Requested  Prescriptions     Pending Prescriptions Disp Refills     nitrofurantoin (MACRODANTIN) 50 MG capsule [Pharmacy Med Name: NITROFURANTOIN MACRO 50MG CAPSULES] 90 capsule 0     Sig: TAKE 1 CAPSULE(50 MG) BY MOUTH AT BEDTIME     enoxaparin ANTICOAGULANT (LOVENOX) 80 mg/0.8 mL syringe [Pharmacy Med Name: ENOXAPARIN 80MG/0.8ML SYR(10X0.8ML)] 24 mL 0     Sig: ADMINISTER 0.8 ML UNDER THE SKIN DAILY     Refill for 10 day supply. May give one additional 5 day supply if INR not anticipated to be in therapeutic goal range in 5 days based on recent INR result.

## 2021-06-16 NOTE — PROGRESS NOTES
Pulmonary Clinic Follow-up Visit    Assessment and Plan:   Dee Mattson is a 67 y.o. female with a history of anxiety, fibromyalgia, GERD, depression, here for follow up chronic dyspnea at rest and on exertion. Testing so far including two sets of PFTs with MIP/MEP, CT chest have been unremarkable. She was recently noted to have +SSA antibody which is suggestive of Sjogren's disease; this can be associated with interstitial lung disease but there was no evidence of this on her CT chest. I reassured her that there is nothing wrong with her lungs and I do not think her paroxysms of dyspnea are related to any intrinsic lung disease.     Recommendations:  -Stop the Arnuity  -OK to resume albuterol as needed  -f/u with rheumatology as scheduled  -follow up with me in 6 months with repeat PFTs. If stable, would not recommend any additional testing. If stll very symptomatic we can consider CPET to determine if cardiac or ventilatory limitation to breathing, or deconditioning.    Patient agrees with plan.     CCx: follow up of dyspnea    HPI: Interim history: Last seen by myself on 10/26. Trialed Arnuity with no effect. Says her breathing is about the same, still gets SOB attacks mostly in the evening. Sleeps fine (takes trazodone). Has dyspnea with climbing one flight of stairs.  Reviewed multiple test results showing normal lung function and normal parenchyma based on the CT chest.  She says the only thing that helped her breathing was the albuterol inhaler, which she no longer uses.  She says she had EGD done last December, no results available to me.  Saw rheumatology, labs ordered but they haven't been done yet.    ROS:  A 12-system review was obtained and was negative with the exception of the symptoms endorsed in the history of present illness.    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 7/4/2014      Chronic fatigue syndrome 7/3/2014     Fibromyalgia 7/3/2014     GERD (gastroesophageal reflux disease)      Major depressive disorder, recurrent episode, moderate 9/6/2005       PSH:  Past Surgical History:   Procedure Laterality Date     ANAL SPHINCTEROPLASTY       APPENDECTOMY       CHOLECYSTECTOMY       COLECTOMY       AZ ABDOMEN SURGERY PROC UNLISTED      Description: Hernia Repair;  Recorded: 08/05/2009;  Comments: perineal     AZ DILATION/CURETTAGE,DIAGNOSTIC      Description: Dilation And Curettage;  Recorded: 08/05/2009;     AZ INSJ TUNNELED CTR VAD W/SUBQ PORT AGE 5 YR/> N/A 10/29/2014    Procedure: REMOVAL PICC LINE RIGHT ARM and PLACEMENT PORTACATH;  Surgeon: Jason Kirkpatrick MD;  Location: [facility];  Service: General     AZ REMOVAL GALLBLADDER      Description: Cholecystectomy;  Recorded: 07/18/2014;     AZ REMOVAL OF TONSILS,<13 Y/O      Description: Tonsillectomy;  Recorded: 08/05/2009;     AZ TOTAL ABDOM HYSTERECTOMY      Description: Total Abdominal Hysterectomy;  Recorded: 07/18/2014;     SMALL INTESTINE SURGERY       THYROIDECTOMY, PARTIAL         Allergies:  Allergies   Allergen Reactions     Citalopram Analogues      GI affects       Methadone      Hallucinations       Metoclopramide Hives     GI upset     Metronidazole Hives     Mirtazapine      GI affects       Morphine Other (See Comments)     confusion     Neuromuscular Blockers, Steroidal      Unsure,per [facility] records      Norfloxacin      C.Diff     Other Drug Allergy (See Comments)      Steroidal Neuromuscular blocking agents- unsure  Pancuronium, vecuronium, rocuronium, rapacuronium, dacuronium, malouètine, duador, dipyrandium, pipecuronium, chandonium, pt unsure of this reaction, thinks it was painful, muscle aches     Oxycodone      Gi distress       Family HX:  Family History   Problem Relation Age of Onset     Colon cancer Father      Liver cancer Brother        Social Hx:  Social History     Social History      Marital status: Single     Spouse name: N/A     Number of children: N/A     Years of education: N/A     Occupational History     Not on file.     Social History Main Topics     Smoking status: Former Smoker     Packs/day: 1.00     Years: 30.00     Types: Cigarettes     Quit date: 1/1/2000     Smokeless tobacco: Never Used     Alcohol use No     Drug use: No     Sexual activity: Not Currently     Other Topics Concern     Not on file     Social History Narrative       Current Meds:  Current Outpatient Prescriptions   Medication Sig Dispense Refill     albuterol (PROVENTIL) 2.5 mg /3 mL (0.083 %) nebulizer solution Take 3 mL (2.5 mg total) by nebulization every 6 (six) hours as needed for wheezing or shortness of breath (per RCAT). 75 mL 12     busPIRone (BUSPAR) 15 MG tablet Take 30 mg by mouth 2 (two) times a day.        cholecalciferol, vitamin D3, (VITAMIN D3) 2,000 unit cap Take 2,000 Units by mouth every morning.       CREON 6,000-19,000 -30,000 unit CpDR capsule TAKE 2 CAPSULES BY MOUTH THREE TIMES DAILY WITH MEALS 540 capsule 0     cyanocobalamin 1,000 mcg/mL injection INJECT INTO THE MUSCLE 1 ML AS DIRECTED EVERY 30 DAYS 3 mL 0     diphenoxylate-atropine (LOMOTIL) 2.5-0.025 mg per tablet Take 2 tablets by mouth 3 (three) times a day. 30 tablet 0     DULoxetine (CYMBALTA) 60 MG capsule TAKE 1 CAPSULE BY MOUTH TWICE DAILY 180 capsule 0     HYDROmorphone (DILAUDID) 4 MG tablet Take 1 tablet (4 mg total) by mouth every 6 (six) hours as needed for pain. 120 tablet 0     LYRICA 200 mg capsule TAKE 1 CAPSULE(200 MG) BY MOUTH THREE TIMES DAILY 270 capsule 0     magnesium sulfate in water 4 gram/100 mL (4 %) infusion Infuse 1 g/hr into a venous catheter at bedtime.       multivitamin therapeutic (THERAGRAN) tablet Take 1 tablet by mouth daily.       nitrofurantoin (MACRODANTIN) 50 MG capsule TAKE 1 CAPSULE(50 MG) BY MOUTH AT BEDTIME 90 capsule 0     ondansetron (ZOFRAN ODT) 8 MG disintegrating tablet Take 1 tablet (8  mg total) by mouth every 8 (eight) hours as needed for nausea. 90 tablet 0     potassium chloride SA (K-DUR,KLOR-CON) 20 MEQ tablet TAKE 1 TABLET(20 MEQ) BY MOUTH DAILY 90 tablet 0     tiZANidine (ZANAFLEX) 2 MG tablet Take 1-4 tablets qhs, start low-dose and gradually increase prn for muscle aches or insomnia, as tolerated. 90 tablet 2     traZODone (DESYREL) 150 MG tablet Take 450 mg by mouth bedtime.       traZODone (DESYREL) 150 MG tablet TAKE 3 TABLETS BY MOUTH EVERY NIGHT AT BEDTIME 270 tablet 0     No current facility-administered medications for this visit.        Physical Exam:  /74  Pulse 76  Resp 20  Wt 113 lb 9.6 oz (51.5 kg)  SpO2 98% Comment: RA  BMI 21.82 kg/m2  Gen: awake, alert, oriented, no distress  HEENT: nasal turbinates are unremarkable, no oropharyngeal lesions, no cervical or supraclavicular lymphadenopathy  CV: RRR, no M/G/R  Resp: CTAB, no focal crackles or wheezes  Abd: soft, nontender, no palpable organomegaly  Skin: no apparent rashes  Ext: no cyanosis, clubbing or edema  Neuro: alert, nonfocal    Labs:  reviewed    Imaging studies:  CT chest: IMPRESSION:   CONCLUSION:  1.  No evidence interstitial lung disease.  2.  Fluid in the esophagus could represent reflux.    PFT's  Feb 2018  FEV1/FVC is 75 and is normal.  FEV1 is 82% predicted and is normal.  FVC is 86% predicted and normal.  There was no improvement in spirometry after a single inhaled dose of bronchodilator.  TLC is 101% predicted and is normal.  RV is 120% predicted and is normal.  DLCO is 77% predicted and is normal when it   is corrected for hemoglobin.     Normal MIP and MEP  There is consistent flattening of the inspiratory limb of the flow volume loop, which may suggest variable extrathoracic obstruction..     Impression:  Full Pulmonary Function Test is normal.    Hussein Cordova MD (Avi)  [facility]  Pager [phone]  Clinic [phone]

## 2021-06-16 NOTE — PROGRESS NOTES
Preoperative Exam    Scheduled Procedure: Cataract  Surgery Date:  Right 3/26 Left 4/11  Surgery Location: [facility] Fax [phone]    Surgeon:  Dr. Echeverria    Assessment/Plan:     1. Cataract      2. Preop cardiovascular exam       Surgical Procedure Risk: Low (reported cardiac risk generally < 1%)  Have you had prior anesthesia?: Yes  Have you or any family members had a previous anesthesia reaction:  No  Do you or any family members have a history of a clotting or bleeding disorder?: No  Cardiac Risk Assessment: no increased risk for major cardiac complications    Patient approved for surgery with general or local anesthesia.      Functional Status: Independent  Patient plans to recover at home with family.     Subjective:      Dee Mattson is a 68 y.o. female who presents for a preoperative consultation.      All other systems reviewed and are negative, other than those listed in the HPI.    Pertinent History  Do you have difficulty breathing or chest pain after walking up a flight of stairs: Yes  History of obstructive sleep apnea: No  Steroid use in the last 6 months: No  Frequent Aspirin/NSAID use: No  Prior Blood Transfusion: No  Prior Blood Transfusion Reaction: No  If for some reason prior to, during or after the procedure, if it is medically indicated, would you be willing to have a blood transfusion?:  There is no transfusion refusal.    Current Outpatient Prescriptions   Medication Sig Dispense Refill     albuterol (PROAIR HFA;PROVENTIL HFA;VENTOLIN HFA) 90 mcg/actuation inhaler Inhale 2 puffs every 6 (six) hours as needed for wheezing. 2 Inhaler 11     albuterol (PROVENTIL) 2.5 mg /3 mL (0.083 %) nebulizer solution Take 3 mL (2.5 mg total) by nebulization every 6 (six) hours as needed for wheezing or shortness of breath (per RCAT). 75 mL 12     busPIRone (BUSPAR) 15 MG tablet Take 30 mg by mouth 2 (two) times a day.        cholecalciferol, vitamin D3, (VITAMIN D3) 2,000 unit cap Take  2,000 Units by mouth every morning.       cyanocobalamin 1,000 mcg/mL injection INJECT INTO THE MUSCLE 1 ML AS DIRECTED EVERY 30 DAYS 3 mL 0     diphenoxylate-atropine (LOMOTIL) 2.5-0.025 mg per tablet Take 2 tablets by mouth 3 (three) times a day. 30 tablet 0     DULoxetine (CYMBALTA) 60 MG capsule TAKE 1 CAPSULE BY MOUTH TWICE DAILY 180 capsule 0     ergocalciferol (VITAMIN D2) 50,000 unit capsule Take 1 capsule (50,000 Units total) by mouth once a week. No refills. 12 capsule 0     HYDROmorphone (DILAUDID) 4 MG tablet Take 1 tablet (4 mg total) by mouth every 6 (six) hours as needed for pain. 120 tablet 0     lipase-protease-amylase (CREON) 6,000-19,000 -30,000 unit CpDR capsule TAKE 2 CAPSULES BY MOUTH THREE TIMES DAILY WITH MEALS 540 capsule 0     magnesium sulfate in water 4 gram/100 mL (4 %) infusion Infuse 1 g/hr into a venous catheter at bedtime.       multivitamin therapeutic (THERAGRAN) tablet Take 1 tablet by mouth daily.       nitrofurantoin (MACRODANTIN) 50 MG capsule TAKE 1 CAPSULE(50 MG) BY MOUTH AT BEDTIME 90 capsule 0     ondansetron (ZOFRAN ODT) 8 MG disintegrating tablet Take 1 tablet (8 mg total) by mouth every 8 (eight) hours as needed for nausea. 90 tablet 0     potassium chloride SA (K-DUR,KLOR-CON) 20 MEQ tablet TAKE 1 TABLET(20 MEQ) BY MOUTH DAILY 90 tablet 0     pregabalin (LYRICA) 200 MG capsule TAKE 1 CAPSULE(200 MG) BY MOUTH THREE TIMES DAILY 270 capsule 0     tiZANidine (ZANAFLEX) 2 MG tablet Take 1-4 tablets qhs, start low-dose and gradually increase prn for muscle aches or insomnia, as tolerated. 90 tablet 2     traZODone (DESYREL) 150 MG tablet Take 450 mg by mouth bedtime.       traZODone (DESYREL) 150 MG tablet TAKE 3 TABLETS BY MOUTH EVERY NIGHT AT BEDTIME 270 tablet 0     No current facility-administered medications for this visit.         Allergies   Allergen Reactions     Citalopram Analogues      GI affects       Methadone      Hallucinations       Metoclopramide Hives     GI  upset     Metronidazole Hives     Mirtazapine      GI affects       Morphine Other (See Comments)     confusion     Neuromuscular Blockers, Steroidal      Unsure,per [facility] records      Norfloxacin      C.Diff     Other Drug Allergy (See Comments)      Steroidal Neuromuscular blocking agents- unsure  Pancuronium, vecuronium, rocuronium, rapacuronium, dacuronium, malouètine, duador, dipyrandium, pipecuronium, chandonium, pt unsure of this reaction, thinks it was painful, muscle aches     Oxycodone      Gi distress       Patient Active Problem List   Diagnosis     Chronic fatigue syndrome     Postinflammatory pulmonary fibrosis     History of malignant neoplasm of large intestine     Personal history of lymphatic and hematopoietic neoplasm     Major depressive disorder, recurrent episode, moderate     Asthma     GERD (gastroesophageal reflux disease)     Fibromyalgia     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     Chronic migraine     Vitamin B12 deficiency     Chronic cystitis     Hypomagnesemia     Anxiety and depression     S/P total colectomy     High output ileostomy     CKD (chronic kidney disease), stage 2 (mild)     LORD (dyspnea on exertion)       Past Medical History:   Diagnosis Date     Anxiety and depression      Asthma 4/10/2012     Bowel obstruction      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 7/4/2014     Chronic fatigue syndrome 7/3/2014     Fibromyalgia 7/3/2014     GERD (gastroesophageal reflux disease)      Major depressive disorder, recurrent episode, moderate 9/6/2005       Past Surgical History:   Procedure Laterality Date     ANAL SPHINCTEROPLASTY       APPENDECTOMY       CHOLECYSTECTOMY       COLECTOMY       MA ABDOMEN SURGERY PROC UNLISTED      Description: Hernia Repair;  Recorded: 08/05/2009;  Comments: perineal     MA DILATION/CURETTAGE,DIAGNOSTIC      Description: Dilation And Curettage;  Recorded: 08/05/2009;     MA INSJ TUNNELED CTR VAD W/SUBQ PORT AGE 5 YR/> N/A 10/29/2014     Procedure: REMOVAL PICC LINE RIGHT ARM and PLACEMENT PORTACATH;  Surgeon: Jason Kirkpatrick MD;  Location: [facility] Main OR;  Service: General     VA REMOVAL GALLBLADDER      Description: Cholecystectomy;  Recorded: 07/18/2014;     VA REMOVAL OF TONSILS,<11 Y/O      Description: Tonsillectomy;  Recorded: 08/05/2009;     VA TOTAL ABDOM HYSTERECTOMY      Description: Total Abdominal Hysterectomy;  Recorded: 07/18/2014;     SMALL INTESTINE SURGERY       THYROIDECTOMY, PARTIAL         Social History     Social History     Marital status: Single     Spouse name: N/A     Number of children: N/A     Years of education: N/A     Occupational History     Not on file.     Social History Main Topics     Smoking status: Former Smoker     Packs/day: 1.00     Years: 30.00     Types: Cigarettes     Quit date: 1/1/2000     Smokeless tobacco: Never Used     Alcohol use No     Drug use: No     Sexual activity: Not Currently     Other Topics Concern     Not on file     Social History Narrative             Objective:     There were no vitals filed for this visit.      Physical Exam:  OBJECTIVE:    In general the patient is alert pleasant and in no acute distress.    HEENT: Pupils equal round reactive light.  Oropharynx is clear.  Lymphatic shows no anterior posterior cervical lymphadenopathy.      Cardiovascular: S1-S2 regular in rhythm no murmurs gallops rubs    Lungs are clear    Abdomen is soft nontender nondistended.  No HSM.    Extremities show no pedal edema present bilaterally.  DP pulses are 2+ and normal bilaterally.      Electronically signed by Bhupendra aCldwell MD 03/15/18 2:24 PM

## 2021-06-16 NOTE — TELEPHONE ENCOUNTER
Reason for Call:  Other FYI     Detailed comments:   Tianna with [facility]  Unable to administer B12 today will be given the shot next week when prescription is available.   Last given 3/16      Call taken on 4/13/2021 at 11:45 AM by Laura L Goldberg

## 2021-06-17 NOTE — TELEPHONE ENCOUNTER
Reason for Call: NADEEM Wynn from [facility] calling for clarification on Lovenox injections.  Tianna states she wants to make sure that the injections were discontinued at patients appointment yesterday.      Tianna also states that patient forgot to discuss referral to clinic dietician and patient would like to set up an appointment for that.       Phone Number Patient can be reached at: Other phone number:  [phone]    Best Time: anytime    Can we leave a detailed message on this number?: Yes    Call taken on 5/18/2021 at 11:57 AM by Beth Padilla

## 2021-06-17 NOTE — TELEPHONE ENCOUNTER
Told by  she can take GasX or Simethicone -  It is 125 mg - for 1-2 soft gels as needed after meals and bedtime and not to exceed 4 capsules in 24 hours.  Patient states she takes 8 capsules a day and patient says Dr Caldwell was OK with this, Homecare checking to make sure Dr is ok with this.

## 2021-06-17 NOTE — ANESTHESIA POSTPROCEDURE EVALUATION
Patient: Dee Mattson  Procedure(s):  PELVIC FLOOR BOTOX  Anesthesia type: MAC    Patient location: Phase II Recovery  Last vitals:   Vitals Value Taken Time   /73 05/24/21 1330   Temp 36.6  C (97.8  F) 05/24/21 1242   Pulse 72 05/24/21 1336   Resp 16 05/24/21 1315   SpO2 98 % 05/24/21 1336   Vitals shown include unvalidated device data.  Post vital signs: stable  Level of consciousness: awake and responds to simple questions  Post-anesthesia pain: pain controlled  Post-anesthesia nausea and vomiting: no  Pulmonary: unassisted, return to baseline  Cardiovascular: stable and blood pressure at baseline  Hydration: adequate  Anesthetic events: no    QCDR Measures:  ASA# 11 - Comfort-op Cardiac Arrest: ASA11B - Patient did NOT experience unanticipated cardiac arrest  ASA# 12 - Comfort-op Mortality Rate: ASA12B - Patient did NOT die  ASA# 13 - PACU Re-Intubation Rate: ASA13B - Patient did NOT require a new airway mgmt  ASA# 10 - Composite Anes Safety: ASA10A - No serious adverse event    Additional Notes: Patient with port. Had it accessed last week and did not lock it in anticipation of us using it today. We will hep lock it when we find what the care instructions are from her PCP.

## 2021-06-17 NOTE — TELEPHONE ENCOUNTER
RN cannot approve Refill Request    RN can NOT refill this medication med is not covered by policy/route to provider. Last office visit: 3/16/2021 Bhupendra Caldwell MD Last Physical: 5/17/2021 Last MTM visit: Visit date not found Last visit same specialty: 3/16/2021 Bhupendra Caldwell MD.  Next visit within 3 mo: Visit date not found  Next physical within 3 mo: Visit date not found      Gale Asif, [facility] Triage/Med Refill 5/20/2021    Requested Prescriptions   Pending Prescriptions Disp Refills     ergocalciferol (ERGOCALCIFEROL) 1,250 mcg (50,000 unit) capsule [Pharmacy Med Name: VITAMIN D2 50,000IU (ERGO) CAP RX] 13 capsule 0     Sig: TAKE 1 CAPSULE BY MOUTH 1 TIME A WEEK FOR 12 DOSES       There is no refill protocol information for this order

## 2021-06-17 NOTE — TELEPHONE ENCOUNTER
Reason for Call:  Home Health Care    Crystal with [facility] called regarding (reason for call): verbal    Orders are needed for this patient. Skilled Nsg    PT: na    OT: na    Skilled Nursin x week for 8 weeks  For monthly labs draws for magnesium and creatine and monthly B12 injections, weekly portacath dressing and needle change, and overall health maintanance.    3 PRN visits for Portacath issues or recertication    Requesting to discontinue Chlorthalidon, patient states has not taken for a long time.    Told by  she can take GasX or Simethicone -  It is 125 mg - for 1-2 soft gels as needed after meals and bedtime and not to exceed 4 capsules in 24 hours.   Patient states she takes 8 capsules a day and patient says Dr Caldwell was OK with this, Homecare checking to make sure Dr is ok with this.    Last week Homecare requested dietician referral, patient states she has not heard from anyone.    Pt Provider: Dr Bhupendra Caldwell    Phone Number Homecare Nurse can be reached at: [phone]    Can we leave a detailed message on this number? Yes       Call taken on 2021 at 11:47 AM by Laura L Goldberg

## 2021-06-17 NOTE — TELEPHONE ENCOUNTER
" \"She will be off of her Lovenox as she does not need to continue this anymore as her pulmonary embolism during her hospitalization was provoked in 2019.\"  "

## 2021-06-17 NOTE — ANESTHESIA CARE TRANSFER NOTE
Last vitals:   Vitals:    05/24/21 1242   BP: (P) 131/60   Pulse: (P) 76   Resp: (P) 16   Temp: (P) 36.6  C (97.8  F)   SpO2: (P) 100%     Patient's level of consciousness is awake and drowsy  Spontaneous respirations: yes  Maintains airway independently: yes  Dentition unchanged: yes  Oropharynx: oropharynx clear of all foreign objects    QCDR Measures:  ASA# 20 - Surgical Safety Checklist: WHO surgical safety checklist completed prior to induction    PQRS# 430 - Adult PONV Prevention: 4558F - Pt received => 2 anti-emetic agents (different classes) preop & intraop  ASA# 8 - Peds PONV Prevention: NA - Not pediatric patient, not GA or 2 or more risk factors NOT present  PQRS# 424 - Comfort-op Temp Management: 4559F - At least one body temp DOCUMENTED => 35.5C or 95.9F within required timeframe  PQRS# 426 - PACU Transfer Protocol: - Transfer of care checklist used  ASA# 14 - Acute Post-op Pain: ASA14B - Patient did NOT experience pain >= 7 out of 10

## 2021-06-17 NOTE — TELEPHONE ENCOUNTER
Reason for Call:  Other      Detailed comments: Mariella with [facility] calling with update    Referral for counselor for depression, patient is going to contact her [employer] to discuss with a Spiritism counselor.  She would like to discuss this at her next visit.    Pharmacist at [facility] called [facility] nurse regarding weight loss - patient has lost 4lb   114 lb in March and now she is 110 lb, patient states not appetite related sometimes food goes right thru her ileostomy.  She is going to request taking to a nutritionist. and want to make sure this is discussed at appointment also.      Call taken on 5/11/2021 at 11:53 AM by Laura L Goldberg

## 2021-06-17 NOTE — TELEPHONE ENCOUNTER
Nurse informed - pt would like a referral to a dietician because she is frustrated that she can't eat a lot of food because of her ostomy bag - [facility] nurses also worried about her losing a pound over one month....      Per Dr. Caldwell pt did bring this up yesterday and she was informed that this is a GI issue - [facility]J told pt to follow up her her GI doctor first before he would consider placing a referral.

## 2021-06-17 NOTE — TELEPHONE ENCOUNTER
Reason for Call:  Home Health Care    crystal with [facility] called regarding (reason for call): for an FYI    Orders are needed for this patient. Her portacath came loose and has some edema, burning, she will follow up with them if needed, Pt state she is depressed was informed to call PCP and get a visit and get a referral.       Phone Number Homecare Nurse can be reached at: [phone]    Can we leave a detailed message on this number? Yes     Phone number patient can be reached at: Cell number on file:    Telephone Information:   Mobile [phone]       Best Time: anytime    Call taken on 5/6/2021 at 12:47 PM by Sharee Ware

## 2021-06-17 NOTE — PROGRESS NOTES
[facility]  [address] [place] [id number]  Dept: [phone]  Dept Fax: [phone]  Primary Provider: Bhupendra Caldwell MD  Pre-op Performing Provider: BHUPENDRA CALDWELL      PREOPERATIVE EVALUATION:  Today's date: 5/17/2021    Dee Mattson is a 71 y.o. female who presents for a preoperative evaluation.    Surgical Information:  Surgery/Procedure: pelvic floor botox  Surgery Location: [facility]  Surgeon: Dr Cade  Surgery Date: 5/24/21  Time of Surgery: na  Where patient plans to recover: At home with family  Fax number for surgical facility: Note does not need to be faxed, will be available electronically in Epic.    Type of Anesthesia Anticipated: to be determined      Patient is cleared to undergo the above surgery.  Hold lisinopril the morning of surgery.  She will be off of her Lovenox as she does not need to continue this anymore as her pulmonary embolism during her hospitalization was provoked in 2019.      Subjective     HPI related to upcoming procedure: Dee has persistent perianal and rectal pain and is going to undergo pelvic floor Botox to hopefully definitively treat this.  She is otherwise in her usual state of health today.  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  History of hypertension, well controlled.  She has no history of obstructive sleep apnea or underlying lung disease.    Preop Questions 5/17/2021   Have you ever had a heart attack or stroke? UNKNOWN -    Have you ever had surgery on your heart or blood vessels, such as a stent placement, a coronary artery bypass, or surgery on an artery in your head, neck, heart, or legs? No   Do you have chest pain with activity? No   Do you have a history of  heart failure? No   Do you currently have a cold, bronchitis or symptoms of other infection? No   Do you have a cough, shortness of breath, or wheezing? No   Do you or anyone in your  family have previous history of blood clots? YES -    Do you or does anyone in your family have a serious bleeding problem such as prolonged bleeding following surgeries or cuts? No   Have you ever had problems with anemia or been told to take iron pills? No   Have you had any abnormal blood loss such as black, tarry or bloody stools, or abnormal vaginal bleeding? No   Have you ever had a blood transfusion? UNKNOWN -    Have you ever had a transfusion reaction? -   Are you willing to have a blood transfusion if it is medically needed before, during, or after your surgery? Yes   Have you or any of your relatives ever had problems with anesthesia? No   Do you have sleep apnea, excessive snoring or daytime drowsiness? No   Do you have any artifical heart valves or other implanted medical devices like a pacemaker, defibrillator, or continuous glucose monitor? No   Do you have artificial joints? No   Are you allergic to latex? No     Health Care Directive:  Patient has a Health Care Directive on file.     Review of Systems  Constitutional, neuro, ENT, endocrine, pulmonary, cardiac, gastrointestinal, genitourinary, musculoskeletal, integument and psychiatric systems are negative, except as otherwise noted.      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 07/04/2014     Chronic fatigue syndrome 07/03/2014     Fibromyalgia 07/03/2014     GERD (gastroesophageal reflux disease)      History of malignant  neoplasm of large intestine 04/10/2012     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/10/2012     Asthma 04/10/2012     Past Medical History:   Diagnosis Date     Anxiety and depression      Asthma 4/10/2012     Bowel obstruction (H)      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 7/4/2014     Chronic fatigue syndrome 7/3/2014     Disease of thyroid gland      Fibromyalgia 7/3/2014     GERD (gastroesophageal reflux disease)      History of anesthesia complications     Slower to wake up     History of blood clots      Hodgkin's lymphoma (H) 10/1979     Hypertension      Major depressive disorder, recurrent episode, moderate (H) 9/6/2005     PONV (postoperative nausea and vomiting)      Shortness of breath      TIA (transient ischemic attack)      Past Surgical History:   Procedure Laterality Date     ANAL SPHINCTEROPLASTY       APPENDECTOMY       CHOLECYSTECTOMY       COLECTOMY       HYSTERECTOMY  05/2000     ILEOSTOMY       OOPHORECTOMY Bilateral 05/2000     SD ABDOMEN SURGERY PROC UNLISTED      Description: Hernia Repair;  Recorded: 08/05/2009;  Comments: perineal     SD CYSTOURETHROSCOPY INJ CHEMODENERVATION BLADDER N/A 2/25/2021    Procedure: PELVIC FLOOR BOTOX;  Surgeon: José Antonio Cade MD;  Location: [facility];  Service: Gynecology     SD DILATION/CURETTAGE,DIAGNOSTIC      Description: Dilation And Curettage;  Recorded: 08/05/2009;     SD INSJ TUNNELED CTR VAD W/SUBQ PORT AGE 5 YR/> N/A 10/29/2014    Procedure: REMOVAL PICC LINE RIGHT ARM and PLACEMENT PORTACATH;  Surgeon: Jason Kirkpatrick MD;  Location: [facility];  Service: General     SD REMOVAL GALLBLADDER      Description: Cholecystectomy;  Recorded: 07/18/2014;     SD REMOVAL OF TONSILS,<11 Y/O      Description: Tonsillectomy;  Recorded: 08/05/2009;     SD RMVL JENY CTR VAD W/SUBQ PORT/ CTR/PRPH INSJ N/A 7/20/2018    Procedure: REMOVAL OF PORT A CATH;  Surgeon: Jason Kirkpatrick MD;  Location: [facility];   Service: General     RI TOTAL ABDOM HYSTERECTOMY      Description: Total Abdominal Hysterectomy;  Recorded: 07/18/2014;     SMALL INTESTINE SURGERY       THYROIDECTOMY, PARTIAL       TUNNELED VENOUS CATHETER PLACEMENT N/A 8/29/2018    Procedure: PORT-A-CATH INSERTION;  Surgeon: Jason Kirkpatrick MD;  Location: [facility];  Service:      Current Outpatient Medications   Medication Sig Dispense Refill     acetaminophen (TYLENOL) 325 MG tablet Take 2 tablets (650 mg total) by mouth every 4 (four) hours as needed for pain. 100 tablet 0     busPIRone (BUSPAR) 15 MG tablet TAKE 2 TABLETS BY MOUTH TWICE DAILY 360 tablet 3     cholecalciferol, vitamin D3, (VITAMIN D3) 2,000 unit cap Take 2,000 Units by mouth every morning.       cyanocobalamin 1,000 mcg/mL injection ADMINISTER 1 ML IN THE MUSCLE EVERY 30 DAYS AS DIRECTED 3 mL 11     diphenoxylate-atropine (LOMOTIL) 2.5-0.025 mg per tablet TAKE 2 TABLETS BY MOUTH THREE TIMES DAILY 180 tablet 0     DULoxetine (CYMBALTA) 60 MG capsule Take 1 capsule (60 mg total) by mouth 2 (two) times a day. 180 capsule 3     enoxaparin ANTICOAGULANT (LOVENOX) 80 mg/0.8 mL syringe ADMINISTER 0.8 ML UNDER THE SKIN DAILY 24 mL 0     ergocalciferol (ERGOCALCIFEROL) 1,250 mcg (50,000 unit) capsule TAKE 1 CAPSULE BY MOUTH 1 TIME A WEEK FOR 12 DOSES 13 capsule 0     HYDROmorphone (DILAUDID) 4 MG tablet Take 1 tablet (4 mg total) by mouth 5 (five) times a day. 150 tablet 0     Lactobacillus acidophilus (ACIDOPHILUS ORAL) Take 1 tablet by mouth 2 (two) times a day.       lipase-protease-amylase (CREON) 6,000-19,000 -30,000 unit CpDR capsule TAKE 2 CAPSULES BY MOUTH THREE TIMES DAILY WITH FOOD 540 capsule 0     lisinopriL (PRINIVIL,ZESTRIL) 20 MG tablet TAKE 1 TABLET(20 MG) BY MOUTH DAILY 90 tablet 3     loperamide (IMODIUM) 2 mg capsule Take 2-4 mg by mouth 2 (two) times a day as needed.   2     multivit-min/folic acid/jqa763 (ALIVE WOMEN'S GUMMY VITAMINS ORAL) Take 2 tablets by mouth Daily  after breakfast.        nitrofurantoin (MACRODANTIN) 50 MG capsule TAKE 1 CAPSULE(50 MG) BY MOUTH AT BEDTIME 90 capsule 0     pregabalin (LYRICA) 200 MG capsule TAKE 1 CAPSULE(200 MG) BY MOUTH THREE TIMES DAILY 270 capsule 0     promethazine (PHENERGAN) 25 MG tablet TAKE 1 TABLET(25 MG) BY MOUTH EVERY 6 HOURS AS NEEDED FOR NAUSEA 30 tablet 0     traZODone (DESYREL) 150 MG tablet Take 3 tablets (450 mg total) by mouth at bedtime. 270 tablet 3     UNABLE TO FIND Infuse into a venous catheter at bedtime. Med Name: Normal saline with magnesium.  2 liters per patient       No current facility-administered medications for this visit.        Allergies   Allergen Reactions     Ketorolac Tromethamine Palpitations     Citalopram Analogues      GI affects       Methadone      Hallucinations       Metoclopramide Hives     GI upset     Metronidazole Hives     Mirtazapine      GI affects       Morphine Other (See Comments)     confusion     Neuromuscular Blockers, Steroidal      Unsure,per [facility] records      Norfloxacin      C.Diff     Other Drug Allergy (See Comments)      Steroidal Neuromuscular blocking agents- unsure  Pancuronium, vecuronium, rocuronium, rapacuronium, dacuronium, malouètine, duador, dipyrandium, pipecuronium, chandonium, pt unsure of this reaction, thinks it was painful, muscle aches     Oxycodone      Gi distress       Social History     Tobacco Use     Smoking status: Former Smoker     Packs/day: 1.00     Years: 30.00     Pack years: 30.00     Types: Cigarettes     Quit date: 2000     Years since quittin.3     Smokeless tobacco: Never Used   Substance Use Topics     Alcohol use: No      Family History   Problem Relation Age of Onset     Colon cancer Father      Liver cancer Brother      Social History     Substance and Sexual Activity   Drug Use No        Objective     There were no vitals taken for this visit.  Physical Exam    GENERAL APPEARANCE: healthy, alert and no distress     EYES: EOMI,  PERRL     HENT: ear canals and TM's normal and nose and mouth without ulcers or lesions     NECK: no adenopathy, no asymmetry, masses, or scars and thyroid normal to palpation     RESP: lungs clear to auscultation - no rales, rhonchi or wheezes     CV: regular rates and rhythm, normal S1 S2, no S3 or S4 and no murmur, click or rub     ABDOMEN:  soft, nontender, no HSM or masses and bowel sounds normal     MS: extremities normal- no gross deformities noted, no evidence of inflammation in joints, FROM in all extremities.     NEURO: Normal strength and tone, sensory exam grossly normal, mentation intact and speech normal     PSYCH: mentation appears normal. and affect normal/bright     LYMPHATICS: No cervical adenopathy    Recent Labs   Lab Test 05/06/21  1159 04/06/21  1118 03/08/21  1200 02/23/21  1113 11/03/20  1115 11/03/20  1115   HGB  --   --   --   --   --  10.4*   PLT  --   --   --   --   --  171   NA  --   --  141 140  --  139   K  --   --  4.4 4.7  --  4.9   CREATININE 1.08 1.09 1.09  1.09 1.25*   < > 1.15*    < > = values in this interval not displayed.        PRE-OP Diagnostics:   No labs were ordered during this visit.    EKG was obtained and personally reviewed by me today.  Normal sinus rhythm, no ST or T wave changes .    REVISED CARDIAC RISK INDEX (RCRI)   The patient has the following serious cardiovascular risks for perioperative complications:   - No serious cardiac risks = 0 points    RCRI INTERPRETATION: 0 points: Class I (very low risk - 0.4% complication rate)         Signed Electronically by: Bhupendra Caldwell MD    Copy of this evaluation report is provided to requesting physician.

## 2021-06-17 NOTE — ANESTHESIA PREPROCEDURE EVALUATION
Anesthesia Evaluation      Patient summary reviewed   History of anesthetic complications (PONV)     Airway   Mallampati: II  Neck ROM: full   Pulmonary - normal exam   (+) asthma  mild, shortness of breath (Chronic fatigue syndrome),     ROS comment: Pulmonary fibrosis                         Cardiovascular - negative ROS and normal exam  (+) hypertension well controlled, ,      Neuro/Psych    (+) neuromuscular disease (fibromylagia),  CVA (h/o TIA) , depression, anxiety/panic attacks, chronic pain (pelvic floor - baseline 5/10)    Comments: Chronic migraines    Endo/Other - negative ROS      Comments: H/o lymphoma    GI/Hepatic/Renal    (+) GERD well controlled,   chronic renal disease (CKD II),     Comments: Colon CA  Short bowel syndrome - infuses 2L IV fluids via port every night          Dental - normal exam   (+) lower dentures and upper dentures                         Anesthesia Plan  Planned anesthetic: MAC  Versed/fentanyl/propofol  Ketamine PRN  Decadron/zofran    Discussed possibility of intra-op awareness or need to convert to GETA.       ASA 3   Induction: intravenous   Anesthetic plan and risks discussed with: patient and sibling  Anesthesia plan special considerations: antiemetics,   Post-op plan: routine recovery      2/22/21 covid pcr negative      Results for orders placed or performed in visit on 05/20/21   COVID-19 Virus PCR MRF    Specimen: Respiratory   Result Value Ref Range    COVID-19 VIRUS SPECIMEN SOURCE Nasopharyngeal     2019-nCOV       Test received-See reflex to IDDL test SARS CoV2 (COVID-19) Virus RT-PCR   SARS-CoV-2 (COVID-19)-PCR    Specimen: Respiratory   Result Value Ref Range    SARS-CoV-2 Virus Specimen Source Nasopharyngeal     SARS-CoV-2 PCR Result NEGATIVE     SARS-COV-2 PCR COMMENT       Testing was performed using the hernan SARS-CoV-2 assay on the hernan [employer]     *Note: Due to a large number of results and/or encounters for the requested time period, some results have not  been displayed. A complete set of results can be found in Results Review.

## 2021-06-19 NOTE — PROGRESS NOTES
Impression:  Probably infected Port-A-Cath    Plan:  Augmentin, call Dr. Saleh in the morning and schedule an appointment as the Port-A-Cath may need to be removed and replaced.  Return if fever chest pain shortness of breath worsening redness or other problem or unable to take medication      Chief Complaint:  Chief Complaint   Patient presents with     Other     port is oozing and stoma is bleeding for over 1 week         HPI:   Dee Mattson is a 68 y.o. female who presents to this clinic for the evaluation of affection around her Port-A-Cath.  The patient has short-bowel syndrome secondary to multiple intestinal resections.  She has a Port-A-Cath in and receives IV fluids nightly through that.  She has noticed some redness around the Port-A-Cath site past 2-3 days.  No fever, no pain, no other rash.  No abdominal pain vomiting or diarrhea.  No cough chest pain or shortness of breath.  No other skin lesions.  A couple years ago she had an infection of her previous port and that had to be removed.      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 7/4/2014     Chronic fatigue syndrome 7/3/2014     Fibromyalgia 7/3/2014     GERD (gastroesophageal reflux disease)      Major depressive disorder, recurrent episode, moderate (H) 9/6/2005     Past Surgical History:   Procedure Laterality Date     ANAL SPHINCTEROPLASTY       APPENDECTOMY       CHOLECYSTECTOMY       COLECTOMY       GA ABDOMEN SURGERY PROC UNLISTED      Description: Hernia Repair;  Recorded: 08/05/2009;  Comments: perineal     GA DILATION/CURETTAGE,DIAGNOSTIC      Description: Dilation And Curettage;  Recorded: 08/05/2009;     GA INSJ TUNNELED CTR VAD W/SUBQ PORT AGE 5 YR/> N/A 10/29/2014    Procedure: REMOVAL PICC LINE RIGHT ARM and PLACEMENT PORTACATH;  Surgeon: Jason Kirkpatrick MD;  Location: [facility];  Service: General      CA REMOVAL GALLBLADDER      Description: Cholecystectomy;  Recorded: 07/18/2014;     CA REMOVAL OF TONSILS,<13 Y/O      Description: Tonsillectomy;  Recorded: 08/05/2009;     CA TOTAL ABDOM HYSTERECTOMY      Description: Total Abdominal Hysterectomy;  Recorded: 07/18/2014;     SMALL INTESTINE SURGERY       THYROIDECTOMY, PARTIAL           ROS:    All other systems negative    Meds:    Current Outpatient Prescriptions:      busPIRone (BUSPAR) 15 MG tablet, Take 30 mg by mouth 2 (two) times a day. , Disp: , Rfl:      busPIRone (BUSPAR) 15 MG tablet, TAKE 2 TABLETS BY MOUTH TWICE DAILY, Disp: 360 tablet, Rfl: 0     cholecalciferol, vitamin D3, (VITAMIN D3) 2,000 unit cap, Take 2,000 Units by mouth every morning., Disp: , Rfl:      CREON 6,000-19,000 -30,000 unit CpDR capsule, TAKE 2 CAPSULE BY MOUTH THREE TIMES DAILY WITH MEALS, Disp: 540 capsule, Rfl: 0     cyanocobalamin 1,000 mcg/mL injection, ADMINISTER 1 ML IN THE MUSCLE EVERY 30 DAYS AS DIRECTED, Disp: 3 mL, Rfl: 0     diphenoxylate-atropine (LOMOTIL) 2.5-0.025 mg per tablet, Take 2 tablets by mouth 3 (three) times a day., Disp: 30 tablet, Rfl: 0     DULoxetine (CYMBALTA) 60 MG capsule, TAKE 1 CAPSULE BY MOUTH TWICE DAILY, Disp: 180 capsule, Rfl: 2     ergocalciferol (VITAMIN D2) 50,000 unit capsule, Take 1 capsule (50,000 Units total) by mouth once a week. No refills., Disp: 12 capsule, Rfl: 0     HYDROmorphone (DILAUDID) 4 MG tablet, Take 1 tablet (4 mg total) by mouth every 6 (six) hours as needed for pain., Disp: 120 tablet, Rfl: 0     LYRICA 200 mg capsule, TAKE 1 CAPSULE(200 MG) BY MOUTH THREE TIMES DAILY, Disp: 270 capsule, Rfl: 0     magnesium sulfate in water 4 gram/100 mL (4 %) infusion, Infuse 1 g/hr into a venous catheter at bedtime., Disp: , Rfl:      mirtazapine (REMERON) 30 MG tablet, Take by mouth., Disp: , Rfl:      multivitamin therapeutic (THERAGRAN) tablet, Take 1 tablet by mouth daily., Disp: , Rfl:      naloxegol (MOVANTIK) 12.5 mg Tab  tablet, Take by mouth., Disp: , Rfl:      nitrofurantoin (MACRODANTIN) 50 MG capsule, TAKE 1 CAPSULE(50 MG) BY MOUTH AT BEDTIME, Disp: 90 capsule, Rfl: 0     nitrofurantoin (MACRODANTIN) 50 MG capsule, TAKE 1 CAPSULE(50 MG) BY MOUTH AT BEDTIME, Disp: 90 capsule, Rfl: 0     ondansetron (ZOFRAN ODT) 8 MG disintegrating tablet, Take 1 tablet (8 mg total) by mouth every 8 (eight) hours as needed for nausea., Disp: 90 tablet, Rfl: 0     potassium chloride SA (K-DUR,KLOR-CON) 20 MEQ tablet, TAKE 1 TABLET(20 MEQ) BY MOUTH DAILY, Disp: 90 tablet, Rfl: 1     traZODone (DESYREL) 150 MG tablet, Take 450 mg by mouth bedtime., Disp: , Rfl:      traZODone (DESYREL) 150 MG tablet, TAKE 3 TABLETS BY MOUTH EVERY NIGHT AT BEDTIME, Disp: 270 tablet, Rfl: 0     albuterol (PROAIR HFA;PROVENTIL HFA;VENTOLIN HFA) 90 mcg/actuation inhaler, Inhale 2 puffs every 6 (six) hours as needed for wheezing., Disp: 2 Inhaler, Rfl: 11     albuterol (PROVENTIL) 2.5 mg /3 mL (0.083 %) nebulizer solution, Take 3 mL (2.5 mg total) by nebulization every 6 (six) hours as needed for wheezing or shortness of breath (per [facility])., Disp: 75 mL, Rfl: 12     tiZANidine (ZANAFLEX) 2 MG tablet, Take 1-4 tablets qhs, start low-dose and gradually increase prn for muscle aches or insomnia, as tolerated., Disp: 90 tablet, Rfl: 2        Social:  Social History     Social History     Marital status: Single     Spouse name: N/A     Number of children: N/A     Years of education: N/A     Occupational History     Not on file.     Social History Main Topics     Smoking status: Former Smoker     Packs/day: 1.00     Years: 30.00     Types: Cigarettes     Quit date: 1/1/2000     Smokeless tobacco: Never Used     Alcohol use No     Drug use: No     Sexual activity: Not Currently     Other Topics Concern     Not on file     Social History Narrative         Physical Exam:  Eyes: PERRL, EOMI  Head: Atraumatic and normocephalic  Pharynx: Clear, airway patent  Neck: No mass or  tenderness  Lungs: Clear without distress  CV: Regular without murmur  Abdomen: There is a colostomy in the right lower quadrant, no tenderness or mass  Extremities: No tenderness or deformity  Skin: There is a Port-A-Cath in the left upper chest wall.  There is some erythema surrounding the port about 3 cm in diameter.  No fluctuance.  No tenderness.  No other skin lesions  Neuro: Normal motor and sensory function in all extremities  Psych: Awake, alert, normally responsive      Results:    No results found for this or any previous visit (from the past 24 hour(s)).    No results found.      Arik Bee MD

## 2021-06-19 NOTE — ANESTHESIA POSTPROCEDURE EVALUATION
Patient: Dee RAMIREZ Cable  REMOVAL OF PORT A CATH  Anesthesia type: MAC    Patient location: PACU  Last vitals:   Vitals:    07/20/18 1454   BP: 153/69   Pulse: 75   Resp: 14   Temp: 37.4  C (99.4  F)   SpO2: 100%     Post vital signs: stable  Level of consciousness: awake and responds to simple questions  Post-anesthesia pain: pain controlled  Post-anesthesia nausea and vomiting: no  Pulmonary: unassisted, return to baseline  Cardiovascular: stable and blood pressure at baseline  Hydration: adequate  Anesthetic events: no    QCDR Measures:  ASA# 11 - Comfort-op Cardiac Arrest: ASA11B - Patient did NOT experience unanticipated cardiac arrest  ASA# 12 - Comfort-op Mortality Rate: ASA12B - Patient did NOT die  ASA# 13 - PACU Re-Intubation Rate: ASA13B - Patient did NOT require a new airway mgmt  ASA# 10 - Composite Anes Safety: ASA10A - No serious adverse event    Additional Notes:

## 2021-06-19 NOTE — ANESTHESIA PREPROCEDURE EVALUATION
Anesthesia Evaluation      Patient summary reviewed   No history of anesthetic complications     Airway   Mallampati: I  Neck ROM: full   Pulmonary - normal exam   (+) asthma  shortness of breath,     ROS comment: Post-inflammatory PF.                         Cardiovascular - negative ROS and normal exam   Neuro/Psych    (+) neuromuscular disease,  depression, anxiety/panic attacks,     Endo/Other    (+) hypothyroidism,      GI/Hepatic/Renal    (+) GERD,   chronic renal disease,           Dental - normal exam                        Anesthesia Plan  Planned anesthetic: MAC  Diprivan infusion.  Zofran PRN.  No Toradol.  ASA 3     Anesthetic plan and risks discussed with: patient  Anesthesia plan special considerations: antiemetics,   Post-op plan: routine recovery

## 2021-06-19 NOTE — PROGRESS NOTES
"OUTPATIENT OSTOMY ASSESSMENT    Patients mode of arrival: Ambulatory    INTAKE  Type of Stoma: Permanent Ileostomy  Anticipated date of takedown: NA    Diagnosis Pertinent to Stoma:precancerous Date of Diagnosis: 1999  Type of Surgery: Ileostomy    Surgery Date: 1999  Surgeon: unknown     Hospital: [facility]    Purpose of this visit:Peristomal Complications    Present for Teaching Session: Patient   Present: NA    Pertinent Information: Patient reports peristomal skin issues and leaking    Current Equipment:    Product # Brand Description   Pouch [address] 1\" convex with clamp   Flange      Paste [address] Barrier ring   Powder [address] Stoma powder   Deodorizer                    Pouch Change Frequency: twice weekly  Provider of Care: Emptying: self Pouch Change: self    ASSESSMENT  Stoma Size: Round 1-1/8 inches  Protrusion: 1.5cm  Vascularity: Pink  Mucocutaneous Juncture: Intact  Peristomal Skin: Abnormal minor erythema with healing areas of denudement/satellite lesions. patient reports that the skin is much improved since last pouch change- she has been on antibiotics for her port which has helped clear up her skin breakdown.    Wound: No  Current Wound Care: NA    TREATMENT  Applied: stoma powder and cavilon no-sting    INTERVENTIONS  Refitting done  Miscellaneous Interventions: NA    INSTRUCTIONS GIVEN  WOC Role, Anatomy and Pouching Products: Showed pouching system     PLAN  Change in Supplies: See Outpatient Ostomy Instructions      Total Time Spent with Patient: 20 minutes.  Education time: 10 minutes.  Wound care: 0 minutes.    "

## 2021-06-19 NOTE — PROGRESS NOTES
Preoperative Exam    Scheduled Procedure: port removal and picc insert  Surgery Date:  7/20/18  Surgery Location: [facility], fax [phone]    Surgeon:  Dr. Kirkpatrick    Assessment/Plan:     1. Pre-op exam  No contraindications for planned procedure.  We are going to attempt to collect a hemoglobin and potassium today. Creatinine and magnesium performed 1 month ago at an outside facility which were normal.  - Electrocardiogram Perform and Read    2. Infected venous access port      3. Visit for screening mammogram  - Mammo Screening Bilateral; Future        Surgical Procedure Risk: Low (reported cardiac risk generally < 1%)  Have you had prior anesthesia?: No  Have you or any family members had a previous anesthesia reaction:  No  Do you or any family members have a history of a clotting or bleeding disorder?: No  Cardiac Risk Assessment: no increased risk for major cardiac complications    Patient approved for surgery with general or local anesthesia.    Please Note:  Patient is taking medications for Chronic Pain.    Functional Status: Independent  Patient plans to recover at home with family.     Subjective:      Dee Mattson is a 68 y.o. female who presents for a preoperative consultation.      Patient's home care nurse called into the nurse triage line approximately 2 weeks ago to state that the patient's port site seem to be infected.  The patient's PCP, Dr. Bhupendra Caldwell, recommended that the patient be seen that day at the walk-in clinic.  She was unable to find a ride to the clinic, and subsequently called into the nurse triage line 1 week later stating that her stoma and Port-A-Cath site was red and had purulent material coming out of it.    She eventually made it to the walk-in clinic where she was prescribed Augmentin.  She was told that she needed to schedule appointment with surgery to evaluate whether or not the port site needed to be changed.    All other systems reviewed and are negative,  other than those listed in the HPI.    Pertinent History  Do you have difficulty breathing or chest pain after walking up a flight of stairs: No  History of obstructive sleep apnea: No  Steroid use in the last 6 months: No  Frequent Aspirin/NSAID use: No  Prior Blood Transfusion: No  Prior Blood Transfusion Reaction: No  If for some reason prior to, during or after the procedure, if it is medically indicated, would you be willing to have a blood transfusion?:  There is no transfusion refusal.    Current Outpatient Prescriptions   Medication Sig Dispense Refill     albuterol (PROAIR HFA;PROVENTIL HFA;VENTOLIN HFA) 90 mcg/actuation inhaler Inhale 2 puffs every 6 (six) hours as needed for wheezing. 2 Inhaler 11     albuterol (PROVENTIL) 2.5 mg /3 mL (0.083 %) nebulizer solution Take 3 mL (2.5 mg total) by nebulization every 6 (six) hours as needed for wheezing or shortness of breath (per [facility]). 75 mL 12     amoxicillin-clavulanate (AUGMENTIN) 500-125 mg per tablet Take 1 tablet (500 mg total) by mouth 3 (three) times a day for 10 days. 30 tablet 0     busPIRone (BUSPAR) 15 MG tablet Take 30 mg by mouth 2 (two) times a day.        busPIRone (BUSPAR) 15 MG tablet TAKE 2 TABLETS BY MOUTH TWICE DAILY 360 tablet 0     cholecalciferol, vitamin D3, (VITAMIN D3) 2,000 unit cap Take 2,000 Units by mouth every morning.       CREON 6,000-19,000 -30,000 unit CpDR capsule TAKE 2 CAPSULE BY MOUTH THREE TIMES DAILY WITH MEALS 540 capsule 0     cyanocobalamin 1,000 mcg/mL injection ADMINISTER 1 ML IN THE MUSCLE EVERY 30 DAYS AS DIRECTED 3 mL 0     diphenoxylate-atropine (LOMOTIL) 2.5-0.025 mg per tablet Take 2 tablets by mouth 3 (three) times a day. 30 tablet 0     DULoxetine (CYMBALTA) 60 MG capsule TAKE 1 CAPSULE BY MOUTH TWICE DAILY 180 capsule 2     ergocalciferol (VITAMIN D2) 50,000 unit capsule Take 1 capsule (50,000 Units total) by mouth once a week. No refills. 12 capsule 0     HYDROmorphone (DILAUDID) 4 MG tablet Take 1  tablet (4 mg total) by mouth every 6 (six) hours as needed for pain. 120 tablet 0     loperamide (IMODIUM) 2 mg capsule   2     LYRICA 200 mg capsule TAKE 1 CAPSULE(200 MG) BY MOUTH THREE TIMES DAILY 270 capsule 0     magnesium sulfate in water 4 gram/100 mL (4 %) infusion Infuse 1 g/hr into a venous catheter at bedtime.       mirtazapine (REMERON) 30 MG tablet Take by mouth.       multivitamin therapeutic (THERAGRAN) tablet Take 1 tablet by mouth daily.       naloxegol (MOVANTIK) 12.5 mg Tab tablet Take by mouth.       nitrofurantoin (MACRODANTIN) 50 MG capsule TAKE 1 CAPSULE(50 MG) BY MOUTH AT BEDTIME 90 capsule 0     nitrofurantoin (MACRODANTIN) 50 MG capsule TAKE 1 CAPSULE(50 MG) BY MOUTH AT BEDTIME 90 capsule 0     ondansetron (ZOFRAN ODT) 8 MG disintegrating tablet Take 1 tablet (8 mg total) by mouth every 8 (eight) hours as needed for nausea. 90 tablet 0     potassium chloride SA (K-DUR,KLOR-CON) 20 MEQ tablet TAKE 1 TABLET(20 MEQ) BY MOUTH DAILY 90 tablet 1     prednisoLONE acetate (PRED-FORTE) 1 % ophthalmic suspension SHAKE LQ AND INT 1 GTT IN OD FOUR TIMES DAILY. START 1 DAY B SURGERY AND U UNTIL INSTRUCTED TO DISCONTINUE  1     tiZANidine (ZANAFLEX) 2 MG tablet Take 1-4 tablets qhs, start low-dose and gradually increase prn for muscle aches or insomnia, as tolerated. 90 tablet 2     traZODone (DESYREL) 150 MG tablet Take 450 mg by mouth bedtime.       traZODone (DESYREL) 150 MG tablet TAKE 3 TABLETS BY MOUTH EVERY NIGHT AT BEDTIME 270 tablet 0     No current facility-administered medications for this visit.         Allergies   Allergen Reactions     Citalopram Analogues      GI affects       Methadone      Hallucinations       Metoclopramide Hives     GI upset     Metronidazole Hives     Mirtazapine      GI affects       Morphine Other (See Comments)     confusion     Neuromuscular Blockers, Steroidal      Unsure,per [facility] records      Norfloxacin      C.Diff     Other Drug Allergy (See Comments)       Steroidal Neuromuscular blocking agents- unsure  Pancuronium, vecuronium, rocuronium, rapacuronium, dacuronium, malouètine, duador, dipyrandium, pipecuronium, chandonium, pt unsure of this reaction, thinks it was painful, muscle aches     Oxycodone      Gi distress       Patient Active Problem List   Diagnosis     Chronic fatigue syndrome     Postinflammatory pulmonary fibrosis (H)     History of malignant neoplasm of large intestine     Personal history of lymphatic and hematopoietic neoplasm     Major depressive disorder, recurrent episode, moderate (H)     Asthma     GERD (gastroesophageal reflux disease)     Fibromyalgia     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     Chronic migraine     Vitamin B12 deficiency     Chronic cystitis     Hypomagnesemia     Anxiety and depression     S/P total colectomy     High output ileostomy (H)     CKD (chronic kidney disease), stage 2 (mild)     LORD (dyspnea on exertion)       Past Medical History:   Diagnosis Date     Anxiety and depression      Asthma 4/10/2012     Bowel obstruction      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 7/4/2014     Chronic fatigue syndrome 7/3/2014     Fibromyalgia 7/3/2014     GERD (gastroesophageal reflux disease)      Major depressive disorder, recurrent episode, moderate (H) 9/6/2005       Past Surgical History:   Procedure Laterality Date     ANAL SPHINCTEROPLASTY       APPENDECTOMY       CHOLECYSTECTOMY       COLECTOMY       MT ABDOMEN SURGERY PROC UNLISTED      Description: Hernia Repair;  Recorded: 08/05/2009;  Comments: perineal     MT DILATION/CURETTAGE,DIAGNOSTIC      Description: Dilation And Curettage;  Recorded: 08/05/2009;     MT INSJ TUNNELED CTR VAD W/SUBQ PORT AGE 5 YR/> N/A 10/29/2014    Procedure: REMOVAL PICC LINE RIGHT ARM and PLACEMENT PORTACATH;  Surgeon: Jason Kirkpatrick MD;  Location: [facility];  Service: General     MT REMOVAL GALLBLADDER      Description: Cholecystectomy;  Recorded: 07/18/2014;     MT REMOVAL OF  TONSILS,<13 Y/O      Description: Tonsillectomy;  Recorded: 08/05/2009;     [place] TOTAL ABDOM HYSTERECTOMY      Description: Total Abdominal Hysterectomy;  Recorded: 07/18/2014;     SMALL INTESTINE SURGERY       THYROIDECTOMY, PARTIAL         Social History     Social History     Marital status: Single     Spouse name: N/A     Number of children: N/A     Years of education: N/A     Occupational History     Not on file.     Social History Main Topics     Smoking status: Former Smoker     Packs/day: 1.00     Years: 30.00     Types: Cigarettes     Quit date: 1/1/2000     Smokeless tobacco: Never Used     Alcohol use No     Drug use: No     Sexual activity: Not Currently     Other Topics Concern     Not on file     Social History Narrative       Patient Care Team:  Bhupendra Caldwell MD as PCP - General (Internal Medicine)          Objective:     Vitals:    07/18/18 1402   BP: 130/70   Pulse: 70   Weight: 125 lb (56.7 kg)         Physical Exam:  General: No apparent distress. Calm. Alert and Oriented X3. Pt behavior is appropriate.  Head:Atraumatic. Normocephalic, non-tender to palpation  Neck: Supple. No JVD. Full ROM.   Eyes: PERRL, No discharge. No strabismus. No nystagmus.  Ears: TMs pearly gray with landmarks visible.   Nose/Mouth/Throat: Patent nares, no oral lesions, pharynx clear and without exudate. Uvula mid-line. Nasal septum mid-line. Clear turbinates.   Lymph: No axillar or cervical adenopathy.   Chest/Lungs: Normal chest wall, clear to auscultation, normal respiratory effort and rate.   Heart/Pulses: Regular rate and rhythm, strong and equal radial pulses, no murmurs. Capillary refill <2 seconds. No edema.   Abdomen: Soft, no palpable masses. No hepatosplenomegaly, no tenderness with palpation noted. Bowel sounds active in all quadrants. No increased tympany.   Genitalia: Not examined.   Musculoskeletal: No CVA tenderness with palpation. Good ROM with extremities.   Neurologic: Interactive, alert, no focal  findings, CNs intact.   Skin: There is a Port-A-Cath in the left upper chest wall.  There is some erythema surrounding the port about 3 cm in diameter.  No fluctuance.  No tenderness.  No other skin lesions        Patient Instructions     Follow your surgeon's direction on when to stop eating and drinking prior to surgery.  Remove all jewelry and metal piercings before your surgery.   Remove nail polish from fingers before surgery.      EKG: Normal sinus with sinus arrhythmia    Labs:  Labs pending at this time.  Results will be reviewed when available.    Immunization History   Administered Date(s) Administered     Hep A, Adult IM (19yr & older) 12/11/2007     Hep B, Adult 03/11/2004, 12/11/2007     Influenza high dose, seasonal 09/24/2015     Influenza, Seasonal, Inj PF IIV3 12/11/2007     Influenza, inj, historic,unspecified 11/03/2013, 11/01/2016     Influenza, seasonal,quad inj 6-35 mos 10/27/2014     Influenza,seasonal, Inj IIV3 11/15/2006, 10/09/2008, 09/21/2009, 10/29/2010, 09/09/2011, 10/10/2012     Pneumo Conj 13-V (2010&after) 08/18/2016     Pneumo Polysac 23-V 01/20/1999, 11/01/2000, 11/10/2011, 02/07/2013     Td, adult adsorbed, PF 02/05/1993, 08/04/2003, 08/18/2016     ZOSTER, LIVE 09/03/2012, 10/16/2012           Electronically signed by Duke Mccall CNP 07/18/18 2:00 PM

## 2021-06-20 NOTE — ANESTHESIA POSTPROCEDURE EVALUATION
Patient: Dee RAMIREZ Cable  PORT-A-CATH INSERTION  Anesthesia type: MAC    Patient location: Phase II Recovery  Last vitals:   Vitals:    08/29/18 1500   BP: 128/70   Pulse: 85   Resp: 16   Temp: 36.7  C (98  F)   SpO2: 96%     Post vital signs: stable  Level of consciousness: awake and responds to simple questions  Post-anesthesia pain: pain controlled  Post-anesthesia nausea and vomiting: no  Pulmonary: unassisted, return to baseline  Cardiovascular: stable and blood pressure at baseline  Hydration: adequate  Anesthetic events: no    QCDR Measures:  ASA# 11 - Comfort-op Cardiac Arrest: ASA11B - Patient did NOT experience unanticipated cardiac arrest  ASA# 12 - Comfort-op Mortality Rate: ASA12B - Patient did NOT die  ASA# 13 - PACU Re-Intubation Rate: NA - No ETT / LMA used for case  ASA# 10 - Composite Anes Safety: ASA10A - No serious adverse event    Additional Notes:

## 2021-06-20 NOTE — ANESTHESIA PREPROCEDURE EVALUATION
Anesthesia Evaluation      Patient summary reviewed   No history of anesthetic complications     Airway   Mallampati: I  Neck ROM: full   Pulmonary     breath sounds clear to auscultation  (+) asthma  mild, shortness of breath (Chronic fatigue syndrome),     ROS comment: Pulmonary fibrosis                         Cardiovascular - negative ROS and normal exam  Rhythm: regular  Rate: normal,         Neuro/Psych    (+) depression, anxiety/panic attacks, chronic pain (Fibromyalgia)    Endo/Other - negative ROS      GI/Hepatic/Renal    (+) GERD well controlled,   chronic renal disease (CKD II),     Comments: Colon CA          Dental - normal exam   (+) lower dentures and upper dentures                       Anesthesia Plan  Planned anesthetic: MAC    ASA 3   Induction: intravenous   Anesthetic plan and risks discussed with: patient and sibling    Post-op plan: routine recovery

## 2021-06-20 NOTE — PROGRESS NOTES
Dee comes in today for follow-up of 2 separate ER visits.  She was seen in the emergency room on 831 after complaints of fatigue and a fall that occurred while at home.  Head CT was negative save for an area of sclerosis in the sphenoid sinus.  Given her history of colon cancer it was recommended that she undergo a contrast-enhanced MRI of the brain to evaluate this further.  CTA of the chest was negative for PE.  No pneumonia noted on the chest CT.  She was given IV fluids as well as a azithromycin and was discharged.  She was then seen on 9/7 for 1 week's worth of anal pain.  On exam, skin was intact with no fistula noted or other changes.  She was diffusely tender throughout the perianal region.  A CT of the pelvis was obtained which showed no acute or abnormalities.  Labs were unremarkable as well.  She is told to follow-up with colorectal surgery as well as myself.    Currently she states that her rectal pain is still present.  It is constant throughout the day.  Because of her proctocolectomy she does not have an anal opening but I am unsure whether her internal sphincter musculature is still present and active.  She denies any fevers and denies any blood in her stool.  She is having regular output from her colostomy bag.    Objective: Vital signs are as per the EMR.  In general the patient is alert pleasant and in no acute distress.  He appears healthy.    Assessment and plan: Rectal pain of unknown etiology.  She is going to follow-up with her gastroenterologist and I will also call her in some topical nitroglycerin to use to see if this will help things.  Follow-up with us as needed.

## 2021-06-20 NOTE — PROGRESS NOTES
Preoperative Exam    Scheduled Procedure: Port Placement  Surgery Date:  08/29/2018  Surgery Location: [facility], fax [phone]    Surgeon:  Dr. Kirkpatrick    Assessment/Plan:     1. Preoperative examination  No contraindications for planned procedure.  - Basic Metabolic Panel    2. Encounter for care related to vascular access port  - Basic Metabolic Panel    3. Elevated blood pressure reading without diagnosis of hypertension  Blood pressure is 150/90 today.  We are going to recheck a BMP today.  I encouraged her to schedule appointment with me to recheck her blood pressure after her procedure.        Surgical Procedure Risk: Low (reported cardiac risk generally < 1%)  Have you had prior anesthesia?: No  Have you or any family members had a previous anesthesia reaction:  No  Do you or any family members have a history of a clotting or bleeding disorder?: No  Cardiac Risk Assessment: no increased risk for major cardiac complications    Patient approved for surgery with general or local anesthesia.    Please Note:  Patient is taking medications for Chronic Pain.    Functional Status: Independent  Patient plans to recover at home with family.     Subjective:      Dee Mattson is a 68 y.o. female who presents for a preoperative consultation.      She came to see me for a preoperative evaluation about 1 month ago.  At that time she was diagnosed with an infected port site, and required site to be changed.  She subsequently had the port removed and a PICC line placed.    Since having her PICC line placed, she went to the emergency room 5 days ago due to an issue with the PICC line.  Apparently, when the home nurse was trying to access her PICC, the patient accidentally moved and removed about 4 inches of the PICC line.  Is still flushing well and so no recommendations were made for any changes.    In general, the patient is feeling well today.  Her blood pressure is elevated at 150/90.  She denies any chest  pain or shortness of breath. She is having a mild headache.   No fevers.  No dyspnea on exertion.  No increased swelling.  No numbness or tingling.    All other systems reviewed and are negative, other than those listed in the HPI.    Pertinent History  Do you have difficulty breathing or chest pain after walking up a flight of stairs: No  History of obstructive sleep apnea: No  Steroid use in the last 6 months: No  Frequent Aspirin/NSAID use: No  Prior Blood Transfusion: No  Prior Blood Transfusion Reaction: No  If for some reason prior to, during or after the procedure, if it is medically indicated, would you be willing to have a blood transfusion?:  There is no transfusion refusal.    Current Outpatient Prescriptions   Medication Sig Dispense Refill     busPIRone (BUSPAR) 15 MG tablet Take 30 mg by mouth 2 (two) times a day.        cholecalciferol, vitamin D3, (VITAMIN D3) 2,000 unit cap Take 2,000 Units by mouth every morning.       cholecalciferol, vitamin D3, (VITAMIN D3) 2,000 unit capsule Take 1,000 Units by mouth daily.       CREON 6,000-19,000 -30,000 unit CpDR capsule TAKE 2 CAPSULE BY MOUTH THREE TIMES DAILY WITH MEALS 540 capsule 0     cyanocobalamin 1,000 mcg/mL injection ADMINISTER 1 ML IN THE MUSCLE EVERY 30 DAYS AS DIRECTED 3 mL 0     diphenoxylate-atropine (LOMOTIL) 2.5-0.025 mg per tablet Take 2 tablets by mouth 3 (three) times a day. 30 tablet 0     DULoxetine (CYMBALTA) 60 MG capsule TAKE 1 CAPSULE BY MOUTH TWICE DAILY 180 capsule 2     ergocalciferol (VITAMIN D2) 50,000 unit capsule Take 1 capsule (50,000 Units total) by mouth once a week. No refills. 12 capsule 0     HYDROmorphone (DILAUDID) 4 MG tablet Take 1 tablet (4 mg total) by mouth every 6 (six) hours as needed for pain. 120 tablet 0     loperamide (IMODIUM) 2 mg capsule Take 2-4 mg by mouth 2 (two) times a day as needed.   2     LYRICA 200 mg capsule TAKE 1 CAPSULE(200 MG) BY MOUTH THREE TIMES DAILY 270 capsule 0     magnesium sulfate  in water 4 gram/100 mL (4 %) infusion Infuse 1 g/hr into a venous catheter at bedtime.       mirtazapine (REMERON) 30 MG tablet Take 30 mg by mouth at bedtime.        multivit-min/folic acid/ajo031 (ALIVE WOMEN'S GUMMY VITAMINS ORAL) Take 2 tablets by mouth daily.       naloxegol (MOVANTIK) 12.5 mg Tab tablet Take 12.5 mg by mouth daily before breakfast.        nitrofurantoin (MACRODANTIN) 50 MG capsule TAKE 1 CAPSULE(50 MG) BY MOUTH AT BEDTIME 90 capsule 0     ondansetron (ZOFRAN ODT) 8 MG disintegrating tablet Take 1 tablet (8 mg total) by mouth every 8 (eight) hours as needed for nausea. 90 tablet 0     potassium chloride SA (K-DUR,KLOR-CON) 20 MEQ tablet TAKE 1 TABLET(20 MEQ) BY MOUTH DAILY 90 tablet 1     traZODone (DESYREL) 150 MG tablet Take 150 mg by mouth at bedtime.        No current facility-administered medications for this visit.         Allergies   Allergen Reactions     Citalopram Analogues      GI affects       Methadone      Hallucinations       Metoclopramide Hives     GI upset     Metronidazole Hives     Mirtazapine      GI affects       Morphine Other (See Comments)     confusion     Neuromuscular Blockers, Steroidal      Unsure,per [facility] records      Norfloxacin      C.Diff     Other Drug Allergy (See Comments)      Steroidal Neuromuscular blocking agents- unsure  Pancuronium, vecuronium, rocuronium, rapacuronium, dacuronium, malouètine, duador, dipyrandium, pipecuronium, chandonium, pt unsure of this reaction, thinks it was painful, muscle aches     Oxycodone      Gi distress       Patient Active Problem List   Diagnosis     Chronic fatigue syndrome     Postinflammatory pulmonary fibrosis (H)     History of malignant neoplasm of large intestine     Personal history of lymphatic and hematopoietic neoplasm     Major depressive disorder, recurrent episode, moderate (H)     Asthma     GERD (gastroesophageal reflux disease)     Fibromyalgia     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     Chronic  migraine     Vitamin B12 deficiency     Chronic cystitis     Hypomagnesemia     Anxiety and depression     S/P total colectomy     High output ileostomy (H)     CKD (chronic kidney disease), stage 2 (mild)     LORD (dyspnea on exertion)       Past Medical History:   Diagnosis Date     Anxiety and depression      Asthma 4/10/2012     Bowel obstruction      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 7/4/2014     Chronic fatigue syndrome 7/3/2014     Fibromyalgia 7/3/2014     GERD (gastroesophageal reflux disease)      History of blood clots      Major depressive disorder, recurrent episode, moderate (H) 9/6/2005       Past Surgical History:   Procedure Laterality Date     ANAL SPHINCTEROPLASTY       APPENDECTOMY       CHOLECYSTECTOMY       COLECTOMY       ILEOSTOMY       ND ABDOMEN SURGERY PROC UNLISTED      Description: Hernia Repair;  Recorded: 08/05/2009;  Comments: perineal     ND DILATION/CURETTAGE,DIAGNOSTIC      Description: Dilation And Curettage;  Recorded: 08/05/2009;     ND INSJ TUNNELED CTR VAD W/SUBQ PORT AGE 5 YR/> N/A 10/29/2014    Procedure: REMOVAL PICC LINE RIGHT ARM and PLACEMENT PORTACATH;  Surgeon: Jason Kirkpatrick MD;  Location: [facility];  Service: General     ND REMOVAL GALLBLADDER      Description: Cholecystectomy;  Recorded: 07/18/2014;     ND REMOVAL OF TONSILS,<11 Y/O      Description: Tonsillectomy;  Recorded: 08/05/2009;     ND RMVL JENY CTR VAD W/SUBQ PORT/ CTR/PRPH INSJ N/A 7/20/2018    Procedure: REMOVAL OF PORT A CATH;  Surgeon: Jason Kirkpatrick MD;  Location: [facility] OR;  Service: General     ND TOTAL ABDOM HYSTERECTOMY      Description: Total Abdominal Hysterectomy;  Recorded: 07/18/2014;     SMALL INTESTINE SURGERY       THYROIDECTOMY, PARTIAL         Social History     Social History     Marital status: Single     Spouse name: N/A     Number of children: N/A     Years of education: N/A     Occupational History     Not on file.     Social History Main Topics      Smoking status: Former Smoker     Packs/day: 1.00     Years: 30.00     Types: Cigarettes     Quit date: 1/1/2000     Smokeless tobacco: Never Used     Alcohol use No     Drug use: No     Sexual activity: Not Currently     Other Topics Concern     Not on file     Social History Narrative       Patient Care Team:  Bhupendra Caldwell MD as PCP - General (Internal Medicine)          Objective:     Vitals:    08/27/18 1435   BP: (!) 160/100   Pulse: 79   SpO2: 98%   Weight: 126 lb (57.2 kg)         Physical Exam:  General: No apparent distress. Calm. Alert and Oriented X3. Pt behavior is appropriate.  Head:Atraumatic. Normocephalic, non-tender to palpation  Neck: Supple. No JVD. Full ROM.   Eyes: PERRL, No discharge. No strabismus. No nystagmus.  Ears: TMs pearly gray with landmarks visible.   Nose/Mouth/Throat: Patent nares, no oral lesions, pharynx clear and without exudate. Uvula mid-line. Nasal septum mid-line. Clear turbinates.   Lymph: No axillar or cervical adenopathy.   Chest/Lungs: Normal chest wall, clear to auscultation, normal respiratory effort and rate.   Heart/Pulses: Regular rate and rhythm, strong and equal radial pulses, no murmurs. Capillary refill <2 seconds. No edema.   Abdomen: Stoma bag in place   Genitalia: Not examined.   Musculoskeletal: No CVA tenderness with palpation. Good ROM with extremities.   Neurologic: Interactive, alert, no focal findings, CNs intact.   Skin: PICC line in place with appropriate dressing in right upper extremity.  Stoma bag in place.        Patient Instructions   Your blood pressure is still too high today.  I checked it at 150/90 before you left.  I would like you to schedule an appointment to come back and see us after your surgery so that we can address this.    In the meantime, I am going to check your renal function and electrolytes today prior to your procedure.    You do not need another EKG.    No changes to your medications prior to your procedure.    Hold  all supplements, aspirin and NSAIDs for 7 days prior to surgery.  Follow your surgeon's direction on when to stop eating and drinking prior to surgery.  Your surgeon will be managing your pain after your surgery.    Remove all jewelry and metal piercings before your surgery.   Remove nail polish from fingers before surgery.      EKG:  Not done today.     Labs:  Labs pending at this time.  Results will be reviewed when available.    Immunization History   Administered Date(s) Administered     Hep A, Adult IM (19yr & older) 12/11/2007     Hep B, Adult 03/11/2004, 12/11/2007     Influenza high dose, seasonal 09/24/2015     Influenza, Seasonal, Inj PF IIV3 12/11/2007     Influenza, inj, historic,unspecified 11/03/2013, 11/01/2016     Influenza, seasonal,quad inj 6-35 mos 10/27/2014     Influenza,seasonal, Inj IIV3 11/15/2006, 10/09/2008, 09/21/2009, 10/29/2010, 09/09/2011, 10/10/2012     Pneumo Conj 13-V (2010&after) 08/18/2016     Pneumo Polysac 23-V 01/20/1999, 11/01/2000, 11/10/2011, 02/07/2013     Td, adult adsorbed, PF 02/05/1993, 08/04/2003, 08/18/2016     ZOSTER, LIVE 09/03/2012, 10/16/2012           Electronically signed by Duke Mccall CNP 08/27/18 2:37 PM

## 2021-06-20 NOTE — ANESTHESIA CARE TRANSFER NOTE
Last vitals:   Vitals:    08/29/18 1423   BP: 115/73   Pulse: 97   Resp: 16   Temp: 36.6  C (97.9  F)   SpO2: 97%     Patient's level of consciousness is awake  Spontaneous respirations: yes  Maintains airway independently: yes  Dentition unchanged: yes  Oropharynx: oropharynx clear of all foreign objects    QCDR Measures:  ASA# 20 - Surgical Safety Checklist: WHO surgical safety checklist completed prior to induction  PQRS# 430 - Adult PONV Prevention: 4558F - Pt received => 2 anti-emetic agents (different classes) preop & intraop  ASA# 8 - Peds PONV Prevention: 4558F - Pt received => 2 anti-emetic agents (different classes) preop & intraop  PQRS# 424 - Comfort-op Temp Management: 4559F - At least one body temp DOCUMENTED => 35.5C or 95.9F within required timeframe  PQRS# 426 - PACU Transfer Protocol: - Transfer of care checklist used  ASA# 14 - Acute Post-op Pain: ASA14B - Patient did NOT experience pain >= 7 out of 10   The patient is Spont Breathing, responding to commands,  Reflexes  Intact.  VSS

## 2021-06-20 NOTE — LETTER
Letter by Bhupendra Caldwell MD at      Author: Bhupendra Caldwell MD Service: -- Author Type: --    Filed:  Encounter Date: 3/10/2020 Status: (Other)         March 10, 2020     Patient: Dee Mattson   YOB: 1950       To Whom It May Concern:    It is my medical opinion that Dee Mattson should be excused from jury duty due to chronic sacral pain and thus has an inability to sit for extended periods of time.    If you have any questions or concerns, please don't hesitate to call.    Sincerely,        Electronically signed by Bhupendra Caldwell MD

## 2021-06-22 NOTE — TELEPHONE ENCOUNTER
Controlled Substance Refill Request  Medication Name:   Requested Prescriptions     Pending Prescriptions Disp Refills     HYDROmorphone (DILAUDID) 4 MG tablet 120 tablet 0     Sig: Take 1 tablet (4 mg total) by mouth every 6 (six) hours as needed for pain.     Date Last Fill: 12/4/18  Pharmacy: [facility]      Submit electronically to pharmacy  Controlled Substance Agreement Date Scanned:   Encounter-Level CSA Scan Date - 10/24/2017:    Scan on 10/31/2017  2:02 PM (below)         Last office visit with prescriber/PCP: 9/10/2018 Bhupendra Caldwell MD OR same dept: 9/10/2018 Bhupendra Caldwell MD OR same specialty: 9/10/2018 Bhupendra Caldwell MD  Last physical: 3/15/2018 Last MTM visit: Visit date not found

## 2021-06-23 ENCOUNTER — COMMUNICATION - HEALTHEAST (OUTPATIENT)
Dept: INTERNAL MEDICINE | Facility: CLINIC | Age: 71
End: 2021-06-23

## 2021-06-23 DIAGNOSIS — N30.20 CHRONIC CYSTITIS: ICD-10-CM

## 2021-06-23 NOTE — TELEPHONE ENCOUNTER
Controlled Substance Refill Request  Medication Name:   Requested Prescriptions     Pending Prescriptions Disp Refills     HYDROmorphone (DILAUDID) 4 MG tablet 120 tablet 0     Sig: Take 1 tablet (4 mg total) by mouth every 6 (six) hours as needed for pain.     Date Last Fill:1/3/19  Pharmacy: [facility]      Submit electronically to pharmacy  Controlled Substance Agreement Date Scanned:   Encounter-Level CSA Scan Date - 10/24/2017:    Scan on 10/31/2017  2:02 PM (below)         Last office visit with prescriber/PCP: 9/10/2018 Bhupendra Caldwell MD OR same dept: 9/10/2018 Bhupendra Caldwell MD OR same specialty: 9/10/2018 Bhupendra Caldwell MD  Last physical: 3/15/2018 Last MTM visit: Visit date not found

## 2021-06-23 NOTE — TELEPHONE ENCOUNTER
Orders being requested: Skilled nursing visit once a week for 9 weeks.  Reason service is needed/diagnosis: Weekly port-a- cath needle and dressing changes, monthly B-12 injections, requested lab draws and assess general status.  When are orders needed by: Monday  Where to send Orders: Phone:  [phone]  Okay to leave detailed message?  Yes, secure voicemail, please be sure to leave your name and credentials on the message, [phone].

## 2021-06-23 NOTE — TELEPHONE ENCOUNTER
Refill Approved    Rx renewed per Medication Renewal Policy. Medication was last renewed on 11/6/18    Cristin Fishman, [facility] Connection Triage/Med Refill 2/4/2019     Requested Prescriptions   Pending Prescriptions Disp Refills     busPIRone (BUSPAR) 15 MG tablet [Pharmacy Med Name: BUSPIRONE 15MG TABLETS] 360 tablet 0     Sig: TAKE 2 TABLETS BY MOUTH TWICE DAILY    Tricyclics/Misc Antidepressant/Antianxiety Meds Refill Protocol Passed - 2/1/2019 10:15 AM       Passed - PCP or prescribing provider visit in last year    Last office visit with prescriber/PCP: 9/10/2018 Bhupendra Caldwell MD OR same dept: 9/10/2018 Bhupendra Caldwell MD OR same specialty: 9/10/2018 Bhupendra Caldwell MD  Last physical: 3/15/2018 Last MTM visit: Visit date not found   Next visit within 3 mo: Visit date not found  Next physical within 3 mo: Visit date not found  Prescriber OR PCP: Bhupendra Caldwell MD  Last diagnosis associated with med order: 1. Chronic migraine  - busPIRone (BUSPAR) 15 MG tablet [Pharmacy Med Name: BUSPIRONE 15MG TABLETS]; TAKE 2 TABLETS BY MOUTH TWICE DAILY  Dispense: 360 tablet; Refill: 0    If protocol passes may refill for 12 months if within 3 months of last provider visit (or a total of 15 months).

## 2021-06-24 NOTE — TELEPHONE ENCOUNTER
They will fax again since orders have been placed to scans and may not be uploaded for some time. Also, notified that Dr. Caldwell is not back into office until Monday. She is okay with waiting until then.  Cassia Carl [facility] ............... 1:09 PM, 02/27/19

## 2021-06-24 NOTE — TELEPHONE ENCOUNTER
Rec'd incomplete order by fax for patient      [facility] refax orders home health cert and plan of care.    Fax [phone]    Lakeisha Camara, RN, [facility] Connection Nurse Triage/Med Refills RN

## 2021-06-24 NOTE — TELEPHONE ENCOUNTER
Controlled Substance Refill Request  Medication Name:   Requested Prescriptions     Pending Prescriptions Disp Refills     HYDROmorphone (DILAUDID) 4 MG tablet 120 tablet 0     Sig: Take 1 tablet (4 mg total) by mouth every 6 (six) hours as needed for pain.     Date Last Fill: 2/04/2019  Pharmacy: [id number]      Submit electronically to pharmacy  Controlled Substance Agreement Date Scanned:   Encounter-Level CSA Scan Date - 10/24/2017:    Scan on 10/31/2017  2:02 PM (below)         Last office visit with prescriber/PCP: 9/10/2018 Bhupendra Caldwell MD OR same dept: 9/10/2018 Bhupendra Caldwell MD OR same specialty: 9/10/2018 Bhupendra Caldwell MD  Last physical: 2/15/2019 Last MTM visit: Visit date not found

## 2021-06-24 NOTE — TELEPHONE ENCOUNTER
Controlled Substance Refill Request  Medication:   Requested Prescriptions     Pending Prescriptions Disp Refills     pregabalin (LYRICA) 200 MG capsule [Pharmacy Med Name: LYRICA 200MG CAPSULES] 270 capsule 0     Sig: TAKE 1 CAPSULE(200 MG) BY MOUTH THREE TIMES DAILY     Date Last Fill: 12/4/18  Pharmacy: [facility]   Submit electronically to pharmacy    Controlled Substance Agreement on File:   Encounter-Level CSA Scan Date - 10/24/2017:    Scan on 10/31/2017  2:02 PM (below)         Last office visit with primary: 9/10/2018

## 2021-06-24 NOTE — TELEPHONE ENCOUNTER
Refill Approved    Duloxetine only  Rx renewed per Medication Renewal Policy. Medication was last renewed on 5/29/2018 for 180/2  Last OV 2/15/2019 PE    Radha Hurtado, [facility] Connection Triage/Med Refill 2/20/2019     Requested Prescriptions   Pending Prescriptions Disp Refills     DULoxetine (CYMBALTA) 60 MG capsule [Pharmacy Med Name: DULOXETINE DR 60MG CAPSULES] 180 capsule 0     Sig: TAKE 1 CAPSULE BY MOUTH TWICE DAILY    Tricyclics/Misc Antidepressant/Antianxiety Meds Refill Protocol Passed - 2/17/2019  2:39 PM       Passed - PCP or prescribing provider visit in last year    Last office visit with prescriber/PCP: 9/10/2018 Bhupendra Caldwell MD OR same dept: 9/10/2018 Bhupendra Caldwell MD OR same specialty: 9/10/2018 Bhupendra Caldwell MD  Last physical: 2/15/2019 Last MTM visit: Visit date not found   Next visit within 3 mo: Visit date not found  Next physical within 3 mo: Visit date not found  Prescriber OR PCP: Bhupendra Caldwell MD  Last diagnosis associated with med order: 1. Depression  - DULoxetine (CYMBALTA) 60 MG capsule [Pharmacy Med Name: DULOXETINE DR 60MG CAPSULES]; TAKE 1 CAPSULE BY MOUTH TWICE DAILY  Dispense: 180 capsule; Refill: 0    If protocol passes may refill for 12 months if within 3 months of last provider visit (or a total of 15 months).             CREON 6,000-19,000 -30,000 unit CpDR capsule [Pharmacy Med Name: CREON 6,000 CAPSULES] 540 capsule 0     Sig: TAKE 2 CAPSULE BY MOUTH THREE TIMES DAILY WITH MEALS    There is no refill protocol information for this order

## 2021-06-24 NOTE — TELEPHONE ENCOUNTER
RN cannot approve Refill Request    RN can NOT refill this medication med is not covered by policy/route to provider  Last refill 11/20/2018 for 540/0  Last OV 2/15/2019 PE   Last office visit: 9/10/2018 Bhupendra Caldwell MD Last Physical: 2/15/2019 Last MTM visit: Visit date not found Last visit same specialty: 9/10/2018 Bhupendra Caldwell MD.  Next visit within 3 mo: Visit date not found  Next physical within 3 mo: Visit date not found      Radha REILLY Hurtado, [facility] Triage/Med Refill 2/20/2019    Requested Prescriptions   Pending Prescriptions Disp Refills     CREON 6,000-19,000 -30,000 unit CpDR capsule [Pharmacy Med Name: CREON 6,000 CAPSULES] 540 capsule 0     Sig: TAKE 2 CAPSULE BY MOUTH THREE TIMES DAILY WITH MEALS    There is no refill protocol information for this order      Signed Prescriptions Disp Refills     DULoxetine (CYMBALTA) 60 MG capsule 180 capsule 3     Sig: Take 1 capsule (60 mg total) by mouth 2 (two) times a day.    Tricyclics/Misc Antidepressant/Antianxiety Meds Refill Protocol Passed - 2/17/2019  2:39 PM       Passed - PCP or prescribing provider visit in last year    Last office visit with prescriber/PCP: 9/10/2018 Bhupendra Caldwell MD OR same dept: 9/10/2018 Bhupendra Caldwell MD OR same specialty: 9/10/2018 Bhupendra Caldwell MD  Last physical: 2/15/2019 Last MTM visit: Visit date not found   Next visit within 3 mo: Visit date not found  Next physical within 3 mo: Visit date not found  Prescriber OR PCP: Bhupendra Caldwell MD  Last diagnosis associated with med order: 1. Depression  - DULoxetine (CYMBALTA) 60 MG capsule; Take 1 capsule (60 mg total) by mouth 2 (two) times a day.  Dispense: 180 capsule; Refill: 3    If protocol passes may refill for 12 months if within 3 months of last provider visit (or a total of 15 months).

## 2021-06-24 NOTE — PROGRESS NOTES
Assessment and Plan:     1. High output ileostomy (H)      2. Fibromyalgia      3. 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 Anxiety and depression      5. Routine general medical examination at a health care facility       The patient's current medical problems were reviewed.    The following health maintenance schedule was reviewed with the patient and provided in printed form in the after visit summary:   Health Maintenance   Topic Date Due     ZOSTER VACCINES (2 of 3) 12/11/2012     DEPRESSION FOLLOW UP  01/18/2019     ASTHMA CONTROL TEST  07/18/2019     ASTHMA FOLLOW-UP  07/18/2019     FALL RISK ASSESSMENT  08/27/2019     URINE DRUG SCREEN  09/10/2019     TREATMENT AGREEMENT FOR CHRONIC PAIN MANAGEMENT  09/10/2019     DXA SCAN  02/12/2020     MAMMOGRAM  12/03/2020     ADVANCE DIRECTIVES DISCUSSED WITH PATIENT  02/28/2022     TD 18+ HE  08/18/2026     PNEUMOCOCCAL POLYSACCHARIDE VACCINE AGE 65 AND OVER  Completed     INFLUENZA VACCINE RULE BASED  Completed     PNEUMOCOCCAL CONJUGATE VACCINE FOR ADULTS (PCV13 OR PREVNAR)  Completed        Subjective:   Chief Complaint: Dee Mattson is an 69 y.o. female here for an Annual Wellness visit.   HPI:  Dee comes in today for a physical exam.  She states that she has been having some intermittent brownish vaginal discharge and I recommended she see a gynecologist regarding this.  She has underwent a KEVIN/BSO in the past.  Otherwise doing well.    Review of Systems:    Please see above.  The rest of the review of systems are negative for all systems.    Patient Care Team:  Bhupendra Caldwell MD as PCP - General (Internal Medicine)     [facility], HOSPITALIZATIONS, AND OPERATIONS:     #1.  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  Her entire large intestine has been removed.  She is currently on chronic IV fluids for this.     #2.  Chronic  depression, diagnosed in .     #3.  Fibromyalgia, on Cymbalta and Dilaudid chronically.     #4.  Chronic migraines.     #5.  Chronic cystitis, on prophylactic antibiotics.     #6.  Acid reflux.     #7.  Asthma     #8.  History of Hodgkin's lymphoma diagnosed on a left neck mass in .  This was surgically removed and she also underwent radiation for this.  No recurrences.     #9.  Status post KEVIN/BSO.     #10.  Status post cholecystectomy.     #11.  Status post partial thyroidectomy for a benign thyroid nodule.          Family History   Problem Relation Age of Onset     Colon cancer Father      Liver cancer Brother       Social History     Socioeconomic History     Marital status: Single     Spouse name: Not on file     Number of children: Not on file     Years of education: Not on file     Highest education level: Not on file   Social Needs     Financial resource strain: Not on file     Food insecurity - worry: Not on file     Food insecurity - inability: Not on file     Transportation needs - medical: Not on file     Transportation needs - non-medical: Not on file   Occupational History     Not on file   Tobacco Use     Smoking status: Former Smoker     Packs/day: 1.00     Years: 30.00     Pack years: 30.00     Types: Cigarettes     Last attempt to quit: 2000     Years since quittin.1     Smokeless tobacco: Never Used   Substance and Sexual Activity     Alcohol use: No     Drug use: No     Sexual activity: Not Currently   Other Topics Concern     Not on file   Social History Narrative     Not on file      Current Outpatient Medications   Medication Sig Dispense Refill     albuterol (PROVENTIL) 2.5 mg /3 mL (0.083 %) nebulizer solution Take 2.5 mg by nebulization every 6 (six) hours as needed for wheezing.       busPIRone (BUSPAR) 15 MG tablet Take 30 mg by mouth 2 (two) times a day.        busPIRone (BUSPAR) 15 MG tablet TAKE 2 TABLETS BY MOUTH TWICE DAILY 360 tablet 1     cholecalciferol, vitamin  D3, (VITAMIN D3) 2,000 unit cap Take 2,000 Units by mouth every morning.       CREON 6,000-19,000 -30,000 unit CpDR capsule TAKE 2 CAPSULE BY MOUTH THREE TIMES DAILY WITH MEALS 540 capsule 0     cyanocobalamin 1,000 mcg/mL injection ADMINISTER 1 ML IN THE MUSCLE EVERY 30 DAYS AS DIRECTED 3 mL 3     diphenoxylate-atropine (LOMOTIL) 2.5-0.025 mg per tablet Take 2 tablets by mouth 3 (three) times a day. 30 tablet 0     DULoxetine (CYMBALTA) 60 MG capsule TAKE 1 CAPSULE BY MOUTH TWICE DAILY 180 capsule 2     ergocalciferol (ERGOCALCIFEROL) 50,000 unit capsule Take 50,000 Units by mouth. Patient indicates taking once a month       HYDROmorphone (DILAUDID) 4 MG tablet Take 1 tablet (4 mg total) by mouth every 6 (six) hours as needed for pain. 120 tablet 0     Lactobacillus acidophilus (ACIDOPHILUS ORAL) Take 1 tablet by mouth 2 (two) times a day.       loperamide (IMODIUM) 2 mg capsule Take 2-4 mg by mouth 2 (two) times a day as needed.   2     mirtazapine (REMERON) 30 MG tablet Take 30 mg by mouth at bedtime.        multivit-min/folic acid/ftg950 (ALIVE WOMEN'S GUMMY VITAMINS ORAL) Take 2 tablets by mouth Daily after breakfast.        naloxegol (MOVANTIK) 12.5 mg Tab tablet Take 12.5 mg by mouth daily before breakfast.        nitrofurantoin (MACRODANTIN) 50 MG capsule TAKE 1 CAPSULE(50 MG) BY MOUTH AT BEDTIME 90 capsule 0     nitrofurantoin (MACRODANTIN) 50 MG capsule TAKE 1 CAPSULE(50 MG) BY MOUTH AT BEDTIME 90 capsule 0     nitroglycerin 0.4% (RECTIV) 0.4 % (w/w) Oint rectal ointment Use twice per day as needed 30 g 3     ondansetron (ZOFRAN ODT) 8 MG disintegrating tablet Take 1 tablet (8 mg total) by mouth every 8 (eight) hours as needed for nausea. 90 tablet 0     potassium chloride (K-DUR,KLOR-CON) 20 MEQ tablet TAKE 1 TABLET(20 MEQ) BY MOUTH DAILY 90 tablet 3     pregabalin (LYRICA) 200 MG capsule TAKE 1 CAPSULE(200 MG) BY MOUTH THREE TIMES DAILY. 270 capsule 0     traZODone (DESYREL) 150 MG tablet Take 150 mg by  "mouth at bedtime.        traZODone (DESYREL) 150 MG tablet TAKE 3 TABLETS BY MOUTH EVERY NIGHT AT BEDTIME 270 tablet 2     UNABLE TO FIND Infuse into a venous catheter at bedtime. Med Name: Normal saline with magnesium.  2 liters per patient       No current facility-administered medications for this visit.       Objective:   Vital Signs:   Visit Vitals  /80   Pulse 80   Temp 98.4  F (36.9  C)   Ht 5' 0.75\" (1.543 m)   Wt 135 lb 9.6 oz (61.5 kg)   BMI 25.83 kg/m         VisionScreening:  No exam data present     PHYSICAL EXAM  OBJECTIVE:    In general the patient is alert pleasant and in no acute distress.    HEENT: Pupils equal round reactive light.  Oropharynx is clear.  Lymphatic shows no anterior posterior cervical lymphadenopathy.      Cardiovascular: S1-S2 regular in rhythm no murmurs gallops rubs    Lungs are clear    Abdomen is soft nontender nondistended.  No HSM.    Extremities show no pedal edema present bilaterally.  DP pulses are 2+ and normal bilaterally.      Assessment Results 2/15/2019   Activities of Daily Living No help needed   Instrumental Activities of Daily Living No help needed   Some recent data might be hidden     A Mini-Cog score of 0-2 suggests the possibility of dementia, score of 3-5 suggests no dementia    Identified Health Risks:     The patient was provided with suggestions to help her develop a healthy physical lifestyle.   She is at risk for lack of exercise and has been provided with information to increase physical activity for the benefit of her well-being.  The patient was counseled and encouraged to consider modifying their diet and eating habits. She was provided with information on recommended healthy diet options.  The patient was provided with written information regarding signs of hearing loss.  The patient was provided with suggestions to help her develop a healthy emotional lifestyle.   The patient's PHQ-9 score is consistent with mild depression. She was provided " with information regarding depression.  Information regarding advance directives (living [place]), including where she can download the appropriate form, was provided to the patient via the AVS.

## 2021-06-25 ENCOUNTER — COMMUNICATION - HEALTHEAST (OUTPATIENT)
Dept: ADMINISTRATIVE | Facility: CLINIC | Age: 71
End: 2021-06-25

## 2021-06-25 ENCOUNTER — MEDICAL CORRESPONDENCE (OUTPATIENT)
Dept: HEALTH INFORMATION MANAGEMENT | Facility: CLINIC | Age: 71
End: 2021-06-25

## 2021-06-25 DIAGNOSIS — M79.7 FIBROMYALGIA: ICD-10-CM

## 2021-06-25 RX ORDER — HYDROMORPHONE HYDROCHLORIDE 4 MG/1
4 TABLET ORAL
Qty: 150 TABLET | Refills: 0 | Status: SHIPPED | OUTPATIENT
Start: 2021-06-25 | End: 2021-07-29

## 2021-06-25 NOTE — TELEPHONE ENCOUNTER
"Pt's nurse informed of maximum daily dose.     Regarding dietician referral - nurse states that pt is now back on lomotil and not having issues with her colostomy bag - patient is requesting a referral to know \"what she should eat\". Nurse said pt seems pretty adamant about this  "

## 2021-06-25 NOTE — TELEPHONE ENCOUNTER
6-4-21  Reason for Call:  Test results     Detailed comments: Mariella called from [facility] and is wondering if Dr Caldwell received the blood test results from [facility] from  6-1-21    Phone Number Patient can be reached at: Other phone number: [phone]    Best Time: anytime    Can we leave a detailed message on this number?: Yes    Call taken on 6/4/2021 at 8:40 AM by Tianna Rizo

## 2021-06-25 NOTE — TELEPHONE ENCOUNTER
Nurse needing OK for pt to take     1. Lomitil 1 tablet four times a day PRN    2. Loperamide 2 daily PRN    3. Need prn sig for Tylenol 325mg

## 2021-06-25 NOTE — TELEPHONE ENCOUNTER
Controlled Substance Refill Request  Medication Name:   Requested Prescriptions     Pending Prescriptions Disp Refills     pregabalin (LYRICA) 200 MG capsule [Pharmacy Med Name: PREGABALIN 200MG CAPSULES] 270 capsule 0     Sig: TAKE 1 CAPSULE(200 MG) BY MOUTH THREE TIMES DAILY     Date Last Fill: 3/11/21  Requested Pharmacy: [facility]  Submit electronically to pharmacy  Controlled Substance Agreement on file:   Encounter-Level CSA Scan Date - 10/24/2017:    Scan on 10/31/2017  2:02 PM        Last office visit:  5/17/21      p

## 2021-06-26 NOTE — TELEPHONE ENCOUNTER
..Reason for Call:  Medication refill     Do you use a [facility]?  Name of the pharmacy and phone number for the current request: [id number] [address] [facility] [address]  [phone]    Name of the medication requested: HYDROmorphone (DILAUDID) 4 MG tablet    Other request: NONE    Can we leave a detailed message on this number? Yes    Phone number patient can be reached at: Home number on file [phone] (home)    Best Time: ANY    Call taken on 6/25/2021 at 11:03 AM by Brittaney Mccurdy

## 2021-06-26 NOTE — TELEPHONE ENCOUNTER
RN cannot approve Refill Request    RN can NOT refill this medication med is not covered by policy/route to provider. Last office visit: 3/16/2021 Bhupendra Caldwell MD Last Physical: 5/17/2021 Last MTM visit: Visit date not found Last visit same specialty: 3/16/2021 Bhupendra Caldwell MD.  Next visit within 3 mo: Visit date not found  Next physical within 3 mo: Visit date not found      Gale Asif, [facility] Triage/Med Refill 6/23/2021    Requested Prescriptions   Pending Prescriptions Disp Refills     nitrofurantoin (MACRODANTIN) 50 MG capsule [Pharmacy Med Name: NITROFURANTOIN MACRO 50MG CAPSULES] 90 capsule 0     Sig: TAKE 1 CAPSULE(50 MG) BY MOUTH AT BEDTIME       There is no refill protocol information for this order

## 2021-06-28 NOTE — PROGRESS NOTES
"Progress Notes by Mariella Lopes CNP at 4/8/2020 10:00 AM     Author: Mariella Lopes CNP Service: -- Author Type: Nurse Practitioner    Filed: 4/8/2020 10:31 AM Date of Service: 4/8/2020 10:00 AM Status: Signed    : Mariella Lopes CNP (Nurse Practitioner)       Dee Mattson is a 70 y.o. female who is being evaluated via a billable telephone visit.      Start time- 10:19 am  End time- 10:25 am- 6 minutes    The patient has been notified of following:     \"This telephone visit will be conducted via a call between you and your physician/provider. We have found that certain health care needs can be provided without the need for a physical exam.  This service lets us provide the care you need with a short phone conversation.  If a prescription is necessary we can send it directly to your pharmacy.  If lab work is needed we can place an order for that and you can then stop by our lab to have the test done at a later time.    If during the course of the call the physician/provider feels a telephone visit is not appropriate, you will not be charged for this service.\"     Dee Mattson complains of    Chief Complaint   Patient presents with   ? Follow-up     rectal pain       I have reviewed and updated the patient's Past Medical History, Social History, Family History and Medication List as well as the Precharting workup by the [facility].       PAIN CLINIC FOLLOW UP PROGRESS NOTE    CC:  Chief Complaint   Patient presents with   ? Follow-up     rectal pain       HPI  Dee Mattson is a 70 y.o. female who presents for evaluation   Chief Complaint   Patient presents with   ? Follow-up     rectal pain    that is causing continued pain. Specific questions indicated the patient wanted addressed today include: she notes that the trigger points helped a little- she reports that her pain went down to about a 5/10 from a 9/10.      Major issues:  1. Chronic pain syndrome      Pain score---5-6  Constant " or intermittent---constant  What does your pain feel like during a flare (description)---throbbing, pressure and burning  Does the pain interfere with:  Work ----- retired  Walking ------ yes  Sleep ------- no  Daily activities ------no  Relationships -------yes  F= 7     UDT---N/A  CSA---N/A               ALLERGIES  Ketorolac tromethamine; Citalopram analogues; Methadone; Metoclopramide; Metronidazole; Mirtazapine; Morphine; Neuromuscular blockers, steroidal; Norfloxacin; Other drug allergy (see comments); and Oxycodone    Previous Medical History  Social History     Substance and Sexual Activity   Alcohol Use No     Social History     Substance and Sexual Activity   Drug Use No     Social History     Tobacco Use   Smoking Status Former Smoker   ? Packs/day: 1.00   ? Years: 30.00   ? Pack years: 30.00   ? Types: Cigarettes   ? Last attempt to quit: 2000   ? Years since quittin.2   Smokeless Tobacco Never Used       Pertinent Pain Medications/interventions:      She currently takes lyrica and dilaudid by her PCP for her fibromyalgia type pain     Review of Systems:  12 point systems were reviewed with pt as documented on on previous exams. Any new diagnosis or new medication is reviewed today.     Medications    Current Outpatient Medications:   ?  amLODIPine (NORVASC) 5 MG tablet, TAKE 1 TABLET(5 MG) BY MOUTH DAILY, Disp: 90 tablet, Rfl: 3  ?  busPIRone (BUSPAR) 15 MG tablet, TAKE 2 TABLETS(30MG) BY MOUTH TWICE DAILY, Disp: 360 tablet, Rfl: 1  ?  chlorthalidone (HYGROTEN) 25 MG tablet, Take 1 tablet (25 mg total) by mouth daily., Disp: 30 tablet, Rfl: 11  ?  cholecalciferol, vitamin D3, (VITAMIN D3) 2,000 unit cap, Take 2,000 Units by mouth every morning., Disp: , Rfl:   ?  cyanocobalamin 1,000 mcg/mL injection, ADMINISTER 1 ML IN THE MUSCLE EVERY 30 DAYS AS DIRECTED, Disp: 3 mL, Rfl: 11  ?  diphenoxylate-atropine (LOMOTIL) 2.5-0.025 mg per tablet, TAKE 2 TABLETS BY MOUTH THREE TIMES DAILY, Disp: 180  tablet, Rfl: 0  ?  DULoxetine (CYMBALTA) 60 MG capsule, TAKE ONE CAPSULE BY MOUTH TWICE DAILY, Disp: 180 capsule, Rfl: 3  ?  HYDROmorphone (DILAUDID) 4 MG tablet, Take 1 tablet (4 mg total) by mouth 4 (four) times a day., Disp: 120 tablet, Rfl: 0  ?  lipase-protease-amylase (CREON) 6,000-19,000 -30,000 unit CpDR capsule, TAKE 2 CAPSULES BY MOUTH THREE TIMES DAILY WITH FOOD, Disp: 540 capsule, Rfl: 0  ?  lisinopril (PRINIVIL,ZESTRIL) 20 MG tablet, Take 1 tablet (20 mg total) by mouth daily., Disp: 30 tablet, Rfl: 11  ?  multivit-min/folic acid/fqk552 (ALIVE WOMEN'S GUMMY VITAMINS ORAL), Take 2 tablets by mouth Daily after breakfast. , Disp: , Rfl:   ?  nitrofurantoin (MACRODANTIN) 50 MG capsule, TAKE 1 CAPSULE(50 MG) BY MOUTH AT BEDTIME, Disp: 90 capsule, Rfl: 0  ?  potassium chloride (K-DUR,KLOR-CON) 20 MEQ tablet, TAKE 1 TABLET(20 MEQ) BY MOUTH DAILY, Disp: 90 tablet, Rfl: 3  ?  pregabalin (LYRICA) 200 MG capsule, TAKE 1 CAPSULE(200 MG) BY MOUTH THREE TIMES DAILY, Disp: 270 capsule, Rfl: 0  ?  promethazine (PHENERGAN) 25 MG tablet, TAKE 1 TABLET(25 MG) BY MOUTH EVERY 6 HOURS AS NEEDED FOR NAUSEA, Disp: 20 tablet, Rfl: 0  ?  traZODone (DESYREL) 150 MG tablet, TAKE 3 TABLETS BY MOUTH EVERY NIGHT AT BEDTIME, Disp: 270 tablet, Rfl: 3  ?  UNABLE TO FIND, Infuse into a venous catheter at bedtime. Med Name: Normal saline with magnesium.  2 liters per patient, Disp: , Rfl:   ?  acetaminophen (TYLENOL) 325 MG tablet, Take 2 tablets (650 mg total) by mouth every 4 (four) hours as needed for pain., Disp: 100 tablet, Rfl: 0  ?  albuterol (PROVENTIL) 2.5 mg /3 mL (0.083 %) nebulizer solution, Take 2.5 mg by nebulization every 6 (six) hours as needed for wheezing., Disp: , Rfl:   ?  enoxaparin ANTICOAGULANT (LOVENOX) 80 mg/0.8 mL syringe, ADMINISTER 0.8 MLS UNDER THE SKIN DAILY, Disp: 24 mL, Rfl: 0  ?  Lactobacillus acidophilus (ACIDOPHILUS ORAL), Take 1 tablet by mouth 2 (two) times a day., Disp: , Rfl:   ?  loperamide  (IMODIUM) 2 mg capsule, Take 2-4 mg by mouth 2 (two) times a day as needed. , Disp: , Rfl: 2  ?  mirtazapine (REMERON) 30 MG tablet, Take 30 mg by mouth at bedtime. , Disp: , Rfl:     Lab:  Last UDS - none taken     Imaging:  No new imaging reviewed with patient over the phone, no new orders placed       Recent   Dated 4/8/2020 was reviewed with the patient today.   Paper copy reviewed     Assessment:     Dee Mattson is a 70 y.o. female seen in clinic today for   Chief Complaint   Patient presents with   ? Follow-up     rectal pain       They are here for follow up and continued medical management of their pain. Today we reviewed the plan of care for their chronic pain and determined that the patient will need a refill of the current prescription.      Due to the Covid 19 precautions all visits are converted to telephone encounters until the government restrictions are lifted and the precautions have been lifted.     Any new diagnosis since the last visit- none     Any new prescriptions since the last visit- ketamine topical cream which does not seem to help her at all.   Patient denies side effects from the current regimen- none   Plan of care going forward for ongoing pain control-  Trigger point injections reduced her pain after 2 days approximately 50 % and would like to repeat when able. Currently all injections are held due to the Covid 19. Patient understands, she also notes that the [facility] cancelled her appointment, she will need to re-scheudle once they open back up.   Patients current MME is 64 per her PCP   New concerns today- just what the plan of care will be going forward.     Plan:   Interventions-    Follow up PRN for trigger point injections while you are awaiting for the [facility] to be established in regards to the pelvic pain and Dr. Cade. Add to waitlist.     Currently you have a good regimen with the opioids, cymbalta and lyrica and do not want to add more to it. Defer to  PCP while you are awaiting the [facility].     This limited telehealth encounter is due to the Covid 19,  as required by the governmental agencies at this time due to risk of transmission of the pandemic, therefore testing availability is limited as well as physical exams.      Prescription Drug Management will be continued by the [facility]    Orders placed today  Medications that were ordered today   Requested Prescriptions      No prescriptions requested or ordered in this encounter     No orders of the defined types were placed in this encounter.        The patient understand todays plan and has their questions answered in regards to expectations and current treatment plan.     Prevent unexpected access/loss of medication: Keep medication locked. Only carry what you need with you    The plan of care will be adjusted to accommodate the issues discussed, discussing management of their care and follow up that is recommended. 4/8/2020         Mariella Lopes CNP  [facility]  [address]  [place] [address]  Ph: [phone]  Fax: [phone]

## 2021-06-28 NOTE — PROGRESS NOTES
"Progress Notes by Eleni Chase CMA at 4/8/2020 10:00 AM     Author: Eleni Chase CMA Service: -- Author Type: Certified Medical Assistant    Filed: 4/8/2020 10:31 AM Date of Service: 4/8/2020 10:00 AM Status: Signed    : Eleni Chase CMA (Certified Medical Assistant)       Dee Mattson is a 70 y.o. female who is being evaluated via a billable telephone visit.      The patient has been notified of following:     \"This telephone visit will be conducted via a call between you and your physician/provider. We have found that certain health care needs can be provided without the need for a physical exam.  This service lets us provide the care you need with a short phone conversation.  If a prescription is necessary we can send it directly to your pharmacy.  If lab work is needed we can place an order for that and you can then stop by our lab to have the test done at a later time.    Telephone visits are billed at different rates depending on your insurance coverage. During this emergency period, for some insurers they may be billed the same as an in-person visit.  Please reach out to your insurance provider with any questions.    If during the course of the call the physician/provider feels a telephone visit is not appropriate, you will not be charged for this service.\"    Patient has given verbal consent to a Telephone visit? Yes     Pain score---5-6  Constant or intermittent---constant  What does your pain feel like during a flare (description)---throbbing, pressure and burning  Does the pain interfere with:  Work ----- retired  Walking ------ yes  Sleep ------- no  Daily activities ------no  Relationships -------yes  F= 7    UDT---N/A  CSA---N/A          Dee Mattson complains of    Chief Complaint   Patient presents with   ? Migraine       Patient presents to the clinic today for a follow up with Mariella Lopes CNP regarding rectal pain. Patient describes their pain as burning, " throbbing, and pressure. Her/His function score is 7.    Does the patient use mychart or want AVS mailed? Mailed      Eleni Chase CMA

## 2021-06-29 ENCOUNTER — RECORDS - HEALTHEAST (OUTPATIENT)
Dept: LAB | Facility: CLINIC | Age: 71
End: 2021-06-29

## 2021-06-29 ENCOUNTER — COMMUNICATION - HEALTHEAST (OUTPATIENT)
Dept: ADMINISTRATIVE | Facility: CLINIC | Age: 71
End: 2021-06-29

## 2021-06-29 LAB
CREAT SERPL-MCNC: 1.14 MG/DL (ref 0.6–1.1)
GFR SERPL CREATININE-BSD FRML MDRD: 47 ML/MIN/1.73M2
MAGNESIUM SERPL-MCNC: 1.9 MG/DL (ref 1.8–2.6)

## 2021-07-01 ENCOUNTER — COMMUNICATION - HEALTHEAST (OUTPATIENT)
Dept: FAMILY MEDICINE | Facility: CLINIC | Age: 71
End: 2021-07-01

## 2021-07-01 NOTE — PROCEDURES
"Procedures by Rita Osman RN at 7/23/2018  2:30 PM     Author: Rita Osman RN Service: -- Author Type: Registered Nurse    Filed: 7/23/2018  4:37 PM Encounter Date: 7/23/2018 Status: Signed    : Rita Osman RN (Registered Nurse)       Procedures          PICC Line Insertion Procedure Note  Pt. Name: Dee Mattson  MRN:        [id number]    Procedure: Insertion of a  single Lumen  4 fr  Bard SOLO (valved) Power PICC, Lot number [id number]    Indications: Fluids    Contraindications : None    Procedure Details   Patient identified with 2 identifiers and \"Time Out\" conducted.  .     Central line insertion bundle followed: hand hygeine performed prior to procedure, site cleansed with cholraprep, hat, mask, sterile gloves,sterile gown worn, patient draped with maximum barrier head to toe drape, sterile field maintained.    Vein was assessed and found to be compressible and of adequate size. Two ml 1% Lidocaine administered sq to the insertion site. A 4 Fr PICC was inserted into the basilic vein of the right arm with ultrasound guidance. One attempt(s) required to access vein.   Catheter threaded without difficulty. Good blood return noted.    Modified Seldinger Technique used for insertion.    The 8 sharps that are included in the PICC kit were accounted for and disposed of in the sharps container prior to the breakdown of the sterile field.     Catheter secured with Statlock, biopatch and Tegaderm dressing applied.    Findings:  Total catheter length  40 cm, with 4 cm exposed. Mid upper arm circumference is 30 cm. Catheter was flushed with 10 cc NS. Patient  tolerated procedure well.  Patient will be doing home infusions herself, therefore an extension was also placed on end of PICC with a sleeve over PICC.    Tip placement verified by 3 ECG technology  CLABSI prevention brochure left at bedside.    Patient's primary RN notified PICC is ready for use.    Comments:              Rita " Dawson RN    [facility]  [phone]

## 2021-07-02 ENCOUNTER — HOME INFUSION (PRE-WILLOW HOME INFUSION) (OUTPATIENT)
Dept: PHARMACY | Facility: CLINIC | Age: 71
End: 2021-07-02

## 2021-07-03 NOTE — ADDENDUM NOTE
Addendum Note by Jesus Gross CMA at 9/26/2017  2:22 PM     Author: Jesus Gross CMA Service: -- Author Type: Certified Medical Assistant    Filed: 9/26/2017  2:22 PM Encounter Date: 9/26/2017 Status: Signed    : Jesus Gross CMA (Certified Medical Assistant)    Addended by: JESUS GROSS on: 9/26/2017 02:22 PM        Modules accepted: Orders

## 2021-07-03 NOTE — ADDENDUM NOTE
Addendum Note by Serena Langston at 1/7/2021 10:00 AM     Author: Serena Langston Service: -- Author Type: --    Filed: 1/12/2021 11:22 AM Date of Service: 1/7/2021 10:00 AM Status: Signed    : Serena Langston    Encounter addended by: Serena Langston on: 1/12/2021 11:22 AM      Actions taken: Charge Capture section accepted

## 2021-07-04 NOTE — TELEPHONE ENCOUNTER
Patient mailed us forms-special diet information request.  She would like us to mail forms to her to address on note when complete please. Thank you

## 2021-07-04 NOTE — ADDENDUM NOTE
Addendum Note by Sarah Sylvester MD at 2/25/2021  1:44 PM     Author: Sarah Sylvester MD Service: -- Author Type: Physician    Filed: 2/25/2021  1:44 PM Date of Service: 2/25/2021  1:44 PM Status: Signed    : Sarah Sylvester MD (Physician)       Addendum  created 02/25/21 1344 by Sarah Sylvester MD    Order list changed

## 2021-07-04 NOTE — TELEPHONE ENCOUNTER
Telephone Encounter by Larissa Arreola MA at 7/1/2021 12:02 PM     Author: Larissa Arreola MA Service: -- Author Type: Certified Medical Assistant    Filed: 7/1/2021 12:03 PM Encounter Date: 7/1/2021 Status: Signed    : Larissa Arreola MA (Certified Medical Assistant)       Incoming call from nurse with home care just wanting to inform you that labs were faxed over on Tuesday and everything looked stable. If you did not receive the fax or if there is any change in treatment please call them back at [phone]

## 2021-07-06 VITALS — WEIGHT: 114 LBS | HEIGHT: 61 IN | BODY MASS INDEX: 21.54 KG/M2 | BODY MASS INDEX: 21.72 KG/M2

## 2021-07-08 NOTE — PROGRESS NOTES
This is a recent snapshot of the patient's [facility] medical record.  For current drug dose and complete information and questions, call [phone] or In Basket pool, [facility]v home infusion ([id number])  CSN Number:  [id number]

## 2021-07-08 NOTE — TELEPHONE ENCOUNTER
Telephone Encounter by Lupis Bates CMA at 7/8/2021  5:05 PM     Author: Lupis Bates CMA Service: -- Author Type: Certified Medical Assistant    Filed: 7/8/2021  5:05 PM Encounter Date: 6/29/2021 Status: Signed    : Lupis Bates CMA (Certified Medical Assistant)       Forms on GJ's desk to sign

## 2021-07-09 NOTE — PROGRESS NOTES
This is a recent snapshot of the patient's [facility] medical record.  For current drug dose and complete information and questions, call [phone] or In Basket pool, fv home infusion ([id number])  CSN Number:  [id number]

## 2021-07-13 ENCOUNTER — RECORDS - HEALTHEAST (OUTPATIENT)
Dept: ADMINISTRATIVE | Facility: CLINIC | Age: 71
End: 2021-07-13

## 2021-07-14 PROBLEM — N18.30 ACUTE RENAL FAILURE SUPERIMPOSED ON STAGE 3 CHRONIC KIDNEY DISEASE (H): Status: RESOLVED | Noted: 2017-03-17 | Resolved: 2017-10-24

## 2021-07-14 PROBLEM — N17.9 ACUTE RENAL FAILURE SUPERIMPOSED ON STAGE 3 CHRONIC KIDNEY DISEASE (H): Status: RESOLVED | Noted: 2017-03-17 | Resolved: 2017-10-24

## 2021-07-21 ENCOUNTER — RECORDS - HEALTHEAST (OUTPATIENT)
Dept: ADMINISTRATIVE | Facility: CLINIC | Age: 71
End: 2021-07-21

## 2021-07-23 ENCOUNTER — TELEPHONE (OUTPATIENT)
Dept: INTERNAL MEDICINE | Facility: CLINIC | Age: 71
End: 2021-07-23

## 2021-07-23 DIAGNOSIS — Z90.49 S/P TOTAL COLECTOMY: Primary | ICD-10-CM

## 2021-07-23 NOTE — TELEPHONE ENCOUNTER
Mariella RN with [facility] and requested to add notes to previous message. Mariella stated She would like a Rx for B-12 injection to be sent to [place], [place] [address] at [facility] [address].  Home care will give the B-12 injection on Tuesday 07/27/2021 and it will be a few days late.    Also, on 07/27/2021 PT will also have carlos path changed in her chest.  It will be 3 days early but PT is only being seen on Tuesday's.    TAMARA HELLER I

## 2021-07-23 NOTE — TELEPHONE ENCOUNTER
..Reason for Call:  Home Health Care    NADEEM Wolff with [facility] called regarding (reason for call): Verbal    Orders are needed for this patient. Skilled nursing    PT: NONE     OT: NONE    Skilled Nursing:   Re-cert   1X a week 9 weeks starting 7/26  Weekly nurse visits for implanted port needle and dressing change, monthly lab draw for creatine and magnesium and monthly B12 injections and house maintenance.     3 prns visits for port issues of recert     FYI:  Patient is seeing pain clinic and will be starting a new medication, Mariella will CB next week with this information.     Pt Provider: Bhupendra Caldwell MD     Phone Number Homecare Nurse can be reached at: [phone]    Can we leave a detailed message on this number? YES    Call taken on 7/23/2021 at 2:38 PM by QUEENIE Bhatti

## 2021-07-26 RX ORDER — CYANOCOBALAMIN 1000 UG/ML
1 INJECTION, SOLUTION INTRAMUSCULAR; SUBCUTANEOUS
Qty: 1 ML | Refills: 4 | Status: SHIPPED | OUTPATIENT
Start: 2021-07-26 | End: 2021-08-09

## 2021-07-27 ENCOUNTER — LAB REQUISITION (OUTPATIENT)
Dept: LAB | Facility: HOSPITAL | Age: 71
End: 2021-07-27
Payer: COMMERCIAL

## 2021-07-27 ENCOUNTER — TRANSFERRED RECORDS (OUTPATIENT)
Dept: HEALTH INFORMATION MANAGEMENT | Facility: CLINIC | Age: 71
End: 2021-07-27

## 2021-07-27 ENCOUNTER — TELEPHONE (OUTPATIENT)
Dept: INTERNAL MEDICINE | Facility: CLINIC | Age: 71
End: 2021-07-27

## 2021-07-27 DIAGNOSIS — K91.2 POSTSURGICAL MALABSORPTION, NOT ELSEWHERE CLASSIFIED: ICD-10-CM

## 2021-07-27 LAB
CREAT SERPL-MCNC: 1.09 MG/DL (ref 0.6–1.1)
GFR SERPL CREATININE-BSD FRML MDRD: 51 ML/MIN/1.73M2
MAGNESIUM SERPL-MCNC: 2.1 MG/DL (ref 1.8–2.6)

## 2021-07-27 PROCEDURE — 82565 ASSAY OF CREATININE: CPT | Mod: ORL | Performed by: INTERNAL MEDICINE

## 2021-07-27 PROCEDURE — 36415 COLL VENOUS BLD VENIPUNCTURE: CPT | Mod: ORL | Performed by: INTERNAL MEDICINE

## 2021-07-27 PROCEDURE — 83735 ASSAY OF MAGNESIUM: CPT | Mod: ORL | Performed by: INTERNAL MEDICINE

## 2021-07-27 NOTE — TELEPHONE ENCOUNTER
Reason for Call:  Other prescription    Detailed comments:  [facility] calling to report that there will be a delay in giving patient the B 12, patient unable to  until 8/3.    Pain clinic - Dr Cade  starting her on new suppository medication -   Baclosen  Daizepm  Ketamine HCL 5mg/10mg/10mg  Insert one twice daily as needed.     Please call back with an OK to add medication  [facility] - [phone]    Can we leave a detailed message on this number? YES    Call taken on 7/27/2021 at 11:30 AM by Laura L Goldberg, ARRT

## 2021-07-29 ENCOUNTER — NURSE TRIAGE (OUTPATIENT)
Dept: NURSING | Facility: CLINIC | Age: 71
End: 2021-07-29

## 2021-07-29 DIAGNOSIS — M79.7 FIBROMYALGIA: ICD-10-CM

## 2021-07-29 RX ORDER — HYDROMORPHONE HYDROCHLORIDE 4 MG/1
4 TABLET ORAL
Qty: 150 TABLET | Refills: 0 | Status: SHIPPED | OUTPATIENT
Start: 2021-07-29 | End: 2021-09-01

## 2021-07-29 NOTE — TELEPHONE ENCOUNTER
"Pt reports she takes Dilaudid 4mg five times daily for chronic pain. 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 Pt states her last dose was 4mg last night. Pt reports she just realized and is now worried about withdrawal. Pt states \"right now I am ok\" however she will not be back home until Saturday. Pt states she is due for a refill. Requesting an urgent refill be sent.     Writer advised pt urgent message will be sent to PCP to advise. Call 911 if feeling nauseous, difficulty breathing, chest pain, very shaky or other severe symptoms. Call back if no response within an hour.     Pt verbalizes understanding and agrees to plan.         Reason for Disposition    Request for URGENT new prescription or refill of 'essential' medication (i.e., likelihood of harm to patient if not taken) and triager unable to fill per department policy    Additional Information    Negative: Coma (e.g., not moving, not talking, not responding to stimuli)    Negative: Difficult to awaken or acting confused (e.g., disoriented, slurred speech)    Negative: Seeing or hearing or feeling things that are not there (i.e., auditory, visual, or tactile hallucinations)    Negative: Slow, shallow and weak breathing    Negative: Seizure    Negative: Violent behavior, or threatening to physically hurt or kill someone    Negative: Sounds like a life-threatening emergency to the triager    Negative: Suicide thoughts, threats, and attempts: questions or concerns about    Negative: Other significant medical symptom is present, see that guideline (e.g., chest pain, headache, vomiting)    Negative: Alcohol use, abuse, or dependence: question or problem related to    Negative: Depression is main problem or symptom (e.g., feelings of sadness or hopelessness)    Negative: Very strange, paranoid, or confused behavior    Negative: Feeling very shaky (i.e., visible tremors of hands)    Negative: Pregnant and " symptoms of narcotic withdrawal (e.g., vomiting, severe muscle cramps)    Negative: SEVERE vomiting (e.g., 6 or more times/day) and present > 8 hours (Exception: patient sounds well, is drinking liquids, does not sound dehydrated, and vomiting has lasted less than 24 hours)    Negative: MODERATE vomiting (e.g., 3 - 5 times/day) and age > 60    Negative: Fever > 100.0 F (37.8 C) and IVDA (intravenous drug abuse)    Negative: Patient sounds very anxious or agitated    Negative: Patient sounds very sick or weak to the triager    Negative: White of the eyes have turned yellow (i.e., jaundice)    Negative: Fever > 100.0 F (37.8 C) and diabetes mellitus or weak immune system (e.g., HIV positive, cancer chemo, splenectomy, organ transplant, chronic steroids)    Protocols used: MEDICATION QUESTION CALL-A-OH, SUBSTANCE ABUSE AND ALNKEHPLUO-W-CK

## 2021-07-29 NOTE — TELEPHONE ENCOUNTER
Order pended since patient is due. I did clarify that patient wants this sent to the [place] pharmacy.  Cassia Carl CMA ............... 3:38 PM, 07/29/21

## 2021-07-30 ENCOUNTER — HOME INFUSION (PRE-WILLOW HOME INFUSION) (OUTPATIENT)
Dept: PHARMACY | Facility: CLINIC | Age: 71
End: 2021-07-30

## 2021-08-03 DIAGNOSIS — Z90.49 S/P TOTAL COLECTOMY: ICD-10-CM

## 2021-08-03 PROBLEM — K56.609 SMALL BOWEL OBSTRUCTION (H): Status: RESOLVED | Noted: 2017-01-25 | Resolved: 2017-10-24

## 2021-08-03 PROBLEM — E44.0 MODERATE PROTEIN MALNUTRITION (H): Status: RESOLVED | Noted: 2017-03-17 | Resolved: 2017-10-24

## 2021-08-03 NOTE — TELEPHONE ENCOUNTER
Reason for Call:  Other  FYI    Detailed comments: [facility] this [facility].  Patient did not  prescription.  Sister is there and will assist patient is getting to the pharmacy to obtain prescription for next dosing.        Call taken on 8/3/2021 at 11:45 AM by Laura L Goldberg, ARRT

## 2021-08-06 NOTE — PATIENT INSTRUCTIONS - HE
Patient Instructions by Bhupendra Caldwell MD at 2/15/2019  2:40 PM     Author: Bhupendra Caldwell MD Service: -- Author Type: Physician    Filed: 2/15/2019  2:55 PM Encounter Date: 2/15/2019 Status: Signed    : Bhupendra Caldwell MD (Physician)         Patient Education     Your Health Risk Assessment indicates you feel you are not in good physical health.    A healthy lifestyle helps keep the body fit and the mind alert. It helps protect you from disease, helps you fight disease, and helps prevent chronic disease (disease that doesn't go away) from getting worse. This is important as you get older and begin to notice twinges in muscles and joints and a decline in the strength and stamina you once took for granted. A healthy lifestyle includes good healthcare, good nutrition, weight control, recreation, and regular exercise. Avoid harmful substances and do what you can to keep safe. Another part of a healthy lifestyle is stay mentally active and socially involved.    Good healthcare     Have a wellness visit every year.     If you have new symptoms, let us know right away. Don't wait until the next checkup.     Take medicines exactly as prescribed and keep your medicines in a safe place. Tell us if your medicine causes problems.   Healthy diet and weight control     Eat 3 or 4 small, nutritious, low-fat, high-fiber meals a day. Include a variety of fruits, vegetables, and whole-grain foods.     Make sure you get enough calcium in your diet. Calcium, vitamin D, and exercise help prevent osteoporosis (bone thinning).     If you live alone, try eating with others when you can. That way you get a good meal and have company while you eat it.     Try to keep a healthy weight. If you eat more calories than your body uses for energy, it will be stored as fat and you will gain weight.     Recreation   Recreation is not limited to sports and team events. It includes any activity that provides relaxation,  interest, enjoyment, and exercise. Recreation provides an outlet for physical, mental, and social energy. It can give a sense of worth and achievement. It can help you stay healthy.       Patient Education     Exercise for a Healthier Heart  You may wonder how you can improve the health of your heart. If youre thinking about exercise, youre on the right track. You dont need to become an athlete, but you do need a certain amount of brisk exercise to help strengthen your heart. If you have been diagnosed with a heart condition, your doctor may recommend exercise to help stabilize your condition. To help make exercise a habit, choose safe, fun activities.       Be sure to check with your health care provider before starting an exercise program.    Why exercise?  Exercising regularly offers many healthy rewards. It can help you do all of the following:    Improve your blood cholesterol levels to help prevent further heart trouble    Lower your blood pressure to help prevent a stroke or heart attack    Control diabetes, or reduce your risk of getting this disease    Improve your heart and lung function    Reach and maintain a healthy weight    Make your muscles stronger and more limber so you can stay active    Prevent falls and fractures by slowing the loss of bone mass (osteoporosis)    Manage stress better  Exercise tips  Ease into your routine. Set small goals. Then build on them.  Exercise on most days. Aim for a total of 150 or more minutes of moderate to  vigorous intensity activity each week. Consider 40 minutes, 3 to 4 times a week. For best results, activity should last for 40 minutes on average. It is OK to work up to the 40 minute period over time. Examples of moderate-intensity activity is walking one mile in 15 minutes or 30 to 45 minutes of yard work.  Step up your daily activity level. Along with your exercise program, try being more active throughout the day. Walk instead of drive. Do more household  tasks or yard work.  Choose one or more activities you enjoy. Walking is one of the easiest things you can do. You can also try swimming, riding a bike, or taking an exercise class.  Stop exercising and call your doctor if you:    Have chest pain or feel dizzy or lightheaded    Feel burning, tightness, pressure, or heaviness in your chest, neck, shoulders, back, or arms    Have unusual shortness of breath    Have increased joint or muscle pain    Have palpitations or an irregular heartbeat      7826-3547 [employer]. [address] [place] [address]. All rights reserved. This information is not intended as a substitute for professional medical care. Always follow your healthcare professional's instructions.         Patient Education   Understanding GCW MyPlate  The USDA (US Department of Agriculture) has guidelines to help you make healthy food choices. These are called MyPlate. MyPlate shows the food groups that make up healthy meals using the image of a place setting. Before you eat, think about the healthiest choices for what to put onto your plate or into your cup or bowl. To learn more about building a healthy plate, visit www.choosemyplate.gov.       The Food Groups    Fruits: Any fruit or 100% fruit juice counts as part of the Fruit Group. Fruits may be fresh, canned, frozen, or dried, and may be whole, cut-up, or pureed. Make half your plate fruits and vegetables.    Vegetables: Any vegetable or 100% vegetable juice counts as a member of the Vegetable Group. Vegetables may be fresh, frozen, canned, or dried. They can be served raw or cooked and may be whole, cut-up, or mashed. Make half your plate fruits and vegetables.     Grains: All foods made from grains are part of the Grains Group. These include wheat, rice, oats, cornmeal, and barley such as bread, pasta, oatmeal, cereal, tortillas, and grits. Grains should be no more than a quarter of your plate. At least half of your grains  should be whole grains.    Protein: This group includes meat, poultry, seafood, beans and peas, eggs, processed soy products (like tofu), nuts (including nut butters), and seeds. Make protein choices no more than a quarter of your plate. Meat and poultry choices should be lean or low fat.    Dairy: All fluid milk products and foods made from milk that contain calcium, like yogurt and cheese are part of the Dairy Group. (Foods that have little calcium, such as cream, butter, and cream cheese, are not part of the group.) Most dairy choices should be low-fat or fat-free.    Oils: These are fats that are liquid at room temperature. They include canola, corn, olive, soybean, and sunflower oil. Foods that are mainly oil include mayonnaise, certain salad dressings, and soft margarines. You should have only 5 to 7 teaspoons of oils a day. You probably already get this much from the food you eat.  Use [employer] to Help Build Your Meals  The SuperTracker can help you plan and track your meals and activity. You can look up individual foods to see or compare their nutritional value. You can get guidelines for what and how much you should eat. You can compare your food choices. And you can assess personal physical activities and see ways you can improve. Go to www.Rebit.gov/supertracker/.    7376-7541 [employer]. [address] [place] [address], [place] [address]. All rights reserved. This information is not intended as a substitute for professional medical care. Always follow your healthcare professional's instructions.           Patient Education   Signs of Hearing Loss  Hearing loss is a problem shared by many people. In fact, it is one of the most common health conditions, particularly as people age. Most people over age 65 have some hearing loss, and by age 80, almost everyone does. Because hearing loss usually occurs slowly over the years, you may not realize your hearing ability has gotten  worse.       Have your hearing checked  Contact your [facility] care provider if you:    Have to strain to hear normal conversation.    Have to watch other peoples faces very carefully to follow what theyre saying.    Need to ask people to repeat what theyve said.    Often misunderstand what people are saying.    Turn the volume of the television or radio up so high that others complain.    Feel that people are mumbling when theyre talking to you.    Find that the effort to hear leaves you feeling tired and irritated.    Notice, when using the phone, that you hear better with 1 ear than the other.    4857-6646 [employer]. [address] [place] [address], [place] [address]. All rights reserved. This information is not intended as a substitute for professional medical care. Always follow your healthcare professional's instructions.         Patient Education   Your Health Risk Assessment indicates you feel you are not in good emotional health.    Recreation   Recreation is not limited to sports and team events. It includes any activity that provides relaxation, interest, enjoyment, and exercise. Recreation provides an outlet for physical, mental, and social energy. It can give a sense of worth and achievement. It can help you stay healthy.    Mental Exercise and Social Involvement  Mental and emotional health is as important as physical health. Keep in touch with friends and family. Stay as active as possible. Continue to learn and challenge yourself.   Things you can do to stay mentally active are:    Learn something new, like a foreign language or musical instrument.     Play SCRABBLE or do crossword puzzles. If you cannot find people to play these games with you at home, you can play them with others on your computer through the Internet.     Join a games club--anything from card games to chess or checkers or lawn bowling.     Start a new hobby.     Go back to school.     Volunteer.     Read.     Keep up with world  events.       Patient Education   Depression and Suicide in Older Adults  Nearly 2 million older Americans have some type of depression. Sadly, some of them even take their own lives. Yet depression among older adults is often ignored. Learn the warning signs. You may help spare a loved one needless pain. You may also save a life.       What Is Depression?  Depression is a mood disorder that affects the way you think and feel. The most common symptom is a feeling of deep sadness. People who are depressed also may seem tired and listless. And nothing seems to give them pleasure. Its normal to grieve or be sad sometimes. But sadness lessens or passes with time. Depression rarely goes away or improves on its own. Other symptoms of depression are:    Sleeping more or less than normal    Eating more or less than normal    Having headaches, stomachaches, or other pains that dont go away    Feeling nervous, empty, or worthless    Crying a great deal    Thinking or talking about suicide or death    Feeling confused or forgetful  What Causes It?  The causes of depression arent fully known. Certain chemicals in the brain play a role. Depression does run in families. And life stresses can also trigger depression in some people. That may be the case with older adults. They often face great burdens, such as the death of friends or a spouse. They may have failing health. And they are more likely to be alone, lonely, or poor.  How You Can Help  Often, depressed people may not want to ask for help. When they do, they may be ignored. Or, they may receive the wrong treatment. You can help by showing parents and older friends love and support. If they seem depressed, help them find the right treatment. Talk to your doctor. Or contact a local mental health center, social service agency, or hospital. With modern treatment, no one has to suffer from depression.  Resources:    National [place] of Mental  [place] 30057111-426-2668  www.[facility].nih.gov    National [place] on Mental Illness  913.824.6873  www.letty.org    Mental Health [place]  875.329.4290  www.[facility].org    National Suicide Hotline  359.388.3897 (800-SUICIDE)      1754-7737 [employer]. [address] [place] [address]. All rights reserved. This information is not intended as a substitute for professional medical care. Always follow your healthcare professional's instructions.         Patient Education   Understanding Advance Care Planning  Advance care planning is the process of deciding ones own future medical care. It helps ensure that if you cant speak for yourself, your wishes can still be carried out. The plan is a series of legal documents that note a persons wishes. The documents vary by state. Advance care planning may be done when a person has a serious illness that is expected to get worse. It may be done before major surgery. And it can help you and your family be prepared in case of a major illness or injury. Advance care planning helps with making decisions at these times.       A health care proxy is a person who acts as the voice of a patient when the patient cant speak for himself or herself. The name of this role varies by state. It may be called a Durable Medical Power of  or Durable Power of  for Healthcare. It may be called an agent, surrogate, or advocate. Or it may be called a representative or decision maker. It is an official duty that is identified by a legal document. The document also varies by state.    Why Is Advance Care Planning Important?  If a person communicates their healthcare wishes:    They will be given medical care that matches their values and goals.    Their family members will not be forced to make decisions in a crisis with no guidance.  Creating a Plan  Making an advance care plan is often done in 3 steps:    Thinking about ones wishes. To create an advance care plan, you  should think about what kind of medical treatment you would want if you lose the ability to communicate. Are there any situations in which you would refuse or stop treatment? Are there therapies you would want or not want? And whom do you want to make decisions for you? There are many places to learn more about how to plan for your care. Ask your doctor or  for resources.    Picking a health care proxy. This means choosing a trusted person to speak for you only when you cant speak for yourself. When you cannot make medical decisions, your proxy makes sure the instructions in your advance care plan are followed. A proxy does not make decisions based on his or her own opinions. They must put aside those opinions and values if needed, and carry out your wishes.    Filling out the legal documents. There are several kinds of legal documents for advance care planning. Each one tells health care providers your wishes. The documents may vary by state. They must be signed and may need to be witnessed or notarized. You can cancel or change them whenever you wish. Depending on your state, the documents may include a Healthcare Proxy form, Living Will, Durable Medical Power of , Advance Directive, or others.  The Familys Role  The best help a family can give is to support their loved ones wishes. Open and honest communication is vital. Family should express any concerns they have about the patients choices while the patient can still make decisions.    0289-9815 [employer]. [address] [place] [address]. All rights reserved. This information is not intended as a substitute for professional medical care. Always follow your healthcare professional's instructions.         Also, [employer] [place] offers a free, downloadable health care directive that allows you to share your treatment choices and personal preferences if you cannot communicate your wishes. It also allows you  to appoint another person (called a health care agent) to make health care decisions if you are unable to do so. You can download an advance directive by going here: http://[email or web address].[facility].org/honoring-choices.[email or web address]     Patient Education   Personalized Prevention Plan  You are due for the preventive services outlined below.  Your care team is available to assist you in scheduling these services.  If you have already completed any of these items, please share that information with your care team to update in your medical record.  Health Maintenance   Topic Date Due   ? ZOSTER VACCINES (2 of 3) 12/11/2012   ? DEPRESSION FOLLOW UP  01/18/2019   ? ASTHMA CONTROL TEST  07/18/2019   ? ASTHMA FOLLOW-UP  07/18/2019   ? FALL RISK ASSESSMENT  08/27/2019   ? URINE DRUG SCREEN  09/10/2019   ? TREATMENT AGREEMENT FOR CHRONIC PAIN MANAGEMENT  09/10/2019   ? DXA SCAN  02/12/2020   ? MAMMOGRAM  12/03/2020   ? ADVANCE DIRECTIVES DISCUSSED WITH PATIENT  02/28/2022   ? TD 18+ HE  08/18/2026   ? PNEUMOCOCCAL POLYSACCHARIDE VACCINE AGE 65 AND OVER  Completed   ? INFLUENZA VACCINE RULE BASED  Completed   ? PNEUMOCOCCAL CONJUGATE VACCINE FOR ADULTS (PCV13 OR PREVNAR)  Completed

## 2021-08-09 ENCOUNTER — HOSPITAL ENCOUNTER (OUTPATIENT)
Dept: NUTRITION | Facility: HOSPITAL | Age: 71
End: 2021-08-09

## 2021-08-09 DIAGNOSIS — Z90.49 S/P TOTAL COLECTOMY: ICD-10-CM

## 2021-08-09 RX ORDER — CYANOCOBALAMIN 1000 UG/ML
1 INJECTION, SOLUTION INTRAMUSCULAR; SUBCUTANEOUS
Qty: 1 ML | Refills: 4 | Status: SHIPPED | OUTPATIENT
Start: 2021-08-09 | End: 2022-06-27

## 2021-08-12 NOTE — PROGRESS NOTES
This is a recent snapshot of the patient's [facility] medical record.  For current drug dose and complete information and questions, call [phone] or In Basket pool, [facility]v home infusion ([id number])  CSN Number:  [id number]

## 2021-08-14 PROCEDURE — 97802 MEDICAL NUTRITION INDIV IN: CPT | Mod: TEL

## 2021-08-14 NOTE — PROGRESS NOTES
"OP MNT Nutrition Assessment & Education    Dee Mattson is a 71 year old female who is being evaluated via a billable telephone visit.    Phone conversation was pushed back per pt request. Phone consult actually occurred on August 12, 2021 at 10 am  Phone call duration:  45  minutes    Patient seen for outpatient MNT initial assessment    Patient is post colectomy due to Colon Cancer. 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    Nutrition diagnosis:  Knowledge deficit related to diet for High output Ileostomy as evidenced by patient's statements and MD order for diet education.    Referring diagnosis:  High output Ileostomy  h/o Dehydration   Patient receives home infusion of 2 liters IVF (over noc) every day.    Height: 5' 1\"   Weight: 110 to 112 lbs  BMI: 21.0  BMI indication: 18.5-24.9 normal weight  IBW: 105 lbs  Wt Readings from Last 3 Encounters:   05/24/21 51.7 kg (114 lb)   05/20/21 51.7 kg (114 lb)   05/17/21 51.7 kg (114 lb)     Labs:   Past 2013 to 2014 Vit D level was low 18 to 21 ng/mL  Current Rx 50,000 IU Vit D once per week ~ RD reinforced this prescribed amount    Meds:  Creon enzyme x 2 tabs TID with meals  Vaginal suppository of muscle relaxant in an effort to relax anal /pelvic floor muscle spasms     Assessed calorie needs:    1250 to 1500 calories     50 to 75 g Protein /day    Nutrition intervention that addressed medical nutrition therapy for above diagnosis:  Patient c/o abd pain, gas, bloating and ongoing pain r/t pelvic floor requires patient lie down several hours during the day.This, along with frequent emptying of ostomy bag (when at least half full) 6-11 x daily makes patient mostly home bound.    RD reviewed guidelines which encourage limit fluid at meals (1/2 cup) and consume sips of oral rehydration solution between meals to promote better absorption and decrease volume of fluids that \"flush\" through quickly.   " "  Breakfast:  Cereal, banana, toast     Lunch:  \"skips\"  Eats [employer] chips    Dinner:   Ham or bologna [place]  Sometimes eats Eggs at night    Snack: \"Sweets\"    Current diet is not adequate in protein.  Protein foods Dee likes: Peanut butter,Tuna, Hamburger (fries) ham, bologna  Patient has little energy or motivation to put together meals.  RD suggests  Meals on Wheels as option for ready to eat meals delivered.    Education materials provided:     Info on diet for high output Ileostomy    Simple recipe for oral rehydration solution that patient agreed to try (OJ water & salt)    RD connected patient with [facility] line [phone] to inquire about help w/ medical bills    RD connected patient with local [employer] organization near [facility] [phone]    Patient has mild barriers to change, but is willing to try \"one thing at a time\"    Phone number provided for questions. Recommended follow-up in as needed.    Thank you for this consult. Call me at [phone] for questions    45 minute  (delayed) Phone consult actually occurred on August 12, 2021 at 10 am  Start time: 10:00  Stop time:  10:45  "

## 2021-08-16 PROCEDURE — G0179 MD RECERTIFICATION HHA PT: HCPCS | Performed by: INTERNAL MEDICINE

## 2021-08-17 ENCOUNTER — TELEPHONE (OUTPATIENT)
Dept: INTERNAL MEDICINE | Facility: CLINIC | Age: 71
End: 2021-08-17
Payer: COMMERCIAL

## 2021-08-17 ENCOUNTER — TELEPHONE (OUTPATIENT)
Dept: INTERNAL MEDICINE | Facility: CLINIC | Age: 71
End: 2021-08-17

## 2021-08-17 RX ORDER — ERGOCALCIFEROL 1.25 MG/1
CAPSULE ORAL
Qty: 13 CAPSULE | Refills: 0 | Status: SHIPPED | OUTPATIENT
Start: 2021-08-17 | End: 2021-11-16

## 2021-08-17 NOTE — TELEPHONE ENCOUNTER
...Reason for Call:  Home Health Care    NADEEM Wolff  with [facility] called regarding (reason for call): FYI    Orders are needed for this patient. Skilled nursing     PT: NONE    OT: NONE    Skilled Nursing: Patient reported her heart skipping a beat the past couple weeks, someday's intermittently. Dr. Caldwell is aware of this history, she stated it causes her to catch her breath but denies any chest pain but is under a lot of stress and has agreed to see counselor at her [facility]. NADEEM Wolff, recommended appointment with Dr. Caldwell to follow-up, patient agreed, but unsure if she'll actually make an appointment. Vitals were all OK.   Patient stopped using the vaginal suppository on 8/7/21 due to getting confused on in it.     Mariella only needed CB if you have recommendations.     Pt Provider: Bhupendra Caldwell MD     Phone Number Homecare Nurse can be reached at: [phone]    Can we leave a detailed message on this number? YES    Call taken on 8/17/2021 at 11:45 AM by QUEENIE Bhatti

## 2021-08-17 NOTE — TELEPHONE ENCOUNTER
Reason for Call:  Other prescription clarification    Detailed comments: Nikkie @ [facility] called and has a question on the following medication that was sent to the pharmacy today. The Rx states states 1 capsule by mouth 1 x a day for 12 dosages and the dispense amount is 13 capsules. Nikkie just wants clarification.     ergocalciferol (ERGOCALCIFEROL) 1.25 MG (82030 UT) capsule 13 capsule 0 8/17/2021  No   Sig: [ERGOCALCIFEROL (ERGOCALCIFEROL) 1,250 MCG (50,000 UNIT) CAPSULE] TAKE 1 CAPSULE BY MOUTH 1 TIME A WEEK FOR 12 DOSES     Can we leave a detailed message on this number? YES    Call taken on 8/17/2021 at 12:29 PM by Aurelia Reyes

## 2021-08-19 DIAGNOSIS — Z53.9 DIAGNOSIS NOT YET DEFINED: Primary | ICD-10-CM

## 2021-08-24 ENCOUNTER — LAB REQUISITION (OUTPATIENT)
Dept: LAB | Facility: HOSPITAL | Age: 71
End: 2021-08-24
Payer: COMMERCIAL

## 2021-08-24 DIAGNOSIS — K91.2 POSTSURGICAL MALABSORPTION, NOT ELSEWHERE CLASSIFIED: ICD-10-CM

## 2021-08-24 DIAGNOSIS — E83.42 HYPOMAGNESEMIA: ICD-10-CM

## 2021-08-24 LAB
CREAT SERPL-MCNC: 1.07 MG/DL (ref 0.6–1.1)
GFR SERPL CREATININE-BSD FRML MDRD: 52 ML/MIN/1.73M2
MAGNESIUM SERPL-MCNC: 2.1 MG/DL (ref 1.8–2.6)

## 2021-08-24 PROCEDURE — 82565 ASSAY OF CREATININE: CPT | Mod: ORL | Performed by: INTERNAL MEDICINE

## 2021-08-24 PROCEDURE — 36592 COLLECT BLOOD FROM PICC: CPT | Mod: ORL | Performed by: INTERNAL MEDICINE

## 2021-08-24 PROCEDURE — 83735 ASSAY OF MAGNESIUM: CPT | Mod: ORL | Performed by: INTERNAL MEDICINE

## 2021-08-24 NOTE — TELEPHONE ENCOUNTER
8-24-21  Reason for Call:  TERRY    Detailed comments: rodney calling back from [facility] with a update:  Medication: Baclofen suppository   Diazepam  Ketamine  0.6 MG/1.25mg/1.25mg  Insert I suppository vaginally twice daily as needed.    Skipped heart beats still occurring, Rodney recommended pt call Dr Caldwell to give the updated on skipped heart beats.    Phone Number Patient can be reached at: [phone]  Best Time: anytime    Can we leave a detailed message on this number? YES    Call taken on 8/24/2021 at 12:08 PM by Tianna Rizo

## 2021-08-24 NOTE — TELEPHONE ENCOUNTER
Unsure what the list of medication is for. I did call Mariella back and left a voicemail to get clarification regarding if these need refills or if anything needs to be done with this.     Regarding the skipped heart beat, it looks like patient was advised by Mariella to make an appointment but she declined.    Please advise.

## 2021-08-30 ENCOUNTER — HOME INFUSION (PRE-WILLOW HOME INFUSION) (OUTPATIENT)
Dept: PHARMACY | Facility: CLINIC | Age: 71
End: 2021-08-30

## 2021-09-01 DIAGNOSIS — M79.7 FIBROMYALGIA: ICD-10-CM

## 2021-09-01 RX ORDER — HYDROMORPHONE HYDROCHLORIDE 4 MG/1
4 TABLET ORAL
Qty: 150 TABLET | Refills: 0 | Status: SHIPPED | OUTPATIENT
Start: 2021-09-01 | End: 2021-11-22

## 2021-09-01 NOTE — PROGRESS NOTES
This is a recent snapshot of the patient's [facility] medical record.  For current drug dose and complete information and questions, call [phone] or In Basket pool, [facility]v home infusion ([id number])  CSN Number:  [id number]

## 2021-09-01 NOTE — TELEPHONE ENCOUNTER
Reason for Call:  Medication or medication refill:    Do you use a [facility]?  Name of the pharmacy and phone number for the current request:  [facility]    Name of the medication requested:  HYDROmorphone (DILAUDID) 4 MG tablet    Other request:     Can we leave a detailed message on this number? YES    Phone number patient can be reached at: Home number on file [phone] (home)    Best Time: any    Call taken on 9/1/2021 at 11:18 AM by Laura L Goldberg, ARRT

## 2021-09-07 ENCOUNTER — TELEPHONE (OUTPATIENT)
Dept: INTERNAL MEDICINE | Facility: CLINIC | Age: 71
End: 2021-09-07

## 2021-09-07 DIAGNOSIS — M79.7 FIBROMYALGIA: ICD-10-CM

## 2021-09-07 NOTE — TELEPHONE ENCOUNTER
Reason for Call:  Medication or medication refill:    Do you use a [facility]?  Name of the pharmacy and phone number for the current request:  [facility] in [place]    Name of the medication requested: Vitamin B12 injections - 3 month supply.    Other request: Tianna states that patient is due to injection today but will not get it because she does not have any injections.  Tianna also states that patient reported having intermittent heart flutter again.  Patient reports no chest pain and will call to schedule an appointment.      Can we leave a detailed message on this number? YES    Phone number patient can be reached at: [phone]    Best Time: anytime    Call taken on 9/7/2021 at 11:15 AM by Beth Padilla

## 2021-09-08 RX ORDER — PROMETHAZINE HYDROCHLORIDE 25 MG/1
TABLET ORAL
Qty: 30 TABLET | Refills: 3 | Status: SHIPPED | OUTPATIENT
Start: 2021-09-08 | End: 2023-02-23

## 2021-09-08 NOTE — TELEPHONE ENCOUNTER
Routing refill request to provider for review/approval because:  Controlled substance request    Last Written Prescription Date:  6/11/21  Last Fill Quantity: 270,  # refills: 0   Last office visit provider:  5/17/21     Requested Prescriptions   Pending Prescriptions Disp Refills     Pregabalin (LYRICA) 200 MG capsule [Pharmacy Med Name: PREGABALIN 200MG CAPSULES] 270 capsule      Sig: TAKE 1 CAPSULE(200 MG) BY MOUTH THREE TIMES DAILY       There is no refill protocol information for this order          Brian Avery RN 09/08/21 11:54 AM

## 2021-09-08 NOTE — TELEPHONE ENCOUNTER
"Last Written Prescription Date:  4/8/21  Last Fill Quantity: 30,  # refills: 0   Last office visit provider:  5/17/21     Requested Prescriptions   Pending Prescriptions Disp Refills     promethazine (PHENERGAN) 25 MG tablet [Pharmacy Med Name: PROMETHAZINE 25MG TABLETS] 30 tablet 0     Sig: TAKE 1 TABLET(25 MG) BY MOUTH EVERY 6 HOURS AS NEEDED FOR NAUSEA        Antivertigo/Antiemetic Agents Passed - 9/7/2021  6:09 PM        Passed - Recent (12 mo) or future (30 days) visit within the authorizing provider's specialty     Patient has had an office visit with the authorizing provider or a provider within the authorizing providers department within the previous 12 mos or has a future within next 30 days. See \"Patient Info\" tab in inbasket, or \"Choose Columns\" in Meds & Orders section of the refill encounter.              Passed - Medication is active on med list        Passed - Patient is 18 years of age or older             Brian Avery RN 09/08/21 11:53 AM  "

## 2021-09-09 RX ORDER — PREGABALIN 200 MG/1
CAPSULE ORAL
Qty: 270 CAPSULE | Refills: 1 | Status: SHIPPED | OUTPATIENT
Start: 2021-09-09 | End: 2022-02-18

## 2021-09-14 ENCOUNTER — TELEPHONE (OUTPATIENT)
Dept: INTERNAL MEDICINE | Facility: CLINIC | Age: 71
End: 2021-09-14

## 2021-09-14 NOTE — TELEPHONE ENCOUNTER
Tianna, homecare nurse calling with [facility] for PCP.  Tianna states that patient will be late for B12 injection again this week.  This is the second week patient is late for it.      Ok to leave detailed message.    Beth Padilla

## 2021-09-21 ENCOUNTER — TRANSFERRED RECORDS (OUTPATIENT)
Dept: HEALTH INFORMATION MANAGEMENT | Facility: CLINIC | Age: 71
End: 2021-09-21

## 2021-09-21 ENCOUNTER — LAB REQUISITION (OUTPATIENT)
Dept: LAB | Facility: CLINIC | Age: 71
End: 2021-09-21
Payer: COMMERCIAL

## 2021-09-21 DIAGNOSIS — E83.42 HYPOMAGNESEMIA: ICD-10-CM

## 2021-09-21 DIAGNOSIS — E86.0 DEHYDRATION: ICD-10-CM

## 2021-09-21 DIAGNOSIS — N18.2 CHRONIC KIDNEY DISEASE, STAGE 2 (MILD): ICD-10-CM

## 2021-09-21 DIAGNOSIS — K91.2 POSTSURGICAL MALABSORPTION, NOT ELSEWHERE CLASSIFIED: ICD-10-CM

## 2021-09-21 LAB
CREAT SERPL-MCNC: 1.05 MG/DL (ref 0.6–1.1)
GFR SERPL CREATININE-BSD FRML MDRD: 54 ML/MIN/1.73M2
MAGNESIUM SERPL-MCNC: 1.9 MG/DL (ref 1.8–2.6)

## 2021-09-21 PROCEDURE — 82565 ASSAY OF CREATININE: CPT | Mod: ORL | Performed by: INTERNAL MEDICINE

## 2021-09-21 PROCEDURE — 83735 ASSAY OF MAGNESIUM: CPT | Mod: ORL | Performed by: INTERNAL MEDICINE

## 2021-09-21 RX ORDER — PANCRELIPASE 30000; 6000; 19000 [USP'U]/1; [USP'U]/1; [USP'U]/1
CAPSULE, DELAYED RELEASE PELLETS ORAL
Qty: 540 CAPSULE | Refills: 0 | Status: SHIPPED | OUTPATIENT
Start: 2021-09-21 | End: 2022-01-04

## 2021-09-21 NOTE — TELEPHONE ENCOUNTER
Routing refill request to provider for review/approval because:  Drug not on the G refill protocol     Last Written Prescription Date:  3/29/21  Last Fill Quantity: 540,  # refills: 0   Last office visit provider:  5/17/21     Requested Prescriptions   Pending Prescriptions Disp Refills     CREON 6000-14370 units CPEP per EC capsule [Pharmacy Med Name: CREON 6,000 CAPSULES] 540 capsule 0     Sig: TAKE 2 CAPSULES BY MOUTH THREE TIMES DAILY WITH FOOD       There is no refill protocol information for this order          Brian Avery RN 09/21/21 2:01 PM

## 2021-09-23 ENCOUNTER — HOME INFUSION (PRE-WILLOW HOME INFUSION) (OUTPATIENT)
Dept: PHARMACY | Facility: CLINIC | Age: 71
End: 2021-09-23

## 2021-09-27 DIAGNOSIS — N30.20 CHRONIC CYSTITIS: Primary | ICD-10-CM

## 2021-09-28 ENCOUNTER — TELEPHONE (OUTPATIENT)
Dept: INTERNAL MEDICINE | Facility: CLINIC | Age: 71
End: 2021-09-28

## 2021-09-28 RX ORDER — NITROFURANTOIN MACROCRYSTALS 50 MG/1
CAPSULE ORAL
Qty: 90 CAPSULE | Refills: 3 | Status: SHIPPED | OUTPATIENT
Start: 2021-09-28 | End: 2022-02-18

## 2021-09-28 NOTE — TELEPHONE ENCOUNTER
Routing refill request to provider for review/approval because:  Drug not on the [facility] refill protocol     Last Written Prescription Date:  6/23/2021  Last Fill Quantity: 90,  # refills: 0   Last office visit provider:  5/17/2021     Requested Prescriptions   Pending Prescriptions Disp Refills     nitroFURantoin macrocrystal (MACRODANTIN) 50 MG capsule [Pharmacy Med Name: NITROFURANTOIN MACRO 50MG CAPSULES] 90 capsule      Sig: TAKE 1 CAPSULE(50 MG) BY MOUTH AT BEDTIME       There is no refill protocol information for this order          Bhakti Vila RN 09/27/21 9:55 PM

## 2021-09-28 NOTE — TELEPHONE ENCOUNTER
Tianna with [facility] with [facility] and update for PCP.  Tianna states that patient reported having skipped heartbeats yesterday again but also reported neck pain on the ride side every time it occurred.  Tianna states that patient also reports being a little short of breath for a second each time as well.  Tianna states that patient reported that it happened throughout the day yesterday but was not evaluated.  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  Tianna states that she encouraged patient to be evaluated today and that her heart rate was 98 and regular.      Beth Padilla

## 2021-10-07 DIAGNOSIS — M79.7 FIBROMYALGIA: ICD-10-CM

## 2021-10-07 RX ORDER — HYDROMORPHONE HYDROCHLORIDE 4 MG/1
4 TABLET ORAL
Qty: 150 TABLET | Refills: 0 | Status: SHIPPED | OUTPATIENT
Start: 2021-10-07 | End: 2022-01-04

## 2021-10-07 NOTE — TELEPHONE ENCOUNTER
..Reason for Call:  Medication or medication refill:    Do you use a [facility]?  Name of the pharmacy and phone number for the current request:  [id number] - [facility] [address] AT [facility] [address]  [phone]    Name of the medication requested: HYDROmorphone (DILAUDID) 4 MG tablet    Other request: NONE    Can we leave a detailed message on this number? YES    Phone number patient can be reached at: Home number on file [phone] (home)    Best Time: ANY    Call taken on 10/7/2021 at 12:57 PM by QUEENIE Bhatti

## 2021-10-07 NOTE — PROGRESS NOTES
This is a recent snapshot of the patient's [facility] medical record.  For current drug dose and complete information and questions, call [phone] or In Basket pool, [facility] home infusion ([id number])  CSN Number:  [id number]

## 2021-10-19 ENCOUNTER — TELEPHONE (OUTPATIENT)
Dept: INTERNAL MEDICINE | Facility: CLINIC | Age: 71
End: 2021-10-19
Payer: COMMERCIAL

## 2021-10-19 ENCOUNTER — TRANSFERRED RECORDS (OUTPATIENT)
Dept: HEALTH INFORMATION MANAGEMENT | Facility: CLINIC | Age: 71
End: 2021-10-19

## 2021-10-19 ENCOUNTER — LAB REQUISITION (OUTPATIENT)
Dept: LAB | Facility: HOSPITAL | Age: 71
End: 2021-10-19
Payer: COMMERCIAL

## 2021-10-19 DIAGNOSIS — K91.2 POSTSURGICAL MALABSORPTION, NOT ELSEWHERE CLASSIFIED: ICD-10-CM

## 2021-10-19 DIAGNOSIS — N18.2 CHRONIC KIDNEY DISEASE, STAGE 2 (MILD): ICD-10-CM

## 2021-10-19 LAB
CREAT SERPL-MCNC: 1 MG/DL (ref 0.6–1.1)
GFR SERPL CREATININE-BSD FRML MDRD: 57 ML/MIN/1.73M2
MAGNESIUM SERPL-MCNC: 1.8 MG/DL (ref 1.8–2.6)

## 2021-10-19 PROCEDURE — 83735 ASSAY OF MAGNESIUM: CPT | Mod: ORL | Performed by: INTERNAL MEDICINE

## 2021-10-19 PROCEDURE — 82565 ASSAY OF CREATININE: CPT | Mod: ORL | Performed by: INTERNAL MEDICINE

## 2021-10-19 PROCEDURE — 36415 COLL VENOUS BLD VENIPUNCTURE: CPT | Mod: ORL | Performed by: INTERNAL MEDICINE

## 2021-10-19 NOTE — TELEPHONE ENCOUNTER
Reason for Call:  Other prescription    Detailed comments: Mariella from [facility] called to let PCP know that Dr Cade @ the pain clinic increased Pt's pain medication a little bit. Patient is taking a vaginal suppository and the dosage are as followed.     This is a 3 in 1 vaginal suppository.     Baclofen 1.25 mg, Diazepam 2.5 mg, Ketamine 2.5 mg    Best Time: ANY    Can we leave a detailed message on this number? YES    Call taken on 10/19/2021 at 12:24 PM by Aurelia Reyes

## 2021-10-21 ENCOUNTER — HOME INFUSION (PRE-WILLOW HOME INFUSION) (OUTPATIENT)
Dept: PHARMACY | Facility: CLINIC | Age: 71
End: 2021-10-21

## 2021-10-22 NOTE — PROGRESS NOTES
This is a recent snapshot of the patient's [facility] medical record.  For current drug dose and complete information and questions, call [phone] or In Basket pool, [facility] home infusion ([id number])  CSN Number:  [id number]

## 2021-10-25 ENCOUNTER — MEDICAL CORRESPONDENCE (OUTPATIENT)
Dept: HEALTH INFORMATION MANAGEMENT | Facility: CLINIC | Age: 71
End: 2021-10-25
Payer: COMMERCIAL

## 2021-10-26 ENCOUNTER — TELEPHONE (OUTPATIENT)
Dept: INTERNAL MEDICINE | Facility: CLINIC | Age: 71
End: 2021-10-26

## 2021-10-26 DIAGNOSIS — Z53.9 DIAGNOSIS NOT YET DEFINED: Primary | ICD-10-CM

## 2021-10-26 PROCEDURE — G0179 MD RECERTIFICATION HHA PT: HCPCS | Performed by: INTERNAL MEDICINE

## 2021-10-26 NOTE — TELEPHONE ENCOUNTER
"Reason for Call: FYI    Detailed comments: Tianna [facility] nurse - FYI:    \" Pt gets weekly cevallos cath changes, was unable to get blood today and pt is going to go to ER at [facility] to get port checked and access. \"      Phone Number can be reached at: Other phone number:  [phone]*      Call taken on 10/26/2021 at 12:03 PM by Michael Devlin        "

## 2021-11-08 ENCOUNTER — MEDICAL CORRESPONDENCE (OUTPATIENT)
Dept: HEALTH INFORMATION MANAGEMENT | Facility: CLINIC | Age: 71
End: 2021-11-08
Payer: COMMERCIAL

## 2021-11-14 DIAGNOSIS — Z90.49 S/P TOTAL COLECTOMY: ICD-10-CM

## 2021-11-16 ENCOUNTER — TRANSFERRED RECORDS (OUTPATIENT)
Dept: HEALTH INFORMATION MANAGEMENT | Facility: CLINIC | Age: 71
End: 2021-11-16
Payer: COMMERCIAL

## 2021-11-16 ENCOUNTER — TELEPHONE (OUTPATIENT)
Dept: INTERNAL MEDICINE | Facility: CLINIC | Age: 71
End: 2021-11-16

## 2021-11-16 ENCOUNTER — LAB REQUISITION (OUTPATIENT)
Dept: LAB | Facility: HOSPITAL | Age: 71
End: 2021-11-16
Payer: COMMERCIAL

## 2021-11-16 DIAGNOSIS — N18.2 CHRONIC KIDNEY DISEASE, STAGE 2 (MILD): ICD-10-CM

## 2021-11-16 DIAGNOSIS — K91.2 POSTSURGICAL MALABSORPTION, NOT ELSEWHERE CLASSIFIED: ICD-10-CM

## 2021-11-16 DIAGNOSIS — E83.42 HYPOMAGNESEMIA: ICD-10-CM

## 2021-11-16 LAB
CREAT SERPL-MCNC: 1.12 MG/DL (ref 0.6–1.1)
GFR SERPL CREATININE-BSD FRML MDRD: 50 ML/MIN/1.73M2
MAGNESIUM SERPL-MCNC: 1.9 MG/DL (ref 1.8–2.6)

## 2021-11-16 PROCEDURE — 82565 ASSAY OF CREATININE: CPT | Mod: ORL | Performed by: INTERNAL MEDICINE

## 2021-11-16 PROCEDURE — 36415 COLL VENOUS BLD VENIPUNCTURE: CPT | Mod: ORL | Performed by: INTERNAL MEDICINE

## 2021-11-16 PROCEDURE — 83735 ASSAY OF MAGNESIUM: CPT | Mod: ORL | Performed by: INTERNAL MEDICINE

## 2021-11-16 RX ORDER — ERGOCALCIFEROL 1.25 MG/1
CAPSULE, LIQUID FILLED ORAL
Qty: 12 CAPSULE | Refills: 3 | Status: SHIPPED | OUTPATIENT
Start: 2021-11-16 | End: 2022-10-24

## 2021-11-16 NOTE — TELEPHONE ENCOUNTER
Routing refill request to provider for review/approval because:  Drug not on the [facility] refill protocol     Last Written Prescription Date:  8/17/21  Last Fill Quantity: 13,  # refills: 0   Last office visit provider:  5/17/21     Requested Prescriptions   Pending Prescriptions Disp Refills     vitamin D2 (ERGOCALCIFEROL) 00426 units (1250 mcg) capsule [Pharmacy Med Name: VITAMIN D2 50,000IU (ERGO) CAP RX] 12 capsule      Sig: TAKE 1 CAPSULE BY MOUTH ONCE PER WEEK FOR 12 DOSES       There is no refill protocol information for this order          Anna Hemphill RN 11/16/21 7:15 AM

## 2021-11-16 NOTE — TELEPHONE ENCOUNTER
Reason for Call:  Home Health Care    Tianna with [facility] called regarding (reason for call): Verbal Orders    Orders are needed for this patient.     Requesting verbal orders to discharged the pt from [facility] in [place] 11/22/21 and to start of care to [facility] in [place] 11/29/21.    Verbal orders to continue weekly cevallos cath and needle change changing it from 7 days to 8 days of the week starting 11/29/21.     Verbal orders due to pt declining Vitamin B12 shot today, she is requesting to get them done 11/22/21 instead.    PT: NONE    OT: NONE    Skilled Nursing: NONE    Pt Provider: DR DI GARNICA    Phone Number Homecare Nurse can be reached at: [phone]    Can we leave a detailed message on this number? YES    Phone number patient can be reached at: Home number on file [phone] (home)    Best Time: ANY    Call taken on 11/16/2021 at 12:18 PM by Michael Devlin

## 2021-11-18 ENCOUNTER — HOME INFUSION (PRE-WILLOW HOME INFUSION) (OUTPATIENT)
Dept: PHARMACY | Facility: CLINIC | Age: 71
End: 2021-11-18
Payer: COMMERCIAL

## 2021-11-22 ENCOUNTER — HOME INFUSION (PRE-WILLOW HOME INFUSION) (OUTPATIENT)
Dept: PHARMACY | Facility: CLINIC | Age: 71
End: 2021-11-22
Payer: COMMERCIAL

## 2021-11-22 ENCOUNTER — TELEPHONE (OUTPATIENT)
Dept: INTERNAL MEDICINE | Facility: CLINIC | Age: 71
End: 2021-11-22
Payer: COMMERCIAL

## 2021-11-22 DIAGNOSIS — M79.7 FIBROMYALGIA: ICD-10-CM

## 2021-11-22 RX ORDER — HYDROMORPHONE HYDROCHLORIDE 4 MG/1
4 TABLET ORAL
Qty: 150 TABLET | Refills: 0 | Status: SHIPPED | OUTPATIENT
Start: 2021-11-22 | End: 2022-03-10

## 2021-11-22 NOTE — TELEPHONE ENCOUNTER
Reason for Call:  Other FYI    Detailed comments: Tianna from [facility] faxed over lab results from last week for pt and Dr Caldwell should've gotten them.    Phone Number Patient can be reached at: Home number on file [phone] (home)    Best Time: any    Can we leave a detailed message on this number? YES    Call taken on 11/22/2021 at 10:53 AM by Michael Devlin

## 2021-11-22 NOTE — TELEPHONE ENCOUNTER
..Reason for Call:  Medication or medication refill:    Do you use a [facility]?  Name of the pharmacy and phone number for the current request: [id number] - [facility] [address] AT [facility] [address]  [phone]    Name of the medication requested: HYDROmorphone (DILAUDID) 4 MG tablet    Other request: NONE     Can we leave a detailed message on this number? YES    Phone number patient can be reached at: Home number on file [phone] (home)    Best Time: ANY    Call taken on 11/22/2021 at 4:09 PM by QUEENIE Bhatti

## 2021-11-23 NOTE — TELEPHONE ENCOUNTER
Have you seen these come through yet?  Cassia Carl [facility] ............... 12:35 PM, 11/23/21

## 2021-11-26 NOTE — PROGRESS NOTES
This is a recent snapshot of the patient's [facility] medical record.  For current drug dose and complete information and questions, call [phone] or In Basket pool, [facility] home infusion ([id number])  CSN Number:  [id number]

## 2021-11-29 ENCOUNTER — TELEPHONE (OUTPATIENT)
Dept: INTERNAL MEDICINE | Facility: CLINIC | Age: 71
End: 2021-11-29
Payer: COMMERCIAL

## 2021-11-29 NOTE — TELEPHONE ENCOUNTER
11-29-21  Reason for Call: Request for an order    Order or referral being requested: verbal orders     Date needed: as soon as possible    Has the patient been seen by the PCP for this problem? Not Applicable    Additional comments: Mariella called from [facility] looking for verbal orders for:  [facility] care [facility] for skilled nursing once a week for 9 weeks for weekly port of cath dressing and needle change & monthly labs & monthly B12 injections and overall health maintenance   3 PRN for prot of cath issues/re-certifaction   Pt wants to discontinue;  Lidocaine 4% cream PRN-pt hasn't used in over 2 years   albuterol .083%-pt hasnt used in over 2 years  Ketamine cream-pt hasnt used in over 2 years  movantik 12.5mg -pt hasnt used in over 2 years  Zofran 8mg-pt hasnt used in over 2 years  Protonix 40mg-pt hasnt used in over 2 years    Needs clarification on Potassium Chloride:  Pt states shes not taking except the pharmacy just filled this on 11-14-21.  The pt states there is potassium in her fluids       Phone number Patient can be reached at:  [phone]    Best Time:  anytime    Can we leave a detailed message on this number?  YES    Call taken on 11/29/2021 at 1:48 PM by Tianna Rizo

## 2021-11-30 NOTE — TELEPHONE ENCOUNTER
Mariella called and stated she spoke to [facility] and the patient is on the same IV fluids ( fluid that does not have potassium). Mariella just wants to verify when Dr Caldwell stopped the potasium just to make sure when patient was suppose to have stopped taking potasium? Please call Mariella back as soon as possible to discuss her questions/concerns at [phone]. Ok to leave a detailed voicemail.    Aurelia Reyes

## 2021-12-02 RX ORDER — CHLORTHALIDONE 25 MG/1
TABLET ORAL
Qty: 90 TABLET | OUTPATIENT
Start: 2021-12-02

## 2021-12-02 NOTE — TELEPHONE ENCOUNTER
Outpatient Medication Detail     Disp Refills Start End LENA   chlorthalidone (HYGROTEN) 25 MG tablet (Discontinued) 90 tablet 3 11/29/2020 2/23/2021 No   Sig: TAKE 1 TABLET(25 MG) BY MOUTH DAILY   Sent to pharmacy as: chlorthalidone 25 mg tablet (HYGROTEN)   Reason for Discontinue: Therapy completed   E-Prescribing Status: Receipt confirmed by pharmacy (11/29/2020 11:03 PM CST)       chlorthalidone (HYGROTEN) 25 MG tablet [[id number]]    Electronically signed by: Josefina Chino RN on 11/29/20 2303 Status: Discontinued   Ordering user: Josefina Chino RN 11/29/20 2303 Ordering provider: Bhupendra Caldwell MD   Authorized by: Bhupendar Caldwell MD   Frequency:  11/29/20 - 02/23/21 Released by: Josefina Chino RN 11/29/20 2303   Discontinued by: Jany Yo RN 02/23/21 1147 [Therapy completed]   Diagnoses  Benign essential hypertension [I10]

## 2021-12-05 DIAGNOSIS — I10 BENIGN ESSENTIAL HYPERTENSION: ICD-10-CM

## 2021-12-07 ENCOUNTER — HOME INFUSION (PRE-WILLOW HOME INFUSION) (OUTPATIENT)
Dept: PHARMACY | Facility: CLINIC | Age: 71
End: 2021-12-07
Payer: COMMERCIAL

## 2021-12-07 RX ORDER — LISINOPRIL 20 MG/1
TABLET ORAL
Qty: 90 TABLET | Refills: 3 | Status: SHIPPED | OUTPATIENT
Start: 2021-12-07 | End: 2022-12-12

## 2021-12-07 NOTE — TELEPHONE ENCOUNTER
"Routing refill request to provider for review/approval because:  Labs out of range:  Creatinine     Last Written Prescription Date:  11/29/20  Last Fill Quantity: 90,  # refills: 3   Last office visit provider:  5/17/21     Requested Prescriptions   Pending Prescriptions Disp Refills     lisinopril (ZESTRIL) 20 MG tablet [Pharmacy Med Name: LISINOPRIL 20MG TABLETS] 90 tablet 3     Sig: TAKE 1 TABLET(20 MG) BY MOUTH DAILY       ACE Inhibitors (Including Combos) Protocol Failed - 12/5/2021 10:25 AM        Failed - Normal serum creatinine on file in past 12 months     Recent Labs   Lab Test 11/16/21  1150   CR 1.12*       Ok to refill medication if creatinine is low          Passed - Blood pressure under 140/90 in past 12 months     BP Readings from Last 3 Encounters:   05/17/21 132/80   03/16/21 132/62   02/22/21 98/58                 Passed - Recent (12 mo) or future (30 days) visit within the authorizing provider's specialty     Patient has had an office visit with the authorizing provider or a provider within the authorizing providers department within the previous 12 mos or has a future within next 30 days. See \"Patient Info\" tab in inbasket, or \"Choose Columns\" in Meds & Orders section of the refill encounter.              Passed - Medication is active on med list        Passed - Patient is age 18 or older        Passed - No active pregnancy on record        Passed - Normal serum potassium on file in past 12 months     Recent Labs   Lab Test 03/08/21  1200   POTASSIUM 4.4             Passed - No positive pregnancy test within past 12 months             Brian Avery RN 12/07/21 9:38 AM  "

## 2021-12-13 ENCOUNTER — MEDICAL CORRESPONDENCE (OUTPATIENT)
Dept: HEALTH INFORMATION MANAGEMENT | Facility: CLINIC | Age: 71
End: 2021-12-13
Payer: COMMERCIAL

## 2021-12-14 ENCOUNTER — HOME INFUSION (PRE-WILLOW HOME INFUSION) (OUTPATIENT)
Dept: PHARMACY | Facility: CLINIC | Age: 71
End: 2021-12-14
Payer: COMMERCIAL

## 2021-12-14 ENCOUNTER — TELEPHONE (OUTPATIENT)
Dept: INTERNAL MEDICINE | Facility: CLINIC | Age: 71
End: 2021-12-14
Payer: COMMERCIAL

## 2021-12-14 NOTE — TELEPHONE ENCOUNTER
Mariella boothe would like to recheck patient's labs for magnesium & creatine lab work next week Tuesday 12/21/2021.    Aurelia Reyes

## 2021-12-14 NOTE — TELEPHONE ENCOUNTER
Reason for Call:  Home Health Care    Mariella @ [facility] called regarding (reason for call): verbal to postpone blood draw until next Tuesday 12/21/2021.    Orders are needed for this patient. Verbal to postpone blood draw until next Tuesday 12/21/2021.    Mariella @ [facility] called to request a verbal order to postpone patients blood draw until 12/21/2021. Mariella went to draw blood today and was not having any issues with the flush or the needle but Mariella was only able to get 4 ml's of blood and needed 5 ml's of blood. Mariella would like to come back again next Tuesday 12/21/2021 and stated that she would like to try to draw blood again and if unsuccessful she would like to try to do a partial venous puncture from port? Please call Mariella with any questions/concerns.     Pt Provider: Bhupendra Caldwell MD    Phone Number Homecare Nurse can be reached at: [phone]    Can we leave a detailed message on this number? YES      Best Time: any    Call taken on 12/14/2021 at 11:58 AM by Aurelia Reyes

## 2021-12-15 NOTE — TELEPHONE ENCOUNTER
Left detailed voicemail for Crystal with verbal okay.  Cassia Carl CMA ............... 4:45 PM, 12/15/21

## 2021-12-17 ENCOUNTER — TELEPHONE (OUTPATIENT)
Dept: INTERNAL MEDICINE | Facility: CLINIC | Age: 71
End: 2021-12-17
Payer: COMMERCIAL

## 2021-12-17 NOTE — TELEPHONE ENCOUNTER
12-17-21  Georgette Urbina called from [facility] & they need any & all services on pt's last visit with Dr Caldwell.  Georgette Urbina needs it for continuing care  FAX; [phone]  ATTN: Georgette purdy

## 2021-12-21 ENCOUNTER — TRANSFERRED RECORDS (OUTPATIENT)
Dept: HEALTH INFORMATION MANAGEMENT | Facility: CLINIC | Age: 71
End: 2021-12-21

## 2021-12-21 ENCOUNTER — TELEPHONE (OUTPATIENT)
Dept: INTERNAL MEDICINE | Facility: CLINIC | Age: 71
End: 2021-12-21

## 2021-12-21 ENCOUNTER — LAB REQUISITION (OUTPATIENT)
Dept: LAB | Facility: HOSPITAL | Age: 71
End: 2021-12-21
Payer: COMMERCIAL

## 2021-12-21 DIAGNOSIS — N18.2 CHRONIC KIDNEY DISEASE, STAGE 2 (MILD): ICD-10-CM

## 2021-12-21 DIAGNOSIS — E83.42 HYPOMAGNESEMIA: ICD-10-CM

## 2021-12-21 DIAGNOSIS — K91.2 POSTSURGICAL MALABSORPTION, NOT ELSEWHERE CLASSIFIED: ICD-10-CM

## 2021-12-21 LAB
CREAT SERPL-MCNC: 1.03 MG/DL (ref 0.6–1.1)
GFR SERPL CREATININE-BSD FRML MDRD: 58 ML/MIN/1.73M2
MAGNESIUM SERPL-MCNC: 2.1 MG/DL (ref 1.8–2.6)

## 2021-12-21 PROCEDURE — 36592 COLLECT BLOOD FROM PICC: CPT | Mod: ORL | Performed by: INTERNAL MEDICINE

## 2021-12-21 PROCEDURE — 83735 ASSAY OF MAGNESIUM: CPT | Mod: ORL | Performed by: INTERNAL MEDICINE

## 2021-12-21 PROCEDURE — 82565 ASSAY OF CREATININE: CPT | Mod: ORL | Performed by: INTERNAL MEDICINE

## 2021-12-21 NOTE — TELEPHONE ENCOUNTER
..Reason for Call:  Other     Detailed comments: Queenie @ [facility] calling to let us know patient is discharging from home care today.     Phone Number: [phone]    Best Time: NONE     Can we leave a detailed message on this number? YES    Call taken on 12/21/2021 at 12:02 PM by QUEENIE Bhatti

## 2021-12-23 ENCOUNTER — HOME INFUSION (PRE-WILLOW HOME INFUSION) (OUTPATIENT)
Dept: PHARMACY | Facility: CLINIC | Age: 71
End: 2021-12-23
Payer: COMMERCIAL

## 2021-12-28 ENCOUNTER — TELEPHONE (OUTPATIENT)
Dept: INTERNAL MEDICINE | Facility: CLINIC | Age: 71
End: 2021-12-28
Payer: COMMERCIAL

## 2021-12-28 NOTE — TELEPHONE ENCOUNTER
Reason for Call:  Other     Detailed comments: Hetal Nurse @ [facility] called and stated that patient was not able to  her B-12 injection she she was not given it today at her home visit. Hetal stated that patient will have his son pick it up and Hetal will give it to patient next week. Patient is seen once a week. Hetal just wanted to inform PCP of this information. No need to call back.    Best Time: any    Can we leave a detailed message on this number? YES    Call taken on 12/28/2021 at 2:17 PM by Aurelia Reyes

## 2022-01-03 DIAGNOSIS — M79.7 FIBROMYALGIA: ICD-10-CM

## 2022-01-03 NOTE — TELEPHONE ENCOUNTER
1-3-22  Reason for Call:  Medication   Do you use a [facility]? [facility]     Name of the medication requested: HYDROmorphone (DILAUDID) 4 MG tablet    Other request: none    Can we leave a detailed message on this number? YES    Phone number patient can be reached at: Cell number on file:    Telephone Information:   Mobile [phone]       Best Time: antyime    Call taken on 1/3/2022 at 2:06 PM by Tianna Rizo

## 2022-01-04 ENCOUNTER — TELEPHONE (OUTPATIENT)
Dept: INTERNAL MEDICINE | Facility: CLINIC | Age: 72
End: 2022-01-04
Payer: COMMERCIAL

## 2022-01-04 RX ORDER — HYDROMORPHONE HYDROCHLORIDE 4 MG/1
4 TABLET ORAL
Qty: 150 TABLET | Refills: 0 | Status: SHIPPED | OUTPATIENT
Start: 2022-01-04 | End: 2022-02-09

## 2022-01-04 NOTE — TELEPHONE ENCOUNTER
Tianna a home health nurse for Dee called you with a update.     12/30/2021 Dee fell at the pain clinic. Staff helped her up. She seemed ok. The Doc thought it was from her suppository  medication that she is taking.    Vaginal Suppository includes, dizapam 10mg, [id number] ,baclofen 5mg    . Nurse thought the dose seemed high. Pt had been cutting into 1/4.     She had a covid exposure 5 beard ago in her apartment she will call to be tested at [facility],    Regarding her Skipped Heart beats. This is happening 1 time every 1-2 weeks. She will call to make appt to discuss.     Just a FYI.

## 2022-01-10 DIAGNOSIS — F41.9 ANXIETY AND DEPRESSION: ICD-10-CM

## 2022-01-10 DIAGNOSIS — F32.A ANXIETY AND DEPRESSION: ICD-10-CM

## 2022-01-12 VITALS — HEIGHT: 61 IN | BODY MASS INDEX: 21.52 KG/M2 | WEIGHT: 114 LBS

## 2022-01-12 RX ORDER — BUSPIRONE HYDROCHLORIDE 15 MG/1
TABLET ORAL
Qty: 360 TABLET | Refills: 3 | Status: SHIPPED | OUTPATIENT
Start: 2022-01-12 | End: 2022-12-22

## 2022-01-12 NOTE — TELEPHONE ENCOUNTER
"Routing refill request to provider for review/approval because:  PHQ 9 score - not on file/out of date.  Patient needs to be seen because it has been more than 6 months since last office visit.    Last Written Prescription Date:  1/11/21  Last Fill Quantity: 360,  # refills: 3   Last office visit provider:  5/17/21     Requested Prescriptions   Pending Prescriptions Disp Refills     busPIRone (BUSPAR) 15 MG tablet [Pharmacy Med Name: BUSPIRONE 15MG TABLETS] 360 tablet 3     Sig: TAKE 2 TABLETS BY MOUTH TWICE DAILY       Atypical Antidepressants Protocol Failed - 1/10/2022  3:27 AM        Failed - Patient has PHQ-9 score less than 5 in past 6 months.     Please review last PHQ-9 score.           Failed - Recent (6 mo) or future (30 days) visit within the authorizing provider's specialty     Patient had office visit in the last 6 months or has a visit in the next 30 days with authorizing provider or within the authorizing provider's specialty.  See \"Patient Info\" tab in inbasket, or \"Choose Columns\" in Meds & Orders section of the refill encounter.            Passed - Medication active on med list        Passed - Patient is age 18 or older        Passed - No active pregnancy on record        Passed - No positive pregnancy test in past 12 mos             Brian Avery RN 01/12/22 1:44 PM  "

## 2022-01-18 ENCOUNTER — LAB REQUISITION (OUTPATIENT)
Dept: LAB | Facility: HOSPITAL | Age: 72
End: 2022-01-18
Payer: COMMERCIAL

## 2022-01-18 VITALS — HEART RATE: 74 BPM | OXYGEN SATURATION: 96 % | DIASTOLIC BLOOD PRESSURE: 80 MMHG | SYSTOLIC BLOOD PRESSURE: 150 MMHG

## 2022-01-18 VITALS
OXYGEN SATURATION: 98 % | WEIGHT: 114 LBS | DIASTOLIC BLOOD PRESSURE: 62 MMHG | BODY MASS INDEX: 21.9 KG/M2 | SYSTOLIC BLOOD PRESSURE: 132 MMHG | HEART RATE: 79 BPM

## 2022-01-18 VITALS
WEIGHT: 124 LBS | HEART RATE: 64 BPM | SYSTOLIC BLOOD PRESSURE: 98 MMHG | BODY MASS INDEX: 20.63 KG/M2 | DIASTOLIC BLOOD PRESSURE: 58 MMHG

## 2022-01-18 VITALS — HEIGHT: 61 IN | BODY MASS INDEX: 21.71 KG/M2 | WEIGHT: 115 LBS

## 2022-01-18 VITALS
OXYGEN SATURATION: 98 % | HEART RATE: 68 BPM | BODY MASS INDEX: 21.9 KG/M2 | SYSTOLIC BLOOD PRESSURE: 132 MMHG | WEIGHT: 114 LBS | DIASTOLIC BLOOD PRESSURE: 80 MMHG

## 2022-01-18 DIAGNOSIS — K91.2 POSTSURGICAL MALABSORPTION, NOT ELSEWHERE CLASSIFIED: ICD-10-CM

## 2022-01-18 DIAGNOSIS — E83.42 HYPOMAGNESEMIA: ICD-10-CM

## 2022-01-18 LAB
CREAT SERPL-MCNC: 0.96 MG/DL (ref 0.6–1.1)
GFR SERPL CREATININE-BSD FRML MDRD: 63 ML/MIN/1.73M2
MAGNESIUM SERPL-MCNC: 1.9 MG/DL (ref 1.8–2.6)

## 2022-01-18 PROCEDURE — 83735 ASSAY OF MAGNESIUM: CPT | Mod: ORL | Performed by: INTERNAL MEDICINE

## 2022-01-18 PROCEDURE — 36591 DRAW BLOOD OFF VENOUS DEVICE: CPT | Mod: ORL | Performed by: INTERNAL MEDICINE

## 2022-01-18 PROCEDURE — 82565 ASSAY OF CREATININE: CPT | Mod: ORL | Performed by: INTERNAL MEDICINE

## 2022-01-24 ENCOUNTER — HOME INFUSION (PRE-WILLOW HOME INFUSION) (OUTPATIENT)
Dept: PHARMACY | Facility: CLINIC | Age: 72
End: 2022-01-24
Payer: COMMERCIAL

## 2022-01-25 ENCOUNTER — TELEPHONE (OUTPATIENT)
Dept: INTERNAL MEDICINE | Facility: CLINIC | Age: 72
End: 2022-01-25
Payer: COMMERCIAL

## 2022-01-25 ENCOUNTER — HOME INFUSION (PRE-WILLOW HOME INFUSION) (OUTPATIENT)
Dept: PHARMACY | Facility: CLINIC | Age: 72
End: 2022-01-25
Payer: COMMERCIAL

## 2022-01-25 NOTE — TELEPHONE ENCOUNTER
Reason for Call:  Home Health Care    Tianna with [facility] called regarding (reason for call): Verbal Orders    Orders are needed for this patient.  Re-certification order for pt and home care skilled nursing.    PT: None    OT: None    Skilled Nursing: Once a week for 9 weeks for weekly cevallos cath changes and needle changes. Monthly B12 Injections and Monthly Creatinine and Magnesium labs. Overall health maintenance and 3 PRN visits for reinsert or cath issues.     Pt Provider: Dr Caldwell    Phone Number Homecare Nurse can be reached at: [phone]    Can we leave a detailed message on this number? YES    Phone number patient can be reached at: Home number on file [phone] (home)    Best Time: any    Call taken on 1/25/2022 at 11:53 AM by Michael Devlin

## 2022-01-25 NOTE — TELEPHONE ENCOUNTER
Left voicemail with verbal orders for Tianna.  Cassia Carl CMA ............... 12:54 PM, 01/25/22

## 2022-02-01 ENCOUNTER — HOME INFUSION (PRE-WILLOW HOME INFUSION) (OUTPATIENT)
Dept: PHARMACY | Facility: CLINIC | Age: 72
End: 2022-02-01

## 2022-02-01 ENCOUNTER — TELEPHONE (OUTPATIENT)
Dept: INTERNAL MEDICINE | Facility: CLINIC | Age: 72
End: 2022-02-01
Payer: COMMERCIAL

## 2022-02-01 NOTE — TELEPHONE ENCOUNTER
Reason for Call:  Other    Detailed comments: Mariella @ [facility] home care called and wanted to talk to PCP/care team regarding some concerns with patient. Mariella states that there was a problem with patients tubing (it was smaller than normal) for home infusion. Mariella stated the smaller tubing resulted in patient only getting a portion of the IV fluid bag resulting in patient being very weak, and patient states her heart is skipping beats. Mariella stated that patient told her that her heart beat feels like it is skipping beats and believes it is caused by the stress she she is going through with her mom.  Patient was also not able to get her vitamin B-12 because she was not able to pick it up. Patient will get her vitamin B-12 at next weeks visit on Tuesday 02/08/2022. Mariella would like to talk to PCP/care team. Please call Mariella back.     Best Time: any    Can we leave a detailed message on this number? YES    Call taken on 2/1/2022 at 12:05 PM by Aurelia Reyes

## 2022-02-08 ENCOUNTER — MEDICAL CORRESPONDENCE (OUTPATIENT)
Dept: HEALTH INFORMATION MANAGEMENT | Facility: CLINIC | Age: 72
End: 2022-02-08
Payer: COMMERCIAL

## 2022-02-08 DIAGNOSIS — F32.A DEPRESSION: ICD-10-CM

## 2022-02-08 DIAGNOSIS — M79.7 FIBROMYALGIA: ICD-10-CM

## 2022-02-08 DIAGNOSIS — Z53.9 DIAGNOSIS NOT YET DEFINED: Primary | ICD-10-CM

## 2022-02-08 PROCEDURE — G0179 MD RECERTIFICATION HHA PT: HCPCS | Performed by: INTERNAL MEDICINE

## 2022-02-08 NOTE — TELEPHONE ENCOUNTER
Reason for Call:  Medication or medication refill: 2 Rx refills    Do you use a [facility]? NO Name of the pharmacy and phone number for the current request:  "[facility] drug "[facility] [address] @ [facility] [address] in [place], [phone]    Name of the medication requested:     HYDROmorphone (DILAUDID) 4 MG tablet 150 tablet 0 11/22/2021  No   Sig - Route: Take 1 tablet (4 mg) by mouth 5 times daily - Oral   Sent to pharmacy as: HYDROmorphone HCl 4 MG Oral Tablet (DILAUDID)     DULoxetine (CYMBALTA) 60 MG capsule 180 capsule 3 2/18/2021  No   Sig - Route: [DULOXETINE (CYMBALTA) 60 MG CAPSULE] Take 1 capsule (60 mg total) by mouth 2 (two) times a day. - Oral     Can we leave a detailed message on this number? YES    Phone number patient can be reached at: Home number on file [phone] (home)    Best Time: ANY    Call taken on 2/8/2022 at 3:13 PM by Aurelia Reyes

## 2022-02-10 RX ORDER — DULOXETIN HYDROCHLORIDE 60 MG/1
60 CAPSULE, DELAYED RELEASE ORAL 2 TIMES DAILY
Qty: 180 CAPSULE | Refills: 3 | Status: SHIPPED | OUTPATIENT
Start: 2022-02-10 | End: 2023-01-20

## 2022-02-10 RX ORDER — HYDROMORPHONE HYDROCHLORIDE 4 MG/1
4 TABLET ORAL
Qty: 150 TABLET | Refills: 0 | Status: SHIPPED | OUTPATIENT
Start: 2022-02-10 | End: 2022-02-18

## 2022-02-11 NOTE — PROGRESS NOTES
This is a recent snapshot of the patient's [facility] medical record.  For current drug dose and complete information and questions, call [phone] or In Basket pool, [facility] home infusion ([id number])  CSN Number:  [id number]

## 2022-02-12 DIAGNOSIS — F41.9 ANXIETY AND DEPRESSION: ICD-10-CM

## 2022-02-12 DIAGNOSIS — F32.A ANXIETY AND DEPRESSION: ICD-10-CM

## 2022-02-12 DIAGNOSIS — F32.A DEPRESSION: ICD-10-CM

## 2022-02-15 ENCOUNTER — LAB REQUISITION (OUTPATIENT)
Dept: LAB | Facility: HOSPITAL | Age: 72
End: 2022-02-15
Payer: COMMERCIAL

## 2022-02-15 DIAGNOSIS — K91.2 POSTSURGICAL MALABSORPTION, NOT ELSEWHERE CLASSIFIED: ICD-10-CM

## 2022-02-15 DIAGNOSIS — E83.42 HYPOMAGNESEMIA: ICD-10-CM

## 2022-02-15 DIAGNOSIS — N18.2 CHRONIC KIDNEY DISEASE, STAGE 2 (MILD): ICD-10-CM

## 2022-02-15 LAB
CREAT SERPL-MCNC: 0.93 MG/DL (ref 0.6–1.1)
GFR SERPL CREATININE-BSD FRML MDRD: 65 ML/MIN/1.73M2
MAGNESIUM SERPL-MCNC: 2.2 MG/DL (ref 1.8–2.6)

## 2022-02-15 PROCEDURE — 36591 DRAW BLOOD OFF VENOUS DEVICE: CPT | Mod: ORL | Performed by: INTERNAL MEDICINE

## 2022-02-15 PROCEDURE — 83735 ASSAY OF MAGNESIUM: CPT | Mod: ORL | Performed by: INTERNAL MEDICINE

## 2022-02-15 PROCEDURE — 82565 ASSAY OF CREATININE: CPT | Mod: ORL | Performed by: INTERNAL MEDICINE

## 2022-02-15 RX ORDER — DULOXETIN HYDROCHLORIDE 60 MG/1
CAPSULE, DELAYED RELEASE ORAL
Qty: 180 CAPSULE | Refills: 3 | OUTPATIENT
Start: 2022-02-15

## 2022-02-15 RX ORDER — POTASSIUM CHLORIDE 1500 MG/1
TABLET, EXTENDED RELEASE ORAL
Qty: 90 TABLET | Refills: 4 | Status: SHIPPED | OUTPATIENT
Start: 2022-02-15 | End: 2022-02-18

## 2022-02-15 RX ORDER — TRAZODONE HYDROCHLORIDE 150 MG/1
TABLET ORAL
Qty: 270 TABLET | Refills: 0 | Status: SHIPPED | OUTPATIENT
Start: 2022-02-15 | End: 2022-06-09

## 2022-02-15 NOTE — TELEPHONE ENCOUNTER
"Routing refill request to provider for review/approval because:  Drug not active on patient's medication list  Drug interaction warning      Klor-con- unable to locate last fill information in chart  Last Written Prescription Date:  ???  Last Fill Quantity: ???,  # refills: ???     Trazodone  Last Written Prescription Date:  2/18/21  Last Fill Quantity: 270,  # refills: 3   Last office visit provider:  5/17/21     Requested Prescriptions   Pending Prescriptions Disp Refills     potassium chloride ER (KLOR-CON M) 20 MEQ CR tablet [Pharmacy Med Name: POTASSIUM CL 20MEQ ER TABLETS] 90 tablet      Sig: TAKE 1 TABLET(20 MEQ) BY MOUTH DAILY       Potassium Supplements Protocol Failed - 2/12/2022  3:25 AM        Failed - Medication is active on med list        Passed - Recent (12 mo) or future (30 days) visit within the authorizing provider's department     Patient has had an office visit with the authorizing provider or a provider within the authorizing providers department within the previous 12 mos or has a future within next 30 days. See \"Patient Info\" tab in inbasket, or \"Choose Columns\" in Meds & Orders section of the refill encounter.              Passed - Patient is age 18 or older        Passed - Normal serum potassium in past 12 months     Recent Labs   Lab Test 03/08/21  1200   POTASSIUM 4.4                       traZODone (DESYREL) 150 MG tablet [Pharmacy Med Name: TRAZODONE 150MG (HUNDRED-FIFTY) TAB] 270 tablet 3     Sig: TAKE 3 TABLETS(450 MG) BY MOUTH AT BEDTIME       Serotonin Modulators Passed - 2/12/2022  3:25 AM        Passed - Recent (12 mo) or future (30 days) visit within the authorizing provider's specialty     Patient has had an office visit with the authorizing provider or a provider within the authorizing providers department within the previous 12 mos or has a future within next 30 days. See \"Patient Info\" tab in inbasket, or \"Choose Columns\" in Meds & Orders section of the refill encounter.  " "            Passed - Medication is active on med list        Passed - Patient is age 18 or older        Passed - No active pregnancy on record        Passed - No positive pregnancy test in past 12 months           DULoxetine (CYMBALTA) 60 MG capsule [Pharmacy Med Name: DULOXETINE DR 60MG CAPSULES] 180 capsule 3     Sig: TAKE ONE CAPSULE BY MOUTH TWICE DAILY       Serotonin-Norepinephrine Reuptake Inhibitors  Failed - 2/12/2022  3:25 AM        Failed - PHQ-9 score of less than 5 in past 6 months     Please review last PHQ-9 score.           Passed - Blood pressure under 140/90 in past 12 months     BP Readings from Last 3 Encounters:   05/17/21 132/80   03/16/21 132/62   02/22/21 98/58                 Passed - Medication is active on med list        Passed - Patient is age 18 or older        Passed - No active pregnancy on record        Passed - No positive pregnancy test in past 12 months        Passed - Recent (6 mo) or future (30 days) visit within the authorizing provider's specialty     Patient had office visit in the last 6 months or has a visit in the next 30 days with authorizing provider or within the authorizing provider's specialty.  See \"Patient Info\" tab in inbasket, or \"Choose Columns\" in Meds & Orders section of the refill encounter.                 Kathleen Lund RN 02/15/22 11:42 AM    Medication refill declined: should have refills on file     Disp Refills Start End LENA   DULoxetine (CYMBALTA) 60 MG capsule 180 capsule 3 2/10/2022  No   Sig - Route: Take 1 capsule (60 mg) by mouth 2 times daily - Oral   Sent to pharmacy as: DULoxetine HCl 60 MG Oral Capsule Delayed Release Particles (CYMBALTA)   Class: E-Prescribe   Order: [id number]   E-Prescribing Status: Receipt confirmed by pharmacy (2/10/2022  8:41 AM CST)     Kathleen Lund RN BSN 2/15/2022 11:50 AM    "

## 2022-02-17 PROBLEM — M35.00 SICCA SYNDROME (H): Status: RESOLVED | Noted: 2019-12-17 | Resolved: 2019-12-17

## 2022-02-18 ENCOUNTER — OFFICE VISIT (OUTPATIENT)
Dept: INTERNAL MEDICINE | Facility: CLINIC | Age: 72
End: 2022-02-18
Payer: COMMERCIAL

## 2022-02-18 ENCOUNTER — HOME INFUSION (PRE-WILLOW HOME INFUSION) (OUTPATIENT)
Dept: PHARMACY | Facility: CLINIC | Age: 72
End: 2022-02-18

## 2022-02-18 VITALS
BODY MASS INDEX: 22.28 KG/M2 | DIASTOLIC BLOOD PRESSURE: 84 MMHG | HEIGHT: 61 IN | WEIGHT: 118 LBS | SYSTOLIC BLOOD PRESSURE: 144 MMHG | OXYGEN SATURATION: 97 % | HEART RATE: 77 BPM

## 2022-02-18 DIAGNOSIS — H61.22 IMPACTED CERUMEN OF LEFT EAR: ICD-10-CM

## 2022-02-18 DIAGNOSIS — I49.1 PREMATURE ATRIAL CONTRACTIONS: Primary | ICD-10-CM

## 2022-02-18 DIAGNOSIS — I49.1 PREMATURE ATRIAL CONTRACTIONS: ICD-10-CM

## 2022-02-18 PROCEDURE — 99214 OFFICE O/P EST MOD 30 MIN: CPT | Performed by: INTERNAL MEDICINE

## 2022-02-18 RX ORDER — METOPROLOL SUCCINATE 25 MG/1
25 TABLET, EXTENDED RELEASE ORAL DAILY
Qty: 30 TABLET | Refills: 3 | Status: SHIPPED | OUTPATIENT
Start: 2022-02-18 | End: 2022-02-21

## 2022-02-18 ASSESSMENT — ANXIETY QUESTIONNAIRES
5. BEING SO RESTLESS THAT IT IS HARD TO SIT STILL: NOT AT ALL
4. TROUBLE RELAXING: NOT AT ALL
2. NOT BEING ABLE TO STOP OR CONTROL WORRYING: MORE THAN HALF THE DAYS
3. WORRYING TOO MUCH ABOUT DIFFERENT THINGS: MORE THAN HALF THE DAYS
GAD7 TOTAL SCORE: 7
IF YOU CHECKED OFF ANY PROBLEMS ON THIS QUESTIONNAIRE, HOW DIFFICULT HAVE THESE PROBLEMS MADE IT FOR YOU TO DO YOUR WORK, TAKE CARE OF THINGS AT HOME, OR GET ALONG WITH OTHER PEOPLE: SOMEWHAT DIFFICULT
7. FEELING AFRAID AS IF SOMETHING AWFUL MIGHT HAPPEN: NOT AT ALL
1. FEELING NERVOUS, ANXIOUS, OR ON EDGE: MORE THAN HALF THE DAYS
6. BECOMING EASILY ANNOYED OR IRRITABLE: SEVERAL DAYS

## 2022-02-18 ASSESSMENT — ASTHMA QUESTIONNAIRES
ACT_TOTALSCORE: 25
QUESTION_3 LAST FOUR WEEKS HOW OFTEN DID YOUR ASTHMA SYMPTOMS (WHEEZING, COUGHING, SHORTNESS OF BREATH, CHEST TIGHTNESS OR PAIN) WAKE YOU UP AT NIGHT OR EARLIER THAN USUAL IN THE MORNING: NOT AT ALL
QUESTION_5 LAST FOUR WEEKS HOW WOULD YOU RATE YOUR ASTHMA CONTROL: COMPLETELY CONTROLLED
QUESTION_1 LAST FOUR WEEKS HOW MUCH OF THE TIME DID YOUR ASTHMA KEEP YOU FROM GETTING AS MUCH DONE AT WORK, SCHOOL OR AT HOME: NONE OF THE TIME
QUESTION_4 LAST FOUR WEEKS HOW OFTEN HAVE YOU USED YOUR RESCUE INHALER OR NEBULIZER MEDICATION (SUCH AS ALBUTEROL): NOT AT ALL
ACT_TOTALSCORE: 25
QUESTION_2 LAST FOUR WEEKS HOW OFTEN HAVE YOU HAD SHORTNESS OF BREATH: NOT AT ALL

## 2022-02-18 ASSESSMENT — PATIENT HEALTH QUESTIONNAIRE - PHQ9: SUM OF ALL RESPONSES TO PHQ QUESTIONS 1-9: 9

## 2022-02-18 NOTE — PROGRESS NOTES
Assessment & Plan   Problem List Items Addressed This Visit     None      Visit Diagnoses     Premature atrial contractions    -  Primary    Relevant Medications    metoprolol succinate ER (TOPROL-XL) 25 MG 24 hr tablet    Impacted cerumen of left ear        Relevant Orders    Otolaryngology Referral           #1.  Symptomatic PACs.  Since they are becoming more symptomatic I recommended starting a low-dose beta-blocker.  I called in a prescription for Toprol-XL at 25 mg/day.  She is going to follow-up in 2 to 3weeks to recheck her blood pressure and to make sure she is tolerating well.  If she does not get any decent symptomatic control from this we will need to refer her to cardiology.    2.  Age-related cognitive decline.  I reassured Dee that I did not see any signs of Alzheimer's disease with her which she was relieved by.  She will continue to keep us posted in this regard.    3.  Cerumen impaction in the left ear.  She is going to make an appointment with ENT to get this removed.        No follow-ups on file.    Bhupendra Caldwell MD  [facility]   Dee comes in today for evaluation of a couple different issues.    She has symptomatic PACs which are beginning to bother her more.  She was worked up in 2020 and a Holter monitor at that time showed the presence of PACs that were associated with feelings of skipped beats, racing heartbeat, and shortness of breath.  She complains about those exact symptoms today and they are becoming more more bothersome for her.  She is wondering how best to treat them.    She states that she has been getting a bit more forgetful over time.  She will go into her room and forget what she was going into the room for.  She is worried that she may be developing dementia as her mother recently  from Alzheimer's disease (just last week), and her sister apparently has a form of dementia as well.  She is able to do her  "finances at home and does not get lost when she drives.  She is wondering if there are any supplements she can take for her memory.    She has decreased hearing in the left ear and wanted me to look in it.      Objective    BP (!) 144/84 (BP Location: Right arm, Patient Position: Sitting, Cuff Size: Adult Regular)   Pulse 77   Ht 1.549 m (5' 1\")   Wt 53.5 kg (118 lb)   SpO2 97%   BMI 22.30 kg/m    Body mass index is 22.3 kg/m .  Physical Exam     Left ear: Impacted cerumen covering the tympanic membrane.  "

## 2022-02-19 ASSESSMENT — ANXIETY QUESTIONNAIRES: GAD7 TOTAL SCORE: 7

## 2022-02-21 RX ORDER — METOPROLOL SUCCINATE 25 MG/1
TABLET, EXTENDED RELEASE ORAL
Qty: 90 TABLET | Refills: 3 | Status: SHIPPED | OUTPATIENT
Start: 2022-02-21 | End: 2023-03-20

## 2022-02-21 NOTE — TELEPHONE ENCOUNTER
"Routing refill request to provider for review/approval because:  Blood pressure out of range.  Patient requesting 90 day supply.    Last Written Prescription Date:  2/18/2022  Last Fill Quantity: 30,  # refills: 3   Last office visit provider:  2/18/2022 Dr. Caldwell     Requested Prescriptions   Pending Prescriptions Disp Refills     metoprolol succinate ER (TOPROL-XL) 25 MG 24 hr tablet [Pharmacy Med Name: METOPROLOL ER SUCCINATE 25MG TABS] 90 tablet      Sig: TAKE 1 TABLET(25 MG) BY MOUTH DAILY       Beta-Blockers Protocol Failed - 2/18/2022  3:59 PM        Failed - Blood pressure under 140/90 in past 12 months     BP Readings from Last 3 Encounters:   02/18/22 (!) 144/84   05/17/21 132/80   03/16/21 132/62                 Passed - Patient is age 6 or older        Passed - Recent (12 mo) or future (30 days) visit within the authorizing provider's specialty     Patient has had an office visit with the authorizing provider or a provider within the authorizing providers department within the previous 12 mos or has a future within next 30 days. See \"Patient Info\" tab in inbasket, or \"Choose Columns\" in Meds & Orders section of the refill encounter.              Passed - Medication is active on med list             Lashay Sandy RN 02/21/22 11:31 AM  "

## 2022-02-25 ENCOUNTER — MEDICAL CORRESPONDENCE (OUTPATIENT)
Dept: HEALTH INFORMATION MANAGEMENT | Facility: CLINIC | Age: 72
End: 2022-02-25
Payer: COMMERCIAL

## 2022-03-08 ENCOUNTER — TELEPHONE (OUTPATIENT)
Dept: NURSING | Facility: CLINIC | Age: 72
End: 2022-03-08
Payer: COMMERCIAL

## 2022-03-08 NOTE — TELEPHONE ENCOUNTER
"Update for Dr. Caldwell per [facility] nurse, Mariella:    Patient's Vit B12 Inj- due today, but cannot do it until next Tues. Patient's sister passed away unexpectedly and couldn't get to the pharmacy.    Patient did have an elevated blood pressure today: 150/78, P- 100. Systolic readings have otherwise been in the 120's.    Light upper chest pain for 30 min last night, but was relieved after taking an antacid.    No chest pain today, but is having \"skipped beats\".  Baseline SOB with exertion.    Headaches for the last 5 days - thinks it's stress related - both mother and sister have passed away in the last 3 weeks. Patient plans to go to [facility] and look into counseling, son coming to stay with her as well.    Patient advised by home care nurse to call 911 with any increased chest pain or shortness of breath.    Patient has weekly nursing home care visits - the next one is Tues, 3/15/22.    Message routed to PCP per nurse's request.    Radha Valencia RN  03/08/22 11:43 AM  [facility] Nurse Advisor  "

## 2022-03-10 DIAGNOSIS — M79.7 FIBROMYALGIA: ICD-10-CM

## 2022-03-10 RX ORDER — HYDROMORPHONE HYDROCHLORIDE 4 MG/1
4 TABLET ORAL
Qty: 150 TABLET | Refills: 0 | Status: SHIPPED | OUTPATIENT
Start: 2022-03-10 | End: 2022-04-18

## 2022-03-10 NOTE — TELEPHONE ENCOUNTER
Reason for Call:  Medication or medication refill:    Do you use a [facility]?  Name of the pharmacy and phone number for the current request:  [id number] [address] AT [facility] [address]  [phone]    Name of the medication requested: HYDROmorphone (DILAUDID) 4 MG tablet  Pregabalin 200mg ( did not see on med list)    Other request: None    Can we leave a detailed message on this number? Not Applicable    Phone number patient can be reached at: Home number on file [phone] (home)    Best Time: Any    Call taken on 3/10/2022 at 10:17 AM by Faviola Beckman

## 2022-03-14 NOTE — TELEPHONE ENCOUNTER
Rx was discontinued at last appt by Purvi. No mention in office note. Should she continue this?     Pregabalin (LYRICA) 200 MG capsule (Discontinued) 270 capsule 1 9/9/2021 2/18/2022 No   Sig: TAKE 1 CAPSULE(200 MG) BY MOUTH THREE TIMES DAILY   Patient not taking: Reported on 2/18/2022        Sent to pharmacy as: Pregabalin 200 MG Oral Capsule (LYRICA)   Class: E-Prescribe   Reason for Discontinue: Therapy completed   Order: [id number]   E-Prescribing Status: Receipt confirmed by pharmacy (9/9/2021  8:42 AM CDT)   E-Cancel Status: Request denied by pharmacy (2/18/2022  3:33 PM CST)       E-Cancel Status Note: Unable to Cancel Rx. Please contact Pharmacy

## 2022-03-14 NOTE — TELEPHONE ENCOUNTER
Reason for Call:  Other call back and prescription    Detailed comments: Pregabalin 200mg - pt keeps requesting for this medication saying she takes it 3x a day for months but I DO NOT see on her med list. Please advise.    Phone Number Patient can be reached at: Home number on file [phone] (home)    Best Time: any    Can we leave a detailed message on this number? YES    Call taken on 3/14/2022 at 1:46 PM by Michael Devlin

## 2022-03-15 ENCOUNTER — TRANSFERRED RECORDS (OUTPATIENT)
Dept: HEALTH INFORMATION MANAGEMENT | Facility: CLINIC | Age: 72
End: 2022-03-15

## 2022-03-15 ENCOUNTER — LAB REQUISITION (OUTPATIENT)
Dept: LAB | Facility: HOSPITAL | Age: 72
End: 2022-03-15
Payer: COMMERCIAL

## 2022-03-15 ENCOUNTER — TELEPHONE (OUTPATIENT)
Dept: INTERNAL MEDICINE | Facility: CLINIC | Age: 72
End: 2022-03-15
Payer: COMMERCIAL

## 2022-03-15 DIAGNOSIS — E83.42 HYPOMAGNESEMIA: ICD-10-CM

## 2022-03-15 DIAGNOSIS — N18.2 CHRONIC KIDNEY DISEASE, STAGE 2 (MILD): ICD-10-CM

## 2022-03-15 DIAGNOSIS — K91.2 POSTSURGICAL MALABSORPTION, NOT ELSEWHERE CLASSIFIED: ICD-10-CM

## 2022-03-15 LAB
CREAT SERPL-MCNC: 0.93 MG/DL (ref 0.6–1.1)
GFR SERPL CREATININE-BSD FRML MDRD: 65 ML/MIN/1.73M2
MAGNESIUM SERPL-MCNC: 2.1 MG/DL (ref 1.8–2.6)

## 2022-03-15 PROCEDURE — 83735 ASSAY OF MAGNESIUM: CPT | Mod: ORL | Performed by: INTERNAL MEDICINE

## 2022-03-15 PROCEDURE — 82565 ASSAY OF CREATININE: CPT | Mod: ORL | Performed by: INTERNAL MEDICINE

## 2022-03-15 PROCEDURE — 36591 DRAW BLOOD OFF VENOUS DEVICE: CPT | Mod: ORL | Performed by: INTERNAL MEDICINE

## 2022-03-15 RX ORDER — PREGABALIN 200 MG/1
200 CAPSULE ORAL 3 TIMES DAILY
Qty: 270 CAPSULE | Refills: 1 | Status: SHIPPED | OUTPATIENT
Start: 2022-03-15 | End: 2022-09-07

## 2022-03-15 NOTE — TELEPHONE ENCOUNTER
Reason for Call:  Medication or medication refill:    Do you use a [facility]?  Name of the pharmacy and phone number for the current request:  [facility][id number] - [facility] [address] AT [facility] [address]  [phone]    Name of the medication requested: patient is requesting pregabalin (LYRICA) 200 MG capsule    Other request: patient states she has none yet, please fill as soon as possible.    Can we leave a detailed message on this number? YES    Phone number patient can be reached at: Home number on file [phone] (home)    Best Time: Any    Call taken on 3/15/2022 at 10:22 AM by Faviola Beckman

## 2022-03-15 NOTE — TELEPHONE ENCOUNTER
3-15-22  Reason for Call:  Checking status on RX    Detailed comments: Rodney home care nurse @ [facility] called states pt has been out of Lyrica x 2 days now.  When rodney went to make the home care visit today 3-15-22 pt states she has been thrown up x2 days already on 3-15-22  Rdoney is concerned about drug withdrawal since pt has  Been without this med for 2 days     Phone Number Patient can be reached at: [phone]    Best Time: antyime    Can we leave a detailed message on this number? YES    Call taken on 3/15/2022 at 11:32 AM by Tianna Rizo

## 2022-03-17 ENCOUNTER — MEDICAL CORRESPONDENCE (OUTPATIENT)
Dept: HEALTH INFORMATION MANAGEMENT | Facility: CLINIC | Age: 72
End: 2022-03-17

## 2022-03-17 ENCOUNTER — HOME INFUSION (PRE-WILLOW HOME INFUSION) (OUTPATIENT)
Dept: PHARMACY | Facility: CLINIC | Age: 72
End: 2022-03-17

## 2022-03-18 NOTE — PROGRESS NOTES
This is a recent snapshot of the patient's [facility] medical record.  For current drug dose and complete information and questions, call [phone] or In Basket pool, fv home infusion ([id number])  CSN Number:  [id number]

## 2022-03-25 DIAGNOSIS — E86.0 DEHYDRATION: ICD-10-CM

## 2022-03-26 RX ORDER — PANCRELIPASE 30000; 6000; 19000 [USP'U]/1; [USP'U]/1; [USP'U]/1
CAPSULE, DELAYED RELEASE PELLETS ORAL
Qty: 540 CAPSULE | Refills: 11 | OUTPATIENT
Start: 2022-03-26

## 2022-03-26 NOTE — TELEPHONE ENCOUNTER
Denied Rx, patient should already have refills on file at the same pharmacy requested.     CREON 6000-06278 units CPEP per EC capsule 540 capsule 11 1/4/2022  No   Sig: TAKE 2 CAPSULES BY MOUTH THREE TIMES DAILY WITH FOOD   Sent to pharmacy as: Creon 6000-33054 UNIT Oral Capsule Delayed Release Particles (amylase-lipase-protease)   Class: E-Prescribe   Order: [id number]   E-Prescribing Status: Receipt confirmed by pharmacy (1/4/2022  8:27 AM CST)     Gale Asif RN, BSN Nurse Triage Advisor 3:30 PM 3/26/2022

## 2022-03-28 ENCOUNTER — TELEPHONE (OUTPATIENT)
Dept: INTERNAL MEDICINE | Facility: CLINIC | Age: 72
End: 2022-03-28
Payer: COMMERCIAL

## 2022-03-28 ENCOUNTER — MEDICAL CORRESPONDENCE (OUTPATIENT)
Dept: HEALTH INFORMATION MANAGEMENT | Facility: CLINIC | Age: 72
End: 2022-03-28
Payer: COMMERCIAL

## 2022-03-28 DIAGNOSIS — N30.20 CHRONIC CYSTITIS: Primary | ICD-10-CM

## 2022-03-28 NOTE — TELEPHONE ENCOUNTER
Reason for Call:  Other call back    Detailed comments: Mariella calling back, she was wondering about the verbal order; I stated Yanelis called and LVM stating verbals were okay per Dr. RAMIREZ.     Mariella now wants to know if it's okay for patient to wait one extra day to get her picc line and port-a-cath cleaned. They had to recertify home care for patient a day early, they usually see her on Tuesdays, so patient is wanting to return to her normal Tuesdays effective next week and Mariella wants to ensure it's okay for this to wait one more day.     Phone Number Patient can be reached at: Other phone number:  [phone]    Best Time: Any    Can we leave a detailed message on this number? YES    Call taken on 3/28/2022 at 4:49 PM by Valerie Adams

## 2022-03-28 NOTE — TELEPHONE ENCOUNTER
Reason for Call:  Home Health Care    Crystal RN with [facility] called regarding (reason for call): Verbal Orders    Orders are needed for this patient. Skilled Nursing    PT: None    OT: None    Skilled Nursing: Yes -   1 time a week for 9 weeks  for weekly cevallos cath dressing and needle change  Monthly magnesium and creatienine labs  Monthly B12 injection  Overall health assessment  3 PRN visits - Cevallos cath issues or recertification     Pt Provider: Dr NAHUN Caldwell    Phone Number Homecare Nurse can be reached at: [phone]    Can we leave a detailed message on this number? YES    Phone number patient can be reached at: Home number on file [phone] (home)    Best Time: any    Call taken on 3/28/2022 at 10:56 AM by Michael Devlin

## 2022-03-28 NOTE — TELEPHONE ENCOUNTER
I do not see that this has been filled in awhile    nitrofurantoin (MACRODANTIN) 50 MG capsule  0 ordered               Summary: [NITROFURANTOIN (MACRODANTIN) 50 MG CAPSULE] TAKE 1 CAPSULE(50 MG) BY MOUTH AT BEDTIME, Disp-90 capsule, R-0, Normal     Start: 6/23/2021    Ord/Sold: 6/23/2021 (O)      Report    Adh:     Taking:     Long-term:       Med Dose History    Change       Patient Sig: [NITROFURANTOIN (MACRODANTIN) 50 MG CAPSULE] TAKE 1 CAPSULE(50 MG) BY MOUTH AT BEDTIME       Ordered on: 6/23/2021       Authorized by: MARTINE JAQUEZ       Dispense: 90 capsule

## 2022-03-29 ENCOUNTER — MEDICAL CORRESPONDENCE (OUTPATIENT)
Dept: HEALTH INFORMATION MANAGEMENT | Facility: CLINIC | Age: 72
End: 2022-03-29
Payer: COMMERCIAL

## 2022-03-29 DIAGNOSIS — Z53.9 DIAGNOSIS NOT YET DEFINED: Primary | ICD-10-CM

## 2022-03-29 PROCEDURE — G0179 MD RECERTIFICATION HHA PT: HCPCS | Performed by: INTERNAL MEDICINE

## 2022-03-29 RX ORDER — NITROFURANTOIN MACROCRYSTALS 50 MG/1
50 CAPSULE ORAL AT BEDTIME
Qty: 90 CAPSULE | Refills: 3 | Status: SHIPPED | OUTPATIENT
Start: 2022-03-29 | End: 2022-06-10

## 2022-04-08 ENCOUNTER — MEDICAL CORRESPONDENCE (OUTPATIENT)
Dept: HEALTH INFORMATION MANAGEMENT | Facility: CLINIC | Age: 72
End: 2022-04-08
Payer: COMMERCIAL

## 2022-04-12 ENCOUNTER — TELEPHONE (OUTPATIENT)
Dept: INTERNAL MEDICINE | Facility: CLINIC | Age: 72
End: 2022-04-12

## 2022-04-12 NOTE — TELEPHONE ENCOUNTER
Reason for Call:  Other    Detailed comments: Tianna @ [facility] called to let PCP know that she was not able to give patient her Vitamin B-12 shot today. Patient wasn't able to pick it up in time from the pharmacy for today's in home visit. Tianna goes to see the patient every Tuesday. Tianna wanted to let PCP know that she won't be able to administer patient's Vitamin B-12 shot until next week on 04/19/2022. Pt's last B-12 shot was given on 03/15/2022. Please call Tianna with any questions/concerns.    This was FYI for PCP. No need to call back.    Best Time: any    Can we leave a detailed message on this number? YES    Call taken on 4/12/2022 at 12:23 PM by Aurelia Reyes

## 2022-04-13 ENCOUNTER — HOME INFUSION (PRE-WILLOW HOME INFUSION) (OUTPATIENT)
Dept: PHARMACY | Facility: CLINIC | Age: 72
End: 2022-04-13

## 2022-04-14 NOTE — PROGRESS NOTES
This is a recent snapshot of the patient's [facility] medical record.  For current drug dose and complete information and questions, call [phone] or In Basket pool, fv home infusion ([id number])  CSN Number:  [id number]

## 2022-04-15 ENCOUNTER — TELEPHONE (OUTPATIENT)
Dept: INTERNAL MEDICINE | Facility: CLINIC | Age: 72
End: 2022-04-15

## 2022-04-15 DIAGNOSIS — M79.7 FIBROMYALGIA: ICD-10-CM

## 2022-04-15 NOTE — TELEPHONE ENCOUNTER
4-15-22  Reason for Call:  Medication     Do you use a [facility]?  [facility] in [facility]     Name of the medication requested: HYDROmorphone (DILAUDID) 4 MG tablet    Other request: none    Can we leave a detailed message on this number? YES    Phone number patient can be reached at: Cell number on file:    Telephone Information:   Mobile [phone]       Best Time: anthime    Call taken on 4/15/2022 at 1:13 PM by Tianna Rizo

## 2022-04-15 NOTE — TELEPHONE ENCOUNTER
Reason for Call:  Other    Detailed comments: FYI for PCP. Mariella @ [facility] called to let PCP know that she faxed over patient's lab work from 04/12/2022 today. This is just FYI and Mariella does not need a return call.    Best Time: an    Can we leave a detailed message on this number? YES    Call taken on 4/15/2022 at 4:13 PM by Aurelia Reyes

## 2022-04-18 RX ORDER — HYDROMORPHONE HYDROCHLORIDE 4 MG/1
4 TABLET ORAL
Qty: 150 TABLET | Refills: 0 | Status: SHIPPED | OUTPATIENT
Start: 2022-04-18 | End: 2022-05-19

## 2022-04-24 ENCOUNTER — HOME INFUSION (PRE-WILLOW HOME INFUSION) (OUTPATIENT)
Dept: PHARMACY | Facility: CLINIC | Age: 72
End: 2022-04-24
Payer: COMMERCIAL

## 2022-04-26 NOTE — PROGRESS NOTES
This is a recent snapshot of the patient's [facility] medical record.  For current drug dose and complete information and questions, call [phone] or In [facility] pool, fv home infusion ([id number])  CSN Number:  [id number]

## 2022-04-27 NOTE — PROGRESS NOTES
This is a recent snapshot of the patient's [facility] medical record.  For current drug dose and complete information and questions, call [phone] or In Basket pool, fv home infusion ([id number])  CSN Number:  [id number]

## 2022-05-01 ENCOUNTER — TRANSFERRED RECORDS (OUTPATIENT)
Dept: MULTI SPECIALTY CLINIC | Facility: CLINIC | Age: 72
End: 2022-05-01

## 2022-05-10 ENCOUNTER — NURSE TRIAGE (OUTPATIENT)
Dept: NURSING | Facility: CLINIC | Age: 72
End: 2022-05-10
Payer: COMMERCIAL

## 2022-05-10 NOTE — PROGRESS NOTES
This is a recent snapshot of the patient's [facility] medical record.  For current drug dose and complete information and questions, call [phone] or In Basket pool, fv home infusion ([id number])  CSN Number:  [id number]

## 2022-05-10 NOTE — TELEPHONE ENCOUNTER
Left Crystal a detailed message with Dr. Caldwell's message.  That there are no new orders and just to continue to monitor.

## 2022-05-10 NOTE — TELEPHONE ENCOUNTER
"Lab results faxed over to clinic by NADEEM Wolff from [place].     Mariella states there is an \"abnormal Creatinine - 1.29\" and that the GFR is 44. Magnesium should also be in the fax as well.     Mariella is looking for any new orders from the patients PCP. Stated it can be today or tomorrow and to call back at [phone].    PCP or covering provider please advise.     Bhakti Vila RN Nursing Advisor 5/10/2022 4:39 PM   Reason for Disposition    [1] Follow-up call from patient regarding patient's clinical status AND [2] information NON-URGENT    Caller requesting lab results    Protocols used: PCP CALL - NO TRIAGE-A-AH      "

## 2022-05-12 NOTE — PROGRESS NOTES
This is a recent snapshot of the patient's [facility] medical record.  For current drug dose and complete information and questions, call [phone] or In Basket pool, fv home infusion ([id number])  CSN Number:  [id number]

## 2022-05-19 DIAGNOSIS — M79.7 FIBROMYALGIA: ICD-10-CM

## 2022-05-19 RX ORDER — HYDROMORPHONE HYDROCHLORIDE 4 MG/1
4 TABLET ORAL
Qty: 150 TABLET | Refills: 0 | Status: SHIPPED | OUTPATIENT
Start: 2022-05-19 | End: 2022-06-29

## 2022-05-19 NOTE — TELEPHONE ENCOUNTER
Reason for Call:  Medication or medication refill:    Do you use a [facility]?  Name of the pharmacy and phone number for the current request:  [id number] [address] AT [facility] [address]  [phone]   Name of the medication requested: HYDROmorphone (DILAUDID) 4 MG tablet    Other request: none    Can we leave a detailed message on this number? YES    Phone number patient can be reached at: Home number on file [phone] (home)    Best Time: any    Call taken on 5/19/2022 at 4:15 PM by Faviola Beckman

## 2022-05-24 ENCOUNTER — TELEPHONE (OUTPATIENT)
Dept: INTERNAL MEDICINE | Facility: CLINIC | Age: 72
End: 2022-05-24
Payer: COMMERCIAL

## 2022-05-24 NOTE — TELEPHONE ENCOUNTER
Reason for Call:  Verbal orders Request    Detailed comments: Looking for verbal orders for Recertification for homecare. Skilled nursing once a wk for 9 wks for carlos cath draining change and needle change.   Monthly Magnesium and Creatinine labs draw via port. Overall health assessment. 2 PRN visits for port issues and recertificiaton.     Phone Number to be reached at: [phone]    Best Time: anytime    Can we leave a detailed message on this number? YES    Call taken on 5/24/2022 at 11:38 AM by Elle Devlin

## 2022-05-25 NOTE — TELEPHONE ENCOUNTER
Attempted to call but was not able to leave voicemail. Will try back at a later time. If NADEEM Wolff calls back, please give her the okay for verbal orders below per Dr. Caldwell.  Cassia Carl CMA ............... 11:30 AM, 05/25/22

## 2022-05-26 ENCOUNTER — MEDICAL CORRESPONDENCE (OUTPATIENT)
Dept: HEALTH INFORMATION MANAGEMENT | Facility: CLINIC | Age: 72
End: 2022-05-26

## 2022-05-26 ENCOUNTER — OFFICE VISIT (OUTPATIENT)
Dept: OTOLARYNGOLOGY | Facility: CLINIC | Age: 72
End: 2022-05-26
Attending: INTERNAL MEDICINE
Payer: COMMERCIAL

## 2022-05-26 DIAGNOSIS — H91.92 HEARING DIFFICULTY OF LEFT EAR: ICD-10-CM

## 2022-05-26 DIAGNOSIS — H61.22 IMPACTED CERUMEN OF LEFT EAR: Primary | ICD-10-CM

## 2022-05-26 PROCEDURE — 69210 REMOVE IMPACTED EAR WAX UNI: CPT | Performed by: OTOLARYNGOLOGY

## 2022-05-26 PROCEDURE — 99203 OFFICE O/P NEW LOW 30 MIN: CPT | Mod: 25 | Performed by: OTOLARYNGOLOGY

## 2022-05-26 NOTE — LETTER
5/26/2022         RE: Dee Mattson  [address]  [place] [address]        Dear Colleague,    Thank you for referring your patient, Dee Mattson, to the [facility]. Please see a copy of my visit note below.    CHIEF COMPLAINT:  Ear Concern      HISTORY OF PRESENT ILLNESS    Dee was seen at the behest of Bhupendra Caldwell MD for cerumen impaction(s).   3.  Cerumen impaction in the left ear.  She is going to make an appointment with ENT to get this removed.          REVIEW OF SYSTEMS    Review of Systems as per HPI and PMHx, otherwise 10 system review system are negative.     Ketorolac tromethamine; Citalopram analogues [citalopram]; Methadone; Metoclopramide; Metronidazole; Mirtazapine; Morphine; Neuromuscular blockers, steroidal [vecuronium & derivatives]; Norfloxacin; Other drug allergy (see comments); and Oxycodone     There were no vitals taken for this visit.    HEAD: Normal appearance and symmetry:  No cutaneous lesions.      NECK:  supple     EARS: normal TM, EACs; Cerumen impactions noted LEFT ear    CERUMEN IMPACTION REMOVAL    After obtaining verbal consent, and using the binocular microscope.  Cerumen impaction(s) were removed from the affected ear canal(s)  using a wire loops and/or suction.  The patient tolerated the procedure well without incident.      EYES:  EOMI    CN VII/XII:  intact     NOSE:     Dorsum:   straight  Septum:  midline  Mucosa:  moist        ORAL CAVITY/OROPHARYNX:     Lips:  Normal.  Tongue: normal, midline  Mucosa:   no lesions     NECK:  Trachea:  midline.              Thyroid:  normal              Adenopathy:  none        NEURO:   Alert and Oriented     GAIT AND STATION:  normal     RESPIRATORY:   Symmetry and Respiratory effort     PSYCH:  Normal mood and affect     SKIN:   warm and dry         IMPRESSION:    No diagnosis found.       RECOMMENDATIONS:      No orders of the defined types were placed in this encounter.      [unfilled]         Again, thank you for allowing me to participate in the care of your patient.        Sincerely,        Jonathan Zavala MD

## 2022-05-26 NOTE — PROGRESS NOTES
CHIEF COMPLAINT:  Ear Concern      HISTORY OF PRESENT ILLNESS      Patient is referred for cerumen impaction in the left ear.  She also is complaining of some hearing loss of the left ear and occasional pain in the left ear.  She now presents for further relation management.  No history of vertigo.  No history of otologic surgery    Referral note:    Dee was seen at the behest of Bhupendar Caldwell MD for cerumen impaction(s).   3.  Cerumen impaction in the left ear.  She is going to make an appointment with ENT to get this removed.              REVIEW OF SYSTEMS    Review of Systems as per HPI and PMHx, otherwise 10 system review system are negative.     Ketorolac tromethamine; Citalopram analogues [citalopram]; Methadone; Metoclopramide; Metronidazole; Mirtazapine; Morphine; Neuromuscular blockers, steroidal [vecuronium & derivatives]; Norfloxacin; Other drug allergy (see comments); and Oxycodone     There were no vitals taken for this visit.    HEAD: Normal appearance and symmetry:  No cutaneous lesions.      NECK:  supple     EARS: normal TM, EACs; Cerumen impactions noted LEFT ear    CERUMEN IMPACTION REMOVAL    After obtaining verbal consent, and using the binocular microscope.  Cerumen impaction(s) were removed from the affected ear canal(s)  using a wire loops and/or suction.  The patient tolerated the procedure well without incident.      EYES:  EOMI    CN VII/XII:  intact     NOSE:     Dorsum:   straight  Septum:  midline  Mucosa:  moist        ORAL CAVITY/OROPHARYNX:     Lips:  Normal.  Tongue: normal, midline  Mucosa:   no lesions     NECK:  Trachea:  midline.              Thyroid:  normal              Adenopathy:  none        NEURO:   Alert and Oriented     GAIT AND STATION:  normal     RESPIRATORY:   Symmetry and Respiratory effort     PSYCH:  Normal mood and affect     SKIN:   warm and dry         IMPRESSION:    Encounter Diagnoses   Name Primary?     Impacted cerumen of left ear Yes     Hearing  difficulty of left ear           RECOMMENDATIONS:      Orders Placed This Encounter   Procedures     Remove Cerumen      Return visit 6 months with hearing test and repeat ear cleaning if needed

## 2022-05-27 ENCOUNTER — VIRTUAL VISIT (OUTPATIENT)
Dept: FAMILY MEDICINE | Facility: CLINIC | Age: 72
End: 2022-05-27
Payer: COMMERCIAL

## 2022-05-27 DIAGNOSIS — R30.0 DYSURIA: Primary | ICD-10-CM

## 2022-05-27 DIAGNOSIS — N30.20 CHRONIC CYSTITIS: ICD-10-CM

## 2022-05-27 PROCEDURE — 99213 OFFICE O/P EST LOW 20 MIN: CPT | Mod: TEL | Performed by: FAMILY MEDICINE

## 2022-05-27 NOTE — PROGRESS NOTES
Dee is a 72 year old who is being evaluated via a billable telephone visit.      What phone number would you like to be contacted at? [phone]   How would you like to obtain your AVS? Mail a copy    Assessment & Plan     Dysuria  Patient feels slight dysuria for the last for 5 days.  She does not have any fever chills or any CVA tenderness denies any blood in the urine as if she no.  She is on chronic antibiotic therapy Macrobid 50 mg for chronic cystitis.  I would not suggest adding any additional antibiotic at this time, howeverher if  she will try doubling the dose 1 Macrobid 50 mg  in the morning  and one at night for the next 3 days and see if her symptoms improve, if it does not improve I would suggest her to go see her primary and get evaluation done.  No additional antibiotic as long as there is no fever chills or any CVA tenderness.    Chronic cystitis                 No follow-ups on file.    Nic Mendiola MD  [facility]    Subjective   Dee is a 72 year old who presents for the following health issues   HPI     Genitourinary - Female  Onset/Duration: 3-4 days  Description:   Painful urination (Dysuria): no           Frequency: YES  Blood in urine (Hematuria): no  Delay in urine (Hesitency): YES  Intensity: moderate  Progression of Symptoms:  worsening  Accompanying Signs & Symptoms:  Fever/chills: no  Flank pain: no  Nausea and vomiting: no  Vaginal symptoms: notes that it feels different  Abdominal/Pelvic Pain: no  History:   History of frequent UTI s: no  History of kidney stones: no  Sexually Active: no  Possibility of pregnancy: No  Precipitating or alleviating factors: None  Therapies tried and outcome:  none         Review of Systems   Constitutional, HEENT, cardiovascular, pulmonary, gi and gu systems are negative, except as otherwise noted.      Objective           Vitals:  No vitals were obtained today due to virtual visit.    Physical Exam   healthy, alert and  no distress  PSYCH: Alert and oriented times 3; coherent speech, normal   rate and volume, able to articulate logical thoughts, able   to abstract reason, no tangential thoughts, no hallucinations   or delusions  Her affect is normal  RESP: No cough, no audible wheezing, able to talk in full sentences  Remainder of exam unable to be completed due to telephone visits                Phone call duration: 6 minutes

## 2022-05-31 ENCOUNTER — TELEPHONE (OUTPATIENT)
Dept: INTERNAL MEDICINE | Facility: CLINIC | Age: 72
End: 2022-05-31

## 2022-05-31 NOTE — TELEPHONE ENCOUNTER
Left voicemail for patient to return call to clinic. When patient returns call, please give them below message.    Dr. Caldwell is out of clinic today. Patient will need to reschedule either with a partner or with Dr. Caldwell at a later time. Urgent care is also an option. Please help patient schedule. Appointment has been canceled for today.    Cassia Carl CMA ............... 10:58 AM, 05/31/22

## 2022-05-31 NOTE — TELEPHONE ENCOUNTER
PT called returned call. TC relayed message. PT will comed in to [facility] to see a provider for a possible UTI.    Nothing else needed at this time.    Aurelia Reyes

## 2022-06-01 NOTE — TELEPHONE ENCOUNTER
Called again and no answer and no machine available.  They will have to called back and request these orders since it has been 8 days.

## 2022-06-02 ENCOUNTER — MEDICAL CORRESPONDENCE (OUTPATIENT)
Dept: HEALTH INFORMATION MANAGEMENT | Facility: CLINIC | Age: 72
End: 2022-06-02

## 2022-06-02 ENCOUNTER — TELEPHONE (OUTPATIENT)
Dept: INTERNAL MEDICINE | Facility: CLINIC | Age: 72
End: 2022-06-02
Payer: COMMERCIAL

## 2022-06-02 NOTE — TELEPHONE ENCOUNTER
Reason for Call:  Verbal orders Request     Detailed comments: Looking for verbal orders for Recertification for homecare. Skilled nursing once a wk for 9 wks for carlos cath draining change and needle change.   Monthly Magnesium and Creatinine labs draw via port. Overall health assessment. 2 PRN visits for port issues and recertificiaton.      Other Details: NADEEM Wolff from [facility] originally called on 05/24/2022 for these orders but her voicemail was not setup on her phone and she was out of town so she never got the verbal authorization. Mariella states her voicemail is now setup. Since it has been more than 8 days she is calling back to request orders again.    Phone Number to be reached at: NADEEM Wolff @ [facility] [phone]     Best Time: anytime     Can we leave a detailed message on this number? YES

## 2022-06-07 ENCOUNTER — TELEPHONE (OUTPATIENT)
Dept: INTERNAL MEDICINE | Facility: CLINIC | Age: 72
End: 2022-06-07
Payer: COMMERCIAL

## 2022-06-07 NOTE — TELEPHONE ENCOUNTER
Should patient hold Nitrofurantoin while on cephalexin?  Cassia Carl CMA ............... 2:31 PM, 06/07/22

## 2022-06-09 ENCOUNTER — HOME INFUSION (PRE-WILLOW HOME INFUSION) (OUTPATIENT)
Dept: PHARMACY | Facility: CLINIC | Age: 72
End: 2022-06-09
Payer: COMMERCIAL

## 2022-06-09 DIAGNOSIS — F32.A ANXIETY AND DEPRESSION: ICD-10-CM

## 2022-06-09 DIAGNOSIS — F41.9 ANXIETY AND DEPRESSION: ICD-10-CM

## 2022-06-09 RX ORDER — TRAZODONE HYDROCHLORIDE 150 MG/1
150 TABLET ORAL AT BEDTIME
Qty: 90 TABLET | Refills: 3 | Status: SHIPPED | OUTPATIENT
Start: 2022-06-09 | End: 2022-09-13

## 2022-06-09 NOTE — TELEPHONE ENCOUNTER
Routing refill request to provider for review/approval because:  traZODone (DESYREL) 150 MG tablet 270 tablet 0 2/15/2022  No   Sig: TAKE 3 TABLETS(450 MG) BY MOUTH AT BEDTIME   Patient taking differently: Take 150 mg by mouth At Bedtime            Emmie Freeman RN  [facility]

## 2022-06-10 ENCOUNTER — OFFICE VISIT (OUTPATIENT)
Dept: INTERNAL MEDICINE | Facility: CLINIC | Age: 72
End: 2022-06-10
Payer: COMMERCIAL

## 2022-06-10 ENCOUNTER — MEDICAL CORRESPONDENCE (OUTPATIENT)
Dept: HEALTH INFORMATION MANAGEMENT | Facility: CLINIC | Age: 72
End: 2022-06-10

## 2022-06-10 VITALS
BODY MASS INDEX: 21.71 KG/M2 | DIASTOLIC BLOOD PRESSURE: 70 MMHG | WEIGHT: 115 LBS | SYSTOLIC BLOOD PRESSURE: 126 MMHG | HEIGHT: 61 IN | HEART RATE: 66 BPM | OXYGEN SATURATION: 99 %

## 2022-06-10 DIAGNOSIS — N30.00 ACUTE CYSTITIS WITHOUT HEMATURIA: ICD-10-CM

## 2022-06-10 DIAGNOSIS — R30.9 URINARY PAIN: Primary | ICD-10-CM

## 2022-06-10 LAB
ALBUMIN UR-MCNC: NEGATIVE MG/DL
APPEARANCE UR: CLEAR
BILIRUB UR QL STRIP: NEGATIVE
COLOR UR AUTO: YELLOW
GLUCOSE UR STRIP-MCNC: NEGATIVE MG/DL
HGB UR QL STRIP: NEGATIVE
KETONES UR STRIP-MCNC: NEGATIVE MG/DL
LEUKOCYTE ESTERASE UR QL STRIP: NEGATIVE
NITRATE UR QL: NEGATIVE
PH UR STRIP: 5.5 [PH] (ref 5–8)
SP GR UR STRIP: >=1.03 (ref 1–1.03)
UROBILINOGEN UR STRIP-ACNC: 0.2 E.U./DL

## 2022-06-10 PROCEDURE — 99213 OFFICE O/P EST LOW 20 MIN: CPT | Performed by: INTERNAL MEDICINE

## 2022-06-10 PROCEDURE — 81003 URINALYSIS AUTO W/O SCOPE: CPT | Performed by: INTERNAL MEDICINE

## 2022-06-10 RX ORDER — SULFAMETHOXAZOLE/TRIMETHOPRIM 800-160 MG
1 TABLET ORAL 2 TIMES DAILY
Qty: 20 TABLET | Refills: 0 | Status: SHIPPED | OUTPATIENT
Start: 2022-06-10 | End: 2022-06-20

## 2022-06-10 ASSESSMENT — ANXIETY QUESTIONNAIRES
7. FEELING AFRAID AS IF SOMETHING AWFUL MIGHT HAPPEN: NOT AT ALL
4. TROUBLE RELAXING: SEVERAL DAYS
2. NOT BEING ABLE TO STOP OR CONTROL WORRYING: MORE THAN HALF THE DAYS
3. WORRYING TOO MUCH ABOUT DIFFERENT THINGS: NEARLY EVERY DAY
8. IF YOU CHECKED OFF ANY PROBLEMS, HOW DIFFICULT HAVE THESE MADE IT FOR YOU TO DO YOUR WORK, TAKE CARE OF THINGS AT HOME, OR GET ALONG WITH OTHER PEOPLE?: NOT DIFFICULT AT ALL
GAD7 TOTAL SCORE: 6
GAD7 TOTAL SCORE: 15
1. FEELING NERVOUS, ANXIOUS, OR ON EDGE: NEARLY EVERY DAY
7. FEELING AFRAID AS IF SOMETHING AWFUL MIGHT HAPPEN: SEVERAL DAYS
6. BECOMING EASILY ANNOYED OR IRRITABLE: SEVERAL DAYS
7. FEELING AFRAID AS IF SOMETHING AWFUL MIGHT HAPPEN: NOT AT ALL
5. BEING SO RESTLESS THAT IT IS HARD TO SIT STILL: MORE THAN HALF THE DAYS
IF YOU CHECKED OFF ANY PROBLEMS ON THIS QUESTIONNAIRE, HOW DIFFICULT HAVE THESE PROBLEMS MADE IT FOR YOU TO DO YOUR WORK, TAKE CARE OF THINGS AT HOME, OR GET ALONG WITH OTHER PEOPLE: SOMEWHAT DIFFICULT
1. FEELING NERVOUS, ANXIOUS, OR ON EDGE: NOT AT ALL
GAD7 TOTAL SCORE: 6
GAD7 TOTAL SCORE: 6
3. WORRYING TOO MUCH ABOUT DIFFERENT THINGS: NEARLY EVERY DAY
5. BEING SO RESTLESS THAT IT IS HARD TO SIT STILL: NOT AT ALL
4. TROUBLE RELAXING: MORE THAN HALF THE DAYS
2. NOT BEING ABLE TO STOP OR CONTROL WORRYING: NEARLY EVERY DAY
6. BECOMING EASILY ANNOYED OR IRRITABLE: NOT AT ALL

## 2022-06-10 NOTE — TELEPHONE ENCOUNTER
Called and notified pt of Dr. Caldwell's message.  Patient also has an appt today with Dr. Caldwell.

## 2022-06-10 NOTE — PROGRESS NOTES
"  Assessment & Plan   Problem List Items Addressed This Visit    None     Visit Diagnoses     Urinary pain    -  Primary    Relevant Medications    sulfamethoxazole-trimethoprim (BACTRIM DS) 800-160 MG tablet    Other Relevant Orders    UA Macro with Reflex to Micro and Culture - lab collect (Completed)    Acute cystitis without hematuria               #1.  Acute cystitis.  Given the fact that she has chronic cystitis Keflex may have not been the best option for her.  Her UA is negative today, but this could represent bacterial suppression to the point where she is not registering positive on a urinalysis rather than bacterial elimination.  To be sure I am going to retreat her with 10 days of Bactrim.  Would consider a quinolone but she has had C. difficile from them in the past.  Follow-up with us as needed    No follow-ups on file.    Bhupendra Caldwell MD  [facility]    Anitra Price is a 72 year old with a history of short-bowel syndrome and chronic cystitis, on 50 mg of nitrofurantoin per day for prophylaxis, who comes in today for follow-up of a urinary tract infection.  She was seen at a [facility] urgent care on 6/1 for dysuria and pelvic pressure UA showed greater than 182 white cells along with white blood cell clumps.  She was started on Keflex for treatment and finished her course on 6/8.  Urine culture revealed multiple organisms.  Currently she states that she feels that her symptoms are coming back and she is having pelvic pressure again.  She feels that she may not have cleared her infection completely.      Objective    /70 (BP Location: Right arm, Patient Position: Sitting, Cuff Size: Adult Regular)   Pulse 66   Ht 1.549 m (5' 1\")   Wt 52.2 kg (115 lb)   SpO2 99%   BMI 21.73 kg/m    Body mass index is 21.73 kg/m .  Physical Exam       Urinalysis obtained was unremarkable.  No leukocyte Estrace or other signs of infection.    "

## 2022-06-13 NOTE — PROGRESS NOTES
This is a recent snapshot of the patient's [facility] medical record.  For current drug dose and complete information and questions, call [phone] or In Basket pool, [facility]v home infusion ([id number])  CSN Number:  [id number]

## 2022-06-14 ENCOUNTER — TELEPHONE (OUTPATIENT)
Dept: INTERNAL MEDICINE | Facility: CLINIC | Age: 72
End: 2022-06-14

## 2022-06-14 NOTE — TELEPHONE ENCOUNTER
Reason for Call:  Other FYI & Call Back    Detailed comments: Mariella SILVER with [facility] calling to ask:    Pt is currently on antibiotics Bactrim, RN Is wondering if pt should resume nitrofurantoin after finishing bactrim antibiotics? Pt is not currently taking nitrofurantoin right now.    Drug interaction RN sees: Lisinopril and Bactrim, just because of pt's history, FYI on this.     RN faxed Creatinine and Magniusm labs last week, wondering if that has been received.    Can call her back at [phone]  Ok to leave a detailed msg if needed.    Phone Number Patient can be reached at: Home number on file [phone] (home)    Best Time: any    Can we leave a detailed message on this number? Not Applicable    Call taken on 6/14/2022 at 11:23 AM by Michael Devlin

## 2022-06-14 NOTE — TELEPHONE ENCOUNTER
Lab results in chart. Any action on them? Ok for interaction on medications?    Drug interaction RN sees: Lisinopril and Bactrim, just because of pt's history, FYI on this.      RN faxed Creatinine and Magniusm labs last week, wondering if that has been received.

## 2022-06-14 NOTE — TELEPHONE ENCOUNTER
Should patient continue nitrofurantoin after she completes Bactrim?       OV Note 6/10/22  Dee is a 72 year old with a history of short-bowel syndrome and chronic cystitis, on 50 mg of nitrofurantoin per day for prophylaxis, who comes in today for follow-up of a urinary tract infection.  She was seen at a [facility] urgent care on 6/1 for dysuria and pelvic pressure UA showed greater than 182 white cells along with white blood cell clumps.  She was started on Keflex for treatment and finished her course on 6/8.  Urine culture revealed multiple organisms.  Currently she states that she feels that her symptoms are coming back and she is having pelvic pressure again.  She feels that she may not have cleared her infection completely.

## 2022-06-16 ENCOUNTER — TELEPHONE (OUTPATIENT)
Dept: INTERNAL MEDICINE | Facility: CLINIC | Age: 72
End: 2022-06-16
Payer: COMMERCIAL

## 2022-06-16 NOTE — TELEPHONE ENCOUNTER
Reason for Call:  Other call back and prescription    Detailed comments: sulfamethoxazole-trimethoprim (BACTRIM DS) 800-160 MG tablet - Pt is getting a side effect from this medication where its making her nauseased and sleepy and it started a couple of days ago. She did not take it at all yesterday. Gonna go back on nitroFURantoin macrocrystal (MACRODANTIN) 50 MG capsule if ok with him. Please advise.    Phone Number Patient can be reached at: Cell number on file:    Telephone Information:   Mobile [phone]       Best Time: any    Can we leave a detailed message on this number? Not Applicable    Call taken on 6/16/2022 at 2:13 PM by Michael Devlin

## 2022-06-22 NOTE — PROGRESS NOTES
This is a recent snapshot of the patient's [facility] medical record.  For current drug dose and complete information and questions, call [phone] or In Basket pool, fv home infusion ([id number])  CSN Number:  [id number]

## 2022-06-23 DIAGNOSIS — I49.1 PREMATURE ATRIAL CONTRACTIONS: ICD-10-CM

## 2022-06-23 RX ORDER — METOPROLOL SUCCINATE 25 MG/1
TABLET, EXTENDED RELEASE ORAL
Qty: 90 TABLET | Refills: 3 | OUTPATIENT
Start: 2022-06-23

## 2022-06-27 DIAGNOSIS — Z90.49 S/P TOTAL COLECTOMY: ICD-10-CM

## 2022-06-27 RX ORDER — CYANOCOBALAMIN 1000 UG/ML
INJECTION, SOLUTION INTRAMUSCULAR; SUBCUTANEOUS
Qty: 1 ML | Refills: 11 | Status: SHIPPED | OUTPATIENT
Start: 2022-06-27 | End: 2023-01-01

## 2022-06-28 NOTE — TELEPHONE ENCOUNTER
"Last Written Prescription Date:  8/9/21  Last Fill Quantity: 1,  # refills: 4   Last office visit provider:  6/10/22     Requested Prescriptions   Pending Prescriptions Disp Refills     cyanocobalamin (CYANOCOBALAMIN) 1000 MCG/ML injection [Pharmacy Med Name: CYANOCOBALAMIN 1000MCG/ML INJ, 1ML] 1 mL 4     Sig: ADMINISTER 1 ML(1000 MCG) IN THE MUSCLE EVERY 30 DAYS       Vitamin Supplements (Adult) Protocol Passed - 6/27/2022 10:52 AM        Passed - High dose Vitamin D not ordered        Passed - Recent (12 mo) or future (30 days) visit within the authorizing provider's specialty     Patient has had an office visit with the authorizing provider or a provider within the authorizing providers department within the previous 12 mos or has a future within next 30 days. See \"Patient Info\" tab in inbasket, or \"Choose Columns\" in Meds & Orders section of the refill encounter.              Passed - Medication is active on med list             Cristin Fishman RN 06/27/22 8:38 PM  "

## 2022-06-29 DIAGNOSIS — M79.7 FIBROMYALGIA: ICD-10-CM

## 2022-06-29 RX ORDER — HYDROMORPHONE HYDROCHLORIDE 4 MG/1
4 TABLET ORAL
Qty: 150 TABLET | Refills: 0 | Status: SHIPPED | OUTPATIENT
Start: 2022-06-29 | End: 2022-08-05

## 2022-06-29 NOTE — TELEPHONE ENCOUNTER
Reason for Call:  Medication or medication refill:    Do you use a [facility]?  Name of the pharmacy and phone number for the current request:  [id number] [address] [facility] [address]    Name of the medication requested: HYDROmorphone (DILAUDID) 4 MG tablet    Other request: none    Can we leave a detailed message on this number? Not Applicable    Phone number patient can be reached at: Home number on file [phone] (home)    Best Time: any    Call taken on 6/29/2022 at 10:22 AM by Michael Devlin

## 2022-07-01 ENCOUNTER — MEDICAL CORRESPONDENCE (OUTPATIENT)
Dept: HEALTH INFORMATION MANAGEMENT | Facility: CLINIC | Age: 72
End: 2022-07-01

## 2022-07-08 ENCOUNTER — TELEPHONE (OUTPATIENT)
Dept: INTERNAL MEDICINE | Facility: CLINIC | Age: 72
End: 2022-07-08

## 2022-07-08 DIAGNOSIS — N30.20 CHRONIC CYSTITIS: Primary | ICD-10-CM

## 2022-07-08 NOTE — TELEPHONE ENCOUNTER
Unable to reorder requested medication due to being discontinued.  Will route to Dr. Caldwell for further advise.     Emmie Freeman RN  [facility]

## 2022-07-08 NOTE — TELEPHONE ENCOUNTER
7-8-22  Reason for Call:   medication refill:    Do you use a [facility]?  jana in [facility]     Name of the medication requested: nitroFURantoin macrocrystal (MACRODANTIN) 50 MG capsule    Other request: none    Can we leave a detailed message on this number? YES    Phone number patient can be reached at: Cell number on file:    Telephone Information:   Mobile [phone]       Best Time: antijme    Call taken on 7/8/2022 at 10:06 AM by Tianna Rizo

## 2022-07-08 NOTE — TELEPHONE ENCOUNTER
"Pt requesting to speak to RN. States, \"I'm trying to talk to sergo but my mom keeps getting in the way.\" Pt reported that she believes she is pregnant and that her mom is \"trying to prevent it\". RN asked how staff can best help her, pt requests vital signs. VSS, pt reassured that vital signs were all normal. Pt also reports that the lights flashing on the vital sign monitor are \"sergo speaking to me\" and \"asking me to calm down.\" Pt also requests boxed meal, water, and sprite. All provided to her. Pt is cooperative at this time.   " Discontinued?  As in, 50 mg of nitrofurantoin isn't manufactured by any companies anymore?

## 2022-07-11 ENCOUNTER — HOME INFUSION (PRE-WILLOW HOME INFUSION) (OUTPATIENT)
Dept: PHARMACY | Facility: CLINIC | Age: 72
End: 2022-07-11

## 2022-07-11 RX ORDER — NITROFURANTOIN MACROCRYSTALS 50 MG/1
50 CAPSULE ORAL AT BEDTIME
Qty: 90 CAPSULE | Refills: 3 | Status: SHIPPED | OUTPATIENT
Start: 2022-07-11 | End: 2023-01-01

## 2022-07-11 NOTE — TELEPHONE ENCOUNTER
Outgoing Call:    Left message that medication has been approved and sent over to pharmacy.    Emmie Freeman RN  [facility]

## 2022-07-12 ENCOUNTER — TELEPHONE (OUTPATIENT)
Dept: INTERNAL MEDICINE | Facility: CLINIC | Age: 72
End: 2022-07-12

## 2022-07-12 NOTE — TELEPHONE ENCOUNTER
SHERI for Crystal letting her know that we did not receive these results and asked her to refax them to us at [phone].

## 2022-07-12 NOTE — TELEPHONE ENCOUNTER
Mariella from [facility] is calling checking to see if we received the lab results for Magnesium and Creatinine that they faxed over a couple of days ago? Please call Mariella at [phone].  Josefina Vazuqez   [facility]  Central Scheduler

## 2022-07-13 NOTE — PROGRESS NOTES
This is a recent snapshot of the patient's [facility] medical record.  For current drug dose and complete information and questions, call [phone] or In Basket pool, fv home infusion ([id number])  CSN Number:  [id number]

## 2022-07-18 ENCOUNTER — OFFICE VISIT (OUTPATIENT)
Dept: OTOLARYNGOLOGY | Facility: CLINIC | Age: 72
End: 2022-07-18
Payer: COMMERCIAL

## 2022-07-18 DIAGNOSIS — H91.92 HEARING DIFFICULTY OF LEFT EAR: ICD-10-CM

## 2022-07-18 DIAGNOSIS — H91.92 HEARING DIFFICULTY OF LEFT EAR: Primary | ICD-10-CM

## 2022-07-18 DIAGNOSIS — H61.22 KERATIN DEBRIS OF LEFT EAR CANAL: Primary | ICD-10-CM

## 2022-07-18 PROCEDURE — 99213 OFFICE O/P EST LOW 20 MIN: CPT | Performed by: OTOLARYNGOLOGY

## 2022-07-18 RX ORDER — CIPROFLOXACIN AND DEXAMETHASONE 3; 1 MG/ML; MG/ML
4 SUSPENSION/ DROPS AURICULAR (OTIC) 2 TIMES DAILY
Qty: 2.8 ML | Refills: 0 | Status: SHIPPED | OUTPATIENT
Start: 2022-07-18 | End: 2022-07-25

## 2022-07-18 NOTE — LETTER
7/18/2022         RE: Dee Mattson  [address]  [place] [address]        Dear Colleague,    Thank you for referring your patient, Dee Mattson, to the [facility]. Please see a copy of my visit note below.    CHIEF COMPLAINT:        HISTORY OF PRESENT ILLNESS    Dee was seen at the behest of Bhupendra Caldwell MD for hearing loss.  Patient has long history of left-sided hearing loss.  She has been having drainage from the left ear.  She was referred for further ration management.  She has had a history of Hodgkin's lymphoma with neck dissection on the left-hand side and radiation.  This is back in 1979 per the patient.  She has chronic dizziness.  No history of otologic surgery.             REVIEW OF SYSTEMS    Review of Systems as per HPI and PMHx, otherwise 10 system review system are negative.     Ketorolac tromethamine; Citalopram analogues [citalopram]; Methadone; Metoclopramide; Metronidazole; Mirtazapine; Morphine; Neuromuscular blockers, steroidal [vecuronium & derivatives]; Norfloxacin; Other drug allergy (see comments); and Oxycodone     There were no vitals taken for this visit.    HEAD: Normal appearance and symmetry:  No cutaneous lesions.      NECK:  supple     EARS:     Right ear: Normal TM, EAC  Left ear: Tympanic membrane is intact but there is some significant mount of greenish-yellow crusting and debris that is removed with a right angled hook and alligator forceps from the surface of the external auditory canal and the tympanic membrane.  Ciprodex and gelfoam placed medial EAC.    EYES:  EOMI    CN VII/XII:  intact     NOSE:     Dorsum:   straight  Septum:  midline  Mucosa:  moist        ORAL CAVITY/OROPHARYNX:     Lips:  Normal.  Tongue: normal, midline  Mucosa:   no lesions     NECK:  Trachea:  midline.              Thyroid:  normal              Adenopathy:  none        NEURO:   Alert and Oriented     GAIT AND STATION:  normal      RESPIRATORY:   Symmetry and Respiratory effort     PSYCH:  Normal mood and affect     SKIN:   warm and dry         IMPRESSION:    Encounter Diagnoses   Name Primary?     Keratin debris of left ear canal Yes     Hearing difficulty of left ear           RECOMMENDATIONS:    Ciprodex as directed  Water precautions  Return visiti 6 weeks with audiogram.       Again, thank you for allowing me to participate in the care of your patient.        Sincerely,        Jonathan Zavala MD

## 2022-07-18 NOTE — PROGRESS NOTES
CHIEF COMPLAINT:        HISTORY OF PRESENT ILLNESS    Dee was seen at the behest of Bhupendra Caldwell MD for hearing loss.  Patient has long history of left-sided hearing loss.  She has been having drainage from the left ear.  She was referred for further ration management.  She has had a history of Hodgkin's lymphoma with neck dissection on the left-hand side and radiation.  This is back in 1979 per the patient.  She has chronic dizziness.  No history of otologic surgery.             REVIEW OF SYSTEMS    Review of Systems as per HPI and PMHx, otherwise 10 system review system are negative.     Ketorolac tromethamine; Citalopram analogues [citalopram]; Methadone; Metoclopramide; Metronidazole; Mirtazapine; Morphine; Neuromuscular blockers, steroidal [vecuronium & derivatives]; Norfloxacin; Other drug allergy (see comments); and Oxycodone     There were no vitals taken for this visit.    HEAD: Normal appearance and symmetry:  No cutaneous lesions.      NECK:  supple     EARS:     Right ear: Normal TM, EAC  Left ear: Tympanic membrane is intact but there is some significant mount of greenish-yellow crusting and debris that is removed with a right angled hook and alligator forceps from the surface of the external auditory canal and the tympanic membrane.  Ciprodex and gelfoam placed medial EAC.    EYES:  EOMI    CN VII/XII:  intact     NOSE:     Dorsum:   straight  Septum:  midline  Mucosa:  moist        ORAL CAVITY/OROPHARYNX:     Lips:  Normal.  Tongue: normal, midline  Mucosa:   no lesions     NECK:  Trachea:  midline.              Thyroid:  normal              Adenopathy:  none        NEURO:   Alert and Oriented     GAIT AND STATION:  normal     RESPIRATORY:   Symmetry and Respiratory effort     PSYCH:  Normal mood and affect     SKIN:   warm and dry         IMPRESSION:    Encounter Diagnoses   Name Primary?     Keratin debris of left ear canal Yes     Hearing difficulty of left ear            RECOMMENDATIONS:    Ciprodex as directed  Water precautions  Return visiti 6 weeks with audiogram.

## 2022-07-20 ENCOUNTER — TELEPHONE (OUTPATIENT)
Dept: INTERNAL MEDICINE | Facility: CLINIC | Age: 72
End: 2022-07-20

## 2022-07-20 NOTE — TELEPHONE ENCOUNTER
Patient mailed in special diet information request form to office to be completed. Patient did not make note of how she would like this delivered back to her. Please advise. Thank you.

## 2022-07-22 NOTE — TELEPHONE ENCOUNTER
Form was faxed back to the worker listed on the form. Called patient and asked if she'd like a copy back and she said yes, she would prefer it mailed back to her.     Copy for the patient's records has been mailed out.

## 2022-07-26 ENCOUNTER — TELEPHONE (OUTPATIENT)
Dept: INTERNAL MEDICINE | Facility: CLINIC | Age: 72
End: 2022-07-26

## 2022-07-26 NOTE — TELEPHONE ENCOUNTER
Outgoing Call:  Left message   Please see below messages.    Verbal orders message relayed to Crystals with [facility] secure line.      Emmie GROVER RN  [facility]

## 2022-07-26 NOTE — TELEPHONE ENCOUNTER
Reason for Call:  Home Health Care    Crystal RN with [facility] called regarding (reason for call): Verbal Orders    Orders are needed for this patient. Verbal orders for re-certification     PT: None    OT: None    Skilled Nursing: Yes -  1x a week for 9 weeks for weekly cevallos cath dressing and needle change  Monthly Creatinine and Magnisum lab draws  Monthly B12 injection  Pt is requesting: using a 22 gauge .75 and would like to change it to 20 gauge .75 needle.     Pt Provider: Dr Caldwell    Phone Number Homecare Nurse can be reached at: [phone]    Can we leave a detailed message on this number? YES    Phone number patient can be reached at: Home number on file [phone] (home)    Best Time: any    Call taken on 7/26/2022 at 11:46 AM by Michael Devlin

## 2022-08-02 ENCOUNTER — TELEPHONE (OUTPATIENT)
Dept: INTERNAL MEDICINE | Facility: CLINIC | Age: 72
End: 2022-08-02

## 2022-08-02 NOTE — TELEPHONE ENCOUNTER
Reason for Call: Request for an order or referral:    Order or referral being requested: VERBAL ORDER    Date needed: as soon as possible    Has the patient been seen by the PCP for this problem? YES    Additional comments: Delay in needle change, dressing change, lab draw by one day to be completed on 8/3/2022    Phone number Patient can be reached at:  Other phone number:  [phone]    Best Time:  anytime    Can we leave a detailed message on this number?  YES    Call taken on 8/2/2022 at 9:33 AM by Larissa Cherry

## 2022-08-05 DIAGNOSIS — M79.7 FIBROMYALGIA: ICD-10-CM

## 2022-08-05 RX ORDER — HYDROMORPHONE HYDROCHLORIDE 4 MG/1
4 TABLET ORAL
Qty: 150 TABLET | Refills: 0 | Status: SHIPPED | OUTPATIENT
Start: 2022-08-05 | End: 2022-09-07

## 2022-08-05 NOTE — TELEPHONE ENCOUNTER
Controlled Substance Refill Request for Pending Prescriptions:                       Disp   Refills    HYDROmorphone (DILAUDID) 4 MG tablet      150 ta*0            Sig: Take 1 tablet (4 mg) by mouth 5 times daily    Last Written Prescription Date:  6/29/22  Last Fill Quantity: 150,  # refills: 0   Last office visit: 6/10/2022 with prescribing provider:     Future Office Visit:   Next 5 appointments (look out 90 days)    Sep 26, 2022  2:20 PM  (Arrive by 2:05 PM)  Return Visit with Jonathan Zavala MD  [facility] [address] [place] [id number] [phone]           Sheree Hodges RN

## 2022-08-05 NOTE — PROGRESS NOTES
This is a recent snapshot of the patient's [facility] medical record.  For current drug dose and complete information and questions, call [phone] or In Basket pool, fv home infusion ([id number])  CSN Number:  [id number]

## 2022-08-05 NOTE — TELEPHONE ENCOUNTER
Reason for Call:  Medication or medication refill: HYDROmorphone (DILAUDID) 4 MG tablet    Do you use a [facility]? NO  Name of the pharmacy and phone number for the current request: [facility] [address] [place] @ [facility] [address] in [facility] [phone]    Name of the medication requested:     HYDROmorphone (DILAUDID) 4 MG tablet 150 tablet 0 6/29/2022  No   Sig - Route: Take 1 tablet (4 mg) by mouth 5 times daily - Oral     Can we leave a detailed message on this number? YES    Phone number patient can be reached at: Home number on file [phone] (home)    Best Time: any    Call taken on 8/5/2022 at 12:29 PM by Aurelia Reyes

## 2022-08-09 ENCOUNTER — TELEPHONE (OUTPATIENT)
Dept: INTERNAL MEDICINE | Facility: CLINIC | Age: 72
End: 2022-08-09

## 2022-08-09 NOTE — TELEPHONE ENCOUNTER
General Call    Contacts       Type Contact Phone/Fax    08/09/2022 10:20 AM CDT Phone (Incoming) Mariella @ [facility] (Other) [phone]        Reason for Call:  Mariella @ [facility] called to let PCP know that they didn't give Pt her vitamin B-12 injection today at her home visit because Pt didn't pick it up at the pharmacy (Pt states the pharmacy didn't have it ready).  Mariella states they will give the injection at Pt's next skilled nursing home visit next week on Tuesday 08/16/2022.     What are your questions or concerns:  No questions/concerns.    Date of last appointment with provider: 06/10/2022    Okay to leave a detailed message?: Yes at Home number on file [phone] (home)

## 2022-08-10 ENCOUNTER — TELEPHONE (OUTPATIENT)
Dept: INTERNAL MEDICINE | Facility: CLINIC | Age: 72
End: 2022-08-10

## 2022-08-10 NOTE — TELEPHONE ENCOUNTER
General Call    Contacts       Type Contact Phone/Fax    08/10/2022 12:12 PM CDT Phone (Incoming) Mariella Waddell [facility] (Other) [phone]        Reason for Call:  Mariella Waddell [facility] called to let PCP know that she faxed the Pt's lab work over yesterday and that her Creatine & Magnesium lab results look stable.     What are your questions or concerns:  none    Date of last appointment with provider: 06/10/2022    Okay to leave a detailed message?: Yes at Other phone number:  Mariella Waddell [facility] [phone]

## 2022-08-11 ENCOUNTER — HOME INFUSION (PRE-WILLOW HOME INFUSION) (OUTPATIENT)
Dept: PHARMACY | Facility: CLINIC | Age: 72
End: 2022-08-11

## 2022-08-12 NOTE — PROGRESS NOTES
This is a recent snapshot of the patient's [facility] medical record.  For current drug dose and complete information and questions, call [phone] or In [facility] [place] pool, fv home infusion ([id number])  CSN Number:  [id number]

## 2022-09-07 DIAGNOSIS — M79.7 FIBROMYALGIA: ICD-10-CM

## 2022-09-07 RX ORDER — HYDROMORPHONE HYDROCHLORIDE 4 MG/1
4 TABLET ORAL
Qty: 150 TABLET | Refills: 0 | Status: SHIPPED | OUTPATIENT
Start: 2022-09-07 | End: 2022-10-17

## 2022-09-07 RX ORDER — PREGABALIN 200 MG/1
200 CAPSULE ORAL 3 TIMES DAILY
Qty: 270 CAPSULE | Refills: 1 | Status: SHIPPED | OUTPATIENT
Start: 2022-09-07 | End: 2023-03-06

## 2022-09-07 NOTE — TELEPHONE ENCOUNTER
Routing refill request to provider for review/approval because:  Drug not on the [facility] or [facility] refill protocol or controlled substance     Dilaudid:   Last Written Prescription Date:  8/5/22  Last Fill Quantity: 150,   # refills: 0    Lyrica:  Last Written Prescription Date:  3/15/22  Last Fill Quantity: 270,   # refills: 1  Last Office Visit: 6/10/22  Future Office visit:  None scheduled     DESIRAE PierceN, RN  [facility]

## 2022-09-07 NOTE — TELEPHONE ENCOUNTER
Medication Question or Refill    Contacts       Type Contact Phone/Fax    09/07/2022 12:24 PM CDT Phone (Incoming) Dee Mattson (Self) [phone] (H)          What medication are you calling about (include dose and sig)?     HYDROmorphone (DILAUDID) 4 MG tablet 150 tablet 0 8/5/2022  No   Sig - Route: Take 1 tablet (4 mg) by mouth 5 times daily - Oral     Pregabalin (LYRICA) 200 MG capsule 270 capsule 1 3/15/2022  --   Sig - Route: Take 1 capsule (200 mg) by mouth 3 times daily - Oral     Controlled Substance Agreement on file:   CSA -- Patient Level:    Controlled Substance Agreement - Opioid - Scan on 11/30/2018  Controlled Substance Agreement - Opioid - Scan on 11/30/2018  Controlled Substance Agreement - Opioid - Scan on 11/1/2017       Who prescribed the medication?: Franklin Palomo MD    Do you need a refill? Yes    When did you use the medication last? today    Patient offered an appointment? No    Do you have any questions or concerns?  No    Preferred Pharmacy:     [id number] - [place], [place] - [address]  [address]  [facility] [address]  Phone: [phone] Fax: [phone]      Okay to leave a detailed message?: Yes at Home number on file [phone] (home)    Aurelai Reyes

## 2022-09-08 ENCOUNTER — HOME INFUSION (PRE-WILLOW HOME INFUSION) (OUTPATIENT)
Dept: PHARMACY | Facility: CLINIC | Age: 72
End: 2022-09-08

## 2022-09-09 NOTE — PROGRESS NOTES
This is a recent snapshot of the patient's [facility] medical record.  For current drug dose and complete information and questions, call [phone] or In Basket pool, fv home infusion ([id number])  CSN Number:  [id number]

## 2022-09-13 DIAGNOSIS — F32.A ANXIETY AND DEPRESSION: ICD-10-CM

## 2022-09-13 DIAGNOSIS — F41.9 ANXIETY AND DEPRESSION: ICD-10-CM

## 2022-09-13 RX ORDER — TRAZODONE HYDROCHLORIDE 150 MG/1
TABLET ORAL
Qty: 270 TABLET | Refills: 0 | Status: SHIPPED | OUTPATIENT
Start: 2022-09-13 | End: 2023-01-01

## 2022-09-13 NOTE — TELEPHONE ENCOUNTER
"Last Written Prescription Date:  6/9/22  Last Fill Quantity: 90,  # refills: 3   Last office visit provider:  6/10/22   Pt has upcoming appt sched for 9/26/22    Warnings Override History for traZODone (DESYREL) 150 MG tablet [[id number]]    Overridden by Bhupendra Caldwell MD on Jun 9, 2022 12:41 PM   Drug-Drug   1. TRAZODONE / SEROTONIN/NOREPINEPHRINE REUPTAKE INHIBITORS [Level: Major] [Reason: Tolerated medication/side effects in past]   Other Orders: DULoxetine (CYMBALTA) 60 MG capsule               Requested Prescriptions   Pending Prescriptions Disp Refills     traZODone (DESYREL) 150 MG tablet [Pharmacy Med Name: TRAZODONE 150MG (HUNDRED-FIFTY) TAB] 270 tablet      Sig: TAKE 3 TABLETS(450 MG) BY MOUTH AT BEDTIME       Serotonin Modulators Passed - 9/13/2022  7:53 AM        Passed - Recent (12 mo) or future (30 days) visit within the authorizing provider's specialty     Patient has had an office visit with the authorizing provider or a provider within the authorizing providers department within the previous 12 mos or has a future within next 30 days. See \"Patient Info\" tab in inbasket, or \"Choose Columns\" in Meds & Orders section of the refill encounter.              Passed - Medication is active on med list        Passed - Patient is age 18 or older        Passed - No active pregnancy on record        Passed - No positive pregnancy test in past 12 months             COURTNEY HILL RN 09/13/22 2:08 PM  "

## 2022-09-15 ENCOUNTER — MEDICAL CORRESPONDENCE (OUTPATIENT)
Dept: HEALTH INFORMATION MANAGEMENT | Facility: CLINIC | Age: 72
End: 2022-09-15

## 2022-09-20 ENCOUNTER — TELEPHONE (OUTPATIENT)
Dept: INTERNAL MEDICINE | Facility: CLINIC | Age: 72
End: 2022-09-20

## 2022-09-20 NOTE — TELEPHONE ENCOUNTER
Order/Referral Request    Who is requesting: Hetal RN,  [facility]  [phone]    Orders being requested:   Skilled nursing verbal orders for:    Skilled nursing visits 1x/week for 9 weeks plus   2PRN visits     For weekly carlos cath and needle changes    monthly B12 IM injections    monthly labs as ordered     Reason service is needed/diagnosis:  N/A  On-going orders    When are orders needed by:  call back within 24 hours was requested    Has this been discussed with Provider: Yes    Does patient have a preference on a Group/Provider/Facility? See above    Does patient have an appointment scheduled?: No    Where to send orders: Call in verbal orders     Okay to leave a detailed message?: Yes at Other phone number:  [phone], Hetal at [facility]

## 2022-09-26 ENCOUNTER — MEDICAL CORRESPONDENCE (OUTPATIENT)
Dept: HEALTH INFORMATION MANAGEMENT | Facility: CLINIC | Age: 72
End: 2022-09-26

## 2022-09-26 ENCOUNTER — OFFICE VISIT (OUTPATIENT)
Dept: AUDIOLOGY | Facility: CLINIC | Age: 72
End: 2022-09-26
Payer: COMMERCIAL

## 2022-09-26 ENCOUNTER — OFFICE VISIT (OUTPATIENT)
Dept: OTOLARYNGOLOGY | Facility: CLINIC | Age: 72
End: 2022-09-26
Payer: COMMERCIAL

## 2022-09-26 DIAGNOSIS — H90.A21 SENSORINEURAL HEARING LOSS (SNHL) OF RIGHT EAR WITH RESTRICTED HEARING OF LEFT EAR: Primary | ICD-10-CM

## 2022-09-26 DIAGNOSIS — H90.A32 MIXED CONDUCTIVE AND SENSORINEURAL HEARING LOSS OF LEFT EAR WITH RESTRICTED HEARING OF RIGHT EAR: Primary | ICD-10-CM

## 2022-09-26 PROCEDURE — 99214 OFFICE O/P EST MOD 30 MIN: CPT | Performed by: OTOLARYNGOLOGY

## 2022-09-26 PROCEDURE — 92557 COMPREHENSIVE HEARING TEST: CPT | Performed by: AUDIOLOGIST

## 2022-09-26 PROCEDURE — 92567 TYMPANOMETRY: CPT | Performed by: AUDIOLOGIST

## 2022-09-26 NOTE — PATIENT INSTRUCTIONS
Referral for hearing aid consultation  CT temporal bones  Conservative measures for TMJ related ear pain  Medically cleared for hearing aids  Return visit 6 months with repeat audiogram due to asymmetry

## 2022-09-26 NOTE — LETTER
"    9/26/2022         RE: Dee Mattson  [address]  [facility] [address]        Dear Colleague,    Thank you for referring your patient, Dee Mattson, to the [facility]. Please see a copy of my visit note below.    CHIEF COMPLAINT:  Patient presents with:  Follow Up: Pt reports that ears feel good but hearing is down          HISTORY OF PRESENT ILLNESS    Dee was seen in follow up for audiogram review.  She is experiencing hearing loss especially on the left.  No veritigo but she feels like she is \"walking sideways\" and leans to the right. No concerns about falling.  No tinnitus.  Left ear has been aching of late. 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  History of TMJ.          REVIEW OF SYSTEMS    Review of Systems: a 10-system review is reviewed at this encounter.  See scanned document.     Ketorolac tromethamine; Citalopram analogues [citalopram]; Methadone; Metoclopramide; Metronidazole; Mirtazapine; Morphine; Neuromuscular blockers, steroidal [vecuronium & derivatives]; Norfloxacin; Other drug allergy (see comments); and Oxycodone     PHYSICAL EXAM:        HEAD: Normal appearance and symmetry:  No cutaneous lesions.      EARS:   Auricles normal    Both TMs intact, normal.      NOSE:    Dorsum:   straight       ORAL CAVITY/OROPHARYNX:    Lips:  Normal.     NECK:  Trachea:  midline       NEURO:   Alert and Oriented    GAIT AND STATION:  normal     RESPIRATORY:   Symmetry and Respiratory effort    PSYCH:   normal mood and affect    SKIN:  warm and dry         IMPRESSION:   Encounter Diagnosis   Name Primary?     Mixed conductive and sensorineural hearing loss of left ear with restricted hearing of right ear Yes          RECOMMENDATIONS:    Referral for hearing aid consultation  CT temporal bones  Conservative measures for TMJ related ear pain  Medically cleared for hearing aids  Return visit 6 months with repeat audiogram due to asymmetry        Again, thank you for " allowing me to participate in the care of your patient.        Sincerely,        Jonathan Zavala MD

## 2022-09-26 NOTE — PROGRESS NOTES
"CHIEF COMPLAINT:  Patient presents with:  Follow Up: Pt reports that ears feel good but hearing is down          HISTORY OF PRESENT ILLNESS    Dee was seen in follow up for audiogram review.  She is experiencing hearing loss especially on the left.  No veritigo but she feels like she is \"walking sideways\" and leans to the right. No concerns about falling.  No tinnitus.  Left ear has been aching of late. 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  History of TMJ.          REVIEW OF SYSTEMS    Review of Systems: a 10-system review is reviewed at this encounter.  See scanned document.     Ketorolac tromethamine; Citalopram analogues [citalopram]; Methadone; Metoclopramide; Metronidazole; Mirtazapine; Morphine; Neuromuscular blockers, steroidal [vecuronium & derivatives]; Norfloxacin; Other drug allergy (see comments); and Oxycodone     PHYSICAL EXAM:        HEAD: Normal appearance and symmetry:  No cutaneous lesions.      EARS:   Auricles normal    Both TMs intact, normal.      NOSE:    Dorsum:   straight       ORAL CAVITY/OROPHARYNX:    Lips:  Normal.     NECK:  Trachea:  midline       NEURO:   Alert and Oriented    GAIT AND STATION:  normal     RESPIRATORY:   Symmetry and Respiratory effort    PSYCH:   normal mood and affect    SKIN:  warm and dry         IMPRESSION:   Encounter Diagnosis   Name Primary?     Mixed conductive and sensorineural hearing loss of left ear with restricted hearing of right ear Yes          RECOMMENDATIONS:    Referral for hearing aid consultation  CT temporal bones  Conservative measures for TMJ related ear pain  Medically cleared for hearing aids  Return visit 6 months with repeat audiogram due to asymmetry    "

## 2022-09-26 NOTE — PROGRESS NOTES
AUDIOLOGY REPORT    SUMMARY: Audiology visit completed. See audiogram for results.      RECOMMENDATIONS: Follow-up with ENT.    Graham Hong, CCC-A  Clinical Audiologist  [place] #[id number]

## 2022-09-27 ENCOUNTER — HOME INFUSION (PRE-WILLOW HOME INFUSION) (OUTPATIENT)
Dept: PHARMACY | Facility: CLINIC | Age: 72
End: 2022-09-27

## 2022-09-28 NOTE — PROGRESS NOTES
This is a recent snapshot of the patient's [facility] medical record.  For current drug dose and complete information and questions, call [phone] or In Basket pool, fv home infusion ([id number])  CSN Number:  [id number]

## 2022-10-11 ENCOUNTER — TELEPHONE (OUTPATIENT)
Dept: INTERNAL MEDICINE | Facility: CLINIC | Age: 72
End: 2022-10-11

## 2022-10-11 NOTE — TELEPHONE ENCOUNTER
General Call    Contacts       Type Contact Phone/Fax    10/11/2022 10:09 AM CDT Phone (Incoming) Tianna @ [facility] [phone]        Reason for Call:  Pt didn't get Vitamin B-12 injection this week during her home care visit.    What are your questions or concerns:  Tianna @ [facility] called and stated Pt forgot to  her Vitamin B-12 injection at the pharmacy so she didn't get her injection this week. Tianna states she will give Pt her B-12 injection next week at her next home visit. This message is FYI to PCP. No need to call back.    Date of last appointment with provider: 06/10/2022    Okay to leave a detailed message?: Yes at Other phone number:  NADEEM Wynn @ [facility] [phone]    Aurelia Reyes

## 2022-10-14 DIAGNOSIS — M79.7 FIBROMYALGIA: ICD-10-CM

## 2022-10-17 RX ORDER — HYDROMORPHONE HYDROCHLORIDE 4 MG/1
4 TABLET ORAL
Qty: 150 TABLET | Refills: 0 | Status: SHIPPED | OUTPATIENT
Start: 2022-10-17 | End: 2022-10-21

## 2022-10-21 DIAGNOSIS — Z90.49 S/P TOTAL COLECTOMY: ICD-10-CM

## 2022-10-21 DIAGNOSIS — M79.7 FIBROMYALGIA: ICD-10-CM

## 2022-10-21 RX ORDER — HYDROMORPHONE HYDROCHLORIDE 4 MG/1
4 TABLET ORAL
Qty: 150 TABLET | Refills: 0 | Status: SHIPPED | OUTPATIENT
Start: 2022-11-17 | End: 2023-01-03

## 2022-10-21 RX ORDER — HYDROMORPHONE HYDROCHLORIDE 4 MG/1
4 TABLET ORAL
Qty: 150 TABLET | Refills: 0 | Status: SHIPPED | OUTPATIENT
Start: 2022-12-17 | End: 2023-01-01

## 2022-10-24 RX ORDER — ERGOCALCIFEROL 1.25 MG/1
CAPSULE, LIQUID FILLED ORAL
Qty: 12 CAPSULE | Refills: 3 | Status: SHIPPED | OUTPATIENT
Start: 2022-10-24 | End: 2023-01-01

## 2022-10-24 NOTE — TELEPHONE ENCOUNTER
Routing refill request to provider for review/approval because:  Drug not on the [facility] refill protocol     Last Written Prescription Date:  11/16/21  Last Fill Quantity: 12,  # refills: 3   Last office visit provider:  6/10/22     Requested Prescriptions   Pending Prescriptions Disp Refills     vitamin D2 (ERGOCALCIFEROL) 04500 units (1250 mcg) capsule [Pharmacy Med Name: VITAMIN D2 50,000IU (ERGO) CAP RX] 12 capsule 3     Sig: TAKE 1 CAPSULE BY MOUTH 1 TIME EVERY WEEK FOR 12 DOSES       There is no refill protocol information for this order          Giovanna Arnold 10/23/22 8:59 PM

## 2022-11-22 ENCOUNTER — TELEPHONE (OUTPATIENT)
Dept: INTERNAL MEDICINE | Facility: CLINIC | Age: 72
End: 2022-11-22

## 2022-11-22 DIAGNOSIS — M79.7 FIBROMYALGIA: ICD-10-CM

## 2022-11-22 DIAGNOSIS — Z90.49 S/P TOTAL COLECTOMY: ICD-10-CM

## 2022-11-22 NOTE — TELEPHONE ENCOUNTER
Medication Question or Refill        What medication are you calling about (include dose and sig)?: Hydromorphone 4 mg tab    Controlled Substance Agreement on file:   CSA -- Patient Level:     [Media Unavailable] Controlled Substance Agreement - Opioid - Scan on 11/30/2018   [Media Unavailable] Controlled Substance Agreement - Opioid - Scan on 11/30/2018   [Media Unavailable] Controlled Substance Agreement - Opioid - Scan on 11/1/2017       Who prescribed the medication?: Dr. Caldwell    Do you need a refill? Yes: due to call in for refill    When did you use the medication last? Daily    Preferred Pharmacy:   [id number] [place], [place] - [address] AT [facility] [address]  [address]  [facility] [address]  Phone: [phone] Fax: [phone]      Okay to leave a detailed message?: Yes at Cell number on file:    Telephone Information:   Mobile [phone]

## 2022-11-28 ENCOUNTER — MEDICAL CORRESPONDENCE (OUTPATIENT)
Dept: HEALTH INFORMATION MANAGEMENT | Facility: CLINIC | Age: 72
End: 2022-11-28

## 2022-11-28 RX ORDER — ERGOCALCIFEROL 1.25 MG/1
CAPSULE, LIQUID FILLED ORAL
Qty: 12 CAPSULE | Refills: 3 | OUTPATIENT
Start: 2022-11-28

## 2022-11-28 RX ORDER — HYDROMORPHONE HYDROCHLORIDE 4 MG/1
4 TABLET ORAL
Qty: 150 TABLET | Refills: 0 | OUTPATIENT
Start: 2022-11-28

## 2022-12-09 ENCOUNTER — MEDICAL CORRESPONDENCE (OUTPATIENT)
Dept: HEALTH INFORMATION MANAGEMENT | Facility: CLINIC | Age: 72
End: 2022-12-09

## 2022-12-12 DIAGNOSIS — I10 BENIGN ESSENTIAL HYPERTENSION: ICD-10-CM

## 2022-12-12 RX ORDER — LISINOPRIL 20 MG/1
TABLET ORAL
Qty: 90 TABLET | Refills: 3 | Status: SHIPPED | OUTPATIENT
Start: 2022-12-12 | End: 2023-01-01

## 2022-12-12 NOTE — TELEPHONE ENCOUNTER
"Routing refill request to provider for review/approval because:  Labs not current:  k    Last Written Prescription Date:  12/7/21  Last Fill Quantity: 90,  # refills: 3   Last office visit provider:  6/10/22     Requested Prescriptions   Pending Prescriptions Disp Refills     lisinopril (ZESTRIL) 20 MG tablet [Pharmacy Med Name: LISINOPRIL 20MG TABLETS] 90 tablet 3     Sig: TAKE 1 TABLET(20 MG) BY MOUTH DAILY       ACE Inhibitors (Including Combos) Protocol Failed - 12/12/2022  8:15 AM        Failed - Normal serum potassium on file in past 12 months     Recent Labs   Lab Test 03/08/21  1200   POTASSIUM 4.4             Passed - Blood pressure under 140/90 in past 12 months     BP Readings from Last 3 Encounters:   06/10/22 126/70   02/18/22 (!) 144/84   05/17/21 132/80                 Passed - Recent (12 mo) or future (30 days) visit within the authorizing provider's specialty     Patient has had an office visit with the authorizing provider or a provider within the authorizing providers department within the previous 12 mos or has a future within next 30 days. See \"Patient Info\" tab in inbasket, or \"Choose Columns\" in Meds & Orders section of the refill encounter.              Passed - Medication is active on med list        Passed - Patient is age 18 or older        Passed - No active pregnancy on record        Passed - Normal serum creatinine on file in past 12 months     Recent Labs   Lab Test 03/15/22  1131   CR 0.93       Ok to refill medication if creatinine is low          Passed - No positive pregnancy test within past 12 months             Cristin Fishman RN 12/12/22 2:36 PM  "

## 2022-12-20 DIAGNOSIS — F32.A ANXIETY AND DEPRESSION: ICD-10-CM

## 2022-12-20 DIAGNOSIS — F41.9 ANXIETY AND DEPRESSION: ICD-10-CM

## 2022-12-21 NOTE — TELEPHONE ENCOUNTER
"Routing refill request to provider for review/approval because:  phq 9    Last Written Prescription Date:  1/12/22  Last Fill Quantity: 360,  # refills: 3   Last office visit provider:  6/10/22     Requested Prescriptions   Pending Prescriptions Disp Refills     busPIRone (BUSPAR) 15 MG tablet [Pharmacy Med Name: BUSPIRONE 15MG TABLETS] 360 tablet 3     Sig: TAKE 2 TABLETS BY MOUTH TWICE DAILY       Atypical Antidepressants Protocol Failed - 12/20/2022  6:03 AM        Failed - Patient has PHQ-9 score less than 5 in past 6 months.     Please review last PHQ-9 score.           Failed - Recent (6 mo) or future (30 days) visit within the authorizing provider's specialty     Patient had office visit in the last 6 months or has a visit in the next 30 days with authorizing provider or within the authorizing provider's specialty.  See \"Patient Info\" tab in inbasket, or \"Choose Columns\" in Meds & Orders section of the refill encounter.            Passed - Medication active on med list        Passed - Patient is age 18 or older        Passed - No active pregnancy on record        Passed - No positive pregnancy test in past 12 mos             Cristin Fishman, RN 12/20/22 8:58 PM  "

## 2022-12-22 RX ORDER — BUSPIRONE HYDROCHLORIDE 15 MG/1
TABLET ORAL
Qty: 360 TABLET | Refills: 3 | Status: ON HOLD | OUTPATIENT
Start: 2022-12-22 | End: 2023-01-01

## 2022-12-28 NOTE — PROGRESS NOTES
Nasal and ocular symptoms around cats and dogs. Some are sneezing, ocular itching, ocular watering, throat itching congestion, rhinorrhea and postnasal drainage. Symptoms improved with diphenhydramine. No history of allergy testing.    Skin testing  Positive for cat, dog, dust mite, grass, trees, weeds and molds.    - Flonase 2 sprays/nostril daily.   - Zyrtec daily as needed for allergy symptoms.   - Aeroallergen avoidance measures discussed and literature provided.        This is a recent snapshot of the patient's [facility] medical record.  For current drug dose and complete information and questions, call [phone] or In Basket pool, fv home infusion ([id number])  CSN Number:  [id number]

## 2023-01-01 ENCOUNTER — TELEPHONE (OUTPATIENT)
Dept: INTERNAL MEDICINE | Facility: CLINIC | Age: 73
End: 2023-01-01
Payer: COMMERCIAL

## 2023-01-01 ENCOUNTER — APPOINTMENT (OUTPATIENT)
Dept: RADIOLOGY | Facility: CLINIC | Age: 73
DRG: 314 | End: 2023-01-01
Attending: FAMILY MEDICINE
Payer: COMMERCIAL

## 2023-01-01 ENCOUNTER — MEDICAL CORRESPONDENCE (OUTPATIENT)
Dept: HEALTH INFORMATION MANAGEMENT | Facility: CLINIC | Age: 73
End: 2023-01-01

## 2023-01-01 ENCOUNTER — LAB REQUISITION (OUTPATIENT)
Dept: LAB | Facility: CLINIC | Age: 73
End: 2023-01-01
Payer: COMMERCIAL

## 2023-01-01 ENCOUNTER — MEDICAL CORRESPONDENCE (OUTPATIENT)
Dept: HEALTH INFORMATION MANAGEMENT | Facility: CLINIC | Age: 73
End: 2023-01-01
Payer: COMMERCIAL

## 2023-01-01 ENCOUNTER — PATIENT OUTREACH (OUTPATIENT)
Dept: INTERNAL MEDICINE | Facility: CLINIC | Age: 73
End: 2023-01-01
Payer: COMMERCIAL

## 2023-01-01 ENCOUNTER — APPOINTMENT (OUTPATIENT)
Dept: ULTRASOUND IMAGING | Facility: CLINIC | Age: 73
DRG: 314 | End: 2023-01-01
Attending: HOSPITALIST
Payer: COMMERCIAL

## 2023-01-01 ENCOUNTER — TRANSFERRED RECORDS (OUTPATIENT)
Dept: HEALTH INFORMATION MANAGEMENT | Facility: CLINIC | Age: 73
End: 2023-01-01

## 2023-01-01 ENCOUNTER — APPOINTMENT (OUTPATIENT)
Dept: CT IMAGING | Facility: CLINIC | Age: 73
DRG: 314 | End: 2023-01-01
Attending: EMERGENCY MEDICINE
Payer: COMMERCIAL

## 2023-01-01 ENCOUNTER — TELEPHONE (OUTPATIENT)
Dept: INTERNAL MEDICINE | Facility: CLINIC | Age: 73
End: 2023-01-01

## 2023-01-01 ENCOUNTER — APPOINTMENT (OUTPATIENT)
Dept: OCCUPATIONAL THERAPY | Facility: CLINIC | Age: 73
DRG: 314 | End: 2023-01-01
Payer: COMMERCIAL

## 2023-01-01 ENCOUNTER — APPOINTMENT (OUTPATIENT)
Dept: PHYSICAL THERAPY | Facility: CLINIC | Age: 73
DRG: 314 | End: 2023-01-01
Payer: COMMERCIAL

## 2023-01-01 ENCOUNTER — NURSE TRIAGE (OUTPATIENT)
Dept: NURSING | Facility: CLINIC | Age: 73
End: 2023-01-01
Payer: COMMERCIAL

## 2023-01-01 ENCOUNTER — PATIENT OUTREACH (OUTPATIENT)
Dept: GASTROENTEROLOGY | Facility: CLINIC | Age: 73
End: 2023-01-01
Payer: COMMERCIAL

## 2023-01-01 ENCOUNTER — HOSPITAL ENCOUNTER (INPATIENT)
Facility: CLINIC | Age: 73
LOS: 8 days | Discharge: HOME | DRG: 314 | End: 2023-11-16
Attending: EMERGENCY MEDICINE | Admitting: HOSPITALIST
Payer: COMMERCIAL

## 2023-01-01 ENCOUNTER — TELEPHONE (OUTPATIENT)
Dept: FAMILY MEDICINE | Facility: OTHER | Age: 73
End: 2023-01-01
Payer: COMMERCIAL

## 2023-01-01 ENCOUNTER — OFFICE VISIT (OUTPATIENT)
Dept: FAMILY MEDICINE | Facility: CLINIC | Age: 73
End: 2023-01-01
Payer: COMMERCIAL

## 2023-01-01 ENCOUNTER — OFFICE VISIT (OUTPATIENT)
Dept: PALLIATIVE MEDICINE | Facility: OTHER | Age: 73
End: 2023-01-01
Attending: INTERNAL MEDICINE
Payer: COMMERCIAL

## 2023-01-01 ENCOUNTER — OFFICE VISIT (OUTPATIENT)
Dept: INTERNAL MEDICINE | Facility: CLINIC | Age: 73
End: 2023-01-01
Payer: COMMERCIAL

## 2023-01-01 ENCOUNTER — TELEPHONE (OUTPATIENT)
Dept: INFECTIOUS DISEASES | Facility: CLINIC | Age: 73
End: 2023-01-01
Payer: COMMERCIAL

## 2023-01-01 ENCOUNTER — TELEPHONE (OUTPATIENT)
Dept: FAMILY MEDICINE | Facility: CLINIC | Age: 73
End: 2023-01-01
Payer: COMMERCIAL

## 2023-01-01 ENCOUNTER — APPOINTMENT (OUTPATIENT)
Dept: INTERVENTIONAL RADIOLOGY/VASCULAR | Facility: CLINIC | Age: 73
DRG: 314 | End: 2023-01-01
Payer: COMMERCIAL

## 2023-01-01 ENCOUNTER — APPOINTMENT (OUTPATIENT)
Dept: MRI IMAGING | Facility: CLINIC | Age: 73
DRG: 314 | End: 2023-01-01
Attending: EMERGENCY MEDICINE
Payer: COMMERCIAL

## 2023-01-01 ENCOUNTER — TRANSFERRED RECORDS (OUTPATIENT)
Dept: HEALTH INFORMATION MANAGEMENT | Facility: CLINIC | Age: 73
End: 2023-01-01
Payer: COMMERCIAL

## 2023-01-01 ENCOUNTER — APPOINTMENT (OUTPATIENT)
Dept: CARDIOLOGY | Facility: CLINIC | Age: 73
DRG: 314 | End: 2023-01-01
Attending: INTERNAL MEDICINE
Payer: COMMERCIAL

## 2023-01-01 ENCOUNTER — VIRTUAL VISIT (OUTPATIENT)
Dept: INTERNAL MEDICINE | Facility: CLINIC | Age: 73
End: 2023-01-01
Payer: COMMERCIAL

## 2023-01-01 ENCOUNTER — HOSPITAL ENCOUNTER (OUTPATIENT)
Facility: AMBULATORY SURGERY CENTER | Age: 73
End: 2023-01-01
Attending: PODIATRIST
Payer: COMMERCIAL

## 2023-01-01 ENCOUNTER — APPOINTMENT (OUTPATIENT)
Dept: PHYSICAL THERAPY | Facility: CLINIC | Age: 73
DRG: 314 | End: 2023-01-01
Attending: HOSPITALIST
Payer: COMMERCIAL

## 2023-01-01 ENCOUNTER — APPOINTMENT (OUTPATIENT)
Dept: OCCUPATIONAL THERAPY | Facility: CLINIC | Age: 73
DRG: 314 | End: 2023-01-01
Attending: HOSPITALIST
Payer: COMMERCIAL

## 2023-01-01 VITALS
WEIGHT: 119.4 LBS | DIASTOLIC BLOOD PRESSURE: 75 MMHG | RESPIRATION RATE: 18 BRPM | TEMPERATURE: 98.3 F | HEIGHT: 61 IN | OXYGEN SATURATION: 96 % | HEART RATE: 78 BPM | BODY MASS INDEX: 22.54 KG/M2 | SYSTOLIC BLOOD PRESSURE: 165 MMHG

## 2023-01-01 VITALS
WEIGHT: 120 LBS | BODY MASS INDEX: 22.66 KG/M2 | DIASTOLIC BLOOD PRESSURE: 81 MMHG | OXYGEN SATURATION: 99 % | HEART RATE: 79 BPM | HEIGHT: 61 IN | TEMPERATURE: 98.3 F | SYSTOLIC BLOOD PRESSURE: 138 MMHG

## 2023-01-01 VITALS
BODY MASS INDEX: 23.05 KG/M2 | HEART RATE: 67 BPM | WEIGHT: 122 LBS | OXYGEN SATURATION: 97 % | SYSTOLIC BLOOD PRESSURE: 129 MMHG | DIASTOLIC BLOOD PRESSURE: 61 MMHG

## 2023-01-01 VITALS
SYSTOLIC BLOOD PRESSURE: 124 MMHG | HEART RATE: 70 BPM | BODY MASS INDEX: 22.28 KG/M2 | RESPIRATION RATE: 16 BRPM | OXYGEN SATURATION: 99 % | WEIGHT: 118 LBS | HEIGHT: 61 IN | TEMPERATURE: 97.9 F | DIASTOLIC BLOOD PRESSURE: 80 MMHG

## 2023-01-01 VITALS
OXYGEN SATURATION: 100 % | HEART RATE: 65 BPM | BODY MASS INDEX: 21.9 KG/M2 | SYSTOLIC BLOOD PRESSURE: 120 MMHG | RESPIRATION RATE: 16 BRPM | WEIGHT: 116 LBS | HEIGHT: 61 IN | TEMPERATURE: 98.2 F | DIASTOLIC BLOOD PRESSURE: 62 MMHG

## 2023-01-01 DIAGNOSIS — E21.3 HYPERPARATHYROIDISM (H): ICD-10-CM

## 2023-01-01 DIAGNOSIS — M79.7 FIBROMYALGIA: ICD-10-CM

## 2023-01-01 DIAGNOSIS — H53.2 DOUBLE VISION: ICD-10-CM

## 2023-01-01 DIAGNOSIS — T80.219D INFECTION OF VENOUS ACCESS PORT, SUBSEQUENT ENCOUNTER: ICD-10-CM

## 2023-01-01 DIAGNOSIS — R78.81 MSSA BACTEREMIA: Primary | ICD-10-CM

## 2023-01-01 DIAGNOSIS — G89.4 PAIN SYNDROME, CHRONIC: ICD-10-CM

## 2023-01-01 DIAGNOSIS — M79.7 FIBROMYALGIA: Chronic | ICD-10-CM

## 2023-01-01 DIAGNOSIS — G89.29 CHRONIC INTRACTABLE PAIN: ICD-10-CM

## 2023-01-01 DIAGNOSIS — K91.2 POSTSURGICAL MALABSORPTION, NOT ELSEWHERE CLASSIFIED: ICD-10-CM

## 2023-01-01 DIAGNOSIS — I10 BENIGN ESSENTIAL HYPERTENSION: ICD-10-CM

## 2023-01-01 DIAGNOSIS — N30.20 CHRONIC CYSTITIS: ICD-10-CM

## 2023-01-01 DIAGNOSIS — Z23 NEED FOR DIPHTHERIA-TETANUS-PERTUSSIS (TDAP) VACCINE: ICD-10-CM

## 2023-01-01 DIAGNOSIS — E86.0 DEHYDRATION: ICD-10-CM

## 2023-01-01 DIAGNOSIS — M53.3 CHRONIC COCCYGEAL PAIN: Primary | ICD-10-CM

## 2023-01-01 DIAGNOSIS — F11.90 CHRONIC, CONTINUOUS USE OF OPIOIDS: ICD-10-CM

## 2023-01-01 DIAGNOSIS — T80.212D PORT OR RESERVOIR INFECTION, SUBSEQUENT ENCOUNTER: ICD-10-CM

## 2023-01-01 DIAGNOSIS — M53.3 CHRONIC COCCYGEAL PAIN: ICD-10-CM

## 2023-01-01 DIAGNOSIS — G89.29 CHRONIC COCCYGEAL PAIN: ICD-10-CM

## 2023-01-01 DIAGNOSIS — N18.2 STAGE 2 CHRONIC KIDNEY DISEASE: ICD-10-CM

## 2023-01-01 DIAGNOSIS — I10 BENIGN ESSENTIAL HYPERTENSION: Primary | ICD-10-CM

## 2023-01-01 DIAGNOSIS — F41.9 ANXIETY AND DEPRESSION: ICD-10-CM

## 2023-01-01 DIAGNOSIS — Z43.2 ENCOUNTER FOR ATTENTION TO ILEOSTOMY (H): ICD-10-CM

## 2023-01-01 DIAGNOSIS — Z53.9 DIAGNOSIS NOT YET DEFINED: Primary | ICD-10-CM

## 2023-01-01 DIAGNOSIS — M53.3 SACRAL PAIN: Primary | ICD-10-CM

## 2023-01-01 DIAGNOSIS — E86.0 DEHYDRATION: Chronic | ICD-10-CM

## 2023-01-01 DIAGNOSIS — R78.81 BACTEREMIA: ICD-10-CM

## 2023-01-01 DIAGNOSIS — E83.51 HYPOCALCEMIA: Primary | ICD-10-CM

## 2023-01-01 DIAGNOSIS — H53.2 DIPLOPIA: ICD-10-CM

## 2023-01-01 DIAGNOSIS — H90.A32 MIXED CONDUCTIVE AND SENSORINEURAL HEARING LOSS OF LEFT EAR WITH RESTRICTED HEARING OF RIGHT EAR: Primary | ICD-10-CM

## 2023-01-01 DIAGNOSIS — G89.29 CHRONIC COCCYGEAL PAIN: Primary | ICD-10-CM

## 2023-01-01 DIAGNOSIS — Z90.49 S/P TOTAL COLECTOMY: ICD-10-CM

## 2023-01-01 DIAGNOSIS — E87.6 HYPOKALEMIA: ICD-10-CM

## 2023-01-01 DIAGNOSIS — Z00.00 ENCOUNTER FOR ANNUAL WELLNESS EXAM IN MEDICARE PATIENT: ICD-10-CM

## 2023-01-01 DIAGNOSIS — B95.61 MSSA BACTEREMIA: Primary | ICD-10-CM

## 2023-01-01 DIAGNOSIS — R62.7 FAILURE TO THRIVE IN ADULT: ICD-10-CM

## 2023-01-01 DIAGNOSIS — K21.00 GASTROESOPHAGEAL REFLUX DISEASE WITH ESOPHAGITIS, UNSPECIFIED WHETHER HEMORRHAGE: ICD-10-CM

## 2023-01-01 DIAGNOSIS — F32.A ANXIETY AND DEPRESSION: ICD-10-CM

## 2023-01-01 DIAGNOSIS — E87.5 HYPERKALEMIA: ICD-10-CM

## 2023-01-01 DIAGNOSIS — F32.A DEPRESSION: ICD-10-CM

## 2023-01-01 DIAGNOSIS — Z12.31 VISIT FOR SCREENING MAMMOGRAM: ICD-10-CM

## 2023-01-01 DIAGNOSIS — Z79.899 CONTROLLED SUBSTANCE AGREEMENT SIGNED: ICD-10-CM

## 2023-01-01 DIAGNOSIS — R41.0 DELIRIUM: ICD-10-CM

## 2023-01-01 DIAGNOSIS — Z23 NEED FOR PNEUMOCOCCAL VACCINATION: ICD-10-CM

## 2023-01-01 DIAGNOSIS — M62.81 GENERALIZED MUSCLE WEAKNESS: ICD-10-CM

## 2023-01-01 DIAGNOSIS — E83.42 HYPOMAGNESEMIA: ICD-10-CM

## 2023-01-01 DIAGNOSIS — Z23 NEED FOR SHINGLES VACCINE: ICD-10-CM

## 2023-01-01 LAB
ALBUMIN SERPL BCG-MCNC: 2.8 G/DL (ref 3.5–5.2)
ALBUMIN SERPL BCG-MCNC: 3.7 G/DL (ref 3.5–5.2)
ALBUMIN SERPL BCG-MCNC: 3.8 G/DL (ref 3.5–5.2)
ALBUMIN SERPL BCG-MCNC: 3.8 G/DL (ref 3.5–5.2)
ALBUMIN SERPL BCG-MCNC: 3.9 G/DL (ref 3.5–5.2)
ALBUMIN SERPL BCG-MCNC: 4.1 G/DL (ref 3.5–5.2)
ALBUMIN SERPL BCG-MCNC: 4.1 G/DL (ref 3.5–5.2)
ALBUMIN UR-MCNC: NEGATIVE MG/DL
ALP SERPL-CCNC: 122 U/L (ref 35–104)
ALP SERPL-CCNC: 126 U/L (ref 35–104)
ALP SERPL-CCNC: 126 U/L (ref 40–150)
ALP SERPL-CCNC: 126 U/L (ref 40–150)
ALP SERPL-CCNC: 131 U/L (ref 40–150)
ALP SERPL-CCNC: 137 U/L (ref 40–150)
ALP SERPL-CCNC: 81 U/L (ref 40–150)
ALT SERPL W P-5'-P-CCNC: 10 U/L (ref 0–50)
ALT SERPL W P-5'-P-CCNC: 11 U/L (ref 0–50)
ALT SERPL W P-5'-P-CCNC: 20 U/L (ref 0–50)
ALT SERPL W P-5'-P-CCNC: 22 U/L (ref 0–50)
ALT SERPL W P-5'-P-CCNC: 23 U/L (ref 0–50)
ALT SERPL W P-5'-P-CCNC: 9 U/L (ref 0–50)
ALT SERPL W P-5'-P-CCNC: <5 U/L (ref 0–50)
AMMONIA PLAS-SCNC: 16 UMOL/L (ref 11–51)
AMPHETAMINES UR QL SCN: NORMAL
ANION GAP SERPL CALCULATED.3IONS-SCNC: 10 MMOL/L (ref 7–15)
ANION GAP SERPL CALCULATED.3IONS-SCNC: 10 MMOL/L (ref 7–15)
ANION GAP SERPL CALCULATED.3IONS-SCNC: 11 MMOL/L (ref 7–15)
ANION GAP SERPL CALCULATED.3IONS-SCNC: 12 MMOL/L (ref 7–15)
ANION GAP SERPL CALCULATED.3IONS-SCNC: 3 MMOL/L (ref 5–18)
ANION GAP SERPL CALCULATED.3IONS-SCNC: 4 MMOL/L (ref 5–18)
ANION GAP SERPL CALCULATED.3IONS-SCNC: 5 MMOL/L (ref 5–18)
ANION GAP SERPL CALCULATED.3IONS-SCNC: 6 MMOL/L (ref 5–18)
ANION GAP SERPL CALCULATED.3IONS-SCNC: 6 MMOL/L (ref 5–18)
ANION GAP SERPL CALCULATED.3IONS-SCNC: 6 MMOL/L (ref 7–15)
ANION GAP SERPL CALCULATED.3IONS-SCNC: 7 MMOL/L (ref 5–18)
ANION GAP SERPL CALCULATED.3IONS-SCNC: 8 MMOL/L (ref 7–15)
ANION GAP SERPL CALCULATED.3IONS-SCNC: 9 MMOL/L (ref 7–15)
APPEARANCE UR: CLEAR
AST SERPL W P-5'-P-CCNC: 19 U/L (ref 0–45)
AST SERPL W P-5'-P-CCNC: 21 U/L (ref 0–45)
AST SERPL W P-5'-P-CCNC: 22 U/L (ref 0–45)
AST SERPL W P-5'-P-CCNC: 22 U/L (ref 0–45)
AST SERPL W P-5'-P-CCNC: 28 U/L (ref 0–45)
AST SERPL W P-5'-P-CCNC: 35 U/L (ref 0–45)
AST SERPL W P-5'-P-CCNC: 40 U/L (ref 0–45)
AST SERPL W P-5'-P-CCNC: 40 U/L (ref 0–45)
AST SERPL W P-5'-P-CCNC: 41 U/L (ref 0–45)
AST SERPL W P-5'-P-CCNC: 42 U/L (ref 0–45)
ATRIAL RATE - MUSE: 148 BPM
BACTERIA #/AREA URNS HPF: ABNORMAL /HPF
BACTERIA BLD CULT: ABNORMAL
BACTERIA BLD CULT: NO GROWTH
BARBITURATES UR QL SCN: NORMAL
BASOPHILS # BLD AUTO: 0 10E3/UL (ref 0–0.2)
BASOPHILS NFR BLD AUTO: 0 %
BENZODIAZ UR QL SCN: NORMAL
BILIRUB SERPL-MCNC: 0.2 MG/DL
BILIRUB SERPL-MCNC: 0.2 MG/DL
BILIRUB SERPL-MCNC: 0.3 MG/DL
BILIRUB SERPL-MCNC: 0.3 MG/DL
BILIRUB SERPL-MCNC: 0.4 MG/DL
BILIRUB SERPL-MCNC: 0.4 MG/DL
BILIRUB SERPL-MCNC: 0.9 MG/DL
BILIRUB UR QL STRIP: NEGATIVE
BUN SERPL-MCNC: 10 MG/DL (ref 8–23)
BUN SERPL-MCNC: 16.7 MG/DL (ref 8–23)
BUN SERPL-MCNC: 17.5 MG/DL (ref 8–23)
BUN SERPL-MCNC: 18.5 MG/DL (ref 8–23)
BUN SERPL-MCNC: 19 MG/DL (ref 8–28)
BUN SERPL-MCNC: 19.2 MG/DL (ref 8–23)
BUN SERPL-MCNC: 19.2 MG/DL (ref 8–23)
BUN SERPL-MCNC: 19.3 MG/DL (ref 8–23)
BUN SERPL-MCNC: 19.4 MG/DL (ref 8–23)
BUN SERPL-MCNC: 19.6 MG/DL (ref 8–23)
BUN SERPL-MCNC: 20 MG/DL (ref 8–28)
BUN SERPL-MCNC: 20.8 MG/DL (ref 8–23)
BUN SERPL-MCNC: 21 MG/DL (ref 8–28)
BUN SERPL-MCNC: 22 MG/DL (ref 8–23)
BUN SERPL-MCNC: 22 MG/DL (ref 8–28)
BUN SERPL-MCNC: 22.2 MG/DL (ref 8–23)
BUN SERPL-MCNC: 23.5 MG/DL (ref 8–23)
BUN SERPL-MCNC: 24 MG/DL (ref 8–28)
BUN SERPL-MCNC: 24.1 MG/DL (ref 8–23)
BUN SERPL-MCNC: 26 MG/DL (ref 8–28)
BZE UR QL SCN: NORMAL
CALCIUM SERPL-MCNC: 7.2 MG/DL (ref 8.8–10.2)
CALCIUM SERPL-MCNC: 7.3 MG/DL (ref 8.8–10.2)
CALCIUM SERPL-MCNC: 7.4 MG/DL (ref 8.8–10.2)
CALCIUM SERPL-MCNC: 7.8 MG/DL (ref 8.8–10.2)
CALCIUM SERPL-MCNC: 8 MG/DL (ref 8.5–10.5)
CALCIUM SERPL-MCNC: 8.1 MG/DL (ref 8.8–10.2)
CALCIUM SERPL-MCNC: 8.2 MG/DL (ref 8.5–10.5)
CALCIUM SERPL-MCNC: 8.2 MG/DL (ref 8.5–10.5)
CALCIUM SERPL-MCNC: 8.2 MG/DL (ref 8.8–10.2)
CALCIUM SERPL-MCNC: 8.2 MG/DL (ref 8.8–10.2)
CALCIUM SERPL-MCNC: 8.3 MG/DL (ref 8.5–10.5)
CALCIUM SERPL-MCNC: 8.3 MG/DL (ref 8.5–10.5)
CALCIUM SERPL-MCNC: 8.6 MG/DL (ref 8.8–10.2)
CALCIUM SERPL-MCNC: 8.7 MG/DL (ref 8.5–10.5)
CALCIUM SERPL-MCNC: 8.9 MG/DL (ref 8.8–10.2)
CALCIUM SERPL-MCNC: 8.9 MG/DL (ref 8.8–10.2)
CALCIUM SERPL-MCNC: 9.1 MG/DL (ref 8.8–10.2)
CALCIUM, IONIZED MEASURED: 1.09 MMOL/L (ref 1.11–1.3)
CANNABINOIDS UR QL SCN: NORMAL
CHLORIDE BLD-SCNC: 107 MMOL/L (ref 98–107)
CHLORIDE BLD-SCNC: 109 MMOL/L (ref 98–107)
CHLORIDE BLD-SCNC: 111 MMOL/L (ref 98–107)
CHLORIDE BLD-SCNC: 114 MMOL/L (ref 98–107)
CHLORIDE BLD-SCNC: 116 MMOL/L (ref 98–107)
CHLORIDE BLD-SCNC: 117 MMOL/L (ref 98–107)
CHLORIDE SERPL-SCNC: 101 MMOL/L (ref 98–107)
CHLORIDE SERPL-SCNC: 106 MMOL/L (ref 98–107)
CHLORIDE SERPL-SCNC: 106 MMOL/L (ref 98–107)
CHLORIDE SERPL-SCNC: 107 MMOL/L (ref 98–107)
CHLORIDE SERPL-SCNC: 107 MMOL/L (ref 98–107)
CHLORIDE SERPL-SCNC: 109 MMOL/L (ref 98–107)
CHLORIDE SERPL-SCNC: 110 MMOL/L (ref 98–107)
CHLORIDE SERPL-SCNC: 110 MMOL/L (ref 98–107)
CHLORIDE SERPL-SCNC: 111 MMOL/L (ref 98–107)
CHLORIDE SERPL-SCNC: 112 MMOL/L (ref 98–107)
CHLORIDE SERPL-SCNC: 99 MMOL/L (ref 98–107)
CK SERPL-CCNC: 83 U/L (ref 26–192)
CO2 SERPL-SCNC: 18 MMOL/L (ref 22–31)
CO2 SERPL-SCNC: 19 MMOL/L (ref 22–31)
CO2 SERPL-SCNC: 20 MMOL/L (ref 22–31)
CO2 SERPL-SCNC: 22 MMOL/L (ref 22–31)
CO2 SERPL-SCNC: 23 MMOL/L (ref 22–31)
CO2 SERPL-SCNC: 26 MMOL/L (ref 22–31)
COLOR UR AUTO: ABNORMAL
CREAT SERPL-MCNC: 0.9 MG/DL (ref 0.51–0.95)
CREAT SERPL-MCNC: 0.91 MG/DL (ref 0.51–0.95)
CREAT SERPL-MCNC: 0.97 MG/DL (ref 0.6–1.1)
CREAT SERPL-MCNC: 1.02 MG/DL (ref 0.6–1.1)
CREAT SERPL-MCNC: 1.04 MG/DL (ref 0.6–1.1)
CREAT SERPL-MCNC: 1.04 MG/DL (ref 0.6–1.1)
CREAT SERPL-MCNC: 1.05 MG/DL (ref 0.6–1.1)
CREAT SERPL-MCNC: 1.07 MG/DL (ref 0.51–0.95)
CREAT SERPL-MCNC: 1.07 MG/DL (ref 0.6–1.1)
CREAT SERPL-MCNC: 1.13 MG/DL (ref 0.51–0.95)
CREAT SERPL-MCNC: 1.14 MG/DL (ref 0.51–0.95)
CREAT SERPL-MCNC: 1.16 MG/DL (ref 0.51–0.95)
CREAT SERPL-MCNC: 1.17 MG/DL (ref 0.51–0.95)
CREAT SERPL-MCNC: 1.18 MG/DL (ref 0.51–0.95)
CREAT SERPL-MCNC: 1.19 MG/DL (ref 0.51–0.95)
CREAT SERPL-MCNC: 1.2 MG/DL (ref 0.51–0.95)
CREAT SERPL-MCNC: 1.24 MG/DL (ref 0.51–0.95)
CREAT SERPL-MCNC: 1.24 MG/DL (ref 0.51–0.95)
CREAT SERPL-MCNC: 1.33 MG/DL (ref 0.51–0.95)
CREAT SERPL-MCNC: 1.4 MG/DL (ref 0.51–0.95)
CREAT SERPL-MCNC: 1.43 MG/DL (ref 0.51–0.95)
CREAT SERPL-MCNC: 1.47 MG/DL (ref 0.51–0.95)
CRP SERPL-MCNC: 4.62 MG/L
CRP SERPL-MCNC: <3 MG/L
DEPRECATED HCO3 PLAS-SCNC: 19 MMOL/L (ref 22–29)
DEPRECATED HCO3 PLAS-SCNC: 21 MMOL/L (ref 22–29)
DEPRECATED HCO3 PLAS-SCNC: 22 MMOL/L (ref 22–29)
DEPRECATED HCO3 PLAS-SCNC: 23 MMOL/L (ref 22–29)
DEPRECATED HCO3 PLAS-SCNC: 23 MMOL/L (ref 22–29)
DEPRECATED HCO3 PLAS-SCNC: 24 MMOL/L (ref 22–29)
DEPRECATED HCO3 PLAS-SCNC: 24 MMOL/L (ref 22–29)
DEPRECATED HCO3 PLAS-SCNC: 25 MMOL/L (ref 22–29)
DEPRECATED HCO3 PLAS-SCNC: 27 MMOL/L (ref 22–29)
DEPRECATED HCO3 PLAS-SCNC: 28 MMOL/L (ref 22–29)
DEPRECATED HCO3 PLAS-SCNC: 29 MMOL/L (ref 22–29)
DEPRECATED HCO3 PLAS-SCNC: 29 MMOL/L (ref 22–29)
DIASTOLIC BLOOD PRESSURE - MUSE: 67 MMHG
EGFRCR SERPLBLD CKD-EPI 2021: 37 ML/MIN/1.73M2
EGFRCR SERPLBLD CKD-EPI 2021: 39 ML/MIN/1.73M2
EGFRCR SERPLBLD CKD-EPI 2021: 40 ML/MIN/1.73M2
EGFRCR SERPLBLD CKD-EPI 2021: 42 ML/MIN/1.73M2
EGFRCR SERPLBLD CKD-EPI 2021: 46 ML/MIN/1.73M2
EGFRCR SERPLBLD CKD-EPI 2021: 46 ML/MIN/1.73M2
EGFRCR SERPLBLD CKD-EPI 2021: 48 ML/MIN/1.73M2
EGFRCR SERPLBLD CKD-EPI 2021: 48 ML/MIN/1.73M2
EGFRCR SERPLBLD CKD-EPI 2021: 49 ML/MIN/1.73M2
EGFRCR SERPLBLD CKD-EPI 2021: 49 ML/MIN/1.73M2
EGFRCR SERPLBLD CKD-EPI 2021: 50 ML/MIN/1.73M2
EGFRCR SERPLBLD CKD-EPI 2021: 51 ML/MIN/1.73M2
EGFRCR SERPLBLD CKD-EPI 2021: 51 ML/MIN/1.73M2
EGFRCR SERPLBLD CKD-EPI 2021: 55 ML/MIN/1.73M2
EGFRCR SERPLBLD CKD-EPI 2021: 66 ML/MIN/1.73M2
EGFRCR SERPLBLD CKD-EPI 2021: 67 ML/MIN/1.73M2
ENTEROCOCCUS FAECALIS: NOT DETECTED
ENTEROCOCCUS FAECIUM: NOT DETECTED
EOSINOPHIL # BLD AUTO: 0 10E3/UL (ref 0–0.7)
EOSINOPHIL # BLD AUTO: 0 10E3/UL (ref 0–0.7)
EOSINOPHIL # BLD AUTO: 0.1 10E3/UL (ref 0–0.7)
EOSINOPHIL # BLD AUTO: 0.1 10E3/UL (ref 0–0.7)
EOSINOPHIL # BLD AUTO: 0.2 10E3/UL (ref 0–0.7)
EOSINOPHIL NFR BLD AUTO: 0 %
EOSINOPHIL NFR BLD AUTO: 0 %
EOSINOPHIL NFR BLD AUTO: 1 %
EOSINOPHIL NFR BLD AUTO: 2 %
EOSINOPHIL NFR BLD AUTO: 3 %
ERYTHROCYTE [DISTWIDTH] IN BLOOD BY AUTOMATED COUNT: 13.8 % (ref 10–15)
ERYTHROCYTE [DISTWIDTH] IN BLOOD BY AUTOMATED COUNT: 14 % (ref 10–15)
ERYTHROCYTE [DISTWIDTH] IN BLOOD BY AUTOMATED COUNT: 14.1 % (ref 10–15)
ERYTHROCYTE [DISTWIDTH] IN BLOOD BY AUTOMATED COUNT: 14.4 % (ref 10–15)
ERYTHROCYTE [DISTWIDTH] IN BLOOD BY AUTOMATED COUNT: 14.6 % (ref 10–15)
ERYTHROCYTE [SEDIMENTATION RATE] IN BLOOD BY WESTERGREN METHOD: 1 MM/HR (ref 0–30)
ERYTHROCYTE [SEDIMENTATION RATE] IN BLOOD BY WESTERGREN METHOD: 21 MM/HR (ref 0–30)
ERYTHROCYTE [SEDIMENTATION RATE] IN BLOOD BY WESTERGREN METHOD: 22 MM/HR (ref 0–30)
ETHANOL SERPL-MCNC: <0.01 G/DL
FENTANYL UR QL: NORMAL
FLUAV RNA SPEC QL NAA+PROBE: NEGATIVE
FLUBV RNA RESP QL NAA+PROBE: NEGATIVE
GFR SERPL CREATININE-BSD FRML MDRD: 55 ML/MIN/1.73M2
GFR SERPL CREATININE-BSD FRML MDRD: 56 ML/MIN/1.73M2
GFR SERPL CREATININE-BSD FRML MDRD: 58 ML/MIN/1.73M2
GFR SERPL CREATININE-BSD FRML MDRD: 61 ML/MIN/1.73M2
GLUCOSE BLD-MCNC: 111 MG/DL (ref 70–125)
GLUCOSE BLD-MCNC: 125 MG/DL (ref 70–125)
GLUCOSE BLD-MCNC: 125 MG/DL (ref 70–125)
GLUCOSE BLD-MCNC: 147 MG/DL (ref 70–125)
GLUCOSE BLD-MCNC: 69 MG/DL (ref 70–125)
GLUCOSE BLD-MCNC: 75 MG/DL (ref 70–125)
GLUCOSE BLDC GLUCOMTR-MCNC: 100 MG/DL (ref 70–99)
GLUCOSE BLDC GLUCOMTR-MCNC: 117 MG/DL (ref 70–99)
GLUCOSE BLDC GLUCOMTR-MCNC: 124 MG/DL (ref 70–99)
GLUCOSE BLDC GLUCOMTR-MCNC: 170 MG/DL (ref 70–99)
GLUCOSE BLDC GLUCOMTR-MCNC: 173 MG/DL (ref 70–99)
GLUCOSE BLDC GLUCOMTR-MCNC: 178 MG/DL (ref 70–99)
GLUCOSE BLDC GLUCOMTR-MCNC: 179 MG/DL (ref 70–99)
GLUCOSE BLDC GLUCOMTR-MCNC: 189 MG/DL (ref 70–99)
GLUCOSE BLDC GLUCOMTR-MCNC: 80 MG/DL (ref 70–99)
GLUCOSE SERPL-MCNC: 101 MG/DL (ref 70–99)
GLUCOSE SERPL-MCNC: 104 MG/DL (ref 70–99)
GLUCOSE SERPL-MCNC: 105 MG/DL (ref 70–99)
GLUCOSE SERPL-MCNC: 105 MG/DL (ref 70–99)
GLUCOSE SERPL-MCNC: 128 MG/DL (ref 70–99)
GLUCOSE SERPL-MCNC: 132 MG/DL (ref 70–99)
GLUCOSE SERPL-MCNC: 136 MG/DL (ref 70–99)
GLUCOSE SERPL-MCNC: 147 MG/DL (ref 70–99)
GLUCOSE SERPL-MCNC: 61 MG/DL (ref 70–99)
GLUCOSE SERPL-MCNC: 67 MG/DL (ref 70–99)
GLUCOSE SERPL-MCNC: 67 MG/DL (ref 70–99)
GLUCOSE SERPL-MCNC: 85 MG/DL (ref 70–99)
GLUCOSE SERPL-MCNC: 88 MG/DL (ref 70–99)
GLUCOSE SERPL-MCNC: 91 MG/DL (ref 70–99)
GLUCOSE UR STRIP-MCNC: 150 MG/DL
HCT VFR BLD AUTO: 28.5 % (ref 35–47)
HCT VFR BLD AUTO: 30.5 % (ref 35–47)
HCT VFR BLD AUTO: 35.7 % (ref 35–47)
HCT VFR BLD AUTO: 37.4 % (ref 35–47)
HCT VFR BLD AUTO: 37.7 % (ref 35–47)
HGB BLD-MCNC: 11.4 G/DL (ref 11.7–15.7)
HGB BLD-MCNC: 12.3 G/DL (ref 11.7–15.7)
HGB BLD-MCNC: 12.3 G/DL (ref 11.7–15.7)
HGB BLD-MCNC: 9.1 G/DL (ref 11.7–15.7)
HGB BLD-MCNC: 9.7 G/DL (ref 11.7–15.7)
HGB UR QL STRIP: NEGATIVE
HOLD SPECIMEN: NORMAL
IMM GRANULOCYTES # BLD: 0 10E3/UL
IMM GRANULOCYTES # BLD: 0.1 10E3/UL
IMM GRANULOCYTES NFR BLD: 0 %
IMM GRANULOCYTES NFR BLD: 1 %
IMM GRANULOCYTES NFR BLD: 1 %
INTERPRETATION ECG - MUSE: NORMAL
ION CA PH 7.4: 1.08 MMOL/L (ref 1.11–1.3)
KETONES UR STRIP-MCNC: NEGATIVE MG/DL
LACTATE SERPL-SCNC: 0.9 MMOL/L (ref 0.7–2)
LEUKOCYTE ESTERASE UR QL STRIP: NEGATIVE
LIPASE SERPL-CCNC: 18 U/L (ref 13–60)
LISTERIA SPECIES (DETECTED/NOT DETECTED): NOT DETECTED
LVEF ECHO: NORMAL
LYMPHOCYTES # BLD AUTO: 0.8 10E3/UL (ref 0.8–5.3)
LYMPHOCYTES # BLD AUTO: 1 10E3/UL (ref 0.8–5.3)
LYMPHOCYTES # BLD AUTO: 1.6 10E3/UL (ref 0.8–5.3)
LYMPHOCYTES # BLD AUTO: 2.2 10E3/UL (ref 0.8–5.3)
LYMPHOCYTES # BLD AUTO: 2.4 10E3/UL (ref 0.8–5.3)
LYMPHOCYTES NFR BLD AUTO: 11 %
LYMPHOCYTES NFR BLD AUTO: 23 %
LYMPHOCYTES NFR BLD AUTO: 25 %
LYMPHOCYTES NFR BLD AUTO: 27 %
LYMPHOCYTES NFR BLD AUTO: 9 %
MAGNESIUM SERPL-MCNC: 1.3 MG/DL (ref 1.7–2.3)
MAGNESIUM SERPL-MCNC: 1.4 MG/DL (ref 1.7–2.3)
MAGNESIUM SERPL-MCNC: 1.6 MG/DL (ref 1.7–2.3)
MAGNESIUM SERPL-MCNC: 1.6 MG/DL (ref 1.7–2.3)
MAGNESIUM SERPL-MCNC: 1.7 MG/DL (ref 1.7–2.3)
MAGNESIUM SERPL-MCNC: 1.7 MG/DL (ref 1.7–2.3)
MAGNESIUM SERPL-MCNC: 1.9 MG/DL (ref 1.7–2.3)
MAGNESIUM SERPL-MCNC: 1.9 MG/DL (ref 1.8–2.6)
MAGNESIUM SERPL-MCNC: 2 MG/DL (ref 1.7–2.3)
MAGNESIUM SERPL-MCNC: 2 MG/DL (ref 1.8–2.6)
MAGNESIUM SERPL-MCNC: 2.1 MG/DL (ref 1.7–2.3)
MAGNESIUM SERPL-MCNC: 2.1 MG/DL (ref 1.7–2.3)
MAGNESIUM SERPL-MCNC: 2.1 MG/DL (ref 1.8–2.6)
MAGNESIUM SERPL-MCNC: 2.2 MG/DL (ref 1.7–2.3)
MAGNESIUM SERPL-MCNC: 2.3 MG/DL (ref 1.7–2.3)
MAGNESIUM SERPL-MCNC: 2.4 MG/DL (ref 1.7–2.3)
MAGNESIUM SERPL-MCNC: 2.5 MG/DL (ref 1.7–2.3)
MAGNESIUM SERPL-MCNC: 2.5 MG/DL (ref 1.7–2.3)
MAGNESIUM SERPL-MCNC: 2.9 MG/DL (ref 1.7–2.3)
MAGNESIUM SERPL-MCNC: 3.2 MG/DL (ref 1.7–2.3)
MCH RBC QN AUTO: 28.9 PG (ref 26.5–33)
MCH RBC QN AUTO: 29 PG (ref 26.5–33)
MCH RBC QN AUTO: 29.1 PG (ref 26.5–33)
MCH RBC QN AUTO: 29.2 PG (ref 26.5–33)
MCH RBC QN AUTO: 29.4 PG (ref 26.5–33)
MCHC RBC AUTO-ENTMCNC: 31.8 G/DL (ref 31.5–36.5)
MCHC RBC AUTO-ENTMCNC: 31.9 G/DL (ref 31.5–36.5)
MCHC RBC AUTO-ENTMCNC: 31.9 G/DL (ref 31.5–36.5)
MCHC RBC AUTO-ENTMCNC: 32.6 G/DL (ref 31.5–36.5)
MCHC RBC AUTO-ENTMCNC: 32.9 G/DL (ref 31.5–36.5)
MCV RBC AUTO: 90 FL (ref 78–100)
MCV RBC AUTO: 90 FL (ref 78–100)
MCV RBC AUTO: 91 FL (ref 78–100)
MCV RBC AUTO: 91 FL (ref 78–100)
MCV RBC AUTO: 92 FL (ref 78–100)
MONOCYTES # BLD AUTO: 0.3 10E3/UL (ref 0–1.3)
MONOCYTES # BLD AUTO: 0.7 10E3/UL (ref 0–1.3)
MONOCYTES # BLD AUTO: 1 10E3/UL (ref 0–1.3)
MONOCYTES NFR BLD AUTO: 11 %
MONOCYTES NFR BLD AUTO: 6 %
MONOCYTES NFR BLD AUTO: 6 %
MONOCYTES NFR BLD AUTO: 8 %
MONOCYTES NFR BLD AUTO: 8 %
MUCOUS THREADS #/AREA URNS LPF: PRESENT /LPF
NEUTROPHILS # BLD AUTO: 3.8 10E3/UL (ref 1.6–8.3)
NEUTROPHILS # BLD AUTO: 5.7 10E3/UL (ref 1.6–8.3)
NEUTROPHILS # BLD AUTO: 6.9 10E3/UL (ref 1.6–8.3)
NEUTROPHILS # BLD AUTO: 7.2 10E3/UL (ref 1.6–8.3)
NEUTROPHILS # BLD AUTO: 7.4 10E3/UL (ref 1.6–8.3)
NEUTROPHILS NFR BLD AUTO: 64 %
NEUTROPHILS NFR BLD AUTO: 64 %
NEUTROPHILS NFR BLD AUTO: 69 %
NEUTROPHILS NFR BLD AUTO: 78 %
NEUTROPHILS NFR BLD AUTO: 83 %
NITRATE UR QL: NEGATIVE
NRBC # BLD AUTO: 0 10E3/UL
NRBC BLD AUTO-RTO: 0 /100
OPIATES UR QL SCN: NORMAL
OSMOLALITY SERPL: 289 MMOL/KG (ref 280–301)
OSMOLALITY UR: 277 MMOL/KG (ref 100–1200)
P AXIS - MUSE: 66 DEGREES
PCP QUAL URINE (ROCHE): NORMAL
PH UR STRIP: 7 [PH] (ref 5–7)
PH: 7.38 (ref 7.35–7.45)
PHOSPHATE SERPL-MCNC: 2.1 MG/DL (ref 2.5–4.5)
PHOSPHATE SERPL-MCNC: 2.3 MG/DL (ref 2.5–4.5)
PHOSPHATE SERPL-MCNC: 2.5 MG/DL (ref 2.5–4.5)
PHOSPHATE SERPL-MCNC: 2.8 MG/DL (ref 2.5–4.5)
PHOSPHATE SERPL-MCNC: 3 MG/DL (ref 2.5–4.5)
PHOSPHATE SERPL-MCNC: 3.1 MG/DL (ref 2.5–4.5)
PHOSPHATE SERPL-MCNC: 3.1 MG/DL (ref 2.5–4.5)
PHOSPHATE SERPL-MCNC: 3.2 MG/DL (ref 2.5–4.5)
PHOSPHATE SERPL-MCNC: 3.5 MG/DL (ref 2.5–4.5)
PHOSPHATE SERPL-MCNC: 3.5 MG/DL (ref 2.5–4.5)
PHOSPHATE SERPL-MCNC: 3.7 MG/DL (ref 2.5–4.5)
PHOSPHATE SERPL-MCNC: 3.8 MG/DL (ref 2.5–4.5)
PHOSPHATE SERPL-MCNC: 3.8 MG/DL (ref 2.5–4.5)
PHOSPHATE SERPL-MCNC: 3.9 MG/DL (ref 2.5–4.5)
PHOSPHATE SERPL-MCNC: 4 MG/DL (ref 2.5–4.5)
PHOSPHATE SERPL-MCNC: 4.3 MG/DL (ref 2.5–4.5)
PHOSPHATE SERPL-MCNC: 4.4 MG/DL (ref 2.5–4.5)
PHOSPHATE SERPL-MCNC: 4.6 MG/DL (ref 2.5–4.5)
PLATELET # BLD AUTO: 126 10E3/UL (ref 150–450)
PLATELET # BLD AUTO: 133 10E3/UL (ref 150–450)
PLATELET # BLD AUTO: 169 10E3/UL (ref 150–450)
PLATELET # BLD AUTO: 171 10E3/UL (ref 150–450)
PLATELET # BLD AUTO: 176 10E3/UL (ref 150–450)
PLATELET # BLD AUTO: 282 10E3/UL (ref 150–450)
PLATELET # BLD AUTO: 313 10E3/UL (ref 150–450)
POTASSIUM BLD-SCNC: 4 MMOL/L (ref 3.5–5)
POTASSIUM BLD-SCNC: 4.3 MMOL/L (ref 3.5–5)
POTASSIUM BLD-SCNC: 4.4 MMOL/L (ref 3.5–5)
POTASSIUM BLD-SCNC: 4.5 MMOL/L (ref 3.5–5)
POTASSIUM BLD-SCNC: 4.9 MMOL/L (ref 3.5–5)
POTASSIUM BLD-SCNC: 5.3 MMOL/L (ref 3.5–5)
POTASSIUM SERPL-SCNC: 2.8 MMOL/L (ref 3.4–5.3)
POTASSIUM SERPL-SCNC: 3.1 MMOL/L (ref 3.4–5.3)
POTASSIUM SERPL-SCNC: 3.7 MMOL/L (ref 3.4–5.3)
POTASSIUM SERPL-SCNC: 3.7 MMOL/L (ref 3.4–5.3)
POTASSIUM SERPL-SCNC: 3.8 MMOL/L (ref 3.4–5.3)
POTASSIUM SERPL-SCNC: 3.8 MMOL/L (ref 3.4–5.3)
POTASSIUM SERPL-SCNC: 4 MMOL/L (ref 3.4–5.3)
POTASSIUM SERPL-SCNC: 4.3 MMOL/L (ref 3.4–5.3)
POTASSIUM SERPL-SCNC: 4.8 MMOL/L (ref 3.4–5.3)
POTASSIUM SERPL-SCNC: 4.9 MMOL/L (ref 3.4–5.3)
POTASSIUM SERPL-SCNC: 5.2 MMOL/L (ref 3.4–5.3)
POTASSIUM SERPL-SCNC: 6.5 MMOL/L (ref 3.4–5.3)
POTASSIUM SERPL-SCNC: 7 MMOL/L (ref 3.4–5.3)
PR INTERVAL - MUSE: 160 MS
PROCALCITONIN SERPL IA-MCNC: 0.51 NG/ML
PROT SERPL-MCNC: 5.7 G/DL (ref 6.4–8.3)
PROT SERPL-MCNC: 6.6 G/DL (ref 6.4–8.3)
PROT SERPL-MCNC: 7 G/DL (ref 6.4–8.3)
PROT SERPL-MCNC: 7.3 G/DL (ref 6.4–8.3)
PROT SERPL-MCNC: 7.3 G/DL (ref 6.4–8.3)
PROT SERPL-MCNC: 7.4 G/DL (ref 6.4–8.3)
PROT SERPL-MCNC: 7.6 G/DL (ref 6.4–8.3)
PTH-INTACT SERPL-MCNC: 141 PG/ML (ref 15–65)
QRS DURATION - MUSE: 70 MS
QT - MUSE: 186 MS
QTC - MUSE: 292 MS
R AXIS - MUSE: 12 DEGREES
RBC # BLD AUTO: 3.15 10E6/UL (ref 3.8–5.2)
RBC # BLD AUTO: 3.33 10E6/UL (ref 3.8–5.2)
RBC # BLD AUTO: 3.93 10E6/UL (ref 3.8–5.2)
RBC # BLD AUTO: 4.18 10E6/UL (ref 3.8–5.2)
RBC # BLD AUTO: 4.21 10E6/UL (ref 3.8–5.2)
RBC URINE: <1 /HPF
RSV RNA SPEC NAA+PROBE: NEGATIVE
SARS-COV-2 RNA RESP QL NAA+PROBE: NEGATIVE
SODIUM SERPL-SCNC: 129 MMOL/L (ref 135–145)
SODIUM SERPL-SCNC: 134 MMOL/L (ref 135–145)
SODIUM SERPL-SCNC: 135 MMOL/L (ref 135–145)
SODIUM SERPL-SCNC: 136 MMOL/L (ref 135–145)
SODIUM SERPL-SCNC: 136 MMOL/L (ref 135–145)
SODIUM SERPL-SCNC: 137 MMOL/L (ref 136–145)
SODIUM SERPL-SCNC: 138 MMOL/L (ref 136–145)
SODIUM SERPL-SCNC: 138 MMOL/L (ref 136–145)
SODIUM SERPL-SCNC: 139 MMOL/L (ref 135–145)
SODIUM SERPL-SCNC: 139 MMOL/L (ref 135–145)
SODIUM SERPL-SCNC: 140 MMOL/L (ref 135–145)
SODIUM SERPL-SCNC: 140 MMOL/L (ref 136–145)
SODIUM SERPL-SCNC: 142 MMOL/L (ref 136–145)
SODIUM SERPL-SCNC: 143 MMOL/L (ref 135–145)
SODIUM SERPL-SCNC: 144 MMOL/L (ref 135–145)
SODIUM SERPL-SCNC: 144 MMOL/L (ref 135–145)
SODIUM UR-SCNC: 102 MMOL/L
SODIUM UR-SCNC: 102 MMOL/L
SP GR UR STRIP: 1.01 (ref 1–1.03)
STAPHYLOCOCCUS AUREUS: DETECTED
STAPHYLOCOCCUS EPIDERMIDIS: NOT DETECTED
STAPHYLOCOCCUS LUGDUNENSIS: NOT DETECTED
STREPTOCOCCUS AGALACTIAE: NOT DETECTED
STREPTOCOCCUS ANGINOSUS GROUP: NOT DETECTED
STREPTOCOCCUS PNEUMONIAE: NOT DETECTED
STREPTOCOCCUS PYOGENES: NOT DETECTED
STREPTOCOCCUS SPECIES: NOT DETECTED
SYSTOLIC BLOOD PRESSURE - MUSE: 115 MMHG
T AXIS - MUSE: 59 DEGREES
TROPONIN T SERPL HS-MCNC: 7 NG/L
TROPONIN T SERPL HS-MCNC: 7 NG/L
TROPONIN T SERPL HS-MCNC: 8 NG/L
TSH SERPL DL<=0.005 MIU/L-ACNC: 0.94 UIU/ML (ref 0.3–4.2)
UROBILINOGEN UR STRIP-MCNC: <2 MG/DL
VANCOMYCIN SERPL-MCNC: 14.9 UG/ML
VENTRICULAR RATE- MUSE: 148 BPM
VIT D+METAB SERPL-MCNC: 36 NG/ML (ref 20–50)
WBC # BLD AUTO: 10.7 10E3/UL (ref 4–11)
WBC # BLD AUTO: 5.9 10E3/UL (ref 4–11)
WBC # BLD AUTO: 8.7 10E3/UL (ref 4–11)
WBC # BLD AUTO: 8.9 10E3/UL (ref 4–11)
WBC # BLD AUTO: 8.9 10E3/UL (ref 4–11)
WBC URINE: 2 /HPF
ZINC SERPL-MCNC: 51.6 UG/DL

## 2023-01-01 PROCEDURE — 250N000009 HC RX 250: Performed by: FAMILY MEDICINE

## 2023-01-01 PROCEDURE — 90472 IMMUNIZATION ADMIN EACH ADD: CPT | Performed by: NURSE PRACTITIONER

## 2023-01-01 PROCEDURE — 250N000011 HC RX IP 250 OP 636: Mod: JZ | Performed by: INTERNAL MEDICINE

## 2023-01-01 PROCEDURE — 250N000011 HC RX IP 250 OP 636: Mod: JZ | Performed by: HOSPITALIST

## 2023-01-01 PROCEDURE — 99223 1ST HOSP IP/OBS HIGH 75: CPT | Performed by: HOSPITALIST

## 2023-01-01 PROCEDURE — 84100 ASSAY OF PHOSPHORUS: CPT | Performed by: HOSPITALIST

## 2023-01-01 PROCEDURE — 36415 COLL VENOUS BLD VENIPUNCTURE: CPT | Performed by: STUDENT IN AN ORGANIZED HEALTH CARE EDUCATION/TRAINING PROGRAM

## 2023-01-01 PROCEDURE — 250N000011 HC RX IP 250 OP 636: Performed by: STUDENT IN AN ORGANIZED HEALTH CARE EDUCATION/TRAINING PROGRAM

## 2023-01-01 PROCEDURE — G0180 MD CERTIFICATION HHA PATIENT: HCPCS | Performed by: NURSE PRACTITIONER

## 2023-01-01 PROCEDURE — G0463 HOSPITAL OUTPT CLINIC VISIT: HCPCS

## 2023-01-01 PROCEDURE — 99233 SBSQ HOSP IP/OBS HIGH 50: CPT | Performed by: HOSPITALIST

## 2023-01-01 PROCEDURE — 80048 BASIC METABOLIC PNL TOTAL CA: CPT | Performed by: NURSE PRACTITIONER

## 2023-01-01 PROCEDURE — 83735 ASSAY OF MAGNESIUM: CPT | Performed by: NURSE PRACTITIONER

## 2023-01-01 PROCEDURE — 84100 ASSAY OF PHOSPHORUS: CPT | Performed by: NURSE PRACTITIONER

## 2023-01-01 PROCEDURE — 93005 ELECTROCARDIOGRAM TRACING: CPT | Performed by: EMERGENCY MEDICINE

## 2023-01-01 PROCEDURE — 97129 THER IVNTJ 1ST 15 MIN: CPT | Mod: GO

## 2023-01-01 PROCEDURE — 85652 RBC SED RATE AUTOMATED: CPT | Performed by: NURSE PRACTITIONER

## 2023-01-01 PROCEDURE — 97161 PT EVAL LOW COMPLEX 20 MIN: CPT | Mod: GP

## 2023-01-01 PROCEDURE — 36415 COLL VENOUS BLD VENIPUNCTURE: CPT | Performed by: HOSPITALIST

## 2023-01-01 PROCEDURE — 87149 DNA/RNA DIRECT PROBE: CPT | Performed by: EMERGENCY MEDICINE

## 2023-01-01 PROCEDURE — 120N000004 HC R&B MS OVERFLOW

## 2023-01-01 PROCEDURE — 250N000013 HC RX MED GY IP 250 OP 250 PS 637: Performed by: HOSPITALIST

## 2023-01-01 PROCEDURE — 258N000003 HC RX IP 258 OP 636: Performed by: HOSPITALIST

## 2023-01-01 PROCEDURE — 84450 TRANSFERASE (AST) (SGOT): CPT | Performed by: NURSE PRACTITIONER

## 2023-01-01 PROCEDURE — 90750 HZV VACC RECOMBINANT IM: CPT | Performed by: NURSE PRACTITIONER

## 2023-01-01 PROCEDURE — 84132 ASSAY OF SERUM POTASSIUM: CPT | Performed by: HOSPITALIST

## 2023-01-01 PROCEDURE — 87040 BLOOD CULTURE FOR BACTERIA: CPT | Performed by: INTERNAL MEDICINE

## 2023-01-01 PROCEDURE — 999N000208 ECHOCARDIOGRAM COMPLETE

## 2023-01-01 PROCEDURE — 83930 ASSAY OF BLOOD OSMOLALITY: CPT | Performed by: HOSPITALIST

## 2023-01-01 PROCEDURE — 97110 THERAPEUTIC EXERCISES: CPT | Mod: GP

## 2023-01-01 PROCEDURE — 99214 OFFICE O/P EST MOD 30 MIN: CPT | Performed by: NURSE PRACTITIONER

## 2023-01-01 PROCEDURE — G0009 ADMIN PNEUMOCOCCAL VACCINE: HCPCS | Performed by: NURSE PRACTITIONER

## 2023-01-01 PROCEDURE — 99291 CRITICAL CARE FIRST HOUR: CPT

## 2023-01-01 PROCEDURE — 96375 TX/PRO/DX INJ NEW DRUG ADDON: CPT

## 2023-01-01 PROCEDURE — 80202 ASSAY OF VANCOMYCIN: CPT | Performed by: HOSPITALIST

## 2023-01-01 PROCEDURE — 36568 INSJ PICC <5 YR W/O IMAGING: CPT

## 2023-01-01 PROCEDURE — 36569 INSJ PICC 5 YR+ W/O IMAGING: CPT | Mod: 52

## 2023-01-01 PROCEDURE — 97116 GAIT TRAINING THERAPY: CPT | Mod: GP

## 2023-01-01 PROCEDURE — 99397 PER PM REEVAL EST PAT 65+ YR: CPT | Mod: 25 | Performed by: NURSE PRACTITIONER

## 2023-01-01 PROCEDURE — 250N000013 HC RX MED GY IP 250 OP 250 PS 637: Performed by: INTERNAL MEDICINE

## 2023-01-01 PROCEDURE — 80053 COMPREHEN METABOLIC PANEL: CPT | Performed by: STUDENT IN AN ORGANIZED HEALTH CARE EDUCATION/TRAINING PROGRAM

## 2023-01-01 PROCEDURE — 84145 PROCALCITONIN (PCT): CPT | Performed by: EMERGENCY MEDICINE

## 2023-01-01 PROCEDURE — 82140 ASSAY OF AMMONIA: CPT | Performed by: EMERGENCY MEDICINE

## 2023-01-01 PROCEDURE — 120N000013 HC R&B IMCU

## 2023-01-01 PROCEDURE — 84484 ASSAY OF TROPONIN QUANT: CPT | Performed by: INTERNAL MEDICINE

## 2023-01-01 PROCEDURE — 82565 ASSAY OF CREATININE: CPT | Performed by: HOSPITALIST

## 2023-01-01 PROCEDURE — 97110 THERAPEUTIC EXERCISES: CPT | Mod: GO

## 2023-01-01 PROCEDURE — 81001 URINALYSIS AUTO W/SCOPE: CPT | Performed by: STUDENT IN AN ORGANIZED HEALTH CARE EDUCATION/TRAINING PROGRAM

## 2023-01-01 PROCEDURE — 36415 COLL VENOUS BLD VENIPUNCTURE: CPT | Performed by: NURSE PRACTITIONER

## 2023-01-01 PROCEDURE — 99223 1ST HOSP IP/OBS HIGH 75: CPT | Performed by: INTERNAL MEDICINE

## 2023-01-01 PROCEDURE — 86140 C-REACTIVE PROTEIN: CPT | Performed by: NURSE PRACTITIONER

## 2023-01-01 PROCEDURE — 87040 BLOOD CULTURE FOR BACTERIA: CPT | Performed by: HOSPITALIST

## 2023-01-01 PROCEDURE — 90662 IIV NO PRSV INCREASED AG IM: CPT | Performed by: HOSPITALIST

## 2023-01-01 PROCEDURE — 84630 ASSAY OF ZINC: CPT | Performed by: NURSE PRACTITIONER

## 2023-01-01 PROCEDURE — 272N000748 HC KIT, CATH 3FR OR 4FR SINGLE LUMEN POWERMIDLINE

## 2023-01-01 PROCEDURE — 99231 SBSQ HOSP IP/OBS SF/LOW 25: CPT | Performed by: INTERNAL MEDICINE

## 2023-01-01 PROCEDURE — 99239 HOSP IP/OBS DSCHRG MGMT >30: CPT | Performed by: FAMILY MEDICINE

## 2023-01-01 PROCEDURE — 72125 CT NECK SPINE W/O DYE: CPT

## 2023-01-01 PROCEDURE — 82962 GLUCOSE BLOOD TEST: CPT

## 2023-01-01 PROCEDURE — 99213 OFFICE O/P EST LOW 20 MIN: CPT | Mod: 25 | Performed by: NURSE PRACTITIONER

## 2023-01-01 PROCEDURE — 36415 COLL VENOUS BLD VENIPUNCTURE: CPT | Performed by: INTERNAL MEDICINE

## 2023-01-01 PROCEDURE — 99418 PROLNG IP/OBS E/M EA 15 MIN: CPT | Performed by: HOSPITALIST

## 2023-01-01 PROCEDURE — 258N000003 HC RX IP 258 OP 636: Performed by: EMERGENCY MEDICINE

## 2023-01-01 PROCEDURE — 85025 COMPLETE CBC W/AUTO DIFF WBC: CPT | Performed by: STUDENT IN AN ORGANIZED HEALTH CARE EDUCATION/TRAINING PROGRAM

## 2023-01-01 PROCEDURE — 80053 COMPREHEN METABOLIC PANEL: CPT | Performed by: NURSE PRACTITIONER

## 2023-01-01 PROCEDURE — 999N000127 HC STATISTIC PERIPHERAL IV START W US GUIDANCE

## 2023-01-01 PROCEDURE — 99232 SBSQ HOSP IP/OBS MODERATE 35: CPT | Performed by: HOSPITALIST

## 2023-01-01 PROCEDURE — 120N000001 HC R&B MED SURG/OB

## 2023-01-01 PROCEDURE — 76770 US EXAM ABDO BACK WALL COMP: CPT

## 2023-01-01 PROCEDURE — 84443 ASSAY THYROID STIM HORMONE: CPT | Performed by: EMERGENCY MEDICINE

## 2023-01-01 PROCEDURE — 87077 CULTURE AEROBIC IDENTIFY: CPT | Performed by: EMERGENCY MEDICINE

## 2023-01-01 PROCEDURE — G0008 ADMIN INFLUENZA VIRUS VAC: HCPCS | Performed by: HOSPITALIST

## 2023-01-01 PROCEDURE — 02PY33Z REMOVAL OF INFUSION DEVICE FROM GREAT VESSEL, PERCUTANEOUS APPROACH: ICD-10-PCS | Performed by: RADIOLOGY

## 2023-01-01 PROCEDURE — 99215 OFFICE O/P EST HI 40 MIN: CPT | Mod: 93 | Performed by: NURSE PRACTITIONER

## 2023-01-01 PROCEDURE — G0179 MD RECERTIFICATION HHA PT: HCPCS | Performed by: NURSE PRACTITIONER

## 2023-01-01 PROCEDURE — 250N000009 HC RX 250: Performed by: RADIOLOGY

## 2023-01-01 PROCEDURE — 85025 COMPLETE CBC W/AUTO DIFF WBC: CPT | Performed by: NURSE PRACTITIONER

## 2023-01-01 PROCEDURE — 250N000012 HC RX MED GY IP 250 OP 636 PS 637: Performed by: EMERGENCY MEDICINE

## 2023-01-01 PROCEDURE — 36592 COLLECT BLOOD FROM PICC: CPT | Performed by: NURSE PRACTITIONER

## 2023-01-01 PROCEDURE — 99233 SBSQ HOSP IP/OBS HIGH 50: CPT | Performed by: FAMILY MEDICINE

## 2023-01-01 PROCEDURE — 84460 ALANINE AMINO (ALT) (SGPT): CPT | Performed by: NURSE PRACTITIONER

## 2023-01-01 PROCEDURE — 99205 OFFICE O/P NEW HI 60 MIN: CPT | Performed by: NURSE PRACTITIONER

## 2023-01-01 PROCEDURE — 250N000011 HC RX IP 250 OP 636: Mod: JZ | Performed by: FAMILY MEDICINE

## 2023-01-01 PROCEDURE — 99207 PR MD RECERTIFICATION HHA PT: CPT | Performed by: NURSE PRACTITIONER

## 2023-01-01 PROCEDURE — 83935 ASSAY OF URINE OSMOLALITY: CPT | Performed by: HOSPITALIST

## 2023-01-01 PROCEDURE — 74177 CT ABD & PELVIS W/CONTRAST: CPT

## 2023-01-01 PROCEDURE — 36569 INSJ PICC 5 YR+ W/O IMAGING: CPT

## 2023-01-01 PROCEDURE — 83735 ASSAY OF MAGNESIUM: CPT | Performed by: EMERGENCY MEDICINE

## 2023-01-01 PROCEDURE — 250N000011 HC RX IP 250 OP 636: Performed by: HOSPITALIST

## 2023-01-01 PROCEDURE — 250N000009 HC RX 250: Performed by: HOSPITALIST

## 2023-01-01 PROCEDURE — 250N000011 HC RX IP 250 OP 636: Mod: JZ | Performed by: EMERGENCY MEDICINE

## 2023-01-01 PROCEDURE — 83735 ASSAY OF MAGNESIUM: CPT | Performed by: HOSPITALIST

## 2023-01-01 PROCEDURE — 82306 VITAMIN D 25 HYDROXY: CPT | Performed by: NURSE PRACTITIONER

## 2023-01-01 PROCEDURE — 80053 COMPREHEN METABOLIC PANEL: CPT | Performed by: INTERNAL MEDICINE

## 2023-01-01 PROCEDURE — 255N000002 HC RX 255 OP 636: Performed by: HOSPITALIST

## 2023-01-01 PROCEDURE — 36590 REMOVAL TUNNELED CV CATH: CPT

## 2023-01-01 PROCEDURE — 87637 SARSCOV2&INF A&B&RSV AMP PRB: CPT | Performed by: EMERGENCY MEDICINE

## 2023-01-01 PROCEDURE — 272N000450 HC KIT 4FR POWER PICC SINGLE LUMEN

## 2023-01-01 PROCEDURE — 85049 AUTOMATED PLATELET COUNT: CPT | Performed by: HOSPITALIST

## 2023-01-01 PROCEDURE — 250N000013 HC RX MED GY IP 250 OP 250 PS 637: Performed by: FAMILY MEDICINE

## 2023-01-01 PROCEDURE — 93971 EXTREMITY STUDY: CPT | Mod: LT

## 2023-01-01 PROCEDURE — 93306 TTE W/DOPPLER COMPLETE: CPT | Mod: 26 | Performed by: INTERNAL MEDICINE

## 2023-01-01 PROCEDURE — 99232 SBSQ HOSP IP/OBS MODERATE 35: CPT | Performed by: INTERNAL MEDICINE

## 2023-01-01 PROCEDURE — 99232 SBSQ HOSP IP/OBS MODERATE 35: CPT | Performed by: REGISTERED NURSE

## 2023-01-01 PROCEDURE — 250N000009 HC RX 250: Performed by: EMERGENCY MEDICINE

## 2023-01-01 PROCEDURE — 83735 ASSAY OF MAGNESIUM: CPT | Performed by: FAMILY MEDICINE

## 2023-01-01 PROCEDURE — 85025 COMPLETE CBC W/AUTO DIFF WBC: CPT | Performed by: HOSPITALIST

## 2023-01-01 PROCEDURE — 36591 DRAW BLOOD OFF VENOUS DEVICE: CPT | Performed by: NURSE PRACTITIONER

## 2023-01-01 PROCEDURE — 250N000011 HC RX IP 250 OP 636: Performed by: RADIOLOGY

## 2023-01-01 PROCEDURE — 99153 MOD SED SAME PHYS/QHP EA: CPT

## 2023-01-01 PROCEDURE — 83605 ASSAY OF LACTIC ACID: CPT | Performed by: EMERGENCY MEDICINE

## 2023-01-01 PROCEDURE — 84300 ASSAY OF URINE SODIUM: CPT | Performed by: HOSPITALIST

## 2023-01-01 PROCEDURE — 258N000001 HC RX 258: Performed by: EMERGENCY MEDICINE

## 2023-01-01 PROCEDURE — G0463 HOSPITAL OUTPT CLINIC VISIT: HCPCS | Performed by: NURSE PRACTITIONER

## 2023-01-01 PROCEDURE — 36415 COLL VENOUS BLD VENIPUNCTURE: CPT | Performed by: EMERGENCY MEDICINE

## 2023-01-01 PROCEDURE — 97535 SELF CARE MNGMENT TRAINING: CPT | Mod: GO

## 2023-01-01 PROCEDURE — 84484 ASSAY OF TROPONIN QUANT: CPT | Performed by: EMERGENCY MEDICINE

## 2023-01-01 PROCEDURE — 96374 THER/PROPH/DIAG INJ IV PUSH: CPT | Mod: 59

## 2023-01-01 PROCEDURE — 84132 ASSAY OF SERUM POTASSIUM: CPT | Performed by: EMERGENCY MEDICINE

## 2023-01-01 PROCEDURE — 84484 ASSAY OF TROPONIN QUANT: CPT | Performed by: STUDENT IN AN ORGANIZED HEALTH CARE EDUCATION/TRAINING PROGRAM

## 2023-01-01 PROCEDURE — 82310 ASSAY OF CALCIUM: CPT | Performed by: NURSE PRACTITIONER

## 2023-01-01 PROCEDURE — 84132 ASSAY OF SERUM POTASSIUM: CPT | Performed by: FAMILY MEDICINE

## 2023-01-01 PROCEDURE — 70450 CT HEAD/BRAIN W/O DYE: CPT

## 2023-01-01 PROCEDURE — 90677 PCV20 VACCINE IM: CPT | Performed by: NURSE PRACTITIONER

## 2023-01-01 PROCEDURE — 85018 HEMOGLOBIN: CPT | Performed by: NURSE PRACTITIONER

## 2023-01-01 PROCEDURE — 999N000285 HC STATISTIC VASC ACCESS LAB DRAW WITH PIV START

## 2023-01-01 PROCEDURE — 0JPT3WZ REMOVAL OF TOTALLY IMPLANTABLE VASCULAR ACCESS DEVICE FROM TRUNK SUBCUTANEOUS TISSUE AND FASCIA, PERCUTANEOUS APPROACH: ICD-10-PCS | Performed by: RADIOLOGY

## 2023-01-01 PROCEDURE — 82550 ASSAY OF CK (CPK): CPT | Performed by: EMERGENCY MEDICINE

## 2023-01-01 PROCEDURE — 97530 THERAPEUTIC ACTIVITIES: CPT | Mod: GP

## 2023-01-01 PROCEDURE — 80307 DRUG TEST PRSMV CHEM ANLYZR: CPT | Performed by: EMERGENCY MEDICINE

## 2023-01-01 PROCEDURE — 99207 PR MD RECERTIFICATION HHA PT: CPT | Performed by: INTERNAL MEDICINE

## 2023-01-01 PROCEDURE — 83970 ASSAY OF PARATHORMONE: CPT | Performed by: NURSE PRACTITIONER

## 2023-01-01 PROCEDURE — 99152 MOD SED SAME PHYS/QHP 5/>YRS: CPT

## 2023-01-01 PROCEDURE — 82077 ASSAY SPEC XCP UR&BREATH IA: CPT | Performed by: EMERGENCY MEDICINE

## 2023-01-01 PROCEDURE — 96361 HYDRATE IV INFUSION ADD-ON: CPT

## 2023-01-01 PROCEDURE — 99214 OFFICE O/P EST MOD 30 MIN: CPT | Performed by: INTERNAL MEDICINE

## 2023-01-01 PROCEDURE — 82330 ASSAY OF CALCIUM: CPT | Performed by: NURSE PRACTITIONER

## 2023-01-01 PROCEDURE — 71275 CT ANGIOGRAPHY CHEST: CPT

## 2023-01-01 PROCEDURE — 84300 ASSAY OF URINE SODIUM: CPT | Performed by: EMERGENCY MEDICINE

## 2023-01-01 PROCEDURE — 84100 ASSAY OF PHOSPHORUS: CPT | Performed by: FAMILY MEDICINE

## 2023-01-01 PROCEDURE — 72141 MRI NECK SPINE W/O DYE: CPT

## 2023-01-01 PROCEDURE — 80048 BASIC METABOLIC PNL TOTAL CA: CPT | Performed by: HOSPITALIST

## 2023-01-01 PROCEDURE — 97166 OT EVAL MOD COMPLEX 45 MIN: CPT | Mod: GO

## 2023-01-01 PROCEDURE — 999N000065 XR CHEST PORT 1 VIEW

## 2023-01-01 PROCEDURE — 83690 ASSAY OF LIPASE: CPT | Performed by: INTERNAL MEDICINE

## 2023-01-01 PROCEDURE — 83735 ASSAY OF MAGNESIUM: CPT | Performed by: INTERNAL MEDICINE

## 2023-01-01 RX ORDER — IOPAMIDOL 755 MG/ML
75 INJECTION, SOLUTION INTRAVASCULAR ONCE
Status: COMPLETED | OUTPATIENT
Start: 2023-01-01 | End: 2023-01-01

## 2023-01-01 RX ORDER — DEXTROSE MONOHYDRATE 25 G/50ML
25 INJECTION, SOLUTION INTRAVENOUS ONCE
Status: COMPLETED | OUTPATIENT
Start: 2023-01-01 | End: 2023-01-01

## 2023-01-01 RX ORDER — POTASSIUM CHLORIDE 1500 MG/1
40 TABLET, EXTENDED RELEASE ORAL ONCE
Status: COMPLETED | OUTPATIENT
Start: 2023-01-01 | End: 2023-01-01

## 2023-01-01 RX ORDER — LISINOPRIL 20 MG/1
20 TABLET ORAL 2 TIMES DAILY
Qty: 60 TABLET | Refills: 3 | Status: SHIPPED | OUTPATIENT
Start: 2023-01-01

## 2023-01-01 RX ORDER — DIPHENHYDRAMINE HYDROCHLORIDE 50 MG/ML
25 INJECTION INTRAMUSCULAR; INTRAVENOUS EVERY 6 HOURS PRN
Status: DISCONTINUED | OUTPATIENT
Start: 2023-01-01 | End: 2023-01-01

## 2023-01-01 RX ORDER — HEPARIN SODIUM 5000 [USP'U]/.5ML
5000 INJECTION, SOLUTION INTRAVENOUS; SUBCUTANEOUS EVERY 12 HOURS
Status: DISCONTINUED | OUTPATIENT
Start: 2023-01-01 | End: 2023-01-01 | Stop reason: HOSPADM

## 2023-01-01 RX ORDER — HYDROMORPHONE HYDROCHLORIDE 8 MG/1
4 TABLET ORAL
Qty: 59 TABLET | Refills: 0 | Status: SHIPPED | OUTPATIENT
Start: 2023-01-01 | End: 2024-01-01

## 2023-01-01 RX ORDER — LIDOCAINE HYDROCHLORIDE AND EPINEPHRINE 10; 10 MG/ML; UG/ML
20 INJECTION, SOLUTION INFILTRATION; PERINEURAL ONCE
Status: COMPLETED | OUTPATIENT
Start: 2023-01-01 | End: 2023-01-01

## 2023-01-01 RX ORDER — LIDOCAINE 40 MG/G
CREAM TOPICAL
Status: DISCONTINUED | OUTPATIENT
Start: 2023-01-01 | End: 2023-01-01 | Stop reason: HOSPADM

## 2023-01-01 RX ORDER — ONDANSETRON 4 MG/1
4 TABLET, ORALLY DISINTEGRATING ORAL EVERY 6 HOURS PRN
Status: DISCONTINUED | OUTPATIENT
Start: 2023-01-01 | End: 2023-01-01 | Stop reason: HOSPADM

## 2023-01-01 RX ORDER — MAGNESIUM SULFATE 4 G/50ML
4 INJECTION INTRAVENOUS ONCE
Status: COMPLETED | OUTPATIENT
Start: 2023-01-01 | End: 2023-01-01

## 2023-01-01 RX ORDER — HYDROMORPHONE HYDROCHLORIDE 4 MG/1
4 TABLET ORAL
Qty: 150 TABLET | Refills: 0 | Status: SHIPPED | OUTPATIENT
Start: 2023-01-01 | End: 2023-01-01

## 2023-01-01 RX ORDER — OMEPRAZOLE 40 MG/1
40 CAPSULE, DELAYED RELEASE ORAL DAILY
COMMUNITY
End: 2023-01-01

## 2023-01-01 RX ORDER — SIMETHICONE 80 MG
80 TABLET,CHEWABLE ORAL EVERY 6 HOURS PRN
Status: DISCONTINUED | OUTPATIENT
Start: 2023-01-01 | End: 2023-01-01 | Stop reason: HOSPADM

## 2023-01-01 RX ORDER — DULOXETIN HYDROCHLORIDE 60 MG/1
60 CAPSULE, DELAYED RELEASE ORAL 2 TIMES DAILY
Status: DISCONTINUED | OUTPATIENT
Start: 2023-01-01 | End: 2023-01-01 | Stop reason: HOSPADM

## 2023-01-01 RX ORDER — LISINOPRIL 20 MG/1
20 TABLET ORAL DAILY
Status: DISCONTINUED | OUTPATIENT
Start: 2023-01-01 | End: 2023-01-01 | Stop reason: HOSPADM

## 2023-01-01 RX ORDER — LABETALOL HYDROCHLORIDE 5 MG/ML
5 INJECTION, SOLUTION INTRAVENOUS EVERY 4 HOURS PRN
Status: DISCONTINUED | OUTPATIENT
Start: 2023-01-01 | End: 2023-01-01 | Stop reason: HOSPADM

## 2023-01-01 RX ORDER — NALOXONE HYDROCHLORIDE 0.4 MG/ML
0.4 INJECTION, SOLUTION INTRAMUSCULAR; INTRAVENOUS; SUBCUTANEOUS
Status: DISCONTINUED | OUTPATIENT
Start: 2023-01-01 | End: 2023-01-01 | Stop reason: HOSPADM

## 2023-01-01 RX ORDER — TRAZODONE HYDROCHLORIDE 150 MG/1
TABLET ORAL
Qty: 270 TABLET | Refills: 0 | Status: ON HOLD | OUTPATIENT
Start: 2023-01-01 | End: 2023-01-01

## 2023-01-01 RX ORDER — FENTANYL CITRATE 50 UG/ML
50 INJECTION, SOLUTION INTRAMUSCULAR; INTRAVENOUS ONCE
Status: COMPLETED | OUTPATIENT
Start: 2023-01-01 | End: 2023-01-01

## 2023-01-01 RX ORDER — FAMOTIDINE 20 MG/1
20 TABLET, FILM COATED ORAL DAILY
Status: DISCONTINUED | OUTPATIENT
Start: 2023-01-01 | End: 2023-01-01 | Stop reason: HOSPADM

## 2023-01-01 RX ORDER — SODIUM BICARBONATE 650 MG/1
650 TABLET ORAL 2 TIMES DAILY
COMMUNITY
Start: 2023-01-01 | End: 2023-01-01

## 2023-01-01 RX ORDER — METOPROLOL SUCCINATE 50 MG/1
50 TABLET, EXTENDED RELEASE ORAL DAILY
Qty: 30 TABLET | Refills: 3 | Status: SHIPPED | OUTPATIENT
Start: 2023-01-01

## 2023-01-01 RX ORDER — METOPROLOL SUCCINATE 25 MG/1
25 TABLET, EXTENDED RELEASE ORAL DAILY
Status: DISCONTINUED | OUTPATIENT
Start: 2023-01-01 | End: 2023-01-01

## 2023-01-01 RX ORDER — PREGABALIN 200 MG/1
200 CAPSULE ORAL
COMMUNITY
End: 2023-01-01

## 2023-01-01 RX ORDER — AMLODIPINE BESYLATE 2.5 MG/1
2.5 TABLET ORAL DAILY PRN
Status: DISCONTINUED | OUTPATIENT
Start: 2023-01-01 | End: 2023-01-01 | Stop reason: HOSPADM

## 2023-01-01 RX ORDER — NITROFURANTOIN MACROCRYSTALS 50 MG/1
50 CAPSULE ORAL AT BEDTIME
Status: DISCONTINUED | OUTPATIENT
Start: 2023-01-01 | End: 2023-01-01 | Stop reason: HOSPADM

## 2023-01-01 RX ORDER — ACETAMINOPHEN 325 MG/1
650 TABLET ORAL EVERY 6 HOURS PRN
Status: DISCONTINUED | OUTPATIENT
Start: 2023-01-01 | End: 2023-01-01 | Stop reason: HOSPADM

## 2023-01-01 RX ORDER — MAGNESIUM SULFATE HEPTAHYDRATE 40 MG/ML
2 INJECTION, SOLUTION INTRAVENOUS ONCE
Status: COMPLETED | OUTPATIENT
Start: 2023-01-01 | End: 2023-01-01

## 2023-01-01 RX ORDER — ONDANSETRON 4 MG/1
4 TABLET, ORALLY DISINTEGRATING ORAL EVERY 6 HOURS PRN
Qty: 20 TABLET | Refills: 0 | Status: SHIPPED | OUTPATIENT
Start: 2023-01-01 | End: 2023-01-01

## 2023-01-01 RX ORDER — SODIUM CHLORIDE 9 MG/ML
INJECTION, SOLUTION INTRAVENOUS CONTINUOUS
Status: DISCONTINUED | OUTPATIENT
Start: 2023-01-01 | End: 2023-01-01 | Stop reason: HOSPADM

## 2023-01-01 RX ORDER — PREGABALIN 100 MG/1
100 CAPSULE ORAL 3 TIMES DAILY
Status: DISCONTINUED | OUTPATIENT
Start: 2023-01-01 | End: 2023-01-01 | Stop reason: HOSPADM

## 2023-01-01 RX ORDER — CEFAZOLIN SODIUM 2 G/100ML
2 INJECTION, SOLUTION INTRAVENOUS EVERY 8 HOURS
Status: DISCONTINUED | OUTPATIENT
Start: 2023-01-01 | End: 2023-01-01 | Stop reason: HOSPADM

## 2023-01-01 RX ORDER — ACETAMINOPHEN 325 MG/1
650 TABLET ORAL EVERY 6 HOURS PRN
COMMUNITY
Start: 2023-01-01 | End: 2024-01-01

## 2023-01-01 RX ORDER — BISACODYL 10 MG
10 SUPPOSITORY, RECTAL RECTAL DAILY PRN
Status: DISCONTINUED | OUTPATIENT
Start: 2023-01-01 | End: 2023-01-01 | Stop reason: HOSPADM

## 2023-01-01 RX ORDER — VANCOMYCIN HYDROCHLORIDE 1 G/200ML
1000 INJECTION, SOLUTION INTRAVENOUS EVERY 24 HOURS
Status: DISCONTINUED | OUTPATIENT
Start: 2023-01-01 | End: 2023-01-01

## 2023-01-01 RX ORDER — ONDANSETRON 2 MG/ML
4 INJECTION INTRAMUSCULAR; INTRAVENOUS EVERY 6 HOURS PRN
Status: DISCONTINUED | OUTPATIENT
Start: 2023-01-01 | End: 2023-01-01 | Stop reason: HOSPADM

## 2023-01-01 RX ORDER — SODIUM CHLORIDE 9 MG/ML
INJECTION, SOLUTION INTRAVENOUS CONTINUOUS
Status: DISCONTINUED | OUTPATIENT
Start: 2023-01-01 | End: 2023-01-01

## 2023-01-01 RX ORDER — FUROSEMIDE 10 MG/ML
40 INJECTION INTRAMUSCULAR; INTRAVENOUS ONCE
Status: COMPLETED | OUTPATIENT
Start: 2023-01-01 | End: 2023-01-01

## 2023-01-01 RX ORDER — POLYETHYLENE GLYCOL 3350 17 G/17G
17 POWDER, FOR SOLUTION ORAL DAILY PRN
Status: DISCONTINUED | OUTPATIENT
Start: 2023-01-01 | End: 2023-01-01 | Stop reason: HOSPADM

## 2023-01-01 RX ORDER — HYDROMORPHONE HYDROCHLORIDE 8 MG/1
4 TABLET ORAL
Qty: 59 TABLET | Refills: 0 | Status: SHIPPED | OUTPATIENT
Start: 2023-01-01 | End: 2023-01-01

## 2023-01-01 RX ORDER — MAGNESIUM OXIDE 400 MG/1
400 TABLET ORAL EVERY 4 HOURS
Status: COMPLETED | OUTPATIENT
Start: 2023-01-01 | End: 2023-01-01

## 2023-01-01 RX ORDER — HYDROMORPHONE HYDROCHLORIDE 4 MG/1
4 TABLET ORAL
Status: DISCONTINUED | OUTPATIENT
Start: 2023-01-01 | End: 2023-01-01 | Stop reason: HOSPADM

## 2023-01-01 RX ORDER — LACTOBACILLUS RHAMNOSUS GG 10B CELL
1 CAPSULE ORAL 2 TIMES DAILY
Status: DISCONTINUED | OUTPATIENT
Start: 2023-01-01 | End: 2023-01-01 | Stop reason: HOSPADM

## 2023-01-01 RX ORDER — NALOXONE HYDROCHLORIDE 0.4 MG/ML
0.2 INJECTION, SOLUTION INTRAMUSCULAR; INTRAVENOUS; SUBCUTANEOUS
Status: DISCONTINUED | OUTPATIENT
Start: 2023-01-01 | End: 2023-01-01 | Stop reason: HOSPADM

## 2023-01-01 RX ORDER — POTASSIUM CHLORIDE 1500 MG/1
20 TABLET, EXTENDED RELEASE ORAL ONCE
Status: COMPLETED | OUTPATIENT
Start: 2023-01-01 | End: 2023-01-01

## 2023-01-01 RX ORDER — OMEPRAZOLE 40 MG/1
40 CAPSULE, DELAYED RELEASE ORAL DAILY
Qty: 90 CAPSULE | Refills: 0 | Status: SHIPPED | OUTPATIENT
Start: 2023-01-01

## 2023-01-01 RX ORDER — AMOXICILLIN 250 MG
2 CAPSULE ORAL 2 TIMES DAILY PRN
Status: DISCONTINUED | OUTPATIENT
Start: 2023-01-01 | End: 2023-01-01 | Stop reason: HOSPADM

## 2023-01-01 RX ORDER — ERGOCALCIFEROL 1.25 MG/1
CAPSULE, LIQUID FILLED ORAL
Qty: 12 CAPSULE | Refills: 3 | Status: SHIPPED | OUTPATIENT
Start: 2023-01-01

## 2023-01-01 RX ORDER — DIPHENHYDRAMINE HCL 25 MG
50 CAPSULE ORAL EVERY 6 HOURS PRN
Status: DISCONTINUED | OUTPATIENT
Start: 2023-01-01 | End: 2023-01-01 | Stop reason: HOSPADM

## 2023-01-01 RX ORDER — LIDOCAINE 40 MG/G
CREAM TOPICAL
Status: DISCONTINUED | OUTPATIENT
Start: 2023-01-01 | End: 2023-01-01 | Stop reason: ALTCHOICE

## 2023-01-01 RX ORDER — CEPHALEXIN 125 MG/5ML
125 POWDER, FOR SUSPENSION ORAL 3 TIMES DAILY
COMMUNITY
End: 2024-01-01

## 2023-01-01 RX ORDER — LISINOPRIL 20 MG/1
20 TABLET ORAL DAILY
Qty: 90 TABLET | Refills: 3 | Status: ON HOLD | OUTPATIENT
Start: 2023-01-01 | End: 2023-01-01

## 2023-01-01 RX ORDER — ACETAMINOPHEN 650 MG/1
650 SUPPOSITORY RECTAL EVERY 6 HOURS PRN
Status: DISCONTINUED | OUTPATIENT
Start: 2023-01-01 | End: 2023-01-01 | Stop reason: HOSPADM

## 2023-01-01 RX ORDER — TRAZODONE HYDROCHLORIDE 150 MG/1
150 TABLET ORAL DAILY
Qty: 270 TABLET | Refills: 0 | Status: SHIPPED | OUTPATIENT
Start: 2023-01-01

## 2023-01-01 RX ORDER — POLYETHYLENE GLYCOL 3350 17 G/17G
17 POWDER, FOR SOLUTION ORAL DAILY PRN
Qty: 20 PACKET | Refills: 0 | Status: SHIPPED | OUTPATIENT
Start: 2023-01-01 | End: 2024-01-01

## 2023-01-01 RX ORDER — MAGNESIUM OXIDE 400 MG/1
400 TABLET ORAL DAILY
Qty: 15 TABLET | Refills: 0 | Status: SHIPPED | OUTPATIENT
Start: 2023-01-01 | End: 2024-01-01

## 2023-01-01 RX ORDER — PANTOPRAZOLE SODIUM 40 MG/1
40 TABLET, DELAYED RELEASE ORAL 2 TIMES DAILY
Status: DISCONTINUED | OUTPATIENT
Start: 2023-01-01 | End: 2023-01-01 | Stop reason: HOSPADM

## 2023-01-01 RX ORDER — DIPHENHYDRAMINE HCL 25 MG
25 CAPSULE ORAL EVERY 6 HOURS PRN
Status: DISCONTINUED | OUTPATIENT
Start: 2023-01-01 | End: 2023-01-01

## 2023-01-01 RX ORDER — SODIUM BICARBONATE 650 MG/1
650 TABLET ORAL 2 TIMES DAILY
Status: DISCONTINUED | OUTPATIENT
Start: 2023-01-01 | End: 2023-01-01 | Stop reason: HOSPADM

## 2023-01-01 RX ORDER — DIPHENOXYLATE HCL/ATROPINE 2.5-.025MG
2 TABLET ORAL 3 TIMES DAILY
Status: DISCONTINUED | OUTPATIENT
Start: 2023-01-01 | End: 2023-01-01 | Stop reason: HOSPADM

## 2023-01-01 RX ORDER — AMOXICILLIN 250 MG
1 CAPSULE ORAL 2 TIMES DAILY PRN
Status: DISCONTINUED | OUTPATIENT
Start: 2023-01-01 | End: 2023-01-01 | Stop reason: HOSPADM

## 2023-01-01 RX ORDER — BUSPIRONE HYDROCHLORIDE 15 MG/1
30 TABLET ORAL 2 TIMES DAILY
Status: DISCONTINUED | OUTPATIENT
Start: 2023-01-01 | End: 2023-01-01 | Stop reason: HOSPADM

## 2023-01-01 RX ORDER — AMLODIPINE BESYLATE 2.5 MG/1
2.5 TABLET ORAL SEE ADMIN INSTRUCTIONS
COMMUNITY
End: 2023-01-01

## 2023-01-01 RX ORDER — CEFAZOLIN SODIUM 2 G/100ML
2 INJECTION, SOLUTION INTRAVENOUS EVERY 8 HOURS
Qty: 6300 ML | Refills: 0 | Status: SHIPPED | OUTPATIENT
Start: 2023-01-01 | End: 2023-01-01

## 2023-01-01 RX ORDER — DIPHENOXYLATE HCL/ATROPINE 2.5-.025MG
TABLET ORAL
Qty: 180 TABLET | Refills: 0 | Status: SHIPPED | OUTPATIENT
Start: 2023-01-01

## 2023-01-01 RX ORDER — PREGABALIN 200 MG/1
200 CAPSULE ORAL 3 TIMES DAILY
Qty: 270 CAPSULE | Refills: 1 | Status: SHIPPED | OUTPATIENT
Start: 2023-01-01 | End: 2024-01-01

## 2023-01-01 RX ORDER — CARBOXYMETHYLCELLULOSE SODIUM 5 MG/ML
1 SOLUTION/ DROPS OPHTHALMIC 3 TIMES DAILY
Status: DISCONTINUED | OUTPATIENT
Start: 2023-01-01 | End: 2023-01-01 | Stop reason: HOSPADM

## 2023-01-01 RX ORDER — PIPERACILLIN SODIUM, TAZOBACTAM SODIUM 3; .375 G/15ML; G/15ML
3.38 INJECTION, POWDER, LYOPHILIZED, FOR SOLUTION INTRAVENOUS EVERY 8 HOURS
Status: DISCONTINUED | OUTPATIENT
Start: 2023-01-01 | End: 2023-01-01

## 2023-01-01 RX ORDER — METOPROLOL SUCCINATE 50 MG/1
50 TABLET, EXTENDED RELEASE ORAL DAILY
Status: DISCONTINUED | OUTPATIENT
Start: 2023-01-01 | End: 2023-01-01 | Stop reason: HOSPADM

## 2023-01-01 RX ORDER — METOPROLOL SUCCINATE 25 MG/1
25 TABLET, EXTENDED RELEASE ORAL DAILY
Qty: 90 TABLET | Refills: 3 | Status: ON HOLD | OUTPATIENT
Start: 2023-01-01 | End: 2023-01-01

## 2023-01-01 RX ORDER — DEXTROSE MONOHYDRATE 25 G/50ML
25-50 INJECTION, SOLUTION INTRAVENOUS
Status: DISCONTINUED | OUTPATIENT
Start: 2023-01-01 | End: 2023-01-01 | Stop reason: HOSPADM

## 2023-01-01 RX ORDER — BUSPIRONE HYDROCHLORIDE 15 MG/1
15 TABLET ORAL 2 TIMES DAILY
COMMUNITY
Start: 2023-01-01 | End: 2024-01-01

## 2023-01-01 RX ORDER — NICOTINE POLACRILEX 4 MG
15-30 LOZENGE BUCCAL
Status: DISCONTINUED | OUTPATIENT
Start: 2023-01-01 | End: 2023-01-01 | Stop reason: HOSPADM

## 2023-01-01 RX ORDER — SIMETHICONE 80 MG
80 TABLET,CHEWABLE ORAL EVERY 6 HOURS PRN
COMMUNITY

## 2023-01-01 RX ORDER — AMLODIPINE BESYLATE 2.5 MG/1
2.5 TABLET ORAL
COMMUNITY
Start: 2023-01-01 | End: 2023-01-01

## 2023-01-01 RX ORDER — PIPERACILLIN SODIUM, TAZOBACTAM SODIUM 3; .375 G/15ML; G/15ML
3.38 INJECTION, POWDER, LYOPHILIZED, FOR SOLUTION INTRAVENOUS ONCE
Status: COMPLETED | OUTPATIENT
Start: 2023-01-01 | End: 2023-01-01

## 2023-01-01 RX ORDER — CYANOCOBALAMIN 1000 UG/ML
INJECTION, SOLUTION INTRAMUSCULAR; SUBCUTANEOUS
Qty: 1 ML | Refills: 11 | Status: SHIPPED | OUTPATIENT
Start: 2023-01-01

## 2023-01-01 RX ORDER — TRAZODONE HYDROCHLORIDE 150 MG/1
450 TABLET ORAL AT BEDTIME
Status: DISCONTINUED | OUTPATIENT
Start: 2023-01-01 | End: 2023-01-01

## 2023-01-01 RX ORDER — PROMETHAZINE HYDROCHLORIDE 25 MG/1
25 TABLET ORAL EVERY 6 HOURS PRN
Status: DISCONTINUED | OUTPATIENT
Start: 2023-01-01 | End: 2023-01-01 | Stop reason: HOSPADM

## 2023-01-01 RX ORDER — DIPHENHYDRAMINE HYDROCHLORIDE 50 MG/ML
25 INJECTION INTRAMUSCULAR; INTRAVENOUS EVERY 6 HOURS PRN
Status: DISCONTINUED | OUTPATIENT
Start: 2023-01-01 | End: 2023-01-01 | Stop reason: HOSPADM

## 2023-01-01 RX ORDER — NITROFURANTOIN MACROCRYSTALS 50 MG/1
CAPSULE ORAL
Qty: 90 CAPSULE | Refills: 3 | Status: SHIPPED | OUTPATIENT
Start: 2023-01-01

## 2023-01-01 RX ORDER — HYDROMORPHONE HYDROCHLORIDE 4 MG/1
4 TABLET ORAL
Qty: 150 TABLET | Refills: 0 | Status: SHIPPED | OUTPATIENT
Start: 2023-01-01 | End: 2023-01-01 | Stop reason: DRUGHIGH

## 2023-01-01 RX ADMIN — METOPROLOL SUCCINATE 25 MG: 25 TABLET, EXTENDED RELEASE ORAL at 09:07

## 2023-01-01 RX ADMIN — HYDROMORPHONE HYDROCHLORIDE 4 MG: 4 TABLET ORAL at 05:30

## 2023-01-01 RX ADMIN — MAGNESIUM SULFATE HEPTAHYDRATE 4 G: 4 INJECTION, SOLUTION INTRAVENOUS at 06:20

## 2023-01-01 RX ADMIN — SODIUM BICARBONATE 650 MG TABLET 650 MG: at 10:44

## 2023-01-01 RX ADMIN — DIPHENHYDRAMINE HYDROCHLORIDE 25 MG: 25 CAPSULE ORAL at 18:45

## 2023-01-01 RX ADMIN — CARBOXYMETHYLCELLULOSE SODIUM 1 DROP: 5 SOLUTION/ DROPS OPHTHALMIC at 14:53

## 2023-01-01 RX ADMIN — SODIUM CHLORIDE: 9 INJECTION, SOLUTION INTRAVENOUS at 05:39

## 2023-01-01 RX ADMIN — Medication 1 CAPSULE: at 21:31

## 2023-01-01 RX ADMIN — CARBOXYMETHYLCELLULOSE SODIUM 1 DROP: 5 SOLUTION/ DROPS OPHTHALMIC at 20:32

## 2023-01-01 RX ADMIN — SODIUM CHLORIDE 1000 ML: 9 INJECTION, SOLUTION INTRAVENOUS at 14:59

## 2023-01-01 RX ADMIN — LISINOPRIL 20 MG: 20 TABLET ORAL at 09:06

## 2023-01-01 RX ADMIN — POLYETHYLENE GLYCOL AND PROPYLENE GLYCOL 2 DROP: 4; 3 SOLUTION/ DROPS OPHTHALMIC at 12:25

## 2023-01-01 RX ADMIN — PANTOPRAZOLE SODIUM 40 MG: 40 TABLET, DELAYED RELEASE ORAL at 09:06

## 2023-01-01 RX ADMIN — PREGABALIN 100 MG: 100 CAPSULE ORAL at 21:32

## 2023-01-01 RX ADMIN — CEFAZOLIN SODIUM 2 G: 2 INJECTION, SOLUTION INTRAVENOUS at 10:36

## 2023-01-01 RX ADMIN — AMLODIPINE BESYLATE 2.5 MG: 2.5 TABLET ORAL at 17:15

## 2023-01-01 RX ADMIN — TRAZODONE HYDROCHLORIDE 150 MG: 100 TABLET ORAL at 20:34

## 2023-01-01 RX ADMIN — PREGABALIN 100 MG: 100 CAPSULE ORAL at 09:04

## 2023-01-01 RX ADMIN — PANCRELIPASE 1 CAPSULE: 60000; 12000; 38000 CAPSULE, DELAYED RELEASE PELLETS ORAL at 12:11

## 2023-01-01 RX ADMIN — DIPHENHYDRAMINE HYDROCHLORIDE 50 MG: 25 CAPSULE ORAL at 09:03

## 2023-01-01 RX ADMIN — NITROFURANTOIN MACROCRYSTALS 50 MG: 50 CAPSULE ORAL at 21:32

## 2023-01-01 RX ADMIN — HYDROMORPHONE HYDROCHLORIDE 4 MG: 4 TABLET ORAL at 05:12

## 2023-01-01 RX ADMIN — PANCRELIPASE 1 CAPSULE: 60000; 12000; 38000 CAPSULE, DELAYED RELEASE PELLETS ORAL at 16:30

## 2023-01-01 RX ADMIN — METOPROLOL SUCCINATE 25 MG: 25 TABLET, EXTENDED RELEASE ORAL at 09:51

## 2023-01-01 RX ADMIN — HYDROMORPHONE HYDROCHLORIDE 4 MG: 4 TABLET ORAL at 21:33

## 2023-01-01 RX ADMIN — NITROFURANTOIN MACROCRYSTALS 50 MG: 50 CAPSULE ORAL at 21:28

## 2023-01-01 RX ADMIN — DIPHENHYDRAMINE HYDROCHLORIDE 25 MG: 50 INJECTION INTRAMUSCULAR; INTRAVENOUS at 23:27

## 2023-01-01 RX ADMIN — SODIUM PHOSPHATE, MONOBASIC, MONOHYDRATE AND SODIUM PHOSPHATE, DIBASIC, ANHYDROUS 9 MMOL: 142; 276 INJECTION, SOLUTION INTRAVENOUS at 17:24

## 2023-01-01 RX ADMIN — FUROSEMIDE 40 MG: 10 INJECTION, SOLUTION INTRAMUSCULAR; INTRAVENOUS at 15:43

## 2023-01-01 RX ADMIN — SIMETHICONE 80 MG: 80 TABLET, CHEWABLE ORAL at 11:25

## 2023-01-01 RX ADMIN — BUSPIRONE HYDROCHLORIDE 30 MG: 10 TABLET ORAL at 22:26

## 2023-01-01 RX ADMIN — PANCRELIPASE 1 CAPSULE: 60000; 12000; 38000 CAPSULE, DELAYED RELEASE PELLETS ORAL at 10:37

## 2023-01-01 RX ADMIN — DIPHENOXYLATE HYDROCHLORIDE AND ATROPINE SULFATE 2 TABLET: 2.5; .025 TABLET ORAL at 14:25

## 2023-01-01 RX ADMIN — LISINOPRIL 20 MG: 20 TABLET ORAL at 09:52

## 2023-01-01 RX ADMIN — DIPHENOXYLATE HYDROCHLORIDE AND ATROPINE SULFATE 2 TABLET: 2.5; .025 TABLET ORAL at 21:31

## 2023-01-01 RX ADMIN — FAMOTIDINE 20 MG: 10 INJECTION INTRAVENOUS at 09:39

## 2023-01-01 RX ADMIN — MAGNESIUM SULFATE HEPTAHYDRATE 4 G: 4 INJECTION, SOLUTION INTRAVENOUS at 15:18

## 2023-01-01 RX ADMIN — CEFAZOLIN SODIUM 2 G: 2 INJECTION, SOLUTION INTRAVENOUS at 16:44

## 2023-01-01 RX ADMIN — LISINOPRIL 20 MG: 20 TABLET ORAL at 09:49

## 2023-01-01 RX ADMIN — HYDROMORPHONE HYDROCHLORIDE 4 MG: 4 TABLET ORAL at 05:17

## 2023-01-01 RX ADMIN — TRAZODONE HYDROCHLORIDE 450 MG: 150 TABLET ORAL at 22:26

## 2023-01-01 RX ADMIN — PREGABALIN 100 MG: 100 CAPSULE ORAL at 14:12

## 2023-01-01 RX ADMIN — MAGNESIUM SULFATE HEPTAHYDRATE 2 G: 40 INJECTION, SOLUTION INTRAVENOUS at 14:37

## 2023-01-01 RX ADMIN — FAMOTIDINE 20 MG: 10 INJECTION INTRAVENOUS at 08:58

## 2023-01-01 RX ADMIN — HYDROMORPHONE HYDROCHLORIDE 4 MG: 4 TABLET ORAL at 09:06

## 2023-01-01 RX ADMIN — PANCRELIPASE 1 CAPSULE: 60000; 12000; 38000 CAPSULE, DELAYED RELEASE PELLETS ORAL at 18:12

## 2023-01-01 RX ADMIN — PREGABALIN 100 MG: 100 CAPSULE ORAL at 20:31

## 2023-01-01 RX ADMIN — SODIUM CHLORIDE: 9 INJECTION, SOLUTION INTRAVENOUS at 09:55

## 2023-01-01 RX ADMIN — HEPARIN SODIUM 5000 UNITS: 5000 INJECTION, SOLUTION INTRAVENOUS; SUBCUTANEOUS at 09:52

## 2023-01-01 RX ADMIN — METOPROLOL SUCCINATE 25 MG: 25 TABLET, EXTENDED RELEASE ORAL at 10:26

## 2023-01-01 RX ADMIN — POLYETHYLENE GLYCOL AND PROPYLENE GLYCOL 2 DROP: 4; 3 SOLUTION/ DROPS OPHTHALMIC at 13:54

## 2023-01-01 RX ADMIN — DIPHENOXYLATE HYDROCHLORIDE AND ATROPINE SULFATE 2 TABLET: 2.5; .025 TABLET ORAL at 09:06

## 2023-01-01 RX ADMIN — PREGABALIN 100 MG: 100 CAPSULE ORAL at 22:22

## 2023-01-01 RX ADMIN — PREGABALIN 100 MG: 100 CAPSULE ORAL at 14:26

## 2023-01-01 RX ADMIN — PANTOPRAZOLE SODIUM 40 MG: 40 TABLET, DELAYED RELEASE ORAL at 21:33

## 2023-01-01 RX ADMIN — VANCOMYCIN HYDROCHLORIDE 1000 MG: 1 INJECTION, SOLUTION INTRAVENOUS at 00:25

## 2023-01-01 RX ADMIN — CARBOXYMETHYLCELLULOSE SODIUM 1 DROP: 5 SOLUTION/ DROPS OPHTHALMIC at 21:41

## 2023-01-01 RX ADMIN — CEFAZOLIN SODIUM 2 G: 2 INJECTION, SOLUTION INTRAVENOUS at 18:33

## 2023-01-01 RX ADMIN — SODIUM BICARBONATE 650 MG TABLET 650 MG: at 20:37

## 2023-01-01 RX ADMIN — HYDROMORPHONE HYDROCHLORIDE 4 MG: 4 TABLET ORAL at 09:49

## 2023-01-01 RX ADMIN — PREGABALIN 100 MG: 100 CAPSULE ORAL at 14:53

## 2023-01-01 RX ADMIN — PANCRELIPASE 1 CAPSULE: 60000; 12000; 38000 CAPSULE, DELAYED RELEASE PELLETS ORAL at 16:53

## 2023-01-01 RX ADMIN — SODIUM CHLORIDE: 9 INJECTION, SOLUTION INTRAVENOUS at 23:09

## 2023-01-01 RX ADMIN — DEXTROSE MONOHYDRATE 25 G: 25 INJECTION, SOLUTION INTRAVENOUS at 15:41

## 2023-01-01 RX ADMIN — HYDROMORPHONE HYDROCHLORIDE 4 MG: 4 TABLET ORAL at 18:01

## 2023-01-01 RX ADMIN — SODIUM CHLORIDE: 9 INJECTION, SOLUTION INTRAVENOUS at 13:03

## 2023-01-01 RX ADMIN — HYDROMORPHONE HYDROCHLORIDE 4 MG: 4 TABLET ORAL at 22:22

## 2023-01-01 RX ADMIN — DIPHENOXYLATE HYDROCHLORIDE AND ATROPINE SULFATE 2 TABLET: 2.5; .025 TABLET ORAL at 14:26

## 2023-01-01 RX ADMIN — PANCRELIPASE 1 CAPSULE: 60000; 12000; 38000 CAPSULE, DELAYED RELEASE PELLETS ORAL at 09:46

## 2023-01-01 RX ADMIN — HYDROMORPHONE HYDROCHLORIDE 4 MG: 4 TABLET ORAL at 14:24

## 2023-01-01 RX ADMIN — CEFAZOLIN SODIUM 2 G: 2 INJECTION, SOLUTION INTRAVENOUS at 16:47

## 2023-01-01 RX ADMIN — SODIUM BICARBONATE 650 MG TABLET 650 MG: at 21:31

## 2023-01-01 RX ADMIN — Medication 1 CAPSULE: at 09:52

## 2023-01-01 RX ADMIN — HEPARIN SODIUM 5000 UNITS: 5000 INJECTION, SOLUTION INTRAVENOUS; SUBCUTANEOUS at 10:37

## 2023-01-01 RX ADMIN — PREGABALIN 100 MG: 100 CAPSULE ORAL at 09:54

## 2023-01-01 RX ADMIN — CEFAZOLIN SODIUM 2 G: 2 INJECTION, SOLUTION INTRAVENOUS at 16:23

## 2023-01-01 RX ADMIN — POLYETHYLENE GLYCOL AND PROPYLENE GLYCOL 2 DROP: 4; 3 SOLUTION/ DROPS OPHTHALMIC at 05:31

## 2023-01-01 RX ADMIN — PANTOPRAZOLE SODIUM 40 MG: 40 TABLET, DELAYED RELEASE ORAL at 20:34

## 2023-01-01 RX ADMIN — DIPHENHYDRAMINE HYDROCHLORIDE 25 MG: 25 CAPSULE ORAL at 05:49

## 2023-01-01 RX ADMIN — HYDROMORPHONE HYDROCHLORIDE 4 MG: 4 TABLET ORAL at 17:08

## 2023-01-01 RX ADMIN — DIPHENOXYLATE HYDROCHLORIDE AND ATROPINE SULFATE 2 TABLET: 2.5; .025 TABLET ORAL at 22:11

## 2023-01-01 RX ADMIN — PANCRELIPASE 1 CAPSULE: 60000; 12000; 38000 CAPSULE, DELAYED RELEASE PELLETS ORAL at 09:08

## 2023-01-01 RX ADMIN — PANTOPRAZOLE SODIUM 40 MG: 40 TABLET, DELAYED RELEASE ORAL at 22:22

## 2023-01-01 RX ADMIN — FAMOTIDINE 20 MG: 10 INJECTION INTRAVENOUS at 09:10

## 2023-01-01 RX ADMIN — Medication 1 CAPSULE: at 09:38

## 2023-01-01 RX ADMIN — VANCOMYCIN HYDROCHLORIDE 750 MG: 5 INJECTION, POWDER, LYOPHILIZED, FOR SOLUTION INTRAVENOUS at 00:05

## 2023-01-01 RX ADMIN — Medication 1 CAPSULE: at 10:37

## 2023-01-01 RX ADMIN — METOPROLOL SUCCINATE 50 MG: 50 TABLET, EXTENDED RELEASE ORAL at 09:38

## 2023-01-01 RX ADMIN — Medication 1 CAPSULE: at 21:32

## 2023-01-01 RX ADMIN — TRAZODONE HYDROCHLORIDE 150 MG: 100 TABLET ORAL at 21:26

## 2023-01-01 RX ADMIN — HEPARIN SODIUM 5000 UNITS: 5000 INJECTION, SOLUTION INTRAVENOUS; SUBCUTANEOUS at 09:38

## 2023-01-01 RX ADMIN — HYDROMORPHONE HYDROCHLORIDE 4 MG: 4 TABLET ORAL at 07:01

## 2023-01-01 RX ADMIN — PIPERACILLIN AND TAZOBACTAM 3.38 G: 3; .375 INJECTION, POWDER, LYOPHILIZED, FOR SOLUTION INTRAVENOUS at 04:47

## 2023-01-01 RX ADMIN — DIPHENHYDRAMINE HYDROCHLORIDE 50 MG: 25 CAPSULE ORAL at 16:30

## 2023-01-01 RX ADMIN — NITROFURANTOIN MACROCRYSTALS 50 MG: 50 CAPSULE ORAL at 20:37

## 2023-01-01 RX ADMIN — INFLUENZA A VIRUS A/VICTORIA/4897/2022 IVR-238 (H1N1) ANTIGEN (FORMALDEHYDE INACTIVATED), INFLUENZA A VIRUS A/DARWIN/9/2021 SAN-010 (H3N2) ANTIGEN (FORMALDEHYDE INACTIVATED), INFLUENZA B VIRUS B/PHUKET/3073/2013 ANTIGEN (FORMALDEHYDE INACTIVATED), AND INFLUENZA B VIRUS B/MICHIGAN/01/2021 ANTIGEN (FORMALDEHYDE INACTIVATED) 0.7 ML: 60; 60; 60; 60 INJECTION, SUSPENSION INTRAMUSCULAR at 10:46

## 2023-01-01 RX ADMIN — DULOXETINE 60 MG: 30 CAPSULE, DELAYED RELEASE ORAL at 22:22

## 2023-01-01 RX ADMIN — PANTOPRAZOLE SODIUM 40 MG: 40 TABLET, DELAYED RELEASE ORAL at 21:44

## 2023-01-01 RX ADMIN — SODIUM BICARBONATE 650 MG TABLET 650 MG: at 22:22

## 2023-01-01 RX ADMIN — HEPARIN SODIUM 5000 UNITS: 5000 INJECTION, SOLUTION INTRAVENOUS; SUBCUTANEOUS at 21:24

## 2023-01-01 RX ADMIN — PREGABALIN 100 MG: 100 CAPSULE ORAL at 09:38

## 2023-01-01 RX ADMIN — Medication 1 CAPSULE: at 09:08

## 2023-01-01 RX ADMIN — LIDOCAINE HYDROCHLORIDE 20 ML: 10 INJECTION, SOLUTION INFILTRATION; PERINEURAL at 14:01

## 2023-01-01 RX ADMIN — SODIUM CHLORIDE 5.5 UNITS: 9 INJECTION, SOLUTION INTRAVENOUS at 15:40

## 2023-01-01 RX ADMIN — PIPERACILLIN AND TAZOBACTAM 3.38 G: 3; .375 INJECTION, POWDER, LYOPHILIZED, FOR SOLUTION INTRAVENOUS at 13:06

## 2023-01-01 RX ADMIN — CARBOXYMETHYLCELLULOSE SODIUM 1 DROP: 5 SOLUTION/ DROPS OPHTHALMIC at 09:40

## 2023-01-01 RX ADMIN — Medication 400 MG: at 04:31

## 2023-01-01 RX ADMIN — MIDAZOLAM HYDROCHLORIDE 1 MG: 1 INJECTION, SOLUTION INTRAMUSCULAR; INTRAVENOUS at 13:51

## 2023-01-01 RX ADMIN — PANTOPRAZOLE SODIUM 40 MG: 40 TABLET, DELAYED RELEASE ORAL at 09:53

## 2023-01-01 RX ADMIN — HYDROMORPHONE HYDROCHLORIDE 4 MG: 4 TABLET ORAL at 18:53

## 2023-01-01 RX ADMIN — HYDROMORPHONE HYDROCHLORIDE 4 MG: 4 TABLET ORAL at 17:15

## 2023-01-01 RX ADMIN — CARBOXYMETHYLCELLULOSE SODIUM 1 DROP: 5 SOLUTION/ DROPS OPHTHALMIC at 10:04

## 2023-01-01 RX ADMIN — HYDROMORPHONE HYDROCHLORIDE 4 MG: 4 TABLET ORAL at 09:38

## 2023-01-01 RX ADMIN — SODIUM BICARBONATE 650 MG TABLET 650 MG: at 09:49

## 2023-01-01 RX ADMIN — SODIUM BICARBONATE 650 MG TABLET 650 MG: at 09:07

## 2023-01-01 RX ADMIN — CEFAZOLIN SODIUM 2 G: 2 INJECTION, SOLUTION INTRAVENOUS at 09:52

## 2023-01-01 RX ADMIN — PANTOPRAZOLE SODIUM 40 MG: 40 TABLET, DELAYED RELEASE ORAL at 20:25

## 2023-01-01 RX ADMIN — PREGABALIN 100 MG: 100 CAPSULE ORAL at 21:43

## 2023-01-01 RX ADMIN — HYDROMORPHONE HYDROCHLORIDE 4 MG: 4 TABLET ORAL at 11:33

## 2023-01-01 RX ADMIN — CEFAZOLIN SODIUM 2 G: 2 INJECTION, SOLUTION INTRAVENOUS at 00:30

## 2023-01-01 RX ADMIN — HEPARIN SODIUM 5000 UNITS: 5000 INJECTION, SOLUTION INTRAVENOUS; SUBCUTANEOUS at 08:58

## 2023-01-01 RX ADMIN — CEFAZOLIN SODIUM 2 G: 2 INJECTION, SOLUTION INTRAVENOUS at 16:03

## 2023-01-01 RX ADMIN — HYDROMORPHONE HYDROCHLORIDE 4 MG: 4 TABLET ORAL at 14:26

## 2023-01-01 RX ADMIN — SODIUM BICARBONATE 50 MEQ: 84 INJECTION INTRAVENOUS at 15:35

## 2023-01-01 RX ADMIN — METOPROLOL SUCCINATE 50 MG: 50 TABLET, EXTENDED RELEASE ORAL at 09:06

## 2023-01-01 RX ADMIN — DIPHENHYDRAMINE HYDROCHLORIDE 50 MG: 25 CAPSULE ORAL at 23:10

## 2023-01-01 RX ADMIN — HYDROMORPHONE HYDROCHLORIDE 4 MG: 4 TABLET ORAL at 14:08

## 2023-01-01 RX ADMIN — LIDOCAINE HYDROCHLORIDE 3 ML: 10 INJECTION, SOLUTION EPIDURAL; INFILTRATION; INTRACAUDAL; PERINEURAL at 13:00

## 2023-01-01 RX ADMIN — PREGABALIN 100 MG: 100 CAPSULE ORAL at 15:33

## 2023-01-01 RX ADMIN — IOPAMIDOL 75 ML: 755 INJECTION, SOLUTION INTRAVENOUS at 16:42

## 2023-01-01 RX ADMIN — PREGABALIN 100 MG: 100 CAPSULE ORAL at 14:24

## 2023-01-01 RX ADMIN — PANCRELIPASE 1 CAPSULE: 60000; 12000; 38000 CAPSULE, DELAYED RELEASE PELLETS ORAL at 14:53

## 2023-01-01 RX ADMIN — Medication 1 CAPSULE: at 09:50

## 2023-01-01 RX ADMIN — PANTOPRAZOLE SODIUM 40 MG: 40 TABLET, DELAYED RELEASE ORAL at 09:07

## 2023-01-01 RX ADMIN — POLYETHYLENE GLYCOL AND PROPYLENE GLYCOL 2 DROP: 4; 3 SOLUTION/ DROPS OPHTHALMIC at 15:14

## 2023-01-01 RX ADMIN — PANTOPRAZOLE SODIUM 40 MG: 40 TABLET, DELAYED RELEASE ORAL at 21:31

## 2023-01-01 RX ADMIN — HEPARIN SODIUM 5000 UNITS: 5000 INJECTION, SOLUTION INTRAVENOUS; SUBCUTANEOUS at 20:32

## 2023-01-01 RX ADMIN — LISINOPRIL 20 MG: 20 TABLET ORAL at 10:43

## 2023-01-01 RX ADMIN — POLYETHYLENE GLYCOL AND PROPYLENE GLYCOL 2 DROP: 4; 3 SOLUTION/ DROPS OPHTHALMIC at 10:55

## 2023-01-01 RX ADMIN — DIPHENHYDRAMINE HYDROCHLORIDE 25 MG: 25 CAPSULE ORAL at 12:11

## 2023-01-01 RX ADMIN — PANTOPRAZOLE SODIUM 40 MG: 40 TABLET, DELAYED RELEASE ORAL at 10:37

## 2023-01-01 RX ADMIN — SIMETHICONE 80 MG: 80 TABLET, CHEWABLE ORAL at 12:17

## 2023-01-01 RX ADMIN — HYDROMORPHONE HYDROCHLORIDE 4 MG: 4 TABLET ORAL at 10:36

## 2023-01-01 RX ADMIN — LIDOCAINE HYDROCHLORIDE,EPINEPHRINE BITARTRATE 20 ML: 10; .01 INJECTION, SOLUTION INFILTRATION; PERINEURAL at 14:02

## 2023-01-01 RX ADMIN — HYDROMORPHONE HYDROCHLORIDE 4 MG: 4 TABLET ORAL at 05:39

## 2023-01-01 RX ADMIN — PIPERACILLIN AND TAZOBACTAM 3.38 G: 3; .375 INJECTION, POWDER, LYOPHILIZED, FOR SOLUTION INTRAVENOUS at 13:39

## 2023-01-01 RX ADMIN — PANCRELIPASE 1 CAPSULE: 60000; 12000; 38000 CAPSULE, DELAYED RELEASE PELLETS ORAL at 18:27

## 2023-01-01 RX ADMIN — HYDROMORPHONE HYDROCHLORIDE 4 MG: 4 TABLET ORAL at 14:12

## 2023-01-01 RX ADMIN — HYDROMORPHONE HYDROCHLORIDE 4 MG: 4 TABLET ORAL at 21:25

## 2023-01-01 RX ADMIN — FAMOTIDINE 20 MG: 10 INJECTION INTRAVENOUS at 09:48

## 2023-01-01 RX ADMIN — PREGABALIN 100 MG: 100 CAPSULE ORAL at 09:06

## 2023-01-01 RX ADMIN — PREGABALIN 100 MG: 100 CAPSULE ORAL at 14:08

## 2023-01-01 RX ADMIN — HYDROMORPHONE HYDROCHLORIDE 4 MG: 4 TABLET ORAL at 22:11

## 2023-01-01 RX ADMIN — POLYETHYLENE GLYCOL AND PROPYLENE GLYCOL 2 DROP: 4; 3 SOLUTION/ DROPS OPHTHALMIC at 21:33

## 2023-01-01 RX ADMIN — PANCRELIPASE 1 CAPSULE: 60000; 12000; 38000 CAPSULE, DELAYED RELEASE PELLETS ORAL at 11:15

## 2023-01-01 RX ADMIN — PREGABALIN 100 MG: 100 CAPSULE ORAL at 21:26

## 2023-01-01 RX ADMIN — METOPROLOL SUCCINATE 25 MG: 25 TABLET, EXTENDED RELEASE ORAL at 10:38

## 2023-01-01 RX ADMIN — POTASSIUM CHLORIDE 40 MEQ: 1500 TABLET, EXTENDED RELEASE ORAL at 04:31

## 2023-01-01 RX ADMIN — POLYETHYLENE GLYCOL AND PROPYLENE GLYCOL 2 DROP: 4; 3 SOLUTION/ DROPS OPHTHALMIC at 19:43

## 2023-01-01 RX ADMIN — POLYETHYLENE GLYCOL AND PROPYLENE GLYCOL 2 DROP: 4; 3 SOLUTION/ DROPS OPHTHALMIC at 10:59

## 2023-01-01 RX ADMIN — Medication 1 CAPSULE: at 22:10

## 2023-01-01 RX ADMIN — SODIUM BICARBONATE 650 MG TABLET 650 MG: at 09:54

## 2023-01-01 RX ADMIN — SODIUM BICARBONATE 650 MG TABLET 650 MG: at 21:26

## 2023-01-01 RX ADMIN — HYDROMORPHONE HYDROCHLORIDE 4 MG: 4 TABLET ORAL at 10:30

## 2023-01-01 RX ADMIN — HEPARIN SODIUM 5000 UNITS: 5000 INJECTION, SOLUTION INTRAVENOUS; SUBCUTANEOUS at 20:25

## 2023-01-01 RX ADMIN — PANCRELIPASE 1 CAPSULE: 60000; 12000; 38000 CAPSULE, DELAYED RELEASE PELLETS ORAL at 09:06

## 2023-01-01 RX ADMIN — SODIUM CHLORIDE: 9 INJECTION, SOLUTION INTRAVENOUS at 18:32

## 2023-01-01 RX ADMIN — SODIUM BICARBONATE 650 MG TABLET 650 MG: at 09:39

## 2023-01-01 RX ADMIN — BUSPIRONE HYDROCHLORIDE 30 MG: 10 TABLET ORAL at 09:00

## 2023-01-01 RX ADMIN — SIMETHICONE 80 MG: 80 TABLET, CHEWABLE ORAL at 11:30

## 2023-01-01 RX ADMIN — DULOXETINE 60 MG: 30 CAPSULE, DELAYED RELEASE ORAL at 09:06

## 2023-01-01 RX ADMIN — PIPERACILLIN AND TAZOBACTAM 3.38 G: 3; .375 INJECTION, POWDER, LYOPHILIZED, FOR SOLUTION INTRAVENOUS at 23:43

## 2023-01-01 RX ADMIN — DIPHENHYDRAMINE HYDROCHLORIDE 25 MG: 25 CAPSULE ORAL at 09:53

## 2023-01-01 RX ADMIN — SODIUM BICARBONATE 650 MG TABLET 650 MG: at 09:52

## 2023-01-01 RX ADMIN — HEPARIN SODIUM 5000 UNITS: 5000 INJECTION, SOLUTION INTRAVENOUS; SUBCUTANEOUS at 09:51

## 2023-01-01 RX ADMIN — PREGABALIN 100 MG: 100 CAPSULE ORAL at 09:50

## 2023-01-01 RX ADMIN — POLYETHYLENE GLYCOL AND PROPYLENE GLYCOL 2 DROP: 4; 3 SOLUTION/ DROPS OPHTHALMIC at 22:11

## 2023-01-01 RX ADMIN — METOPROLOL SUCCINATE 25 MG: 25 TABLET, EXTENDED RELEASE ORAL at 09:49

## 2023-01-01 RX ADMIN — PANCRELIPASE 1 CAPSULE: 60000; 12000; 38000 CAPSULE, DELAYED RELEASE PELLETS ORAL at 12:48

## 2023-01-01 RX ADMIN — SODIUM CHLORIDE: 9 INJECTION, SOLUTION INTRAVENOUS at 01:00

## 2023-01-01 RX ADMIN — CEFAZOLIN SODIUM 2 G: 2 INJECTION, SOLUTION INTRAVENOUS at 09:47

## 2023-01-01 RX ADMIN — PROCHLORPERAZINE EDISYLATE 5 MG: 5 INJECTION INTRAMUSCULAR; INTRAVENOUS at 18:00

## 2023-01-01 RX ADMIN — SIMETHICONE 80 MG: 80 TABLET, CHEWABLE ORAL at 11:45

## 2023-01-01 RX ADMIN — HYDROMORPHONE HYDROCHLORIDE 4 MG: 4 TABLET ORAL at 13:52

## 2023-01-01 RX ADMIN — NITROFURANTOIN MACROCRYSTALS 50 MG: 50 CAPSULE ORAL at 21:44

## 2023-01-01 RX ADMIN — POLYETHYLENE GLYCOL AND PROPYLENE GLYCOL 2 DROP: 4; 3 SOLUTION/ DROPS OPHTHALMIC at 12:00

## 2023-01-01 RX ADMIN — POTASSIUM CHLORIDE 20 MEQ: 1500 TABLET, EXTENDED RELEASE ORAL at 06:54

## 2023-01-01 RX ADMIN — POLYETHYLENE GLYCOL AND PROPYLENE GLYCOL 2 DROP: 4; 3 SOLUTION/ DROPS OPHTHALMIC at 23:11

## 2023-01-01 RX ADMIN — LIDOCAINE HYDROCHLORIDE 2 ML: 10 INJECTION, SOLUTION EPIDURAL; INFILTRATION; INTRACAUDAL; PERINEURAL at 14:10

## 2023-01-01 RX ADMIN — HYDROMORPHONE HYDROCHLORIDE 4 MG: 4 TABLET ORAL at 06:02

## 2023-01-01 RX ADMIN — LISINOPRIL 20 MG: 20 TABLET ORAL at 09:38

## 2023-01-01 RX ADMIN — SODIUM CHLORIDE: 9 INJECTION, SOLUTION INTRAVENOUS at 10:57

## 2023-01-01 RX ADMIN — PREGABALIN 100 MG: 100 CAPSULE ORAL at 09:08

## 2023-01-01 RX ADMIN — NITROFURANTOIN MACROCRYSTALS 50 MG: 50 CAPSULE ORAL at 22:26

## 2023-01-01 RX ADMIN — LISINOPRIL 20 MG: 20 TABLET ORAL at 09:08

## 2023-01-01 RX ADMIN — POLYETHYLENE GLYCOL AND PROPYLENE GLYCOL 2 DROP: 4; 3 SOLUTION/ DROPS OPHTHALMIC at 17:15

## 2023-01-01 RX ADMIN — DIPHENOXYLATE HYDROCHLORIDE AND ATROPINE SULFATE 2 TABLET: 2.5; .025 TABLET ORAL at 09:08

## 2023-01-01 RX ADMIN — CEFAZOLIN SODIUM 2 G: 2 INJECTION, SOLUTION INTRAVENOUS at 00:25

## 2023-01-01 RX ADMIN — HYDROMORPHONE HYDROCHLORIDE 4 MG: 4 TABLET ORAL at 17:26

## 2023-01-01 RX ADMIN — HEPARIN SODIUM 5000 UNITS: 5000 INJECTION, SOLUTION INTRAVENOUS; SUBCUTANEOUS at 09:08

## 2023-01-01 RX ADMIN — SODIUM CHLORIDE 500 ML: 9 INJECTION, SOLUTION INTRAVENOUS at 16:46

## 2023-01-01 RX ADMIN — CEFAZOLIN SODIUM 2 G: 2 INJECTION, SOLUTION INTRAVENOUS at 23:47

## 2023-01-01 RX ADMIN — HYDROMORPHONE HYDROCHLORIDE 4 MG: 4 TABLET ORAL at 18:27

## 2023-01-01 RX ADMIN — POLYETHYLENE GLYCOL AND PROPYLENE GLYCOL 2 DROP: 4; 3 SOLUTION/ DROPS OPHTHALMIC at 16:25

## 2023-01-01 RX ADMIN — CEFAZOLIN SODIUM 2 G: 2 INJECTION, SOLUTION INTRAVENOUS at 07:53

## 2023-01-01 RX ADMIN — SODIUM CHLORIDE: 9 INJECTION, SOLUTION INTRAVENOUS at 03:44

## 2023-01-01 RX ADMIN — CEFAZOLIN SODIUM 2 G: 2 INJECTION, SOLUTION INTRAVENOUS at 09:07

## 2023-01-01 RX ADMIN — POLYETHYLENE GLYCOL AND PROPYLENE GLYCOL 2 DROP: 4; 3 SOLUTION/ DROPS OPHTHALMIC at 12:56

## 2023-01-01 RX ADMIN — Medication 1 CAPSULE: at 09:06

## 2023-01-01 RX ADMIN — CEFAZOLIN SODIUM 2 G: 2 INJECTION, SOLUTION INTRAVENOUS at 16:53

## 2023-01-01 RX ADMIN — TRAZODONE HYDROCHLORIDE 150 MG: 100 TABLET ORAL at 21:31

## 2023-01-01 RX ADMIN — VANCOMYCIN HYDROCHLORIDE 1000 MG: 1 INJECTION, SOLUTION INTRAVENOUS at 23:10

## 2023-01-01 RX ADMIN — POLYETHYLENE GLYCOL AND PROPYLENE GLYCOL 2 DROP: 4; 3 SOLUTION/ DROPS OPHTHALMIC at 10:57

## 2023-01-01 RX ADMIN — SODIUM CHLORIDE: 9 INJECTION, SOLUTION INTRAVENOUS at 21:40

## 2023-01-01 RX ADMIN — SODIUM BICARBONATE 650 MG TABLET 650 MG: at 20:25

## 2023-01-01 RX ADMIN — TRAZODONE HYDROCHLORIDE 150 MG: 100 TABLET ORAL at 21:45

## 2023-01-01 RX ADMIN — CARBOXYMETHYLCELLULOSE SODIUM 1 DROP: 5 SOLUTION/ DROPS OPHTHALMIC at 08:58

## 2023-01-01 RX ADMIN — PREGABALIN 100 MG: 100 CAPSULE ORAL at 13:52

## 2023-01-01 RX ADMIN — HYDROMORPHONE HYDROCHLORIDE 4 MG: 4 TABLET ORAL at 09:52

## 2023-01-01 RX ADMIN — PANTOPRAZOLE SODIUM 40 MG: 40 TABLET, DELAYED RELEASE ORAL at 09:38

## 2023-01-01 RX ADMIN — PROCHLORPERAZINE EDISYLATE 5 MG: 5 INJECTION INTRAMUSCULAR; INTRAVENOUS at 19:14

## 2023-01-01 RX ADMIN — HEPARIN SODIUM 5000 UNITS: 5000 INJECTION, SOLUTION INTRAVENOUS; SUBCUTANEOUS at 21:43

## 2023-01-01 RX ADMIN — HYDROMORPHONE HYDROCHLORIDE 4 MG: 4 TABLET ORAL at 15:33

## 2023-01-01 RX ADMIN — NITROFURANTOIN MACROCRYSTALS 50 MG: 50 CAPSULE ORAL at 20:26

## 2023-01-01 RX ADMIN — SODIUM CHLORIDE: 9 INJECTION, SOLUTION INTRAVENOUS at 22:23

## 2023-01-01 RX ADMIN — PANCRELIPASE 1 CAPSULE: 60000; 12000; 38000 CAPSULE, DELAYED RELEASE PELLETS ORAL at 16:32

## 2023-01-01 RX ADMIN — SIMETHICONE 80 MG: 80 TABLET, CHEWABLE ORAL at 06:02

## 2023-01-01 RX ADMIN — PANTOPRAZOLE SODIUM 40 MG: 40 TABLET, DELAYED RELEASE ORAL at 22:10

## 2023-01-01 RX ADMIN — PANTOPRAZOLE SODIUM 40 MG: 40 TABLET, DELAYED RELEASE ORAL at 21:26

## 2023-01-01 RX ADMIN — HEPARIN SODIUM 5000 UNITS: 5000 INJECTION, SOLUTION INTRAVENOUS; SUBCUTANEOUS at 10:26

## 2023-01-01 RX ADMIN — Medication 1 CAPSULE: at 09:51

## 2023-01-01 RX ADMIN — HEPARIN SODIUM 5000 UNITS: 5000 INJECTION, SOLUTION INTRAVENOUS; SUBCUTANEOUS at 21:32

## 2023-01-01 RX ADMIN — HYDROMORPHONE HYDROCHLORIDE 4 MG: 4 TABLET ORAL at 05:50

## 2023-01-01 RX ADMIN — AMLODIPINE BESYLATE 2.5 MG: 2.5 TABLET ORAL at 12:39

## 2023-01-01 RX ADMIN — PREGABALIN 100 MG: 100 CAPSULE ORAL at 22:10

## 2023-01-01 RX ADMIN — HYDROMORPHONE HYDROCHLORIDE 4 MG: 4 TABLET ORAL at 21:31

## 2023-01-01 RX ADMIN — SIMETHICONE 80 MG: 80 TABLET, CHEWABLE ORAL at 16:25

## 2023-01-01 RX ADMIN — PREGABALIN 100 MG: 100 CAPSULE ORAL at 14:02

## 2023-01-01 RX ADMIN — DIPHENHYDRAMINE HYDROCHLORIDE 50 MG: 25 CAPSULE ORAL at 14:24

## 2023-01-01 RX ADMIN — HYDROMORPHONE HYDROCHLORIDE 4 MG: 4 TABLET ORAL at 14:53

## 2023-01-01 RX ADMIN — HYDROMORPHONE HYDROCHLORIDE 4 MG: 4 TABLET ORAL at 06:54

## 2023-01-01 RX ADMIN — PANCRELIPASE 1 CAPSULE: 60000; 12000; 38000 CAPSULE, DELAYED RELEASE PELLETS ORAL at 12:17

## 2023-01-01 RX ADMIN — CARBOXYMETHYLCELLULOSE SODIUM 1 DROP: 5 SOLUTION/ DROPS OPHTHALMIC at 15:33

## 2023-01-01 RX ADMIN — Medication 400 MG: at 09:38

## 2023-01-01 RX ADMIN — PANTOPRAZOLE SODIUM 40 MG: 40 TABLET, DELAYED RELEASE ORAL at 09:50

## 2023-01-01 RX ADMIN — SODIUM CHLORIDE: 9 INJECTION, SOLUTION INTRAVENOUS at 03:12

## 2023-01-01 RX ADMIN — HYDROMORPHONE HYDROCHLORIDE 4 MG: 4 TABLET ORAL at 10:04

## 2023-01-01 RX ADMIN — CARBOXYMETHYLCELLULOSE SODIUM 1 DROP: 5 SOLUTION/ DROPS OPHTHALMIC at 14:27

## 2023-01-01 RX ADMIN — ACETAMINOPHEN 650 MG: 325 TABLET ORAL at 16:10

## 2023-01-01 RX ADMIN — SODIUM BICARBONATE 650 MG TABLET 650 MG: at 21:33

## 2023-01-01 RX ADMIN — PREGABALIN 100 MG: 100 CAPSULE ORAL at 09:53

## 2023-01-01 RX ADMIN — HYDROMORPHONE HYDROCHLORIDE 4 MG: 4 TABLET ORAL at 21:43

## 2023-01-01 RX ADMIN — Medication 1 CAPSULE: at 21:25

## 2023-01-01 RX ADMIN — CEFAZOLIN SODIUM 2 G: 2 INJECTION, SOLUTION INTRAVENOUS at 00:16

## 2023-01-01 RX ADMIN — HYDROMORPHONE HYDROCHLORIDE 4 MG: 4 TABLET ORAL at 14:02

## 2023-01-01 RX ADMIN — HEPARIN SODIUM 5000 UNITS: 5000 INJECTION, SOLUTION INTRAVENOUS; SUBCUTANEOUS at 22:11

## 2023-01-01 RX ADMIN — METOPROLOL SUCCINATE 25 MG: 25 TABLET, EXTENDED RELEASE ORAL at 09:53

## 2023-01-01 RX ADMIN — ONDANSETRON 4 MG: 4 TABLET, ORALLY DISINTEGRATING ORAL at 07:57

## 2023-01-01 RX ADMIN — SODIUM BICARBONATE 650 MG TABLET 650 MG: at 22:10

## 2023-01-01 RX ADMIN — CEFAZOLIN SODIUM 2 G: 2 INJECTION, SOLUTION INTRAVENOUS at 01:16

## 2023-01-01 RX ADMIN — SODIUM CHLORIDE 500 ML: 9 INJECTION, SOLUTION INTRAVENOUS at 08:49

## 2023-01-01 RX ADMIN — NITROFURANTOIN MACROCRYSTALS 50 MG: 50 CAPSULE ORAL at 21:31

## 2023-01-01 RX ADMIN — PANCRELIPASE 1 CAPSULE: 60000; 12000; 38000 CAPSULE, DELAYED RELEASE PELLETS ORAL at 09:53

## 2023-01-01 RX ADMIN — PANCRELIPASE 1 CAPSULE: 60000; 12000; 38000 CAPSULE, DELAYED RELEASE PELLETS ORAL at 10:26

## 2023-01-01 RX ADMIN — NITROFURANTOIN MACROCRYSTALS 50 MG: 50 CAPSULE ORAL at 22:50

## 2023-01-01 RX ADMIN — SODIUM BICARBONATE 650 MG TABLET 650 MG: at 09:05

## 2023-01-01 RX ADMIN — DEXTROSE MONOHYDRATE 300 ML: 100 INJECTION, SOLUTION INTRAVENOUS at 15:45

## 2023-01-01 RX ADMIN — PIPERACILLIN AND TAZOBACTAM 3.38 G: 3; .375 INJECTION, POWDER, LYOPHILIZED, FOR SOLUTION INTRAVENOUS at 05:05

## 2023-01-01 RX ADMIN — PERFLUTREN 2 ML: 6.52 INJECTION, SUSPENSION INTRAVENOUS at 08:38

## 2023-01-01 RX ADMIN — SODIUM BICARBONATE 650 MG TABLET 650 MG: at 09:04

## 2023-01-01 RX ADMIN — FAMOTIDINE 20 MG: 10 INJECTION INTRAVENOUS at 12:56

## 2023-01-01 RX ADMIN — Medication 1 CAPSULE: at 20:26

## 2023-01-01 RX ADMIN — CEFAZOLIN SODIUM 2 G: 2 INJECTION, SOLUTION INTRAVENOUS at 17:26

## 2023-01-01 RX ADMIN — PANCRELIPASE 1 CAPSULE: 60000; 12000; 38000 CAPSULE, DELAYED RELEASE PELLETS ORAL at 12:53

## 2023-01-01 RX ADMIN — SODIUM CHLORIDE: 9 INJECTION, SOLUTION INTRAVENOUS at 13:14

## 2023-01-01 RX ADMIN — POLYETHYLENE GLYCOL AND PROPYLENE GLYCOL 2 DROP: 4; 3 SOLUTION/ DROPS OPHTHALMIC at 18:45

## 2023-01-01 RX ADMIN — PROMETHAZINE HYDROCHLORIDE 25 MG: 25 TABLET ORAL at 13:48

## 2023-01-01 RX ADMIN — DIPHENOXYLATE HYDROCHLORIDE AND ATROPINE SULFATE 2 TABLET: 2.5; .025 TABLET ORAL at 20:25

## 2023-01-01 RX ADMIN — POLYETHYLENE GLYCOL AND PROPYLENE GLYCOL 2 DROP: 4; 3 SOLUTION/ DROPS OPHTHALMIC at 16:30

## 2023-01-01 RX ADMIN — LABETALOL HYDROCHLORIDE 5 MG: 5 INJECTION, SOLUTION INTRAVENOUS at 04:32

## 2023-01-01 RX ADMIN — PIPERACILLIN AND TAZOBACTAM 3.38 G: 3; .375 INJECTION, POWDER, LYOPHILIZED, FOR SOLUTION INTRAVENOUS at 20:26

## 2023-01-01 RX ADMIN — FENTANYL CITRATE 50 MCG: 50 INJECTION, SOLUTION INTRAMUSCULAR; INTRAVENOUS at 13:51

## 2023-01-01 RX ADMIN — PANCRELIPASE 1 CAPSULE: 60000; 12000; 38000 CAPSULE, DELAYED RELEASE PELLETS ORAL at 18:15

## 2023-01-01 RX ADMIN — SODIUM BICARBONATE 650 MG TABLET 650 MG: at 21:45

## 2023-01-01 RX ADMIN — CEFAZOLIN SODIUM 2 G: 2 INJECTION, SOLUTION INTRAVENOUS at 02:04

## 2023-01-01 RX ADMIN — TRAZODONE HYDROCHLORIDE 150 MG: 100 TABLET ORAL at 22:10

## 2023-01-01 RX ADMIN — HEPARIN SODIUM 5000 UNITS: 5000 INJECTION, SOLUTION INTRAVENOUS; SUBCUTANEOUS at 21:31

## 2023-01-01 RX ADMIN — PANCRELIPASE 1 CAPSULE: 60000; 12000; 38000 CAPSULE, DELAYED RELEASE PELLETS ORAL at 16:47

## 2023-01-01 RX ADMIN — LISINOPRIL 20 MG: 20 TABLET ORAL at 09:51

## 2023-01-01 RX ADMIN — SODIUM CHLORIDE: 9 INJECTION, SOLUTION INTRAVENOUS at 06:20

## 2023-01-01 RX ADMIN — PANCRELIPASE 1 CAPSULE: 60000; 12000; 38000 CAPSULE, DELAYED RELEASE PELLETS ORAL at 09:49

## 2023-01-01 RX ADMIN — PREGABALIN 100 MG: 100 CAPSULE ORAL at 21:31

## 2023-01-01 RX ADMIN — POLYETHYLENE GLYCOL AND PROPYLENE GLYCOL 2 DROP: 4; 3 SOLUTION/ DROPS OPHTHALMIC at 13:57

## 2023-01-01 RX ADMIN — PREGABALIN 100 MG: 100 CAPSULE ORAL at 10:36

## 2023-01-01 RX ADMIN — POLYETHYLENE GLYCOL AND PROPYLENE GLYCOL 2 DROP: 4; 3 SOLUTION/ DROPS OPHTHALMIC at 09:52

## 2023-01-01 ASSESSMENT — ANXIETY QUESTIONNAIRES
1. FEELING NERVOUS, ANXIOUS, OR ON EDGE: SEVERAL DAYS
6. BECOMING EASILY ANNOYED OR IRRITABLE: MORE THAN HALF THE DAYS
GAD7 TOTAL SCORE: 6
6. BECOMING EASILY ANNOYED OR IRRITABLE: SEVERAL DAYS
3. WORRYING TOO MUCH ABOUT DIFFERENT THINGS: SEVERAL DAYS
GAD7 TOTAL SCORE: 6
4. TROUBLE RELAXING: NOT AT ALL
4. TROUBLE RELAXING: SEVERAL DAYS
GAD7 TOTAL SCORE: 5
7. FEELING AFRAID AS IF SOMETHING AWFUL MIGHT HAPPEN: NOT AT ALL
6. BECOMING EASILY ANNOYED OR IRRITABLE: NOT AT ALL
5. BEING SO RESTLESS THAT IT IS HARD TO SIT STILL: SEVERAL DAYS
3. WORRYING TOO MUCH ABOUT DIFFERENT THINGS: NOT AT ALL
5. BEING SO RESTLESS THAT IT IS HARD TO SIT STILL: NOT AT ALL
1. FEELING NERVOUS, ANXIOUS, OR ON EDGE: SEVERAL DAYS
7. FEELING AFRAID AS IF SOMETHING AWFUL MIGHT HAPPEN: NOT AT ALL
2. NOT BEING ABLE TO STOP OR CONTROL WORRYING: SEVERAL DAYS
8. IF YOU CHECKED OFF ANY PROBLEMS, HOW DIFFICULT HAVE THESE MADE IT FOR YOU TO DO YOUR WORK, TAKE CARE OF THINGS AT HOME, OR GET ALONG WITH OTHER PEOPLE?: NOT DIFFICULT AT ALL
1. FEELING NERVOUS, ANXIOUS, OR ON EDGE: SEVERAL DAYS
GAD7 TOTAL SCORE: 4
IF YOU CHECKED OFF ANY PROBLEMS ON THIS QUESTIONNAIRE, HOW DIFFICULT HAVE THESE PROBLEMS MADE IT FOR YOU TO DO YOUR WORK, TAKE CARE OF THINGS AT HOME, OR GET ALONG WITH OTHER PEOPLE: NOT DIFFICULT AT ALL
3. WORRYING TOO MUCH ABOUT DIFFERENT THINGS: SEVERAL DAYS
2. NOT BEING ABLE TO STOP OR CONTROL WORRYING: SEVERAL DAYS
GAD7 TOTAL SCORE: 4
4. TROUBLE RELAXING: SEVERAL DAYS
8. IF YOU CHECKED OFF ANY PROBLEMS, HOW DIFFICULT HAVE THESE MADE IT FOR YOU TO DO YOUR WORK, TAKE CARE OF THINGS AT HOME, OR GET ALONG WITH OTHER PEOPLE?: SOMEWHAT DIFFICULT
7. FEELING AFRAID AS IF SOMETHING AWFUL MIGHT HAPPEN: NOT AT ALL
IF YOU CHECKED OFF ANY PROBLEMS ON THIS QUESTIONNAIRE, HOW DIFFICULT HAVE THESE PROBLEMS MADE IT FOR YOU TO DO YOUR WORK, TAKE CARE OF THINGS AT HOME, OR GET ALONG WITH OTHER PEOPLE: NOT DIFFICULT AT ALL
GAD7 TOTAL SCORE: 5
IF YOU CHECKED OFF ANY PROBLEMS ON THIS QUESTIONNAIRE, HOW DIFFICULT HAVE THESE PROBLEMS MADE IT FOR YOU TO DO YOUR WORK, TAKE CARE OF THINGS AT HOME, OR GET ALONG WITH OTHER PEOPLE: SOMEWHAT DIFFICULT
5. BEING SO RESTLESS THAT IT IS HARD TO SIT STILL: NOT AT ALL
GAD7 TOTAL SCORE: 6
GAD7 TOTAL SCORE: 4
2. NOT BEING ABLE TO STOP OR CONTROL WORRYING: MORE THAN HALF THE DAYS

## 2023-01-01 ASSESSMENT — ACTIVITIES OF DAILY LIVING (ADL)
ADLS_ACUITY_SCORE: 31
ADLS_ACUITY_SCORE: 31
ADLS_ACUITY_SCORE: 40
ADLS_ACUITY_SCORE: 39
PATIENT'S_PREFERRED_MEANS_OF_COMMUNICATION: ENGLISH SPEAKER WITH HEARING LOSS, NO SPEECH PROBLEMS.
ADLS_ACUITY_SCORE: 40
ADLS_ACUITY_SCORE: 31
ADLS_ACUITY_SCORE: 39
ADLS_ACUITY_SCORE: 32
WALKING_OR_CLIMBING_STAIRS: STAIR CLIMBING DIFFICULTY, DEPENDENT
ADLS_ACUITY_SCORE: 32
ADLS_ACUITY_SCORE: 34
ADLS_ACUITY_SCORE: 32
ADLS_ACUITY_SCORE: 39
ADLS_ACUITY_SCORE: 32
DIFFICULTY_EATING/SWALLOWING: NO
ADLS_ACUITY_SCORE: 32
CONCENTRATING,_REMEMBERING_OR_MAKING_DECISIONS_DIFFICULTY: YES
CHANGE_IN_FUNCTIONAL_STATUS_SINCE_ONSET_OF_CURRENT_ILLNESS/INJURY: YES
ADLS_ACUITY_SCORE: 34
ADLS_ACUITY_SCORE: 31
ADLS_ACUITY_SCORE: 39
HEARING_DIFFICULTY_OR_DEAF: YES
ADLS_ACUITY_SCORE: 32
ADLS_ACUITY_SCORE: 40
ADLS_ACUITY_SCORE: 34
ADLS_ACUITY_SCORE: 31
ADLS_ACUITY_SCORE: 31
ADLS_ACUITY_SCORE: 34
ADLS_ACUITY_SCORE: 31
ADLS_ACUITY_SCORE: 40
ADLS_ACUITY_SCORE: 39
ADLS_ACUITY_SCORE: 34
ADLS_ACUITY_SCORE: 40
ADLS_ACUITY_SCORE: 32
ADLS_ACUITY_SCORE: 32
ADLS_ACUITY_SCORE: 40
ADLS_ACUITY_SCORE: 40
DEPENDENT_IADLS:: TRANSPORTATION;MEDICATION MANAGEMENT
ADLS_ACUITY_SCORE: 31
ADLS_ACUITY_SCORE: 40
ADLS_ACUITY_SCORE: 34
ADLS_ACUITY_SCORE: 34
TOILETING_ISSUES: NO
ADLS_ACUITY_SCORE: 32
ADLS_ACUITY_SCORE: 34
ADLS_ACUITY_SCORE: 40
WEAR_GLASSES_OR_BLIND: YES
ADLS_ACUITY_SCORE: 39
ADLS_ACUITY_SCORE: 40
DIFFICULTY_COMMUNICATING: NO
DRESSING/BATHING_DIFFICULTY: NO
ADLS_ACUITY_SCORE: 40
ADLS_ACUITY_SCORE: 34
ADLS_ACUITY_SCORE: 39
ADLS_ACUITY_SCORE: 34
ADLS_ACUITY_SCORE: 31
ADLS_ACUITY_SCORE: 39
ADLS_ACUITY_SCORE: 49
ADLS_ACUITY_SCORE: 34
FALL_HISTORY_WITHIN_LAST_SIX_MONTHS: YES
ADLS_ACUITY_SCORE: 32
ADLS_ACUITY_SCORE: 34
NUMBER_OF_TIMES_PATIENT_HAS_FALLEN_WITHIN_LAST_SIX_MONTHS: 1
ADLS_ACUITY_SCORE: 37
ADLS_ACUITY_SCORE: 39
ADLS_ACUITY_SCORE: 31
ADLS_ACUITY_SCORE: 39
ADLS_ACUITY_SCORE: 35
ADLS_ACUITY_SCORE: 31
ADLS_ACUITY_SCORE: 39
ADLS_ACUITY_SCORE: 39
ADLS_ACUITY_SCORE: 32
ADLS_ACUITY_SCORE: 32
ADLS_ACUITY_SCORE: 31
DESCRIBE_HEARING_LOSS: HEARING LOSS ON LEFT SIDE
ADLS_ACUITY_SCORE: 32
ADLS_ACUITY_SCORE: 31
ADLS_ACUITY_SCORE: 37
ADLS_ACUITY_SCORE: 32
ADLS_ACUITY_SCORE: 34
ADLS_ACUITY_SCORE: 39
ADLS_ACUITY_SCORE: 32
ADLS_ACUITY_SCORE: 31
ADLS_ACUITY_SCORE: 34
ADLS_ACUITY_SCORE: 39
ADLS_ACUITY_SCORE: 34
ADLS_ACUITY_SCORE: 31
ADLS_ACUITY_SCORE: 40
ADLS_ACUITY_SCORE: 32
DOING_ERRANDS_INDEPENDENTLY_DIFFICULTY: NO
ADLS_ACUITY_SCORE: 31
ADLS_ACUITY_SCORE: 39
ADLS_ACUITY_SCORE: 39
ADLS_ACUITY_SCORE: 32
ADLS_ACUITY_SCORE: 31
ADLS_ACUITY_SCORE: 34
ADLS_ACUITY_SCORE: 32
CURRENT_FUNCTION: NO ASSISTANCE NEEDED
WALKING_OR_CLIMBING_STAIRS_DIFFICULTY: YES
ADLS_ACUITY_SCORE: 39
WERE_AUXILIARY_AIDS_OFFERED?: NO
ADLS_ACUITY_SCORE: 34
ADLS_ACUITY_SCORE: 39
ADLS_ACUITY_SCORE: 40
ADLS_ACUITY_SCORE: 31
ADLS_ACUITY_SCORE: 31
ADLS_ACUITY_SCORE: 40
ADLS_ACUITY_SCORE: 31
ADLS_ACUITY_SCORE: 40
ADLS_ACUITY_SCORE: 35
ADLS_ACUITY_SCORE: 35
ADLS_ACUITY_SCORE: 39

## 2023-01-01 ASSESSMENT — ASTHMA QUESTIONNAIRES
ACT_TOTALSCORE: 21
ACT_TOTALSCORE: 25
QUESTION_4 LAST FOUR WEEKS HOW OFTEN HAVE YOU USED YOUR RESCUE INHALER OR NEBULIZER MEDICATION (SUCH AS ALBUTEROL): NOT AT ALL
QUESTION_5 LAST FOUR WEEKS HOW WOULD YOU RATE YOUR ASTHMA CONTROL: COMPLETELY CONTROLLED
QUESTION_1 LAST FOUR WEEKS HOW MUCH OF THE TIME DID YOUR ASTHMA KEEP YOU FROM GETTING AS MUCH DONE AT WORK, SCHOOL OR AT HOME: NONE OF THE TIME
ACT_TOTALSCORE: 25
QUESTION_4 LAST FOUR WEEKS HOW OFTEN HAVE YOU USED YOUR RESCUE INHALER OR NEBULIZER MEDICATION (SUCH AS ALBUTEROL): NOT AT ALL
QUESTION_1 LAST FOUR WEEKS HOW MUCH OF THE TIME DID YOUR ASTHMA KEEP YOU FROM GETTING AS MUCH DONE AT WORK, SCHOOL OR AT HOME: NONE OF THE TIME
QUESTION_2 LAST FOUR WEEKS HOW OFTEN HAVE YOU HAD SHORTNESS OF BREATH: ONCE A DAY
QUESTION_3 LAST FOUR WEEKS HOW OFTEN DID YOUR ASTHMA SYMPTOMS (WHEEZING, COUGHING, SHORTNESS OF BREATH, CHEST TIGHTNESS OR PAIN) WAKE YOU UP AT NIGHT OR EARLIER THAN USUAL IN THE MORNING: NOT AT ALL
QUESTION_5 LAST FOUR WEEKS HOW WOULD YOU RATE YOUR ASTHMA CONTROL: WELL CONTROLLED
QUESTION_3 LAST FOUR WEEKS HOW OFTEN DID YOUR ASTHMA SYMPTOMS (WHEEZING, COUGHING, SHORTNESS OF BREATH, CHEST TIGHTNESS OR PAIN) WAKE YOU UP AT NIGHT OR EARLIER THAN USUAL IN THE MORNING: NOT AT ALL
QUESTION_2 LAST FOUR WEEKS HOW OFTEN HAVE YOU HAD SHORTNESS OF BREATH: NOT AT ALL
ACT_TOTALSCORE: 21

## 2023-01-01 ASSESSMENT — PATIENT HEALTH QUESTIONNAIRE - PHQ9
SUM OF ALL RESPONSES TO PHQ QUESTIONS 1-9: 4
10. IF YOU CHECKED OFF ANY PROBLEMS, HOW DIFFICULT HAVE THESE PROBLEMS MADE IT FOR YOU TO DO YOUR WORK, TAKE CARE OF THINGS AT HOME, OR GET ALONG WITH OTHER PEOPLE: NOT DIFFICULT AT ALL
SUM OF ALL RESPONSES TO PHQ QUESTIONS 1-9: 5
10. IF YOU CHECKED OFF ANY PROBLEMS, HOW DIFFICULT HAVE THESE PROBLEMS MADE IT FOR YOU TO DO YOUR WORK, TAKE CARE OF THINGS AT HOME, OR GET ALONG WITH OTHER PEOPLE: NOT DIFFICULT AT ALL
SUM OF ALL RESPONSES TO PHQ QUESTIONS 1-9: 3
SUM OF ALL RESPONSES TO PHQ QUESTIONS 1-9: 4
SUM OF ALL RESPONSES TO PHQ QUESTIONS 1-9: 5

## 2023-01-01 ASSESSMENT — PAIN SCALES - PAIN ENJOYMENT GENERAL ACTIVITY SCALE (PEG): INTERFERED_GENERAL_ACTIVITY: 8

## 2023-01-02 DIAGNOSIS — G89.4 PAIN SYNDROME, CHRONIC: ICD-10-CM

## 2023-01-02 DIAGNOSIS — M79.7 FIBROMYALGIA: ICD-10-CM

## 2023-01-02 DIAGNOSIS — F11.90 CHRONIC, CONTINUOUS USE OF OPIOIDS: Primary | ICD-10-CM

## 2023-01-03 RX ORDER — HYDROMORPHONE HYDROCHLORIDE 4 MG/1
4 TABLET ORAL
Qty: 150 TABLET | Refills: 0 | Status: SHIPPED | OUTPATIENT
Start: 2023-01-03 | End: 2023-02-02

## 2023-01-11 DIAGNOSIS — Z53.9 DIAGNOSIS NOT YET DEFINED: Primary | ICD-10-CM

## 2023-01-11 PROCEDURE — 99207 PR MD RECERTIFICATION HHA PT: CPT | Performed by: INTERNAL MEDICINE

## 2023-01-18 ENCOUNTER — TELEPHONE (OUTPATIENT)
Dept: INTERNAL MEDICINE | Facility: CLINIC | Age: 73
End: 2023-01-18
Payer: COMMERCIAL

## 2023-01-18 NOTE — TELEPHONE ENCOUNTER
The Home Care/Assisted Living/Nursing Facility is calling regarding an established patient.  Has the patient seen Home Care in the past or is currently residing in Assisted Living or Nursing Facility? Yes.     NADEEM Almazan calling from [facility] requesting the following orders that are within the Home Care, Assisted Living or Nursing Home Eval and Treatment standing order and can be signed as standing order signature required by RN.    Preferred Call Back Number: [phone], OK to leave detailed messages.    Home Care Visits Continuation   Skilled Nursing once a week for 9 weeks for port a cath dressing and needle change, monthly blood draws, and monthly B12 injections.  Also needing 2 PRN visits.    Any additional Orders:  Are there any orders requested, not stated above, that are outside of the standing order and must be routed to a licensed practitioner for approval?    No    Writer has verified Requestor will send fax to have orders signed.    DESIRAE PierceN, RN  [facility]

## 2023-01-19 DIAGNOSIS — F32.A DEPRESSION: ICD-10-CM

## 2023-01-20 RX ORDER — DULOXETIN HYDROCHLORIDE 60 MG/1
CAPSULE, DELAYED RELEASE ORAL
Qty: 180 CAPSULE | Refills: 3 | Status: SHIPPED | OUTPATIENT
Start: 2023-01-20 | End: 2024-01-01

## 2023-01-20 NOTE — TELEPHONE ENCOUNTER
"Routing refill request to provider for review/approval because:  phq 9    Last Written Prescription Date:  2/10/22  Last Fill Quantity: 180,  # refills: 3   Last office visit provider:  6/10/22     Requested Prescriptions   Pending Prescriptions Disp Refills     DULoxetine (CYMBALTA) 60 MG capsule [Pharmacy Med Name: DULOXETINE DR 60MG CAPSULES] 180 capsule 3     Sig: TAKE 1 CAPSULE BY MOUTH TWICE DAILY       Serotonin-Norepinephrine Reuptake Inhibitors  Failed - 1/19/2023  6:04 AM        Failed - PHQ-9 score of less than 5 in past 6 months     Please review last PHQ-9 score.           Failed - Recent (6 mo) or future (30 days) visit within the authorizing provider's specialty     Patient had office visit in the last 6 months or has a visit in the next 30 days with authorizing provider or within the authorizing provider's specialty.  See \"Patient Info\" tab in inbasket, or \"Choose Columns\" in Meds & Orders section of the refill encounter.            Passed - Blood pressure under 140/90 in past 12 months     BP Readings from Last 3 Encounters:   06/10/22 126/70   02/18/22 (!) 144/84   05/17/21 132/80                 Passed - Medication is active on med list        Passed - Patient is age 18 or older        Passed - No active pregnancy on record        Passed - No positive pregnancy test in past 12 months             Cristin Fishman RN 01/20/23 9:18 AM  "

## 2023-01-23 ENCOUNTER — MEDICAL CORRESPONDENCE (OUTPATIENT)
Dept: HEALTH INFORMATION MANAGEMENT | Facility: CLINIC | Age: 73
End: 2023-01-23

## 2023-01-30 NOTE — TELEPHONE ENCOUNTER
TERRY Mares calling to let PCP know  She still hasn't gotten the B-12. She will get it next week.      Petty Evans on 1/30/2023 at 2:47 PM

## 2023-02-02 DIAGNOSIS — G89.4 PAIN SYNDROME, CHRONIC: ICD-10-CM

## 2023-02-02 DIAGNOSIS — F11.90 CHRONIC, CONTINUOUS USE OF OPIOIDS: ICD-10-CM

## 2023-02-02 RX ORDER — HYDROMORPHONE HYDROCHLORIDE 4 MG/1
4 TABLET ORAL
Qty: 150 TABLET | Refills: 0 | Status: SHIPPED | OUTPATIENT
Start: 2023-02-02 | End: 2023-03-06

## 2023-02-13 NOTE — TELEPHONE ENCOUNTER
Reason for Call:  Other call back and prescription    Detailed comments: Jacki with [facility] pharmacy called stating patient prescribed nitrofurantoin (MACRODANTIN) 50 MG capsule and it was close out by . Patient is not sure why, and is looking for some clarification if she should be taking it out not, please advise.    Phone Number Patient can be reached at: Home number on file [phone] (home)    Best Time: Any    Can we leave a detailed message on this number? YES    Call taken on 3/28/2022 at 8:14 AM by Faviola Beckman       Eucrisa Counseling: Patient may experience a mild burning sensation during topical application. Eucrisa is not approved in children less than 2 years of age.

## 2023-02-22 DIAGNOSIS — M79.7 FIBROMYALGIA: ICD-10-CM

## 2023-02-23 RX ORDER — PROMETHAZINE HYDROCHLORIDE 25 MG/1
TABLET ORAL
Qty: 30 TABLET | Refills: 3 | Status: SHIPPED | OUTPATIENT
Start: 2023-02-23

## 2023-02-23 NOTE — TELEPHONE ENCOUNTER
"Last Written Prescription Date:  9/18/2021  Last Fill Quantity: 30,  # refills: 3   Last office visit provider:  6/10/2022     Requested Prescriptions   Pending Prescriptions Disp Refills     promethazine (PHENERGAN) 25 MG tablet [Pharmacy Med Name: PROMETHAZINE 25MG TABLETS] 30 tablet 3     Sig: TAKE 1 TABLET(25 MG) BY MOUTH EVERY 6 HOURS AS NEEDED FOR NAUSEA        Antivertigo/Antiemetic Agents Passed - 2/22/2023  9:41 AM        Passed - Recent (12 mo) or future (30 days) visit within the authorizing provider's specialty     Patient has had an office visit with the authorizing provider or a provider within the authorizing providers department within the previous 12 mos or has a future within next 30 days. See \"Patient Info\" tab in inbasket, or \"Choose Columns\" in Meds & Orders section of the refill encounter.              Passed - Medication is active on med list        Passed - Patient is 18 years of age or older             Azalea Cain RN 02/23/23 2:45 PM  "

## 2023-03-06 DIAGNOSIS — G89.4 PAIN SYNDROME, CHRONIC: ICD-10-CM

## 2023-03-06 DIAGNOSIS — F11.90 CHRONIC, CONTINUOUS USE OF OPIOIDS: ICD-10-CM

## 2023-03-06 DIAGNOSIS — M79.7 FIBROMYALGIA: ICD-10-CM

## 2023-03-06 RX ORDER — PREGABALIN 200 MG/1
200 CAPSULE ORAL 3 TIMES DAILY
Qty: 270 CAPSULE | Refills: 1 | Status: SHIPPED | OUTPATIENT
Start: 2023-03-06 | End: 2023-01-01

## 2023-03-06 RX ORDER — HYDROMORPHONE HYDROCHLORIDE 4 MG/1
4 TABLET ORAL
Qty: 150 TABLET | Refills: 0 | Status: SHIPPED | OUTPATIENT
Start: 2023-03-06 | End: 2023-01-01

## 2023-03-06 NOTE — TELEPHONE ENCOUNTER
Medication Question or Refill    Contacts       Type Contact Phone/Fax    03/06/2023 11:17 AM CST Phone (Incoming) Dee Mattson (Self) [phone] (M)          What medication are you calling about (include dose and sig)?: Dilaudid & Pregabalin    Preferred Pharmacy:   [id number] [place], [place] - [address] AT [facility] [address]  [address]  [facility] [address]  Phone: [phone] Fax: [phone]      Controlled Substance Agreement on file:   CSA -- Patient Level:     [Media Unavailable] Controlled Substance Agreement - Opioid - Scan on 11/30/2018   [Media Unavailable] Controlled Substance Agreement - Opioid - Scan on 11/30/2018   [Media Unavailable] Controlled Substance Agreement - Opioid - Scan on 11/1/2017       Who prescribed the medication?: Dr. Caldwell    Do you need a refill? Yes    Okay to leave a detailed message?: Yes at Cell number on file:    Telephone Information:   Mobile [phone]

## 2023-03-15 ENCOUNTER — TELEPHONE (OUTPATIENT)
Dept: NURSING | Facility: CLINIC | Age: 73
End: 2023-03-15
Payer: COMMERCIAL

## 2023-03-15 NOTE — TELEPHONE ENCOUNTER
Emily with [facility] - Needs annual orders for this pt as they will  in 2 days.  States she faxed request yesterday and prior to that.  Concerned as this expires in 2 days.      [phone] Option #2:  Ask for a GREEN TEAM PHARMACIST    Can take a verbal if needed.       FAX:  [phone] - if easier, okay to fax orders to this number.     Renewed Orders requested:      Daily:  Pt gets 2000ml Normal Saline + magnesium Sulfate 1.6g infused daily IV over 12 hours.    IV Hydration order:  Normal Saline 1000ml IV 3x/week PRN dehydration.     Monthly Labs:  Creatinine & Magnesium    Writer read back these orders to caller.      Debora Morin RN  [facility] Triage Nurse Advisor   3/15/2023 3:03 PM

## 2023-03-20 ENCOUNTER — MEDICAL CORRESPONDENCE (OUTPATIENT)
Dept: HEALTH INFORMATION MANAGEMENT | Facility: CLINIC | Age: 73
End: 2023-03-20

## 2023-03-20 DIAGNOSIS — I49.1 PREMATURE ATRIAL CONTRACTIONS: ICD-10-CM

## 2023-03-20 RX ORDER — METOPROLOL SUCCINATE 25 MG/1
TABLET, EXTENDED RELEASE ORAL
Qty: 90 TABLET | Refills: 0 | Status: SHIPPED | OUTPATIENT
Start: 2023-03-20 | End: 2023-01-01

## 2023-03-20 NOTE — TELEPHONE ENCOUNTER
"Last Written Prescription Date:  2/21/2022  Last Fill Quantity: 90,  # refills: 3   Last office visit provider:  6/10/2022     Requested Prescriptions   Pending Prescriptions Disp Refills     metoprolol succinate ER (TOPROL XL) 25 MG 24 hr tablet [Pharmacy Med Name: METOPROLOL ER SUCCINATE 25MG TABS] 90 tablet 3     Sig: TAKE 1 TABLET(25 MG) BY MOUTH DAILY       Beta-Blockers Protocol Passed - 3/20/2023  6:04 AM        Passed - Blood pressure under 140/90 in past 12 months     BP Readings from Last 3 Encounters:   06/10/22 126/70   02/18/22 (!) 144/84   05/17/21 132/80                 Passed - Patient is age 6 or older        Passed - Recent (12 mo) or future (30 days) visit within the authorizing provider's specialty     Patient has had an office visit with the authorizing provider or a provider within the authorizing providers department within the previous 12 mos or has a future within next 30 days. See \"Patient Info\" tab in inbasket, or \"Choose Columns\" in Meds & Orders section of the refill encounter.              Passed - Medication is active on med list             ELISA ATKINSON RN 03/20/23 2:18 PM  "

## 2023-03-22 ENCOUNTER — TELEPHONE (OUTPATIENT)
Dept: INTERNAL MEDICINE | Facility: CLINIC | Age: 73
End: 2023-03-22
Payer: COMMERCIAL

## 2023-03-22 NOTE — TELEPHONE ENCOUNTER
Order/Referral Request    Who is requesting: Ivy SILVER from [facility]     Orders being requested: Skilled nursing 1 time a week for 8 weeks.  Monthly B12 Injections and Monthly Lab draws for Magnesium and Creatinine/GFR    Reason service is needed/diagnosis: Carlos cath Needle and Dressings    When are orders needed by: as soon as able      Where to send orders: Please call Verbal Order to [phone] Detailed message stephania STEWART - RN was unable to give B12 Injection today, as patient did not  from pharmacy.

## 2023-03-29 NOTE — TELEPHONE ENCOUNTER
Routing refill request to provider for review/approval because:  LOV 6/10/2022         NADEEM Soto  [facility]

## 2023-04-19 NOTE — TELEPHONE ENCOUNTER
4-19-23    FYI - Status Update    Who is Calling: Therese @ [facility]    Update: therese called with FYI on becky.  Pt is currently in-patient @ [facility]   Admin: 4-19-23  DX: hyperKalemia & neck swelling    Does caller want a call/response back: No- just a TERRY villegas

## 2023-04-20 NOTE — TELEPHONE ENCOUNTER
Please see TE encounter dated 4.19.23  Mg drawn on 4.19.23 1.5  Cr drawn on 3.22.23 1.05    Justina calling to see if Dr. Caldwell would like to continue CR, MG labs drawn.   Previously done monthly    Justina will be seeing pt tomorrow would like guidance, or if PCP wants other labs drawn    Verbal Order OK  Message will be routed to PCP for review    Emmie GROVER RN  [facility]

## 2023-04-21 NOTE — TELEPHONE ENCOUNTER
"Chandrika, pharmacist[facility]   Requesting renewed hydration orders.    Orders requested:   Daily:  Pt gets 2000ml Normal Saline + magnesium Sulfate 1.6g infused daily IV over 12 hours.    IV Hydration order:  Normal Saline 1000ml IV 3x/week PRN dehydration.     Chandrika would like call back with verbal order if approved.   Will route to PCP to review/advise.     If approved: RN team to call [phone], option 2. Request to speak with Chandrika from \"[facility] Team Pharmacist\".     Thank you,   Azalea Evans RN, BSN  [facility]    "

## 2023-04-21 NOTE — TELEPHONE ENCOUNTER
Relayed ok per Dr. Caldwell to Chandrika.     No further questions.    Chandrika states that they will send paperwork Monday 4/24

## 2023-05-01 NOTE — PROGRESS NOTES
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  Lab draws need to be done through her nurse as she has a port in place.  We will recheck a BMP at her next lab visit.    Chronic sacral/coccygeal pain.  Again I will refer her to the pain center for further evaluation    Hypertension.  Blood pressure increased at home.  Restart lisinopril and metoprolol.    Short bowel syndrome.  Prescription for Lomotil was sent in.    Bhupendra Caldwell MD  [facility]     Dee comes in today for follow-up of a hospitalization for hyperkalemia.  She presented to [facility] on 4/19 with generalized weakness and neck swelling.  CT of the neck showed nonspecific edema/fat stranding in the supraclavicular, axillary, and submandibular region.  She had a normal white count.  BMP showed hyperkalemia with potassium of 6.3.  Admitted and treated with sodium bicarb.  Potassium came down well.  Started on oral bicarbonate on discharge.  In regards to the neck issues she was diagnosed with sialadenitis and was started on Augmentin for this.  Discharged on 4/20.    States that she is feeling better although she has been having some worsening chronic problems recently.  She has chronic sacral and coccygeal pain and states that this has been getting worse over the last couple of months.  She is currently on a total of 20 mg of hydrocodone per day for this along with her fibromyalgia.  She has been on this dosage for a number of years.  In regards to the coccygeal pain she underwent injections of this area years ago which have not helped.  I recommended a referral to the pain  clinic to see if there would be other treatment options available which she is agreeable to.    Blood pressures have been in the 1 60-1 70 range at home since discharge.  Her lisinopril and metoprolol were stopped due to hypotension in the hospital.  She is wondering if she can go back on these.    She has been having more output from her ostomy and states that she has had to empty her bag about 11 times per day.  She had been on Lomotil for this but stopped taking it as she was concerned about causing too little stool output.  She would like to try it again and requested prescription today.          Objective    There were no vitals taken for this visit.  There is no height or weight on file to calculate BMI.  Physical Exam           Answers for HPI/ROS submitted by the patient on 5/1/2023  If you checked off any problems, how difficult have these problems made it for you to do your work, take care of things at home, or get along with other people?: Not difficult at all  PHQ9 TOTAL SCORE: 5  DEV 7 TOTAL SCORE: 4

## 2023-05-09 NOTE — TELEPHONE ENCOUNTER
Writer is reaching out to the patient because PCP is leaving the organization in the coming months. Care team is trying to get as many scheduled as we can to help avoid a gap in care related to their pain management needs.    Patient Quality Outreach    Patient is due for the following:   Chronic Opioid Use -  Treatment Agreement (CSA) and Urine Drug Screen    Next Steps:   Schedule a office visit for chronic pain management    Type of outreach:    Phone, left message for patient/parent to call back.      Questions for provider review:    None           Arleth Garcia MA

## 2023-05-10 NOTE — TELEPHONE ENCOUNTER
Medication Question or Refill    Contacts       Type Contact Phone/Fax    05/10/2023 10:18 AM CDT Phone (Incoming) Dee Mattson (Self) [phone] (M)    05/10/2023 10:19 AM CDT Phone (Incoming) Dee Mattson (Self) [phone] (M)        What medication are you calling about (include dose and sig)?:   HYDROmorphone (DILAUDID) 4 MG tablet    Preferred Pharmacy:   [id number] [place], [place] - [address] AT [facility] [address]  [address]  [facility] [address]  Phone: [phone] Fax: [phone]      Controlled Substance Agreement on file:   CSA -- Patient Level:     [Media Unavailable] Controlled Substance Agreement - Opioid - Scan on 11/30/2018   [Media Unavailable] Controlled Substance Agreement - Opioid - Scan on 11/30/2018   [Media Unavailable] Controlled Substance Agreement - Opioid - Scan on 11/1/2017       Who prescribed the medication?: Dr Bhupendra Caldwell    Do you need a refill? Yes have enough until end of week    When did you use the medication last? today    Patient offered an appointment? Yes: Will establish care with Attila Mccall    Do you have any questions or concerns?  No      Okay to leave a detailed message?: Yes at Cell number on file:    Telephone Information:   Mobile [phone]

## 2023-05-19 NOTE — TELEPHONE ENCOUNTER
Order/Referral Request    Who is requesting: Homecare    Orders being requested: Verbal orders for skilled nursing 1X week for 9 weeks.      Reason service is needed/diagnosis: Cath change, Needle change and dressing, B12 injection and labs. Homecare took off Zofran on med list    When are orders needed by: asap    Has this been discussed with Provider: Yes    Does patient have a preference on a Group/Provider/Facility? n    Does patient have an appointment scheduled?: No    Where to send orders: [facility] orders    Okay to leave a detailed message?: Yes at Other phone number:  [phone]

## 2023-05-23 PROBLEM — Z79.899 CONTROLLED SUBSTANCE AGREEMENT SIGNED: Status: ACTIVE | Noted: 2023-01-01

## 2023-05-23 PROBLEM — G89.29 CHRONIC COCCYGEAL PAIN: Status: ACTIVE | Noted: 2023-01-01

## 2023-05-23 PROBLEM — M53.3 CHRONIC COCCYGEAL PAIN: Status: ACTIVE | Noted: 2023-01-01

## 2023-05-23 NOTE — LETTER
Opioid / Opioid Plus Controlled Substance Agreement    This is an agreement between you and your provider about the safe and appropriate use of controlled substance/opioids prescribed by your care team. Controlled substances are medicines that can cause physical and mental dependence (abuse).    There are strict laws about having and using these medicines. We here at [facility] are committing to working with you in your efforts to get better. To support you in this work, we ll help you schedule regular office appointments for medicine refills. If we must cancel or change your appointment for any reason, we ll make sure you have enough medicine to last until your next appointment.     As a Provider, I will:  Listen carefully to your concerns and treat you with respect.   Recommend a treatment plan that I believe is in your best interest. This plan may involve therapies other than opioid pain medication.   Talk with you often about the possible benefits, and the risk of harm of any medicine that we prescribe for you.   Provide a plan on how to taper (discontinue or go off) using this medicine if the decision is made to stop its use.    As a Patient, I understand that opioid(s):   Are a controlled substance prescribed by my care team to help me function or work and manage my condition(s).   Are strong medicines and can cause serious side effects such as:  Drowsiness, which can seriously affect my driving ability  A lower breathing rate, enough to cause death  Harm to my thinking ability   Depression   Abuse of and addiction to this medicine  Need to be taken exactly as prescribed. Combining opioids with certain medicines or chemicals (such as illegal drugs, sedatives, sleeping pills, and benzodiazepines) can be dangerous or even fatal. If I stop opioids suddenly, I may have severe withdrawal symptoms.  Do not work for all types of pain nor for all patients. If they re not helpful, I may be asked to stop  them.        The risks, benefits and side effects of these medicine(s) were explained to me. I agree that:  I will take part in other treatments as advised by my care team. This may be psychiatry or counseling, physical therapy, behavioral therapy, group treatment or a referral to a specialist.     I will keep all my appointments. I understand that this is part of the monitoring of opioids. My care team may require an office visit for EVERY opioid/controlled substance refill. If I miss appointments or don t follow instructions, my care team may stop my medicine.    I will take my medicines as prescribed. I will not change the dose or schedule unless my care team tells me to. There will be no refills if I run out early.     I may be asked to come to the clinic and complete a urine drug test or complete a pill count at any time. If I don t give a urine sample or participate in a pill count, the care team may stop my medicine.    I will only receive prescriptions from this clinic for chronic pain. If I am treated by another provider for acute pain issues, I will tell them that I am taking opioid pain medication for chronic pain and that I have a treatment agreement with this provider. I will inform my [facility] care team within one business day if I am given a prescription for any pain medication by another healthcare provider. My [facility] care team can contact other providers and pharmacists about my use of any medicines.    It is up to me to make sure that I don t run out of my medicines on weekends or holidays. If my care team is willing to refill my opioid prescription without a visit, I must request refills only during office hours. Refills may take up to 3 business days to process. I will use one pharmacy to fill all my opioid and other controlled substance prescriptions. I will notify the clinic about any changes to my insurance or medication availability.    I am responsible for my  prescriptions. If the medicine/prescription is lost, stolen or destroyed, it will not be replaced. I also agree not to share controlled substance medicines with anyone.    I am aware I should not use any illegal or recreational drugs. I agree not to drink alcohol unless my care team says I can.       If I enroll in the [place] [facility] Cannabis program, I will tell my care team prior to my next refill.     I will tell my care team right away if I become pregnant, have a new medical problem treated outside of my regular clinic, or have a change in my medications.    I understand that this medicine can affect my thinking, judgment and reaction time. Alcohol and drugs affect the brain and body, which can affect the safety of my driving. Being under the influence of alcohol or drugs can affect my decision-making, behaviors, personal safety, and the safety of others. Driving while impaired (DWI) can occur if a person is driving, operating, or in physical control of a car, motorcycle, boat, snowmobile, ATV, motorbike, off-road vehicle, or any other motor vehicle ([place] Statute 169A.20). I understand the risk if I choose to drive or operate any vehicle or machinery.    I understand that if I do not follow any of the conditions above, my prescriptions or treatment may be stopped or changed.          Opioids  What You Need to Know    What are opioids?   Opioids are pain medicines that must be prescribed by a doctor. They are also known as narcotics.     Examples are:   morphine (MS Contin, Hailey)  oxycodone (Oxycontin)  oxycodone and acetaminophen (Percocet)  hydrocodone and acetaminophen (Vicodin, Norco)   fentanyl patch (Duragesic)   hydromorphone (Dilaudid)   methadone  codeine (Tylenol #3)     What do opioids do well?   Opioids are best for severe short-term pain such as after a surgery or injury. They may work well for cancer pain. They may help some people with long-lasting (chronic) pain.     What do opioids NOT do  well?   Opioids never get rid of pain entirely, and they don t work well for most patients with chronic pain. Opioids don t reduce swelling, one of the causes of pain.                                    Other ways to manage chronic pain and improve function include:     Treat the health problem that may be causing pain  Anti-inflammation medicines, which reduce swelling and tenderness, such as ibuprofen (Advil, Motrin) or naproxen (Aleve)  Acetaminophen (Tylenol)  Antidepressants and anti-seizure medicines, especially for nerve pain  Topical treatments such as patches or creams  Injections or nerve blocks  Chiropractic or osteopathic treatment  Acupuncture, massage, deep breathing, meditation, visual imagery, aromatherapy  Use heat or ice at the pain site  Physical therapy   Exercise  Stop smoking  Take part in therapy       Risks and side effects     Talk to your doctor before you start or decide to keep taking opioids. Possible side effects include:    Lowering your breathing rate enough to cause death  Overdose, including death, especially if taking higher than prescribed doses  Worse depression symptoms; less pleasure in things you usually enjoy  Feeling tired or sluggish  Slower thoughts or cloudy thinking  Being more sensitive to pain over time; pain is harder to control  Trouble sleeping or restless sleep  Changes in hormone levels (for example, less testosterone)  Changes in sex drive or ability to have sex  Constipation  Unsafe driving  Itching and sweating  Dizziness  Nausea, throwing up and dry mouth    What else should I know about opioids?    Opioids may lead to dependence, tolerance, or addiction.    Dependence means that if you stop or reduce the medicine too quickly, you will have withdrawal symptoms. These include loose poop (diarrhea), jitters, flu-like symptoms, nervousness and tremors. Dependence is not the same as addiction.                     Tolerance means needing higher doses over time to  get the same effect. This may increase the chance of serious side effects.    Addiction is when people improperly use a substance that harms their body, their mind or their relations with others. Use of opiates can cause a relapse of addiction if you have a history of drug or alcohol abuse.    People who have used opioids for a long time may have a lower quality of life, worse depression, higher levels of pain and more visits to doctors.    You can overdose on opioids. Take these steps to lower your risk of overdose:    Recognize the signs:  Signs of overdose include decrease or loss of consciousness (blackout), slowed breathing, trouble waking up and blue lips. If someone is worried about overdose, they should call 911.    Talk to your doctor about Narcan (naloxone).   If you are at risk for overdose, you may be given a prescription for Narcan. This medicine very quickly reverses the effects of opioids.   If you overdose, a friend or family member can give you Narcan while waiting for the ambulance. They need to know the signs of overdose and how to give Narcan.     Don't use alcohol or street drugs.   Taking them with opioids can cause death.    Do not take any of these medicines unless your doctor says it s OK. Taking these with opioids can cause death:  Benzodiazepines, such as lorazepam (Ativan), alprazolam (Xanax) or diazepam (Valium)  Muscle relaxers, such as cyclobenzaprine (Flexeril)  Sleeping pills like zolpidem (Ambien)   Other opioids      How to keep you and other people safe while taking opioids:    Never share your opioids with others.  Opioid medicines are regulated by the Drug Enforcement Agency ([place]). Selling or sharing medications is a criminal act.    2. Be sure to store opioids in a secure place, locked up if possible. Young children can easily swallow them and overdose.    3. When you are traveling with your medicines, keep them in the original bottles. If you use a pill box, be sure you also  carry a copy of your medicine list from your clinic or pharmacy.    4. Safe disposal of opioids    Most pharmacies have places to get rid of medicine, called disposal kiosks. Medicine disposal options are also available in every [facility]. Search your county and  medication disposal  to find more options. You can find more details at:  https://www.pca.[facility].[place]./living-green/managing-unwanted-medications     I agree that my provider, clinic care team, and pharmacy may work with any city, state or federal law enforcement agency that investigates the misuse, sale, or other diversion of my controlled medicine. I will allow my provider to discuss my care with, or share a copy of, this agreement with any other treating provider, pharmacy or emergency room where I receive care.    I have read this agreement and have asked questions about anything I did not understand.    _______________________________________________________  Patient Signature - Dee Mattson _____________________                   Date     _______________________________________________________  Provider Signature - Duke Mccall, CNP   _____________________                   Date     _______________________________________________________  Witness Signature (required if provider not present while patient signing)   _____________________                   Date

## 2023-05-23 NOTE — PATIENT INSTRUCTIONS
Controlled substance agreement signed.  We will need periodic follow-up appointments to discuss your chronic pain medications.  Typically, every 3 to 6 months.    Pneumococcal vaccine administered today.    First shingles shot today.  You will need another one in 2 to 4 months.    Make sure you are getting your mammogram at least once per year.

## 2023-05-23 NOTE — PROGRESS NOTES
SUBJECTIVE:   Dee is a 73 year old who presents for Preventive Visit.      2023     1:28 PM   Additional Questions   Roomed by Mari JACOBSON   Patient has been advised of split billing requirements and indicates understanding: Yes  Are you in the first 12 months of your Medicare coverage?  No    HPI      Have you ever done Advance Care Planning? (For example, a Health Directive, POLST, or a discussion with a medical provider or your loved ones about your wishes): Yes, advance care planning is on file.    Fall risk     0  Cognitive Screening   1) Repeat 3 items (Leader, Season, Table)    2) Clock draw: NORMAL  3) 3 item recall: Recalls 1 object   Results: NORMAL clock, 1-2 items recalled: COGNITIVE IMPAIRMENT LESS LIKELY    Mini-CogTM Copyright S Sigifredo. Licensed by the author for use in [place] [employer]; reprinted with permission (antonieta@[facility]). All rights reserved.      Do you have sleep apnea, excessive snoring or daytime drowsiness?: no    Reviewed and updated as needed this visit by clinical staff   Tobacco  Allergies  Meds              Reviewed and updated as needed this visit by Provider                 Social History     Tobacco Use     Smoking status: Former     Packs/day: 1.00     Years: 30.00     Pack years: 30.00     Types: Cigarettes     Quit date: 2000     Years since quittin.4     Smokeless tobacco: Never   Vaping Use     Vaping status: Never Used   Substance Use Topics     Alcohol use: No              View : No data to display.                   View : No data to display.              Do you have a current opioid prescription? No  Do you use any other controlled substances or medications that are not prescribed by a provider? None        Current providers sharing in care for this patient include:   Patient Care Team:  Duke Mccall CNP as PCP - General (Nurse Practitioner - Family)  Bhupendra Caldwell MD as Assigned PCP  Jonathan Zavala MD as Assigned Surgical  "Provider  Naheed Munoz AuD as Audiologist (Audiology)    The following health maintenance items are reviewed in Epic and correct as of today:  Health Maintenance   Topic Date Due     LIPID  Never done     MICROALBUMIN  Never done     ANNUAL REVIEW OF HM ORDERS  Never done     ASTHMA ACTION PLAN  Never done     COLORECTAL CANCER SCREENING  Never done     HEPATITIS C SCREENING  Never done     ZOSTER IMMUNIZATION (2 of 3) 12/11/2012     DTAP/TDAP/TD IMMUNIZATION (1 - Tdap) 08/19/2016     Pneumococcal Vaccine: 65+ Years (3 - PPSV23 if available, else PCV20) 02/07/2018     URINE DRUG SCREEN  09/10/2019     TREATMENT AGREEMENT FOR CHRONIC PAIN MANAGEMENT  11/30/2019     MAMMO SCREENING  12/03/2020     BMP  03/08/2022     COVID-19 Vaccine (5 - Pfizer series) 01/25/2023     ASTHMA CONTROL TEST  11/01/2023     FALL RISK ASSESSMENT  05/01/2024     MEDICARE ANNUAL WELLNESS VISIT  05/23/2024     DEV ASSESSMENT  05/23/2024     PHQ-9  05/23/2024     ADVANCE CARE PLANNING  11/20/2024     DEXA  02/12/2033     INFLUENZA VACCINE  Completed     URINALYSIS  Completed     IPV IMMUNIZATION  Aged Out     MENINGITIS IMMUNIZATION  Aged Out     Labs reviewed in EPIC        Review of Systems  Constitutional, HEENT, cardiovascular, pulmonary, gi and gu systems are negative, except as otherwise noted.    OBJECTIVE:   /62 (BP Location: Right arm, Patient Position: Sitting, Cuff Size: Adult Regular)   Pulse 65   Temp 98.2  F (36.8  C)   Resp 16   Ht 1.549 m (5' 1\")   Wt 52.6 kg (116 lb)   SpO2 100%   BMI 21.92 kg/m   Estimated body mass index is 22.3 kg/m  as calculated from the following:    Height as of 5/1/23: 1.549 m (5' 1\").    Weight as of 5/1/23: 53.5 kg (118 lb).  Physical Exam  GENERAL: healthy, alert and no distress  NECK: no adenopathy, no asymmetry, masses, or scars and thyroid normal to palpation  RESP: lungs clear to auscultation - no rales, rhonchi or wheezes  CV: regular rate and rhythm, normal S1 S2, no S3 or S4, no " murmur, click or rub, no peripheral edema and peripheral pulses strong  ABDOMEN: soft, nontender, no hepatosplenomegaly, no masses and bowel sounds normal  MS: no gross musculoskeletal defects noted, no edema    Diagnostic Test Results:  Labs reviewed in Epic    ASSESSMENT / PLAN:     1. Encounter for annual wellness exam in Medicare patient  Chronic medical issues reviewed today.  Care established    2. Chronic coccygeal pain  She has used hydromorphone for many years, typically around 20 mg/day.  Now working with Dr. Cade at the pelvic floor center on reinstituting some therapies that she has tried in the past but has failed.    Apparently, she has tried to wean down on her pain medications in the past but has not been successful    3. S/P total colectomy  She has an ileostomy.  She does her ostomy cares at the ostomy nurse clinic at [facility] and can call whenever she needs supplies or cares    4. Need for shingles vaccine  She would like her first shingles shot today  - ZOSTER VACCINE RECOMBINANT ADJUVANTED IM NJX  - ZOSTER VACCINE RECOMBINANT ADJUVANTED IM NJX; Future    5. Need for diphtheria-tetanus-pertussis (Tdap) vaccine  We are going to hold off on this for now    6. Stage 2 chronic kidney disease  Monitoring    7. Visit for screening mammogram  She gets her mammograms done on a yearly basis    8. Fibromyalgia  On Lyrica    9. Controlled substance agreement signed  Signed today    10. Benign essential hypertension  Optimal control on amlodipine    11. Chronic, continuous use of opioids  As above    12. Need for pneumococcal vaccination  - Pneumococcal 20 Valent Conjugate (PCV20)    Patient Instructions   Controlled substance agreement signed.  We will need periodic follow-up appointments to discuss your chronic pain medications.  Typically, every 3 to 6 months.    Pneumococcal vaccine administered today.    First shingles shot today.  You will need another one in 2 to 4 months.    Make sure  you are getting your mammogram at least once per year.        Patient has been advised of split billing requirements and indicates understanding: Yes      COUNSELING:  Reviewed preventive health counseling, as reflected in patient instructions       Regular exercise       Healthy diet/nutrition       Vision screening       Hearing screening        She reports that she quit smoking about 23 years ago. Her smoking use included cigarettes. She has a 30.00 pack-year smoking history. She has never used smokeless tobacco.      Appropriate preventive services were discussed with this patient, including applicable screening as appropriate for cardiovascular disease, diabetes, osteopenia/osteoporosis, and glaucoma.  As appropriate for age/gender, discussed screening for colorectal cancer, prostate cancer, breast cancer, and cervical cancer. Checklist reviewing preventive services available has been given to the patient.    Reviewed patients plan of care and provided an AVS. The Intermediate Care Plan ( asthma action plan, low back pain action plan, and migraine action plan) for Dee meets the Care Plan requirement. This Care Plan has been established and reviewed with the Patient.      Duke Mccall, [facility]    Identified Health Risks:    I have reviewed Opioid Use Disorder and Substance Use Disorder risk factors and made any needed referrals.     Answers for HPI/ROS submitted by the patient on 5/23/2023  If you checked off any problems, how difficult have these problems made it for you to do your work, take care of things at home, or get along with other people?: Not difficult at all  PHQ9 TOTAL SCORE: 4  DEV 7 TOTAL SCORE: 6

## 2023-05-23 NOTE — Clinical Note
"Mammograms are done every years at the \"[facility]\"? She says that she has had one in the last year"

## 2023-05-24 NOTE — TELEPHONE ENCOUNTER
Danny calling in stating they need orders for Dee's lab orders due to her being on TPN. She states in the past the pharmacy normally just sends a message to Dr. Caldwell and that he gives the verbal ok, but now they are needing paperwork signed.     Danny states she can get the paperwork faxed today. Writer gave Danny fax number [phone] and informed her that Dr. Caldwell is out, but his covering provider can potentially fill out paperwork. Danny can be reached at [phone].

## 2023-05-25 NOTE — TELEPHONE ENCOUNTER
General Call    Contacts       Type Contact Phone/Fax    05/25/2023 11:46 AM CDT Phone (Incoming) Georgette Ken [phone]        Reason for Call:  Please call and give verbal ok to draw lab orders.     What are your questions or concerns:  Georgette Ken called and stated they received orders to draw blood but they weren't signed by Duke Mccall. Georgette is wondering if Duke Mccall or someone the care team can call her back and give a verbal consent to draw the labs.    Date of last appointment with provider: 05/23/2023    Aurelia Reyes

## 2023-06-19 NOTE — TELEPHONE ENCOUNTER
Medication Question or Refill    Contacts       Type Contact Phone/Fax    06/19/2023 10:13 AM CDT Phone (Incoming) Dee Mattson (Self) [phone] (M)          What medication are you calling about (include dose and sig)?:     HYDROmorphone (DILAUDID) 4 MG tablet 150 tablet 0 5/10/2023  No  Sig - Route: Take 1 tablet (4 mg) by mouth 5 times daily - Oral  Sent to pharmacy as: HYDROmorphone HCl 4 MG Oral Tablet (DILAUDID)    Preferred Pharmacy:       [id number] [place], [place] - [address] AT [facility] [address]  [address]  [facility] [address]  Phone: [phone] Fax: [phone]      Controlled Substance Agreement on file:   CSA -- Patient Level:     [Media Unavailable] Controlled Substance Agreement - Opioid - Scan on 5/25/2023  2:38 PM   [Media Unavailable] Controlled Substance Agreement - Opioid - Scan on 11/30/2018   [Media Unavailable] Controlled Substance Agreement - Opioid - Scan on 11/30/2018   [Media Unavailable] Controlled Substance Agreement - Opioid - Scan on 11/1/2017       Who prescribed the medication?: Duke Mccall    Do you need a refill? Yes    When did you use the medication last? This morning    Patient offered an appointment?  Yes, not needed    Do you have any questions or concerns?  No    Okay to leave a detailed message?: Yes at Cell number on file:    Telephone Information:   Mobile [phone]

## 2023-06-19 NOTE — TELEPHONE ENCOUNTER
Pt calling in stating she is almost out of her lisinopril.     According to MAR pt reported not taking 5/1/23 and was discontinued. OV note states to resume lisinopril and metoprolol 5/1, pt confirms she has been taking both daily

## 2023-07-03 NOTE — ADDENDUM NOTE
Addended by: LINDA TRISTAN on: 3/14/2022 02:00 PM     Modules accepted: Orders     [id number] Billing Preferences: 1

## 2023-07-12 NOTE — TELEPHONE ENCOUNTER
Special diet form received by mail from the patient for Attila to fill out. Form was prepped with the same info as it was the last time Dr Caldwell filled it out and Attila verified and signed it for the patient.    Form was copied for our records and mailed back to the patient as she requested.    Pt notified via VM.

## 2023-07-19 NOTE — TELEPHONE ENCOUNTER
Echocardiogram Report

 

 

Patient Name: RANDOLPH TUTTLEPatient ID: [id number]

: 1959 (59y 2m)Study Date: 02/15/2019 7:21:06 AM

Gender: FAccession #: [id number]

Tech: DARYA Patterson [facility]                      Location: [facility]          

Ref.Physician: TEJAS SALMERON            Height(Cm):            

 

BSA: Weight(Kg):

Quality: AdequateAccount #:

 

 

Procedures:

Echocardiographic Report:

Transthoracic echocardiogram with complete 2D, M-Mode, and doppler 

examination.

 

Indications:

Arrythmia.

 

 

Measurements:

2D/M Mode                                          Doppler                                           
     

Measurement    Value    Normal Range               Measurement       Value    Normal Range           
     

LVIDd 2D       4.2      [ 3.8 - 5.2 ] cm           AV Peak Aleks       1.4      [ 100.0 - 170.0 ] cm/se
c    

LVIDs 2D       2.7      [ 2.2 - 3.5 ] cm           AV Peak PG        7.0      [ 2.0 - 9.0 ] mmHg     
     

LVPWd 2D       1.1      [ 0.6 - 0.9 ] cm           AI Peak PG        52.0     mmHg                   
     

IVSd 2D        1.0      [ 0.6 - 0.9 ] cm           AI Peak Aleks       3.6      cm/sec                 
     

AoR Diam 2D    2.8      [ 2.3 - 3.1 ] cm           AI PHT            561.0    msec                   
     

EDV 2D         77.7     [ 46.0 - 106.0 ] ml        LVOT Peak Aleks     1.0      [ 70.0 - 110.0 ] cm/sec
     

ESV 2D         25.8     [ 14.0 - 42.0 ] ml         LVOT Peak PG      4.0      [ 2.0 - 6.0 ] mmHg     
     

EF 2D          66.8     [ 54.0 - 74.0 ] percent    MV E Peak Aleks     0.6      [ 60.0 - 130.0 ] cm/sec
     

LA Dimen 2D    2.6      [ 2.7 - 3.8 ] cm           MV A Peak Aleks     0.6      [ 100.0 - 120.0 ] cm/se
c    

                                                   MV E/A            0.9      [ 0.8 - 1.5 ] ratio    
     

                                                   MV PHT            108.0    [ 20.0 - 100.0 ] msec  
     

                                                   MV Decel Time     169      [ 104 - 258 ] msec     
     

                                                   MV Decel McCulloch    1                               
     

                                                   Lat E` Aleks        0.1      [ 10.0 - 15.0 ] cm/sec 
     

                                                   Lateral E/E`      7.8      [ 1.0 - 2.0 ] ratio    
     

                                                   MV E/A            0.9      [ 0.8 - 1.5 ] ratio    
     

                                                   MVA PHT           2.0      [ 2.0 - 4.0 ] cm2      
     

 

Findings:

Left Ventricle:

Normal left ventricular systolic function. Normal left ventricular 

cavity size. Mild concentric left ventricular hypertrophy. Ejection 

fraction is visually estimated at 65 %. Tissue Doppler/Mitral Doppler 

indices are consistent with impaired relaxation (Stage I diastolic 

dysfunction).

Right Ventricle:

Normal right ventricular size. Normal right ventricular systolic 

function.

Left Atrium:

The left atrium is normal in size.

Right Atrium:

The right atrium is normal in size.

Mitral Valve:

Normal appearance and function of the mitral valve with trace 

physiologic regurgitation.

Aortic Valve:

No significant aortic stenosis or insufficiency. Aortic cusps appear 

mildly calcified.

Tricuspid Valve:

Normal appearance of the tricuspid valve. Unable to obtain RVSP due to 

minimal presence of tricuspid regurgitation.

Pulmonic Valve:

Normal pulmonic valve appearance.

Pericardium:

Normal pericardium with no significant pericardial effusion.

Aorta:

Normal aortic root.

IVC:

Inferior vena cava without respiratory collapse, however, patient on 

ventilator.

 

 

Conclusions:

 Normal left ventricular systolic function. Normal left ventricular 

cavity size. Mild concentric left ventricular hypertrophy. Ejection 

fraction is visually estimated at 65 %. Tissue Doppler/Mitral Doppler 

indices are consistent with impaired relaxation (Stage I diastolic 

dysfunction).

 Inferior vena cava without respiratory collapse, however, patient on 

ventilator.

 

Electronically Signed By:

 

Aubrey Reynoso

2019-02-15 13:50:20 PST Okay by me

## 2023-07-19 NOTE — TELEPHONE ENCOUNTER
Home Care is calling regarding an established patient with [facility].       Requesting orders from: Duke Mccall  Provider is following patient: Yes  Is this a 60-day recertification request?  Yes    Orders Requested    Skilled Nursing  Request for recertification   Frequency: 1x/wk for 9 wks  2 PRN      Confirmed ok to leave a detailed message with call back.  Contact information confirmed and updated as needed.

## 2023-07-20 NOTE — TELEPHONE ENCOUNTER
Relayed Attila Talavera message to repeat labs in 1 week to pharmacist at [facility] and infusion

## 2023-07-20 NOTE — TELEPHONE ENCOUNTER
Potassium was 5.3    Amber called and labs were not hemolyzed, called pt and she is not adding anything that would add extra potassium to diet    Do you want repeat labs in a week    They are unable to therapeutically do any changes to correct potassium on pharmacy home and infusion end, last week they changed around the acid base by adding  more bicarbonate in the fluids    Please call back [phone] and click option 2 to speak to a pharmacist or anyone on the green team

## 2023-08-17 NOTE — TELEPHONE ENCOUNTER
"Routing refill request to provider for review/approval because:  A break in medication    Last Written Prescription Date:  9/13/2022  Last Fill Quantity: 270,  # refills: 0   Last office visit provider:  5/23/2023     Requested Prescriptions   Pending Prescriptions Disp Refills    traZODone (DESYREL) 150 MG tablet [Pharmacy Med Name: TRAZODONE 150MG (HUNDRED-FIFTY) TAB] 270 tablet 0     Sig: TAKE 3 TABLETS(450 MG) BY MOUTH AT BEDTIME       Serotonin Modulators Passed - 8/17/2023  6:01 AM        Passed - Recent (12 mo) or future (30 days) visit within the authorizing provider's specialty     Patient has had an office visit with the authorizing provider or a provider within the authorizing providers department within the previous 12 mos or has a future within next 30 days. See \"Patient Info\" tab in inbasket, or \"Choose Columns\" in Meds & Orders section of the refill encounter.              Passed - Medication is active on med list        Passed - Patient is age 18 or older        Passed - No active pregnancy on record        Passed - No positive pregnancy test in past 12 months             Katerine Hodges RN 08/17/23 9:56 AM  "

## 2023-08-23 NOTE — TELEPHONE ENCOUNTER
8-23-23    FYI - Status Update    Who is Calling: debra @ [facility]    Update: Debra called with FYI:  Debra was unable to give pt her B12 injection 8-23 because pt didn't  from the pharmacy.  Debra is hoping by 8-30 she can administer the B12    Does caller want a call/response back: No

## 2023-09-08 NOTE — TELEPHONE ENCOUNTER
Medication Question or Refill    Contacts         Type Contact Phone/Fax    09/08/2023 09:39 AM CDT Phone (Incoming) Dee Mattson (Self) [phone] (M)            What medication are you calling about (include dose and sig)?:     Pregabalin (LYRICA) 200 MG capsule   270 capsule   1   3/6/2023       Sig - Route: Take 1 capsule (200 mg) by mouth 3 times daily - Oral       Preferred Pharmacy:     [id number] - [place], [place] - [address] AT [facility] [address]  [address]  [facility] [address]  Phone: [phone] Fax: [phone]      Controlled Substance Agreement on file:   CSA -- Patient Level:     [Media Unavailable] Controlled Substance Agreement - Opioid - Scan on 5/25/2023  2:38 PM   [Media Unavailable] Controlled Substance Agreement - Opioid - Scan on 11/30/2018   [Media Unavailable] Controlled Substance Agreement - Opioid - Scan on 11/30/2018   [Media Unavailable] Controlled Substance Agreement - Opioid - Scan on 11/1/2017       Who prescribed the medication?: Duke Mccall    Do you need a refill? Yes    Do you have any questions or concerns?  No    Okay to leave a detailed message?: Yes at Cell number on file:    Telephone Information:   Mobile [phone]

## 2023-09-18 NOTE — TELEPHONE ENCOUNTER
Home Care is calling regarding an established patient with [facility].       Requesting orders from: Duke Mccall  Provider is following patient: Yes  Is this a 60-day recertification request?  Yes    Orders Requested    Skilled Nursing  Request for recertification   Frequency:  1/wk for 8 wks starting 9/20/2023 and 2 PRN for status changes and requested labs for carlos cath dressing needle changes, b12 injections, and blood draws.      Confirmed ok to leave a detailed message with call back.  Contact information confirmed and updated as needed.    NADEEM Castellanos  #[phone]

## 2023-10-03 NOTE — TELEPHONE ENCOUNTER
Medication Question or Refill    Contacts         Type Contact Phone/Fax    10/03/2023 12:47 PM CDT Phone (Incoming) Dee Mattson (Self) [phone] (M)            What medication are you calling about (include dose and sig)?:     Preferred Pharmacy:   [id number] [place], [place] - [address] AT [facility] [address]  [address]  [facility] [address]  Phone: [phone] Fax: [phone]      Controlled Substance Agreement on file:   CSA -- Patient Level:     [Media Unavailable] Controlled Substance Agreement - Opioid - Scan on 5/25/2023  2:38 PM   [Media Unavailable] Controlled Substance Agreement - Opioid - Scan on 11/30/2018   [Media Unavailable] Controlled Substance Agreement - Opioid - Scan on 11/30/2018   [Media Unavailable] Controlled Substance Agreement - Opioid - Scan on 11/1/2017       Who prescribed the medication?: ALEN Greenwood    Do you need a refill? Yes        Okay to leave a detailed message?: Yes at Cell number on file:    Telephone Information:   Mobile [phone]
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
## 2023-10-06 NOTE — PROGRESS NOTES
Patient presents to the clinic today for a visit with LAKSHMI Haider CNP regarding Pain Management.      UDS/CSA- 09.10.2018    Medications- hydromorphone today @1pm    QUESTIONS:Gained 7 lbs in two weeks. Her sister told her to eat sweets to gain weight. Now she is a binge eater. She used to be anorexic but she said she does not purge.     Nicole Perez  [facility] Visit Facilitator

## 2023-10-06 NOTE — PATIENT INSTRUCTIONS
[facility] [place] Number:  [phone]  Call with any questions about your care and for scheduling assistance.   Calls are returned Monday through Friday between 8 AM and 4:30 PM. We usually get back to you within 2 business days depending on the issue/request.    If we are prescribing your medications:  For opioid medication refills, call the clinic or send a Discourse message 7 days in advance.  Please include:  Name of requested medication  Name of the pharmacy.  For non-opioid medications, call your pharmacy directly to request a refill. Please allow 3-4 days to be processed.   Per [place] State Law:  All controlled substance prescriptions must be filled within 30 days of being written.    For those controlled substances allowing refills, pickup must occur within 30 days of last fill.      We believe regular attendance is key to your success in our program!    Any time you are unable to keep your appointment we ask that you call us at least 24 hours in advance to cancel.This will allow us to offer the appointment time to another patient.   Multiple missed appointments may lead to dismissal from the clinic.

## 2023-11-08 PROBLEM — E87.1 HYPONATREMIA: Status: ACTIVE | Noted: 2023-01-01

## 2023-11-08 PROBLEM — E87.5 HYPERKALEMIA: Status: ACTIVE | Noted: 2023-01-01

## 2023-11-08 PROBLEM — R41.0 DELIRIUM: Status: ACTIVE | Noted: 2023-01-01

## 2023-11-08 PROBLEM — R62.7 FAILURE TO THRIVE IN ADULT: Status: ACTIVE | Noted: 2023-01-01

## 2023-11-08 NOTE — ED PROVIDER NOTES
"EMERGENCY DEPARTMENT ENCOUNTER      NAME: Dee Mattson  AGE: 73 year old female  YOB: 1950  MRN: [id number]  EVALUATION DATE & TIME: 11/8/2023  1:40 PM    PCP: Duke Mccall    ED PROVIDER: Azalea Edwards M.D.      Chief Complaint   Patient presents with    Generalized Weakness         FINAL IMPRESSION:  1. Hyperkalemia    2. Delirium    3. Failure to thrive in adult          ED COURSE & MEDICAL DECISION MAKING:    ED Course as of 11/08/23 2033 Wed Nov 08, 2023   1415 I met with the patient, obtained history, performed physical exam, and discussed ED plan.   1422 Patient BIB EMS with fatigue for one week, lives alone with already home care RN and with ileostomy with chronic malnutrition and chronic fatigue with fibromyalgia and depression/anxiety, admits to fall today, says \"yes\" to alcohol, does not volunteer any information or assist wiht more than yes/no answers, small pupils with h/o opioid dependence, CT head and c-spine pending with fall and head trauma with poor historian with possible intoxication, CTA r/o PE with sat 94% RA, runoff to abdomen with h/o abdominal pathology and recurrent SBO although ileostomy bag at this time with stool, viral PCR with lactica julianne/cultures, electrolytes, EKG, and ultimatley will d/w care manager re: dispo planning and to discuss long term placement if no acute pathology becomes apparent as etiology of symptoms today   1438 Per lab, critical K 6.5. Stat hyperkalemia order set begun   1440 Insulin/dextrose with lasix and bicarb and IVF begun with creatinine 1.19 and Na 129, urine Na pending to screen for SIADH   1654 UA withotu cells to suggest UTI and UDS negative, lactic acid WNL and repeat K 7.0. Viral PCR negative, ammonia 16, EtOH negative and troponin only 8, repeat 2h delta troponin now ordered, TSH normal and CT normal   1656 I spoke with charge NADEEM Valdovinos who will page ICU MD now to discuss tawnya ICU MD recommends ICU admission with K 7.0 on " "repeat, given insulin/dextrose, per Aruna we do have ICU bed available here at this time for admission   1701 I spoke with Dr Eid from ICU re: patient, who recommends cardiac tele/intermediate care and indicates ICU bed not needed necessarily, located in ICU   1718 Pt endorsed to hospitalist RH   1743 Per radiologist, reread CT with \" 4 mm increased density in the ventral epidural space from  skull base to the top of C6 which could represent a small acute ventral epidural hemorrhage versus prominent ventral epidural plexus\" thus radiology recommends C-spine MRI r/o traumatic pathology. I spoke with hospitalist  and he is ok with plan to MRI prior to admit to ensure no missed acute pathology and correct admitting service, MRI ordered, I spoke with charge RN Aruna re: need for MRI prior to rooming patientin hospital, will again discuss with hospitalsit after MRI result   1744 CT CAP negative for acute pathology reassuringly otherwise   2032 MRI c-spine reassuringly negative for acute pathology and hospitalist paged to update       Pertinent Labs & Imaging studies reviewed. (See chart for details)    N95 worn  A face shield was worn also  COVID PPE    Medical Decision Making    History:  Supplemental history from: Documented in chart, if applicable  External Record(s) reviewed: Documented in chart, if applicable.    Work Up:  Chart documentation includes differential considered and any EKGs or imaging independently interpreted by provider, where specified.  In additional to work up documented, I considered the following work up: Documented in chart, if applicable.    External consultation:  Discussion of management with another provider: Documented in chart, if applicable    Complicating factors:  Care impacted by chronic illness: Chronic Kidney Disease, Chronic Pain, and Hypertension  Care affected by social determinants of health: N/A    Disposition considerations: Admit.    Critical Care time was 45 minutes " for this patient excluding procedures.      At the conclusion of the encounter I discussed the results of all of the tests and the disposition. The questions were answered. The patient or family acknowledged understanding and was agreeable with the care plan.     MEDICATIONS GIVEN IN THE EMERGENCY:  Medications   glucose gel 15-30 g (has no administration in time range)     Or   dextrose 50 % injection 25-50 mL (has no administration in time range)     Or   glucagon injection 1 mg (has no administration in time range)   sodium chloride 0.9% BOLUS 1,000 mL (0 mLs Intravenous Stopped 11/8/23 1611)   dextrose 10% BOLUS 300 mL (0 mLs Intravenous Stopped 11/8/23 1957)   dextrose 50 % injection 25 g (25 g Intravenous $Given 11/8/23 1541)   insulin regular 1 unit/mL injection 5.5 Units (5.5 Units Intravenous $Given 11/8/23 1540)   sodium bicarbonate 8.4 % injection 50 mEq (50 mEq Intravenous $Given 11/8/23 1535)   furosemide (LASIX) injection 40 mg (40 mg Intravenous $Given 11/8/23 1543)   iopamidol (ISOVUE-370) solution 75 mL (75 mLs Intravenous $Given 11/8/23 1642)       NEW PRESCRIPTIONS STARTED AT TODAY'S ER VISIT  Current Discharge Medication List             =================================================================    HPI    HPI limited secondary to patient presentation.    Dee Mattson is a 73 year old female with PMHx of HTN, chronic fatigue syndrome, severe fibromyalgia on opioid therapy with home care nursing, asthma, CKD 2, ileostomy with SBOs, DVT in setting of Hodgkin's lymphoma remotely and not on blood thinner therapy, anxiety, and depression who presents to the ED today via ambulance with generalized weakness.     Per EMS,  For the past week, the patient complains of generalized weakness and fatigue. The patient lives at home alone with a home care RN. The patient has port a cath for fluids due to malnutrition and an ileostomy. Port to right chest area looks red and dry. When EMS arrived, patient  was at 89% and applied 2L nasal cannula. The patient is now 94% on room air. The patient usually ambulates independently but was unable to move.     Per patient,  The patient was drinking alcohol when she fell and hit her head. Her nurse called 911. Denies abdominal pain, chest pain, neck pain, or any other complaints at this time.      REVIEW OF SYSTEMS   All other systems reviewed and are negative except as noted above in HPI.    PAST MEDICAL HISTORY:  Past Medical History:   Diagnosis Date    Anxiety and depression     Asthma 4/10/2012    Bowel obstruction (H)     Chronic fatigue syndrome 7/3/2014    Dehydration 7/4/2014    Disease of thyroid gland     Fibromyalgia 7/3/2014    GERD (gastroesophageal reflux disease)     History of anesthesia complications     Slower to wake up    History of blood clots     Hodgkin's lymphoma (H) 10/1979    Hypertension     Major depressive disorder, recurrent episode, moderate (H) 9/6/2005    PONV (postoperative nausea and vomiting)     Shortness of breath     TIA (transient ischemic attack)        PAST SURGICAL HISTORY:  Past Surgical History:   Procedure Laterality Date    ANAL SPHINCTEROPLASTY      APPENDECTOMY      C UNLISTED PROCEDURE, ABDOMEN/PERITONEUM/OMENTUM      Description: Hernia Repair;  Recorded: 08/05/2009;  Comments: perineal    CHOLECYSTECTOMY      COLECTOMY      HC DILATION/CURETTAGE DIAG/THER NON OB      Description: Dilation And Curettage;  Recorded: 08/05/2009;    HC REMOVAL GALLBLADDER      Description: Cholecystectomy;  Recorded: 07/18/2014;    HC REMOVAL OF TONSILS,<13 Y/O      Description: Tonsillectomy;  Recorded: 08/05/2009;    HYSTERECTOMY  05/2000    ILEOSTOMY      IR MISCELLANEOUS PROCEDURE  10/9/2002    IR MISCELLANEOUS PROCEDURE  10/14/2002    OOPHORECTOMY Bilateral 05/2000    ID CYSTOURETHROSCOPY INJ CHEMODENERVATION BLADDER N/A 2/25/2021    Procedure: PELVIC FLOOR BOTOX;  Surgeon: José Antonio Cade MD;  Location: [facility];  Service:  Gynecology    MN INSJ TUNNELED CTR VAD W/SUBQ PORT AGE 5 YR/> N/A 10/29/2014    Procedure: REMOVAL PICC LINE RIGHT ARM and PLACEMENT PORTACATH;  Surgeon: Jason Kirkpatrick MD;  Location: [facility] Main OR;  Service: General    MN RMVL JENY CTR VAD W/SUBQ PORT/ CTR/PRPH INSJ N/A 7/20/2018    Procedure: REMOVAL OF PORT A CATH;  Surgeon: Jason Kirkpatrick MD;  Location: [facility] Main OR;  Service: General    SMALL INTESTINE SURGERY      THYROIDECTOMY, PARTIAL      TUNNELED VENOUS CATHETER PLACEMENT N/A 8/29/2018    Procedure: PORT-A-CATH INSERTION;  Surgeon: Jason Kirkpatrick MD;  Location: [facility] Main OR;  Service:     [facility] TOTAL ABDOM HYSTERECTOMY      Description: Total Abdominal Hysterectomy;  Recorded: 07/18/2014;       CURRENT MEDICATIONS:    No current outpatient medications on file.      ALLERGIES:  Allergies   Allergen Reactions    Ketorolac Tromethamine Palpitations    Citalopram Analogues [Citalopram] Unknown     GI affects    Methadone Unknown     Hallucinations    Metoclopramide Hives     GI upset    Metronidazole Hives    Mirtazapine Unknown     GI affects    Morphine Other (See Comments)     confusion    Neuromuscular Blockers, Steroidal [Neuromuscular Blocking Agents] Unknown     Unsure,per [facility] records     Norfloxacin Unknown     C.Diff    Other Drug Allergy (See Comments) Unknown     Steroidal Neuromuscular blocking agents- unsure, Pancuronium, vecuronium, rocuronium, rapacuronium, dacuronium, malouètine, duador, dipyrandium, pipecuronium, chandonium, pt unsure of this reaction, thinks it was painful, muscle aches    Oxycodone Unknown     Gi distress       FAMILY HISTORY:  Family History   Problem Relation Age of Onset    Colon Cancer Father     Liver Cancer Brother        SOCIAL HISTORY:   Social History     Socioeconomic History    Marital status: Single   Tobacco Use    Smoking status: Former     Packs/day: 1.00     Years: 30.00     Additional pack years: 0.00     Total pack years:  30.00     Types: Cigarettes     Quit date: 2000     Years since quittin.8    Smokeless tobacco: Never   Vaping Use    Vaping Use: Never used   Substance and Sexual Activity    Alcohol use: No    Drug use: No    Sexual activity: Not Currently       VITALS:  Patient Vitals for the past 24 hrs:   BP Temp Temp src Pulse Resp SpO2   23 (!) 154/78 -- -- 92 24 98 %   23 1945 (!) 141/72 -- -- 85 19 98 %   23 1818 117/57 -- -- 92 19 92 %   23 1803 115/55 -- -- 91 19 92 %   23 1748 121/60 -- -- 91 20 91 %   23 1730 121/59 -- -- 91 19 91 %   23 1718 (!) 157/82 -- -- 93 20 93 %   23 1703 (!) 203/101 -- -- 92 11 94 %   23 1633 (!) 198/89 -- -- 91 24 93 %   23 1615 (!) 152/71 -- -- 92 14 95 %   23 1602 (!) 157/67 -- -- 90 24 94 %   23 1547 (!) 141/58 -- -- 96 17 94 %   23 1532 (!) 148/68 -- -- 77 27 95 %   23 1517 135/63 -- -- 72 16 94 %   23 1502 -- -- -- 74 19 93 %   23 1348 -- -- -- 78 -- 98 %   23 1340 115/67 98.7  F (37.1  C) Oral -- 18 --       PHYSICAL EXAM    VITAL SIGNS: BP (!) 154/78   Pulse 92   Temp 98.7  F (37.1  C) (Oral)   Resp 24   SpO2 98%    GENERAL: Awake, alert.  In no acute distress. GCS 15  HEENT: Normocephalic, atraumatic.  Pupils equal, round and reactive.  Conjunctiva normal.  EOMI. No lagunas sign, no racoon eyes, no mastoid tenderness, no hemotympanum, no facial instability, no nasal bridge pain, no nasal septal hematoma, no intraoral lacerations, no loose teeth, no mandible pain or deformity  NECK: No stridor or apparent deformity. No midline pain to palpation.  PULMONARY: Symmetrical breath sounds without distress.  Lungs clear to auscultation bilaterally without wheezes, rhonchi or rales.  CARDIO: Regular rate and rhythm.  No significant murmur, rub or gallop.  Radial pulses strong and symmetrical.  THORAX: No focal chest wall deformity or crepitus  BACK: No focal tenderness or  deformity to each vertebral level in midline  ABDOMINAL: Abdomen soft, non-distended and non-tender to palpation.  No CVAT, BL. Right lower quadrant ostomy with stool present. No blood.  EXTREMITIES: No lower extremity swelling or edema. Pelvis stable and without focal tenderness. Bilateral pedal pulses 2+ and equal. No leg swelling.  NEURO: Alert and oriented to person, place and time.  Cranial nerves grossly intact.  No focal motor deficit. Sensation globally intact.  PSYCH: Voluntarily poor eye contact. Small pupils  SKIN: Right anterior chest wall port accessed.       LAB:  All pertinent labs reviewed and interpreted.  Results for orders placed or performed during the hospital encounter of 11/08/23   CT Cervical Spine w/o Contrast    Impression    IMPRESSION:  HEAD CT:  1.  No CT finding of a mass, hemorrhage or focal area suggestive of acute infarct.    CERVICAL SPINE CT:  1.  No CT evidence for acute fracture or post traumatic subluxation.  2.  4 mm increased density in the ventral epidural space from  skull base to the top of C6 which could represent a small acute ventral epidural hemorrhage versus prominent ventral epidural plexus. Recommend MRI cervical spine for further evaluation.  3.  No significant canal compromise or neural foraminal narrowing throughout cervical spine.    These findings were communicated by phone to Dr. Quiroga at 5:31 PM on 11/08/2023.   CT Head w/o Contrast    Impression    IMPRESSION:  HEAD CT:  1.  No CT finding of a mass, hemorrhage or focal area suggestive of acute infarct.    CERVICAL SPINE CT:  1.  No CT evidence for acute fracture or post traumatic subluxation.  2.  4 mm increased density in the ventral epidural space from  skull base to the top of C6 which could represent a small acute ventral epidural hemorrhage versus prominent ventral epidural plexus. Recommend MRI cervical spine for further evaluation.  3.  No significant canal compromise or neural foraminal narrowing  throughout cervical spine.    These findings were communicated by phone to Dr. Quiroga at 5:31 PM on 11/08/2023.   CT Chest Pulmonary Embolism w Contrast    Impression    IMPRESSION:   1.  No pulmonary embolism.    2.  Proctocolectomy with right lower quadrant ileostomy. Nothing for bowel obstruction. No abscess.    3.  Moderate bilateral ureteral pyelocaliectasis. The urinary bladder is distended. Would recommend reimaging of the collecting systems and ureters after urinary bladder drainage.    4.  Cholecystectomy and hysterectomy.    5.  Stable splenomegaly.   CT Abdomen Pelvis w Contrast    Impression    IMPRESSION:   1.  No pulmonary embolism.    2.  Proctocolectomy with right lower quadrant ileostomy. Nothing for bowel obstruction. No abscess.    3.  Moderate bilateral ureteral pyelocaliectasis. The urinary bladder is distended. Would recommend reimaging of the collecting systems and ureters after urinary bladder drainage.    4.  Cholecystectomy and hysterectomy.    5.  Stable splenomegaly.   Cervical spine MRI w/o contrast    Impression    IMPRESSION:  1.  Good anatomic alignment and vertebral body heights maintained.  2.  No abnormal signal cervical spinal cord.  3.  Prominent ventral epidural complexes skull base to top of C5.  4.  No significant canal compromise or neural foraminal narrowing throughout cervical spine.     Comprehensive metabolic panel   Result Value Ref Range    Sodium 129 (L) 135 - 145 mmol/L    Potassium 6.5 (HH) 3.4 - 5.3 mmol/L    Carbon Dioxide (CO2) 21 (L) 22 - 29 mmol/L    Anion Gap 9 7 - 15 mmol/L    Urea Nitrogen 16.7 8.0 - 23.0 mg/dL    Creatinine 1.19 (H) 0.51 - 0.95 mg/dL    GFR Estimate 48 (L) >60 mL/min/1.73m2    Calcium 8.9 8.8 - 10.2 mg/dL    Chloride 99 98 - 107 mmol/L    Glucose 104 (H) 70 - 99 mg/dL    Alkaline Phosphatase 126 (H) 35 - 104 U/L    AST 28 0 - 45 U/L    ALT 9 0 - 50 U/L    Protein Total 7.3 6.4 - 8.3 g/dL    Albumin 3.7 3.5 - 5.2 g/dL    Bilirubin Total 0.9  <=1.2 mg/dL   Result Value Ref Range    Troponin T, High Sensitivity 8 <=14 ng/L   UA with Microscopic reflex to Culture    Specimen: Urine, Clean Catch   Result Value Ref Range    Color Urine Light Yellow Colorless, Straw, Light Yellow, Yellow    Appearance Urine Clear Clear    Glucose Urine 150 (A) Negative mg/dL    Bilirubin Urine Negative Negative    Ketones Urine Negative Negative mg/dL    Specific Gravity Urine 1.006 1.001 - 1.030    Blood Urine Negative Negative    pH Urine 7.0 5.0 - 7.0    Protein Albumin Urine Negative Negative mg/dL    Urobilinogen Urine <2.0 <2.0 mg/dL    Nitrite Urine Negative Negative    Leukocyte Esterase Urine Negative Negative    Bacteria Urine Few (A) None Seen /HPF    Mucus Urine Present (A) None Seen /LPF    RBC Urine <1 <=2 /HPF    WBC Urine 2 <=5 /HPF   CBC with platelets and differential   Result Value Ref Range    WBC Count 8.7 4.0 - 11.0 10e3/uL    RBC Count 4.18 3.80 - 5.20 10e6/uL    Hemoglobin 12.3 11.7 - 15.7 g/dL    Hematocrit 37.4 35.0 - 47.0 %    MCV 90 78 - 100 fL    MCH 29.4 26.5 - 33.0 pg    MCHC 32.9 31.5 - 36.5 g/dL    RDW 13.8 10.0 - 15.0 %    Platelet Count 133 (L) 150 - 450 10e3/uL    % Neutrophils 83 %    % Lymphocytes 9 %    % Monocytes 8 %    % Eosinophils 0 %    % Basophils 0 %    % Immature Granulocytes 0 %    NRBCs per 100 WBC 0 <1 /100    Absolute Neutrophils 7.2 1.6 - 8.3 10e3/uL    Absolute Lymphocytes 0.8 0.8 - 5.3 10e3/uL    Absolute Monocytes 0.7 0.0 - 1.3 10e3/uL    Absolute Eosinophils 0.0 0.0 - 0.7 10e3/uL    Absolute Basophils 0.0 0.0 - 0.2 10e3/uL    Absolute Immature Granulocytes 0.0 <=0.4 10e3/uL    Absolute NRBCs 0.0 10e3/uL   Extra Blood Culture Bottle   Result Value Ref Range    Hold Specimen JIC    Extra Blue Top Tube   Result Value Ref Range    Hold Specimen JIC    Extra Red Top Tube   Result Value Ref Range    Hold Specimen JIC    Extra Purple Top Tube   Result Value Ref Range    Hold Specimen JIC    Symptomatic Influenza A/B, RSV, &  SARS-CoV2 PCR (COVID-19) Nasopharyngeal    Specimen: Nasopharyngeal; Swab   Result Value Ref Range    Influenza A PCR Negative Negative    Influenza B PCR Negative Negative    RSV PCR Negative Negative    SARS CoV2 PCR Negative Negative   Ethyl Alcohol Level   Result Value Ref Range    Alcohol ethyl <0.01 <=0.01 g/dL   Result Value Ref Range    Ammonia 16 11 - 51 umol/L   TSH with free T4 reflex   Result Value Ref Range    TSH 0.94 0.30 - 4.20 uIU/mL   Result Value Ref Range    Magnesium 1.7 1.7 - 2.3 mg/dL   Result Value Ref Range    Procalcitonin 0.51 (H) <0.05 ng/mL   Lactic acid whole blood   Result Value Ref Range    Lactic Acid 0.9 0.7 - 2.0 mmol/L   Result Value Ref Range    CK 83 26 - 192 U/L   Result Value Ref Range    Potassium 7.0 (HH) 3.4 - 5.3 mmol/L   Result Value Ref Range    Potassium 3.8 3.4 - 5.3 mmol/L   Sodium random urine   Result Value Ref Range    Sodium Urine mmol/L 102 mmol/L   Urine Drug Screen Panel   Result Value Ref Range    Amphetamines Urine Screen Negative Screen Negative    Barbituates Urine Screen Negative Screen Negative    Benzodiazepine Urine Screen Negative Screen Negative    Cannabinoids Urine Screen Negative Screen Negative    Cocaine Urine Screen Negative Screen Negative    Fentanyl Qual Urine Screen Negative Screen Negative    Opiates Urine Screen Negative Screen Negative    PCP Urine Screen Negative Screen Negative   Glucose by meter   Result Value Ref Range    GLUCOSE BY METER POCT 189 (H) 70 - 99 mg/dL   Result Value Ref Range    Troponin T, High Sensitivity 7 <=14 ng/L   Glucose by meter   Result Value Ref Range    GLUCOSE BY METER POCT 170 (H) 70 - 99 mg/dL   Glucose by meter   Result Value Ref Range    GLUCOSE BY METER POCT 178 (H) 70 - 99 mg/dL   Glucose by meter   Result Value Ref Range    GLUCOSE BY METER POCT 179 (H) 70 - 99 mg/dL   Glucose by meter   Result Value Ref Range    GLUCOSE BY METER POCT 173 (H) 70 - 99 mg/dL   Glucose by meter   Result Value Ref Range     GLUCOSE BY METER POCT 124 (H) 70 - 99 mg/dL   ECG 12-LEAD WITH MUSE (LHE)   Result Value Ref Range    Systolic Blood Pressure 115 mmHg    Diastolic Blood Pressure 67 mmHg    Ventricular Rate 148 BPM    Atrial Rate 148 BPM    KS Interval 160 ms    QRS Duration 70 ms     ms    QTc 292 ms    P Axis 66 degrees    R AXIS 12 degrees    T Axis 59 degrees    Interpretation ECG       Sinus tachycardia with frequent Premature ventricular complexes in a pattern of bigeminy  Possible Left atrial enlargement  Borderline ECG  When compared with ECG of 17-MAY-2021 15:34,  Premature ventricular complexes are now Present  Vent. rate has increased BY  81 BPM  T wave amplitude has decreased in Inferior leads  Nonspecific T wave abnormality, worse in Anterolateral leads  Confirmed by SEE ED PROVIDER NOTE FOR, ECG INTERPRETATION ([id number]),  Bryce Vides ([id number]) on 11/8/2023 5:17:23 PM         RADIOLOGY:  Reviewed all pertinent imaging. Please see official radiology report.  Cervical spine MRI w/o contrast   Final Result   IMPRESSION:   1.  Good anatomic alignment and vertebral body heights maintained.   2.  No abnormal signal cervical spinal cord.   3.  Prominent ventral epidural complexes skull base to top of C5.   4.  No significant canal compromise or neural foraminal narrowing throughout cervical spine.         CT Abdomen Pelvis w Contrast   Final Result   IMPRESSION:    1.  No pulmonary embolism.      2.  Proctocolectomy with right lower quadrant ileostomy. Nothing for bowel obstruction. No abscess.      3.  Moderate bilateral ureteral pyelocaliectasis. The urinary bladder is distended. Would recommend reimaging of the collecting systems and ureters after urinary bladder drainage.      4.  Cholecystectomy and hysterectomy.      5.  Stable splenomegaly.      CT Chest Pulmonary Embolism w Contrast   Final Result   IMPRESSION:    1.  No pulmonary embolism.      2.  Proctocolectomy with right lower quadrant ileostomy.  Nothing for bowel obstruction. No abscess.      3.  Moderate bilateral ureteral pyelocaliectasis. The urinary bladder is distended. Would recommend reimaging of the collecting systems and ureters after urinary bladder drainage.      4.  Cholecystectomy and hysterectomy.      5.  Stable splenomegaly.      CT Cervical Spine w/o Contrast   Final Result   IMPRESSION:   HEAD CT:   1.  No CT finding of a mass, hemorrhage or focal area suggestive of acute infarct.      CERVICAL SPINE CT:   1.  No CT evidence for acute fracture or post traumatic subluxation.   2.  4 mm increased density in the ventral epidural space from  skull base to the top of C6 which could represent a small acute ventral epidural hemorrhage versus prominent ventral epidural plexus. Recommend MRI cervical spine for further evaluation.   3.  No significant canal compromise or neural foraminal narrowing throughout cervical spine.      These findings were communicated by phone to Dr. Quiroga at 5:31 PM on 11/08/2023.      CT Head w/o Contrast   Final Result   IMPRESSION:   HEAD CT:   1.  No CT finding of a mass, hemorrhage or focal area suggestive of acute infarct.      CERVICAL SPINE CT:   1.  No CT evidence for acute fracture or post traumatic subluxation.   2.  4 mm increased density in the ventral epidural space from  skull base to the top of C6 which could represent a small acute ventral epidural hemorrhage versus prominent ventral epidural plexus. Recommend MRI cervical spine for further evaluation.   3.  No significant canal compromise or neural foraminal narrowing throughout cervical spine.      These findings were communicated by phone to Dr. Quiroga at 5:31 PM on 11/08/2023.            EKG:    Performed at: 1443  Impression: Sinus tachycardia. No ST changes.    Rate: 148  Rhythm: Tachycardia  Comparison: 5/17/21 Similar    I have independently reviewed and interpreted the EKG(s) documented above.        REJI, Addy Moseley, am serving as a scribe to  document services personally performed by Dr. Azalea Edwards based on my observation and the provider's statements to me. I, Azalea Edwards MD attest that Addy Moseley is acting in a scribe capacity, has observed my performance of the services and has documented them in accordance with my direction.    Azalea Edwards MD  Emergency Medicine  [facility] EMERGENCY ROOM  [address] [id number]  Dept: [phone]     Azalea Edwards MD  11/08/23 2033

## 2023-11-08 NOTE — ED NOTES
Straight Cath performed by Jennifer MOREIRA and Bryce MOREIRA. No urine was obtained during straight cath.

## 2023-11-08 NOTE — ED TRIAGE NOTES
Pt arrives to ED via EMS with c/o generalized weakness and fatigue for the past week. Pt lives at home alone with home care RN. Pt has port a cath for fluids due to malnutrition and an ileostomy. Port to right chest area looks red and dry. When ems arrived pt was at 89% and applied 2L nasal cannula and now 94% on room air. Pt usually ambulates independently but was unable to move per EMS.       Triage Assessment (Adult)       Row Name 11/08/23 9017          Triage Assessment    Airway WDL WDL        Respiratory WDL    Respiratory WDL X  needed ozygen from ems denies sob        Skin Circulation/Temperature WDL    Skin Circulation/Temperature WDL X  possible infection to port a cath        Cardiac WDL    Cardiac WDL WDL        Peripheral/Neurovascular WDL    Peripheral Neurovascular WDL WDL        Cognitive/Neuro/Behavioral WDL    Cognitive/Neuro/Behavioral WDL X;mood/behavior     Level of Consciousness lethargic     Mood/Behavior flat affect        [place] Coma Scale    Best Eye Response 4-->(E4) spontaneous     Best Motor Response 6-->(M6) obeys commands     Best Verbal Response 5-->(V5) oriented     [place] Coma Scale Score 15

## 2023-11-08 NOTE — PHARMACY-ADMISSION MEDICATION HISTORY
Pharmacist Admission Medication History    Admission medication history is complete. The information provided in this note is only as accurate as the sources available at the time of the update.    Information Source(s): Patient, Caregiver, Patient's pharmacy, Clinic records, Hospital records, and [facility]    Pertinent Information:     FYI patient poor historian however she said she took her morning medications today. She has a home health care nurse who sets up her medications for her. List compiled from [facility]where and pharmacy fill history which is consistent.     Changes made to PTA medication list:  Added: amlodipine prn, omeprazole  Deleted: None  Changed: None    Medication History Completed By:Lisa Escamilla, PharmD, [facility] 11/8/2023 3:36 PM    PTA Med List   Medication Sig Note Last Dose    amLODIPine (NORVASC) 2.5 MG tablet Take 2.5 mg by mouth See Admin Instructions Take 2.5 mg daily as needed for SBP > 140 or DBP > 80. May take up to twice daily.      busPIRone (BUSPAR) 15 MG tablet TAKE 2 TABLETS BY MOUTH TWICE DAILY  11/8/2023 at x 1    cholecalciferol, vitamin D3, (VITAMIN D3) 2,000 unit cap [CHOLECALCIFEROL, VITAMIN D3, (VITAMIN D3) 2,000 UNIT CAP] Take 2,000 Units by mouth every morning.  11/8/2023    cyanocobalamin (CYANOCOBALAMIN) 1000 MCG/ML injection ADMINISTER 1 ML(1000 MCG) IN THE MUSCLE EVERY 30 DAYS 11/8/2023: Take on the 1st of the month.  11/1/2023    diphenoxylate-atropine (LOMOTIL) 2.5-0.025 MG tablet [DIPHENOXYLATE-ATROPINE (LOMOTIL) 2.5-0.025 MG PER TABLET] TAKE 2 TABLETS BY MOUTH THREE TIMES DAILY  11/8/2023 at x 1    DULoxetine (CYMBALTA) 60 MG capsule TAKE 1 CAPSULE BY MOUTH TWICE DAILY  11/8/2023 at x 1    HYDROmorphone (DILAUDID) 4 MG tablet Take 1 tablet (4 mg) by mouth 5 times daily 11/8/2023: Typically takes q4h while awake.  11/8/2023 at x 1    Lactobacillus acidophilus (ACIDOPHILUS ORAL) [LACTOBACILLUS ACIDOPHILUS (ACIDOPHILUS ORAL)] Take 1  tablet by mouth 2 (two) times a day.  11/8/2023 at x 1    lipase-protease-amylase (CREON) 2785-90149-61366 units CPEP TAKE 2 CAPSULES BY MOUTH THREE TIMES DAILY WITH FOOD  11/8/2023 at x 1    lisinopril (ZESTRIL) 20 MG tablet Take 1 tablet (20 mg) by mouth daily  11/8/2023 at AM    metoprolol succinate ER (TOPROL XL) 25 MG 24 hr tablet Take 1 tablet (25 mg) by mouth daily  11/8/2023 at AM    multivit-min/folic acid/vhw105 (ALIVE WOMEN'S GUMMY VITAMINS ORAL) [MULTIVIT-MIN/FOLIC ACID/CQO191 (ALIVE WOMEN'S GUMMY VITAMINS ORAL)] Take 2 tablets by mouth Daily after breakfast.   11/8/2023    nitroFURantoin macrocrystal (MACRODANTIN) 50 MG capsule TAKE 1 CAPSULE(50 MG) BY MOUTH AT BEDTIME  11/7/2023    omeprazole (PRILOSEC) 40 MG DR capsule Take 40 mg by mouth daily  11/8/2023    Pregabalin (LYRICA) 200 MG capsule Take 1 capsule (200 mg) by mouth 3 times daily  11/8/2023 at x 1    promethazine (PHENERGAN) 25 MG tablet TAKE 1 TABLET(25 MG) BY MOUTH EVERY 6 HOURS AS NEEDED FOR NAUSEA      simethicone (MYLICON) 80 MG chewable tablet Take 80 mg by mouth every 6 hours as needed for flatulence or cramping      sodium bicarbonate 650 MG tablet Take 650 mg by mouth 2 times daily  11/8/2023 at x 1    traZODone (DESYREL) 150 MG tablet TAKE 3 TABLETS(450 MG) BY MOUTH AT BEDTIME  11/7/2023    vitamin D2 (ERGOCALCIFEROL) 52097 units (1250 mcg) capsule TAKE 1 CAPSULE BY MOUTH 1 TIME EVERY WEEK FOR 12 DOSES 11/8/2023: Take on Wednesdays 11/1/2023

## 2023-11-09 NOTE — PROGRESS NOTES
11/09/23 1510   Appointment Info   Signing Clinician's Name / Credentials (OT) Preeti Michael, MANUEL/L, CLT   Rehab Comments (OT) OT eval   Living Environment   People in Home alone   Current Living Arrangements apartment   Home Accessibility no concerns   Transportation Anticipated family or friend will provide   Self-Care   Usual Activity Tolerance moderate   Current Activity Tolerance fair   Equipment Currently Used at Home grab bar, toilet;grab bar, tub/shower;raised toilet seat;shower chair   Activity/Exercise/Self-Care Comment indep with ADL and IADL except for driving per pt   General Information   Onset of Illness/Injury or Date of Surgery 11/09/23   Referring Physician Vijay Benítez   Patient/Family Therapy Goal Statement (OT) open to more therapy   Additional Occupational Profile Info/Pertinent History of Current Problem mod:failure to thrive; 73 year old female admitted on 11/8/2023. She has a hx of HTN, chronic fatigue syndrome, severe fibromyalgia on opioid therapy, asthma, CKD2, ileostomy, hx of SBOs, hx of DVT in setting of Hodgkin's lymphoma and not on anticoagulation, MDD, presents with generalized weakness, physical deconditioning, and severe hyperkalemia and hyponatremia. Artifact noted on CT spine confirmed with MRI spine. Admitted to [facility] [place]; however, hyperkalemia has not resolved and transfer patient to med/surg (with remote telemetry), and treat mild DYLAN with IVF, recheck Cr in AM. Hyponatremia normalizing/improved to 134.   Existing Precautions/Restrictions fall   Cognitive Status Examination   Orientation Status person   Affect/Mental Status (Cognitive) confused   Visual Perception   Visual Impairment/Limitations WFL   Sensory   Sensory Quick Adds sensation intact   Pain Assessment   Patient Currently in Pain No   Range of Motion Comprehensive   Comment, General Range of Motion grossly intact   Strength Comprehensive (MMT)   Comment, General Manual Muscle Testing (MMT) Assessment gen  weakness   Muscle Tone Assessment   Muscle Tone Quick Adds No deficits were identified   Coordination   Upper Extremity Coordination No deficits were identified   Bed Mobility   Comment (Bed Mobility) mod A   Transfers   Transfer Comments mod A   Balance   Balance Comments decreased   Activities of Daily Living   BADL Assessment/Intervention lower body dressing;grooming;toileting   Lower Body Dressing Assessment/Training   [place] Level (Lower Body Dressing) minimum assist (75% patient effort)   Grooming Assessment/Training   [place] Level (Grooming) minimum assist (75% patient effort)   Toileting   [place] Level (Toileting) minimum assist (75% patient effort)   Clinical Impression   Criteria for Skilled Therapeutic Interventions Met (OT) Yes, treatment indicated   OT Diagnosis decreased ADL indep/safety   Influenced by the following impairments s/p failure to thrive;see above   OT Problem List-Impairments impacting ADL activity tolerance impaired;balance;cognition;flexibility;mobility;strength   Assessment of Occupational Performance 3-5 Performance Deficits   Identified Performance Deficits dressing, home mgmt, func mob/bed mob, toileting   Planned Therapy Interventions (OT) ADL retraining;bed mobility training;cognition;balance training;strengthening;transfer training;progressive activity/exercise   Clinical Decision Making Complexity (OT) detailed assessment/moderate complexity   Risk & Benefits of therapy have been explained care plan/treatment goals reviewed;patient   OT Total Evaluation Time   OT Eval, Moderate Complexity Minutes (12532) 10   OT Goals   Therapy Frequency (OT) Daily   OT Predicted Duration/Target Date for Goal Attainment 11/15/23   OT Goals Lower Body Dressing;Cognition   OT: Lower Body Dressing Modified independent;using adaptive equipment   OT: Cognitive Patient/caregiver will verbalize understanding of cognitive assessment results/recommendations as needed for safe discharge  planning  (min cues)   Interventions   Interventions Quick Adds Self-Care/Home Management   Self-Care/Home Management   Self-Care/Home Mgmt/ADL, Compensatory, Meal Prep Minutes (49737) 30   Symptoms Noted During/After Treatment (Meal Preparation/Planning Training) fatigue   Treatment Detail/Skilled Intervention All functional transfers with min-modA/FWW/cues for tech/hand placement including bed, toilet, and chair. Walked to bathroom with min A/FWW/cues for posture/safety with FWW. Toileting with CGA/FWW/rail/cues for tech/hand placement. Walked to sink and performed g/h tasks with CGA/FWW/cues for posture. Pt stood at sink with sink for support with min A and LOB x3 noted, and completed g/h tasks including wash hands and face, brush hair/tounge. Walked back to the chair with min A/FWW/cues for posture/safety. Increased time/effort needed for all tasks d/t increased pain/weakness/decr cog. Educ in safe tech for functional transfers and ADL. educ in how to use cell phone. Role of therapy explained. All questions answered.   OT Discharge Planning   OT Plan any func mob/bed; toileting; standing at sink for g/h, LB dress   OT Discharge Recommendation (DC Rec) (S)  Transitional Care Facility   OT Rationale for DC Rec lives alone and is significantly decreased in balance/strength-needs Ax1   OT Brief overview of current status min A for func mob and has LOB multiple times during session   Total Session Time   Timed Code Treatment Minutes 30   Total Session Time (sum of timed and untimed services) 40

## 2023-11-09 NOTE — H&P
[facility]    History and Physical - Hospitalist Service       Date of Admission:  11/8/2023    Assessment & Plan      Dee Mattson is a 73 year old female admitted on 11/8/2023. She has a hx of HTN, chronic fatigue syndrome, severe fibromyalgia on opioid therapy, asthma, CKD2, ileostomy, hx of SBOs, hx of DVT in setting of Hodgkin's lymphoma and not on anticoagulation, MDD, presents with generalized weakness, physical deconditioning, and severe hyperkalemia and hyponatremia. Artifact noted on CT spine confirmed with MRI spine. Admitted to [facility]; however, hyperkalemia has not resolved and patient can be transitioned to cardiac telemetry.     Hyperkalemia  Hyponatremia  CKD2  -K 7.0; ordered hyperkalemia protocol,   -K now 3.8 upon arrival to floor  -Mag monitoring  -Urine osm and urine Na and serum osm for hyponatremia work-up; suspect possibly medication-induced (selective serotonin reuptake inhibitor), SIADH, hypovolemia, etc on Ddx  -Trend BMP    Asthma  -Order home inhalers and medications.   -Nebulizer and RT pulmonary hygiene as needed.     HTN  -Order home medications and as needed apply specified hold parameters.     Chronic Fatigue Syndrome  Fibromyalgia  -Consider pain team as needed.     Ileostomy  -Monitor, ensure patency and function  -High-output could be a contributing factor to electrolyte abnormalities     Generalized Weakness  Failure to Thrive  Physical Deconditioning  -PT  -OT  -CM/SW pending initial PT/OT evals        Diet: Combination Diet Regular Diet Adult    DVT Prophylaxis: Pneumatic Compression Devices, Anti-embolisim stockings (TEDs), and Ambulate every shift  Agee Catheter: Not present  Lines: PRESENT      Port a Cath 11/08/23 Single Lumen Right Chest wall-Site Assessment: WDL except;Red      Cardiac Monitoring: None  Code Status: Full Code      Clinically Significant Risk Factors Present on Admission        # Hyperkalemia: Highest K = 7 mmol/L in last 2 days,  will monitor as appropriate  # Hyponatremia: Lowest Na = 129 mmol/L in last 2 days, will monitor as appropriate          # Hypertension: Noted on problem list          # Asthma: noted on problem list        Disposition Plan      Expected Discharge Date: 11/10/2023                  Vijay Solis MD  Hospitalist Service  [facility]  Securely message with [employer] (more info)  Text page via [employer] Paging/Directory     ______________________________________________________________________    Chief Complaint   Abnormal lab, fatigue and weakness    History is obtained from the patient and ED ME    History of Present Illness   Dee Mattson is a 73 year old female admitted on 11/8/2023. She has a hx of HTN, chronic fatigue syndrome, severe fibromyalgia on opioid therapy, asthma, CKD2, ileostomy, hx of SBOs, hx of DVT in setting of Hodgkin's lymphoma and not on anticoagulation, MDD, presents with generalized weakness, physical deconditioning, and severe hyperkalemia and hyponatremia.    She was at home when she fell, and reportedly had been drinking alcohol. Upon arrival to ED, noted with severe hypokalemia to 7.0 and mild hyponatremia 129. She also endorses progressive severe generalized weakness, profound fatigue, and physical deconditioning. Otherwise, there is no endorsement of any fevers, rigors, chest pain or shortness of breath, nausea or vomiting or abdominal pain, changes in bowel movements/pattern, urinary symptoms, focal weakness, or any other new and associated symptoms at this time. 14 point review of systems performed without any other pertinent positives at this time.     All questions the patient had at this time were answered to verbalized and stated satisfaction and understanding. Code status was discussed and the patient confirmed wishes for full code for this hospitalization.     Past Medical History    Past Medical History:   Diagnosis Date    Anxiety and depression     Asthma  4/10/2012    Bowel obstruction (H)     Chronic fatigue syndrome 7/3/2014    Dehydration 7/4/2014    Disease of thyroid gland     Fibromyalgia 7/3/2014    GERD (gastroesophageal reflux disease)     History of anesthesia complications     Slower to wake up    History of blood clots     Hodgkin's lymphoma (H) 10/1979    Hypertension     Major depressive disorder, recurrent episode, moderate (H) 9/6/2005    PONV (postoperative nausea and vomiting)     Shortness of breath     TIA (transient ischemic attack)        Past Surgical History   Past Surgical History:   Procedure Laterality Date    ANAL SPHINCTEROPLASTY      APPENDECTOMY      C UNLISTED PROCEDURE, ABDOMEN/PERITONEUM/OMENTUM      Description: Hernia Repair;  Recorded: 08/05/2009;  Comments: perineal    CHOLECYSTECTOMY      COLECTOMY      HC DILATION/CURETTAGE DIAG/THER NON OB      Description: Dilation And Curettage;  Recorded: 08/05/2009;    HC REMOVAL GALLBLADDER      Description: Cholecystectomy;  Recorded: 07/18/2014;    HC REMOVAL OF TONSILS,<11 Y/O      Description: Tonsillectomy;  Recorded: 08/05/2009;    HYSTERECTOMY  05/2000    ILEOSTOMY      IR MISCELLANEOUS PROCEDURE  10/9/2002    IR MISCELLANEOUS PROCEDURE  10/14/2002    OOPHORECTOMY Bilateral 05/2000    [place] CYSTOURETHROSCOPY INJ CHEMODENERVATION BLADDER N/A 2/25/2021    Procedure: PELVIC FLOOR BOTOX;  Surgeon: José Antonio Cade MD;  Location: [facility];  Service: Gynecology    NY INSJ TUNNELED CTR VAD W/SUBQ PORT AGE 5 YR/> N/A 10/29/2014    Procedure: REMOVAL PICC LINE RIGHT ARM and PLACEMENT PORTACATH;  Surgeon: Jsaon Kirkpatrick MD;  Location: [facility] OR;  Service: General    NY RMVL JENY CTR VAD W/SUBQ PORT/ CTR/PRPH INSJ N/A 7/20/2018    Procedure: REMOVAL OF PORT A CATH;  Surgeon: Jason Kirkpatrick MD;  Location: [facility] OR;  Service: General    SMALL INTESTINE SURGERY      THYROIDECTOMY, PARTIAL      TUNNELED VENOUS CATHETER PLACEMENT N/A 8/29/2018    Procedure:  PORT-A-CATH INSERTION;  Surgeon: Jason Kirkpatrick MD;  Location: [facility];  Service:     [facility] TOTAL ABDOM HYSTERECTOMY      Description: Total Abdominal Hysterectomy;  Recorded: 07/18/2014;       Prior to Admission Medications   Prior to Admission Medications   Prescriptions Last Dose Informant Patient Reported? Taking?   DULoxetine (CYMBALTA) 60 MG capsule 11/8/2023 at x 1  No Yes   Sig: TAKE 1 CAPSULE BY MOUTH TWICE DAILY   HYDROmorphone (DILAUDID) 4 MG tablet 11/8/2023 at x 1  No Yes   Sig: Take 1 tablet (4 mg) by mouth 5 times daily   Lactobacillus acidophilus (ACIDOPHILUS ORAL) 11/8/2023 at x 1  Yes Yes   Sig: [LACTOBACILLUS ACIDOPHILUS (ACIDOPHILUS ORAL)] Take 1 tablet by mouth 2 (two) times a day.   Pregabalin (LYRICA) 200 MG capsule 11/8/2023 at x 1  No Yes   Sig: Take 1 capsule (200 mg) by mouth 3 times daily   amLODIPine (NORVASC) 2.5 MG tablet   Yes Yes   Sig: Take 2.5 mg by mouth See Admin Instructions Take 2.5 mg daily as needed for SBP > 140 or DBP > 80. May take up to twice daily.   busPIRone (BUSPAR) 15 MG tablet 11/8/2023 at x 1  No Yes   Sig: TAKE 2 TABLETS BY MOUTH TWICE DAILY   cholecalciferol, vitamin D3, (VITAMIN D3) 2,000 unit cap 11/8/2023  Yes Yes   Sig: [CHOLECALCIFEROL, VITAMIN D3, (VITAMIN D3) 2,000 UNIT CAP] Take 2,000 Units by mouth every morning.   cyanocobalamin (CYANOCOBALAMIN) 1000 MCG/ML injection 11/1/2023  No Yes   Sig: ADMINISTER 1 ML(1000 MCG) IN THE MUSCLE EVERY 30 DAYS   diphenoxylate-atropine (LOMOTIL) 2.5-0.025 MG tablet 11/8/2023 at x 1  No Yes   Sig: [DIPHENOXYLATE-ATROPINE (LOMOTIL) 2.5-0.025 MG PER TABLET] TAKE 2 TABLETS BY MOUTH THREE TIMES DAILY   lipase-protease-amylase (CREON) 4747-17910-99032 units CPEP 11/8/2023 at x 1  No Yes   Sig: TAKE 2 CAPSULES BY MOUTH THREE TIMES DAILY WITH FOOD   lisinopril (ZESTRIL) 20 MG tablet 11/8/2023 at AM  No Yes   Sig: Take 1 tablet (20 mg) by mouth daily   metoprolol succinate ER (TOPROL XL) 25 MG 24 hr tablet 11/8/2023  at AM  No Yes   Sig: Take 1 tablet (25 mg) by mouth daily   multivit-min/folic acid/[id number] (ALIVE WOMEN'S GUMMY VITAMINS ORAL) 11/8/2023  Yes Yes   Sig: [MULTIVIT-MIN/FOLIC ACID/XCZ311 (ALIVE WOMEN'S GUMMY VITAMINS ORAL)] Take 2 tablets by mouth Daily after breakfast.    nitroFURantoin macrocrystal (MACRODANTIN) 50 MG capsule 11/7/2023  No Yes   Sig: TAKE 1 CAPSULE(50 MG) BY MOUTH AT BEDTIME   omeprazole (PRILOSEC) 40 MG DR capsule 11/8/2023  Yes Yes   Sig: Take 40 mg by mouth daily   promethazine (PHENERGAN) 25 MG tablet   No Yes   Sig: TAKE 1 TABLET(25 MG) BY MOUTH EVERY 6 HOURS AS NEEDED FOR NAUSEA   simethicone (MYLICON) 80 MG chewable tablet   Yes Yes   Sig: Take 80 mg by mouth every 6 hours as needed for flatulence or cramping   sodium bicarbonate 650 MG tablet 11/8/2023 at x 1  Yes Yes   Sig: Take 650 mg by mouth 2 times daily   traZODone (DESYREL) 150 MG tablet 11/7/2023  No Yes   Sig: TAKE 3 TABLETS(450 MG) BY MOUTH AT BEDTIME   vitamin D2 (ERGOCALCIFEROL) 19770 units (1250 mcg) capsule 11/1/2023  No Yes   Sig: TAKE 1 CAPSULE BY MOUTH 1 TIME EVERY WEEK FOR 12 DOSES      Facility-Administered Medications: None           Physical Exam   Vital Signs: Temp: 98.6  F (37  C) Temp src: Oral BP: 129/72 Pulse: 93   Resp: 22 SpO2: 94 % O2 Device: None (Room air)    Weight: 117 lbs 8.08 oz    GENERAL:  Alert, appears comfortable, in no acute distress, appears stated age, thin and frail appearing   HEAD:  Normocephalic, without obvious abnormality, atraumatic   EYES:  PERRL, conjunctiva/corneas clear, no scleral icterus, EOM's intact   NOSE: Nares normal, septum midline, mucosa normal, no drainage   THROAT: Lips, mucosa, and tongue normal; teeth and gums normal, mouth moist   NECK: Supple, symmetrical, trachea midline   BACK:   Symmetric, no curvature, ROM normal   LUNGS:   Clear to auscultation bilaterally, no rales, rhonchi, or wheezing, symmetric chest rise on inhalation, respirations unlabored   CHEST WALL:  No  tenderness or deformity   HEART:  Regular rate and rhythm, S1 and S2 normal, no murmur, rub, or gallop    ABDOMEN:   Soft, non-tender, bowel sounds active all four quadrants, no masses, no organomegaly, no rebound or guarding   EXTREMITIES: Extremities normal, atraumatic, no cyanosis or edema    SKIN: Dry to touch, no exanthems in the visualized areas   NEURO: Alert, oriented x 4, moves all four extremities freely/spontaneously   PSYCH: Cooperative, behavior is appropriate        Medical Decision Making       96 MINUTES SPENT BY ME on the date of service doing chart review, history, exam, documentation & further activities per the note.      Data     I have personally reviewed the following data over the past 24 hrs:    8.7  \   12.3   / 133 (L)     129 (L) 99 16.7 /  80   3.8 21 (L) 1.19 (H) \     ALT: 9 AST: 28 AP: 126 (H) TBILI: 0.9   ALB: 3.7 TOT PROTEIN: 7.3 LIPASE: N/A     Trop: 7 BNP: N/A     TSH: 0.94 T4: N/A A1C: N/A     Procal: 0.51 (H) CRP: N/A Lactic Acid: 0.9         Imaging results reviewed over the past 24 hrs:   Recent Results (from the past 24 hour(s))   CT Head w/o Contrast    Narrative    EXAM: CT HEAD W/O CONTRAST, CT CERVICAL SPINE W/O CONTRAST  LOCATION: [facility]  DATE: 11/8/2023    INDICATION: fall head trauma intox poor historian altered mental status and neck pain.  COMPARISON: 11/05/2019.  TECHNIQUE:   1) Routine CT Head without IV contrast. Multiplanar reformats. Dose reduction techniques were used.  2) Routine CT Cervical Spine without IV contrast. Multiplanar reformats. Dose reduction techniques were used.    FINDINGS:   HEAD CT:   INTRACRANIAL CONTENTS: No intracranial hemorrhage, extraaxial collection, or mass effect.  No CT evidence of acute infarct. Normal parenchymal attenuation. Normal ventricles and sulci.     VISUALIZED ORBITS/SINUSES/MASTOIDS: No intraorbital abnormality. No paranasal sinus mucosal disease. Trace fluid left mastoid air  cells.    BONES/SOFT TISSUES: No acute abnormality.    CERVICAL SPINE CT:   VERTEBRA: There is a slight anterior listhesis of C5 in relation to C6. The rest of the cervical spine has good anatomic alignment. The vertebral body heights are well-maintained throughout. There is slight osteopenia of the bony structures. No fracture   or posttraumatic subluxation.     CANAL/FORAMINA: There is increased density seen in the ventral epidural space from the skull base down to the top of the C6 vertebral body. This is more prominent than normal. It measures approximately 4 mm in its widest width. There is no significant   canal compromise from this. This could represent a prominent ventral epidural venous plexus versus a small acute ventral epidural hematoma. Disc spaces are fairly well-maintained throughout. There is diffuse facet arthropathy visualized. There is no   significant canal compromise or neural foraminal narrowing throughout cervical spine.    PARASPINAL: No extraspinal abnormality. Left lung apical scarring.      Impression    IMPRESSION:  HEAD CT:  1.  No CT finding of a mass, hemorrhage or focal area suggestive of acute infarct.    CERVICAL SPINE CT:  1.  No CT evidence for acute fracture or post traumatic subluxation.  2.  4 mm increased density in the ventral epidural space from  skull base to the top of C6 which could represent a small acute ventral epidural hemorrhage versus prominent ventral epidural plexus. Recommend MRI cervical spine for further evaluation.  3.  No significant canal compromise or neural foraminal narrowing throughout cervical spine.    These findings were communicated by phone to Dr. Quiroga at 5:31 PM on 11/08/2023.   CT Cervical Spine w/o Contrast    Narrative    EXAM: CT HEAD W/O CONTRAST, CT CERVICAL SPINE W/O CONTRAST  LOCATION: [facility]  DATE: 11/8/2023    INDICATION: fall head trauma intox poor historian altered mental status and neck pain.  COMPARISON:  11/05/2019.  TECHNIQUE:   1) Routine CT Head without IV contrast. Multiplanar reformats. Dose reduction techniques were used.  2) Routine CT Cervical Spine without IV contrast. Multiplanar reformats. Dose reduction techniques were used.    FINDINGS:   HEAD CT:   INTRACRANIAL CONTENTS: No intracranial hemorrhage, extraaxial collection, or mass effect.  No CT evidence of acute infarct. Normal parenchymal attenuation. Normal ventricles and sulci.     VISUALIZED ORBITS/SINUSES/MASTOIDS: No intraorbital abnormality. No paranasal sinus mucosal disease. Trace fluid left mastoid air cells.    BONES/SOFT TISSUES: No acute abnormality.    CERVICAL SPINE CT:   VERTEBRA: There is a slight anterior listhesis of C5 in relation to C6. The rest of the cervical spine has good anatomic alignment. The vertebral body heights are well-maintained throughout. There is slight osteopenia of the bony structures. No fracture   or posttraumatic subluxation.     CANAL/FORAMINA: There is increased density seen in the ventral epidural space from the skull base down to the top of the C6 vertebral body. This is more prominent than normal. It measures approximately 4 mm in its widest width. There is no significant   canal compromise from this. This could represent a prominent ventral epidural venous plexus versus a small acute ventral epidural hematoma. Disc spaces are fairly well-maintained throughout. There is diffuse facet arthropathy visualized. There is no   significant canal compromise or neural foraminal narrowing throughout cervical spine.    PARASPINAL: No extraspinal abnormality. Left lung apical scarring.      Impression    IMPRESSION:  HEAD CT:  1.  No CT finding of a mass, hemorrhage or focal area suggestive of acute infarct.    CERVICAL SPINE CT:  1.  No CT evidence for acute fracture or post traumatic subluxation.  2.  4 mm increased density in the ventral epidural space from  skull base to the top of C6 which could represent a small  acute ventral epidural hemorrhage versus prominent ventral epidural plexus. Recommend MRI cervical spine for further evaluation.  3.  No significant canal compromise or neural foraminal narrowing throughout cervical spine.    These findings were communicated by phone to Dr. Quiroga at 5:31 PM on 11/08/2023.   CT Chest Pulmonary Embolism w Contrast    Narrative    EXAM: CT ABDOMEN PELVIS W CONTRAST, CT CHEST PULMONARY EMBOLISM W CONTRAST  LOCATION: [facility]  DATE: 11/8/2023    INDICATION: Fall, fatigue, poor historian, AMS, DVT. Hypoxia.  COMPARISON: 04/22/2019 CT abdomen and pelvis.  TECHNIQUE: CT scan of the abdomen and pelvis was performed following injection of IV contrast. Multiplanar reformats were obtained. CT chest pulmonary angiogram during arterial phase injection of IV contrast. Multiplanar reformats and MIP reconstructions   were performed. Dose reduction techniques were used. Dose reduction techniques were used.  CONTRAST: Isovue 370 75 mL.    FINDINGS:   CT PULMONARY ANGIOGRAM: No pulmonary emboli. No evidence for right heart strain. Ectasia of the ascending aorta measuring 3.7 cm.    LUNGS AND PLEURA: Dependent atelectasis. Trace right pleural fluid.    MEDIASTINUM: Negative.    CORONARY ARTERY CALCIFICATION: Mild.    HEPATOBILIARY: Cholecystectomy. Riedel's lobe of the liver.    PANCREAS: Normal.    SPLEEN: Splenomegaly.    ADRENAL GLANDS: Normal.    KIDNEYS/BLADDER: There is new moderate dilatation of the intrarenal collecting systems and both ureters down to the bladder base. The urinary bladder is somewhat distended. Would recommend reimaging the intrarenal collecting systems and ureters after   drainage of the bladder.    BOWEL: Proctocolectomy with right lower quadrant ileostomy.    LYMPH NODES: Normal.    VASCULATURE: Unremarkable.    PELVIC ORGANS: Hysterectomy.    MUSCULOSKELETAL: Normal.      Impression    IMPRESSION:   1.  No pulmonary embolism.    2.   Proctocolectomy with right lower quadrant ileostomy. Nothing for bowel obstruction. No abscess.    3.  Moderate bilateral ureteral pyelocaliectasis. The urinary bladder is distended. Would recommend reimaging of the collecting systems and ureters after urinary bladder drainage.    4.  Cholecystectomy and hysterectomy.    5.  Stable splenomegaly.   CT Abdomen Pelvis w Contrast    Narrative    EXAM: CT ABDOMEN PELVIS W CONTRAST, CT CHEST PULMONARY EMBOLISM W CONTRAST  LOCATION: [facility]  DATE: 11/8/2023    INDICATION: Fall, fatigue, poor historian, AMS, DVT. Hypoxia.  COMPARISON: 04/22/2019 CT abdomen and pelvis.  TECHNIQUE: CT scan of the abdomen and pelvis was performed following injection of IV contrast. Multiplanar reformats were obtained. CT chest pulmonary angiogram during arterial phase injection of IV contrast. Multiplanar reformats and MIP reconstructions   were performed. Dose reduction techniques were used. Dose reduction techniques were used.  CONTRAST: Isovue 370 75 mL.    FINDINGS:   CT PULMONARY ANGIOGRAM: No pulmonary emboli. No evidence for right heart strain. Ectasia of the ascending aorta measuring 3.7 cm.    LUNGS AND PLEURA: Dependent atelectasis. Trace right pleural fluid.    MEDIASTINUM: Negative.    CORONARY ARTERY CALCIFICATION: Mild.    HEPATOBILIARY: Cholecystectomy. Riedel's lobe of the liver.    PANCREAS: Normal.    SPLEEN: Splenomegaly.    ADRENAL GLANDS: Normal.    KIDNEYS/BLADDER: There is new moderate dilatation of the intrarenal collecting systems and both ureters down to the bladder base. The urinary bladder is somewhat distended. Would recommend reimaging the intrarenal collecting systems and ureters after   drainage of the bladder.    BOWEL: Proctocolectomy with right lower quadrant ileostomy.    LYMPH NODES: Normal.    VASCULATURE: Unremarkable.    PELVIC ORGANS: Hysterectomy.    MUSCULOSKELETAL: Normal.      Impression    IMPRESSION:   1.  No pulmonary  embolism.    2.  Proctocolectomy with right lower quadrant ileostomy. Nothing for bowel obstruction. No abscess.    3.  Moderate bilateral ureteral pyelocaliectasis. The urinary bladder is distended. Would recommend reimaging of the collecting systems and ureters after urinary bladder drainage.    4.  Cholecystectomy and hysterectomy.    5.  Stable splenomegaly.   Cervical spine MRI w/o contrast    Narrative    EXAM: MR CERVICAL SPINE W/O CONTRAST  LOCATION: [facility]  DATE: 11/8/2023    INDICATION: prominant ventral anomaly, possible hematoma with a history of trauma.  COMPARISON: 11/08/2023.  TECHNIQUE: MRI Cervical Spine without IV contrast.    FINDINGS: There is motion on these images leading to suboptimal visualization of fine detail.  Normal vertebral body heights, alignment and marrow signal. There is no abnormal signal or change in size of the cervical spinal cord. There is no evidence of an intracanal mass or hemorrhage. There is prominence of the ventral epidural plexus from skull   base to the top of C5. This is a normal variant. The ligamentous structures are intact. There is no significant canal compromise or significant neural foraminal narrowing throughout cervical spine.    Craniovertebral junction and C1-C2: Normal.      Impression    IMPRESSION:  1.  Good anatomic alignment and vertebral body heights maintained.  2.  No abnormal signal cervical spinal cord.  3.  Prominent ventral epidural complexes skull base to top of C5.  4.  No significant canal compromise or neural foraminal narrowing throughout cervical spine.

## 2023-11-09 NOTE — PROGRESS NOTES
11/09/23 1125   Appointment Info   Signing Clinician's Name / Credentials (PT) Denny Arevalo   Living Environment   People in Home alone   Current Living Arrangements apartment   Home Accessibility no concerns   Transportation Anticipated family or friend will provide   Self-Care   Usual Activity Tolerance moderate   Current Activity Tolerance poor   Equipment Currently Used at Home walker, standard;cane, straight;colostomy/ostomy supplies   Fall history within last six months yes   Number of times patient has fallen within last six months 1   Activity/Exercise/Self-Care Comment Indep with basic ADL's , does have HH nurse 1x per week, has a bus service take her for shopping   General Information   Onset of Illness/Injury or Date of Surgery 11/08/23   Pertinent History of Current Problem (include personal factors and/or comorbidities that impact the POC) Failure to thrive in adult 11/8/2023    Chronic fatigue syndrome (Chronic) 7/3/2014    Asthma (Chronic) 4/10/2012    GERD (gastroesophageal reflux disease) (Chronic) Unknown    Fibromyalgia (Chronic) 7/3/2014    Hyperkalemia 11/8/2023    Delirium 11/8/2023    Hyponatremia   Existing Precautions/Restrictions no known precautions/restrictions   Cognition   Affect/Mental Status (Cognition) WFL   Follows Commands (Cognition) WFL   Range of Motion (ROM)   Range of Motion ROM is WFL   Strength (Manual Muscle Testing)   Strength Comments general weakness   Transfers   Transfers sit-stand transfer   Sit-Stand Transfer   Sit-Stand [place] (Transfers) minimum assist (75% patient effort);verbal cues;supervision   Assistive Device (Sit-Stand Transfers) walker, front-wheeled   Gait/Stairs (Locomotion)   [place] Level (Gait) minimum assist (75% patient effort);1 person assist;verbal cues;supervision   Assistive Device (Gait) walker, front-wheeled   Distance in Feet (Gait) 5   Pattern (Gait) step-to   Deviations/Abnormal Patterns (Gait) gait speed decreased;patrick  decreased;stride length decreased   Balance   Balance Comments unsteady with inital standing   Clinical Impression   Criteria for Skilled Therapeutic Intervention Yes, treatment indicated   PT Diagnosis (PT) impaired functional mobiity   Influenced by the following impairments weakness, pain   Functional limitations due to impairments transfers, gait   Clinical Presentation (PT Evaluation Complexity) evolving   Clinical Presentation Rationale Patient presents as medically diagnosed   Clinical Decision Making (Complexity) low complexity   Planned Therapy Interventions (PT) gait training;home exercise program;patient/family education;transfer training   Risk & Benefits of therapy have been explained evaluation/treatment results reviewed;participants included;patient   PT Total Evaluation Time   PT Eval, Low Complexity Minutes (55866) 10   Physical Therapy Goals   PT Frequency Daily   PT Predicted Duration/Target Date for Goal Attainment 11/16/23   PT Goals Transfers;Gait;PT Goal 1   PT: Transfers Modified independent;Sit to/from stand;Assistive device   PT: Gait Modified independent;Rolling walker;100 feet   PT: Goal 1 Indep with HEP   Interventions   Interventions Quick Adds Gait Training;Therapeutic Activity   Therapeutic Activity   Therapeutic Activities: dynamic activities to improve functional performance Minutes (73853) 10   Symptoms Noted During/After Treatment Fatigue   Treatment Detail/Skilled Intervention Paitent needing mod assist to move from supine<> sit.  Moving very slowly overall. STS with CGA and vc for hand placement   Gait Training   Gait Training Minutes (78541) 13   Symptoms Noted During/After Treatment (Gait Training) fatigue   Treatment Detail/Skilled Intervention Paitent dizzy with inital ambulation even though she had been standing 20-30 seconds before walking to chair. BP checked and wnl.  Patient needing cues on use of walker to turn and maneuver   Distance in Feet 14, 20, 15   [place]  Level (Gait Training) minimum assist (75% patient effort)   Physical Assistance Level (Gait Training) supervision;verbal cues;1 person assist   Assistive Device (Gait Training) rolling walker   Pattern Analysis (Gait Training)   (step to pattern)   Gait Analysis Deviations decreased patrick;decreased step length;decreased stride length   Impairments (Gait Analysis/Training) strength decreased;balance impaired   PT Discharge Planning   PT Plan progress with gait/transfers. Very weak and not tolerating much at this time.   PT Discharge Recommendation (DC Rec) Transitional Care Facility   PT Rationale for DC Rec Patient very weakn overall and not tolerating much activity at this time.  Would benefit from short term rehab stay for gait and transfers prior to returning to home   PT Brief overview of current status Paitent is min assist wiht gait/transfers.  Only able to walk short distances   Total Session Time   Timed Code Treatment Minutes 23   Total Session Time (sum of timed and untimed services) 33

## 2023-11-09 NOTE — PROGRESS NOTES
[facility]    Medicine Progress Note - Hospitalist Service    Date of Admission:  11/8/2023    Assessment & Plan      Dee Mattson is a 73 year old female admitted on 11/8/2023. She has a hx of HTN, chronic fatigue syndrome, severe fibromyalgia on opioid therapy, asthma, CKD2, ileostomy, hx of SBOs, hx of DVT in setting of Hodgkin's lymphoma and not on anticoagulation, MDD, presents with generalized weakness, physical deconditioning, and severe hyperkalemia and hyponatremia. Artifact noted on CT spine confirmed with MRI spine. Admitted to [facility]; however, hyperkalemia has not resolved and transfer patient to med/surg (with remote telemetry), and treat mild DYLAN with IVF, recheck Cr in AM. Hyponatremia normalizing/improved to 134.     Hyperkalemia  Hyponatremia  CKD2  -K 7.0; ordered hyperkalemia protocol,   -K 3.8 and normalized, 4.9 on 11/9  -Mag monitoring  -Urine osm and urine Na and serum osm for hyponatremia work-up; suspect possibly medication-induced (selective serotonin reuptake inhibitor), SIADH, hypovolemia, etc on Ddx  -Trend BMP  -Na normalizing/improved to 134 on 11/9    ? Medication Side Effects  Lip-smacking, EPS  -Consultation to psychiatry for concern from psychiatric medications  -Team members observed lip smacking and extrapyramidal symptoms  -For now, Hold home Duloxetine, Buspirone, and Trazodone.     Asthma  -Order home inhalers and medications.   -Nebulizer and RT pulmonary hygiene as needed.     HTN  -Order home medications and as needed apply specified hold parameters.     Chronic Fatigue Syndrome  Fibromyalgia  -Consider pain team as needed.     Ileostomy  -Monitor, ensure patency and function  -High-output could be a contributing factor to electrolyte abnormalities     Generalized Weakness  Failure to Thrive  Physical Deconditioning  -PT  -OT  -CM/SW pending initial PT/OT evals          Diet: Combination Diet Regular Diet Adult    DVT Prophylaxis: Heparin SQ,  Pneumatic Compression Devices, Anti-embolisim stockings (TEDs), and Ambulate every shift  Agee Catheter: Not present  Lines: PRESENT      Port a Cath 11/08/23 Single Lumen Right Chest wall-Site Assessment: Other (Comment) (needle removed)      Cardiac Monitoring: None  Code Status: Full Code      Clinically Significant Risk Factors Present on Admission        # Hyperkalemia: Highest K = 7 mmol/L in last 2 days, will monitor as appropriate  # Hyponatremia: Lowest Na = 129 mmol/L in last 2 days, will monitor as appropriate  # Hypocalcemia: Lowest Ca = 8.1 mg/dL in last 2 days, will monitor and replace as appropriate       # Thrombocytopenia: Lowest platelets = 126 in last 2 days, will monitor for bleeding   # Hypertension: Noted on problem list          # Asthma: noted on problem list        Disposition Plan      Expected Discharge Date: 11/10/2023                    Vijay Solis MD  Hospitalist Service  [facility]  Securely message with [employer] (more info)  Text page via [employer] Paging/Directory   ______________________________________________________________________    Interval History   No acute events overnight.    No report of chest pain, shortness of breath, or other new complaints. RN team members observed lip smacking and extrapyramidal symptoms. Discussed plan of care with patient. Answered all questions to patient's verbalized understanding and satisfaction.      Physical Exam   Vital Signs: Temp: 97.6  F (36.4  C) Temp src: Oral BP: 121/59 Pulse: 81   Resp: 20 SpO2: 100 % O2 Device: Nasal cannula Oxygen Delivery: 1 LPM  Weight: 117 lbs 8.08 oz    GENERAL:  Alert, appears comfortable, in no acute distress, appears stated age, thin, frail-appearing   HEAD:  Normocephalic, without obvious abnormality, atraumatic   EYES:  PERRL, conjunctiva/corneas clear, no scleral icterus, EOM's intact   NOSE: Nares normal, septum midline, mucosa normal, no drainage   THROAT: Lips, mucosa, and  tongue normal; teeth and gums normal, mouth moist   NECK: Supple, symmetrical, trachea midline   BACK:   Symmetric, no curvature, ROM normal   LUNGS:   Clear to auscultation bilaterally, no rales, rhonchi, or wheezing, symmetric chest rise on inhalation, respirations unlabored   CHEST WALL:  No tenderness or deformity   HEART:  Regular rate and rhythm, S1 and S2 normal, no murmur, rub, or gallop    ABDOMEN:   Soft, non-tender, bowel sounds active all four quadrants, no masses, no organomegaly, no rebound or guarding   EXTREMITIES: Extremities normal, atraumatic, no cyanosis or edema    SKIN: Dry to touch, no exanthems in the visualized areas   NEURO: Alert, oriented x 4, moves all four extremities freely/spontaneously   PSYCH: Cooperative, behavior is appropriate        Medical Decision Making       51 MINUTES SPENT BY ME on the date of service doing chart review, history, exam, documentation & further activities per the note.      Data     I have personally reviewed the following data over the past 24 hrs:    8.9  \   12.3   / 126 (L)     134 (L) 99 19.6 /  91   4.9 23 1.40 (H) \     ALT: 9 AST: 28 AP: 126 (H) TBILI: 0.9   ALB: 3.7 TOT PROTEIN: 7.3 LIPASE: N/A     Trop: 7 BNP: N/A     TSH: 0.94 T4: N/A A1C: N/A     Procal: 0.51 (H) CRP: N/A Lactic Acid: 0.9         Imaging results reviewed over the past 24 hrs:   Recent Results (from the past 24 hour(s))   CT Head w/o Contrast    Narrative    EXAM: CT HEAD W/O CONTRAST, CT CERVICAL SPINE W/O CONTRAST  LOCATION: [facility]  DATE: 11/8/2023    INDICATION: fall head trauma intox poor historian altered mental status and neck pain.  COMPARISON: 11/05/2019.  TECHNIQUE:   1) Routine CT Head without IV contrast. Multiplanar reformats. Dose reduction techniques were used.  2) Routine CT Cervical Spine without IV contrast. Multiplanar reformats. Dose reduction techniques were used.    FINDINGS:   HEAD CT:   INTRACRANIAL CONTENTS: No intracranial  hemorrhage, extraaxial collection, or mass effect.  No CT evidence of acute infarct. Normal parenchymal attenuation. Normal ventricles and sulci.     VISUALIZED ORBITS/SINUSES/MASTOIDS: No intraorbital abnormality. No paranasal sinus mucosal disease. Trace fluid left mastoid air cells.    BONES/SOFT TISSUES: No acute abnormality.    CERVICAL SPINE CT:   VERTEBRA: There is a slight anterior listhesis of C5 in relation to C6. The rest of the cervical spine has good anatomic alignment. The vertebral body heights are well-maintained throughout. There is slight osteopenia of the bony structures. No fracture   or posttraumatic subluxation.     CANAL/FORAMINA: There is increased density seen in the ventral epidural space from the skull base down to the top of the C6 vertebral body. This is more prominent than normal. It measures approximately 4 mm in its widest width. There is no significant   canal compromise from this. This could represent a prominent ventral epidural venous plexus versus a small acute ventral epidural hematoma. Disc spaces are fairly well-maintained throughout. There is diffuse facet arthropathy visualized. There is no   significant canal compromise or neural foraminal narrowing throughout cervical spine.    PARASPINAL: No extraspinal abnormality. Left lung apical scarring.      Impression    IMPRESSION:  HEAD CT:  1.  No CT finding of a mass, hemorrhage or focal area suggestive of acute infarct.    CERVICAL SPINE CT:  1.  No CT evidence for acute fracture or post traumatic subluxation.  2.  4 mm increased density in the ventral epidural space from  skull base to the top of C6 which could represent a small acute ventral epidural hemorrhage versus prominent ventral epidural plexus. Recommend MRI cervical spine for further evaluation.  3.  No significant canal compromise or neural foraminal narrowing throughout cervical spine.    These findings were communicated by phone to Dr. Quiroga at 5:31 PM on  11/08/2023.   CT Cervical Spine w/o Contrast    Narrative    EXAM: CT HEAD W/O CONTRAST, CT CERVICAL SPINE W/O CONTRAST  LOCATION: [facility]  DATE: 11/8/2023    INDICATION: fall head trauma intox poor historian altered mental status and neck pain.  COMPARISON: 11/05/2019.  TECHNIQUE:   1) Routine CT Head without IV contrast. Multiplanar reformats. Dose reduction techniques were used.  2) Routine CT Cervical Spine without IV contrast. Multiplanar reformats. Dose reduction techniques were used.    FINDINGS:   HEAD CT:   INTRACRANIAL CONTENTS: No intracranial hemorrhage, extraaxial collection, or mass effect.  No CT evidence of acute infarct. Normal parenchymal attenuation. Normal ventricles and sulci.     VISUALIZED ORBITS/SINUSES/MASTOIDS: No intraorbital abnormality. No paranasal sinus mucosal disease. Trace fluid left mastoid air cells.    BONES/SOFT TISSUES: No acute abnormality.    CERVICAL SPINE CT:   VERTEBRA: There is a slight anterior listhesis of C5 in relation to C6. The rest of the cervical spine has good anatomic alignment. The vertebral body heights are well-maintained throughout. There is slight osteopenia of the bony structures. No fracture   or posttraumatic subluxation.     CANAL/FORAMINA: There is increased density seen in the ventral epidural space from the skull base down to the top of the C6 vertebral body. This is more prominent than normal. It measures approximately 4 mm in its widest width. There is no significant   canal compromise from this. This could represent a prominent ventral epidural venous plexus versus a small acute ventral epidural hematoma. Disc spaces are fairly well-maintained throughout. There is diffuse facet arthropathy visualized. There is no   significant canal compromise or neural foraminal narrowing throughout cervical spine.    PARASPINAL: No extraspinal abnormality. Left lung apical scarring.      Impression    IMPRESSION:  HEAD CT:  1.  No CT  finding of a mass, hemorrhage or focal area suggestive of acute infarct.    CERVICAL SPINE CT:  1.  No CT evidence for acute fracture or post traumatic subluxation.  2.  4 mm increased density in the ventral epidural space from  skull base to the top of C6 which could represent a small acute ventral epidural hemorrhage versus prominent ventral epidural plexus. Recommend MRI cervical spine for further evaluation.  3.  No significant canal compromise or neural foraminal narrowing throughout cervical spine.    These findings were communicated by phone to Dr. Quiroga at 5:31 PM on 11/08/2023.   CT Chest Pulmonary Embolism w Contrast    Narrative    EXAM: CT ABDOMEN PELVIS W CONTRAST, CT CHEST PULMONARY EMBOLISM W CONTRAST  LOCATION: [facility]  DATE: 11/8/2023    INDICATION: Fall, fatigue, poor historian, AMS, DVT. Hypoxia.  COMPARISON: 04/22/2019 CT abdomen and pelvis.  TECHNIQUE: CT scan of the abdomen and pelvis was performed following injection of IV contrast. Multiplanar reformats were obtained. CT chest pulmonary angiogram during arterial phase injection of IV contrast. Multiplanar reformats and MIP reconstructions   were performed. Dose reduction techniques were used. Dose reduction techniques were used.  CONTRAST: Isovue 370 75 mL.    FINDINGS:   CT PULMONARY ANGIOGRAM: No pulmonary emboli. No evidence for right heart strain. Ectasia of the ascending aorta measuring 3.7 cm.    LUNGS AND PLEURA: Dependent atelectasis. Trace right pleural fluid.    MEDIASTINUM: Negative.    CORONARY ARTERY CALCIFICATION: Mild.    HEPATOBILIARY: Cholecystectomy. Riedel's lobe of the liver.    PANCREAS: Normal.    SPLEEN: Splenomegaly.    ADRENAL GLANDS: Normal.    KIDNEYS/BLADDER: There is new moderate dilatation of the intrarenal collecting systems and both ureters down to the bladder base. The urinary bladder is somewhat distended. Would recommend reimaging the intrarenal collecting systems and ureters  after   drainage of the bladder.    BOWEL: Proctocolectomy with right lower quadrant ileostomy.    LYMPH NODES: Normal.    VASCULATURE: Unremarkable.    PELVIC ORGANS: Hysterectomy.    MUSCULOSKELETAL: Normal.      Impression    IMPRESSION:   1.  No pulmonary embolism.    2.  Proctocolectomy with right lower quadrant ileostomy. Nothing for bowel obstruction. No abscess.    3.  Moderate bilateral ureteral pyelocaliectasis. The urinary bladder is distended. Would recommend reimaging of the collecting systems and ureters after urinary bladder drainage.    4.  Cholecystectomy and hysterectomy.    5.  Stable splenomegaly.   CT Abdomen Pelvis w Contrast    Narrative    EXAM: CT ABDOMEN PELVIS W CONTRAST, CT CHEST PULMONARY EMBOLISM W CONTRAST  LOCATION: [facility]  DATE: 11/8/2023    INDICATION: Fall, fatigue, poor historian, AMS, DVT. Hypoxia.  COMPARISON: 04/22/2019 CT abdomen and pelvis.  TECHNIQUE: CT scan of the abdomen and pelvis was performed following injection of IV contrast. Multiplanar reformats were obtained. CT chest pulmonary angiogram during arterial phase injection of IV contrast. Multiplanar reformats and MIP reconstructions   were performed. Dose reduction techniques were used. Dose reduction techniques were used.  CONTRAST: Isovue 370 75 mL.    FINDINGS:   CT PULMONARY ANGIOGRAM: No pulmonary emboli. No evidence for right heart strain. Ectasia of the ascending aorta measuring 3.7 cm.    LUNGS AND PLEURA: Dependent atelectasis. Trace right pleural fluid.    MEDIASTINUM: Negative.    CORONARY ARTERY CALCIFICATION: Mild.    HEPATOBILIARY: Cholecystectomy. Riedel's lobe of the liver.    PANCREAS: Normal.    SPLEEN: Splenomegaly.    ADRENAL GLANDS: Normal.    KIDNEYS/BLADDER: There is new moderate dilatation of the intrarenal collecting systems and both ureters down to the bladder base. The urinary bladder is somewhat distended. Would recommend reimaging the intrarenal collecting  systems and ureters after   drainage of the bladder.    BOWEL: Proctocolectomy with right lower quadrant ileostomy.    LYMPH NODES: Normal.    VASCULATURE: Unremarkable.    PELVIC ORGANS: Hysterectomy.    MUSCULOSKELETAL: Normal.      Impression    IMPRESSION:   1.  No pulmonary embolism.    2.  Proctocolectomy with right lower quadrant ileostomy. Nothing for bowel obstruction. No abscess.    3.  Moderate bilateral ureteral pyelocaliectasis. The urinary bladder is distended. Would recommend reimaging of the collecting systems and ureters after urinary bladder drainage.    4.  Cholecystectomy and hysterectomy.    5.  Stable splenomegaly.   Cervical spine MRI w/o contrast    Narrative    EXAM: MR CERVICAL SPINE W/O CONTRAST  LOCATION: [facility]  DATE: 11/8/2023    INDICATION: prominant ventral anomaly, possible hematoma with a history of trauma.  COMPARISON: 11/08/2023.  TECHNIQUE: MRI Cervical Spine without IV contrast.    FINDINGS: There is motion on these images leading to suboptimal visualization of fine detail.  Normal vertebral body heights, alignment and marrow signal. There is no abnormal signal or change in size of the cervical spinal cord. There is no evidence of an intracanal mass or hemorrhage. There is prominence of the ventral epidural plexus from skull   base to the top of C5. This is a normal variant. The ligamentous structures are intact. There is no significant canal compromise or significant neural foraminal narrowing throughout cervical spine.    Craniovertebral junction and C1-C2: Normal.      Impression    IMPRESSION:  1.  Good anatomic alignment and vertebral body heights maintained.  2.  No abnormal signal cervical spinal cord.  3.  Prominent ventral epidural complexes skull base to top of C5.  4.  No significant canal compromise or neural foraminal narrowing throughout cervical spine.

## 2023-11-09 NOTE — CONSULTS
Care Management Initial Consult    General Information  Assessment completed with: Patient, Children,    Type of CM/SW Visit: Initial Assessment    Primary Care Provider verified and updated as needed: Yes   Readmission within the last 30 days:        Reason for Consult: discharge planning    Communication Assessment  Patient's communication style: spoken language (English or Bilingual)    Hearing Difficulty or Deaf: yes   Wear Glasses or Blind: yes    Cognitive  Cognitive/Neuro/Behavioral: .WDL except  Level of Consciousness: other (see comments) (drowsy)  Arousal Level: opens eyes spontaneously  Orientation: disoriented to, situation, time  Mood/Behavior: anxious  Best Language: 0 - No aphasia  Speech: clear, spontaneous, other (see comments) (quiet/high pitched)    Living Environment:   People in home: alone     Current living Arrangements: apartment           Family/Social Support:  Care provided by: self, homecare agency  Provides care for: no one     Children          Description of Support System: Supportive, Involved         Current Resources:   Patient receiving home care services: Yes  Skilled Home Care Services: Skilled Nursing  Community Resources: Home Care  Equipment currently used at home: walker, standard, cane, straight, colostomy/ostomy supplies  Supplies currently used at home: Other (ostomy supplies)      Lifestyle & Psychosocial Needs:  Social Determinants of Health     Food Insecurity: Not on file   Depression: Not at risk (5/23/2023)    PHQ-2     PHQ-2 Score: 1   Housing Stability: Not on file   Tobacco Use: Medium Risk (5/23/2023)    Patient History     Smoking Tobacco Use: Former     Smokeless Tobacco Use: Never     Passive Exposure: Not on file   Financial Resource Strain: Not on file   Alcohol Use: Not on file   Transportation Needs: Not on file   Physical Activity: Not on file   Interpersonal Safety: Not on file   Stress: Not on file   Social Connections: Not on file       Functional  Status:  Prior to admission patient needed assistance:   Dependent ADLs:: Ambulation-walker, Ambulation-cane  Dependent IADLs:: Transportation, Medication Management               Additional Information:  11:01 AM  Assessed via son Jordan. Lives in an apartment alone.  Uses a walker/cane.  Has ostomy.  Open to HC RN with [facility].  Gets fluids/hydration through [facility].  [facility] Ortega will transport at discharge.      2:42 PM  Pt worked with therapy who are recommending TCU at discharge.  SW met with patient who is in agreement to TCU.  Requested referrals be sent to [facility], [facility] and [facility] .    MARTIR Barros

## 2023-11-09 NOTE — PHARMACY-VANCOMYCIN DOSING SERVICE
"Pharmacy Vancomycin Initial Note  Date of Service 2023  Patient's  1950  73 year old, female    Indication: Bacteremia    Current estimated CrCl = Estimated Creatinine Clearance: 35.4 mL/min (A) (based on SCr of 1.19 mg/dL (H)).    Creatinine for last 3 days  2023:  2:03 PM Creatinine 1.19 mg/dL    Recent Vancomycin Level(s) for last 3 days  No results found for requested labs within last 3 days.      Vancomycin IV Administrations (past 72 hours)        No vancomycin orders with administrations in past 72 hours.                    Nephrotoxins and other renal medications (From now, onward)      Start     Dose/Rate Route Frequency Ordered Stop    23 0900  lisinopril (ZESTRIL) tablet 20 mg        Note to Pharmacy: PTA Sig:Take 1 tablet (20 mg) by mouth daily      20 mg Oral DAILY 23 2131      23 0530  piperacillin-tazobactam (ZOSYN) 3.375 g vial to attach to  mL bag        Note to Pharmacy: For [facility], [facility] and [facility]: For Zosyn-naive patients, use the \"Zosyn initial dose + extended infusion\" order panel.    3.375 g  over 240 Minutes Intravenous EVERY 8 HOURS 23 2311      23 2330  piperacillin-tazobactam (ZOSYN) 3.375 g vial to attach to  mL bag         3.375 g  over 30 Minutes Intravenous ONCE 23 2324      23 2330  vancomycin (VANCOCIN) 1,000 mg in NaCl 0.9% 200 mL intermittent infusion         1,000 mg  over 60 Minutes Intravenous EVERY 24 HOURS 23 2328              Contrast Orders - past 72 hours (72h ago, onward)      Start     Dose/Rate Route Frequency Stop    23 1700  iopamidol (ISOVUE-370) solution 75 mL         75 mL Intravenous ONCE 23 1642            InnovidRBeryllium Prediction of Planned Initial Vancomycin Regimen  Regimen: 1000 mg IV every 24 hours.  Start time: 23:30 on 2023  Exposure target: AUC24 (range)400-600 mg/L.hr   AUC24,ss: 539 mg/L.hr  Probability of AUC24 > 400: 83 %  Ctrough,ss: 16.7 mg/L  Probability of " Ctrough,ss > 20: 31 %  Probability of nephrotoxicity (Lodise LUCAS 2009): 12 %    Plan:  Start vancomycin  1000 mg IV q24h.   Vancomycin monitoring method: AUC  Vancomycin therapeutic monitoring goal: 400-600 mg*h/L  Pharmacy will check vancomycin levels as appropriate in 1-3 Days.    Serum creatinine levels will be ordered daily for the first week of therapy and at least twice weekly for subsequent weeks.      Dunia Holland RP

## 2023-11-09 NOTE — PROGRESS NOTES
[facility]    Patient readmitted for weakness, fatigue and hyperkalemia on 11/8/2023.    Patient is currently on service with [facility] for IV fluids at home (with Na/Mg/Phos).      Pt's Access Device: SL Port   Nursing provided by: [facility]     [facility]ia[facility] will follow along.    Thank you,     Michelle Rosa, RN, BSN  [facility]  [phone] (Monday-Fri 8:00 am-5:00 pm)  [phone] Office

## 2023-11-09 NOTE — PLAN OF CARE
Per lab, pt's venous port infected.  MD notified.  Needle removed.  Pt afebrile.  Vitals WNL.  Pt given IV antibiotics as ordered.  Continue to monitor.        Problem: Infection  Goal: Absence of Infection Signs and Symptoms  Outcome: Progressing

## 2023-11-09 NOTE — PROGRESS NOTES
"Brief Update - Unexpected CT spine finding     Paged by MD Edwards that CT spine had an unexpected finding that may represent an \"epidural hematoma\" or other acute spinal complication/finding, and an MRI spine is recommended. Depending on what is found, this may affect disposition, such as necessitating more complex neurosurgical evaluations, cares, possible interventions, and more specialized neurosurgical nursing monitoring and cares. Another possibility is an artifactual finding, which if that is the case then this patient could be re-considered for admission to [facility]. Discussed this with ED MD who agrees and will page once MRI is obtained and results are back. [facility]UC and RN team updated/notified including directly by myself in-person in the ED unit. Additionally, confirmed with ED MD that acute hyperkalemia treatment has been started and ongoing and MAR ordered noted.    If MRI results confirm an artifact and no acute complex neurosurgical evaluation and monitoring / level of care is needed, then patient may be considered for admission to [facility] [place] at [facility] for ongoing hyperkalemia treatment and management. Until then, patient remains under ED team care and has not been accepted for admission by [facility] pending MRI results. Should MRI return after present [facility] shift change, sign-out will be provided to incoming/ongoing nocturnist by myself.      Vijay Solis MD  Internal Medicine  Hospitalist  [facility]  Phone: #[phone]  Securely message with the Vocera Web Console (learn more here)  Text page via [employer] Paging/Directory     "

## 2023-11-10 NOTE — PLAN OF CARE
Pt very sedated after getting 1800 dose of po Dilaudid.  Pupils pinpoint.  2000 dose of Lyrica and midnight dose of Dilaudid held due to AMS.  Pt's O2 sats dropped into the 70's while sleeping.  Pt placed on oxygen and is currently on 2L.  Pt complained of pain this morning. Pt given 0600 dose of dilaudid.  Continue to monitor.        Problem: Infection  Goal: Absence of Infection Signs and Symptoms  Outcome: Progressing    Problem: Pain Acute  Goal: Optimal Pain Control and Function  Outcome: Progressing  Intervention: Prevent or Manage Pain    Problem: Gas Exchange Impaired  Goal: Optimal Gas Exchange  Outcome: Progressing

## 2023-11-10 NOTE — DISCHARGE INSTRUCTIONS
Future Appts:    Date: Thursday, 11/23/2023  Time: 1:00 pm - 2:00 pm  Provider: Lukas Olmedo MA  [facility]  Location: [facility], [address] [place] [address]  Phone: [phone]  Type: Therapy - Initial (In-Person)    Date: Tuesday, 12/5/2023  Time: 11:00 am - 12:00 pm  Provider: Kia Uriostegui DNP, CNP,RN  Location: [facility], [address] [place] [address]  Phone: [phone]  Type: Medication Mgmt - Initial (In-Person)    Patient Instructions  Greetings! Upon receipt of a referral from [facility], our scheduling department will call and text you to confirm the appointment. Once the appointment is confirmed, we will send you intake forms to your email of which need to be completed upon receipt to keep the scheduled appointment. If you do not have an email, we will encourage you to schedule your appointment with us in person. We look forward to working with you! [email or web address]      --      Ileostomy - Change pouch twice weekly  Cleanse peristomal skin; gently dry.  Sprinkle Adapt powder onto denuded areas, brush off excess.  Set powder into place with Cavilon No-Sting. Let dry 1 minute  Apply Adapt Ring around your stoma; apply pouch over stoma.  Supplies: Cavilon No Sting PS# [id number] and Pouches: 2 pc. Fecal [place] [address] (will need to order a barrier) PS# [id number], Skin Barriers/Flanges: Convex CTF (20224) PS# [id number], and Accessories: Adapt Ring (8805) PS# [id number] and Adapt Powder (7906) PS# [id number]      Port Removal Discharge Instructions:  You had your port-a-catheter removed 11/13/2023. Please follow the below instructions following your port removal:    Care Instructions:  - Avoid tub baths, Jacuzzi and pool soaks for 10 days.  - You may shower beginning tomorrow. Do not scrub site until well healed; pat dry.  - If you experience significant bleeding at site, apply pressure with hands above the clavicle bone, sit  upright.    Seek medical assistance for any of the following:   - Uncontrolled bleeding.  - You have a fever (greater than 101 F (38.3C)  - Purulent (yellow/green/foul smelling) drainage from previous catheter insertion site.  - Increasing redness at previous catheter insertion site.  - Increasing pain at previous catheter insertion site.  - Increasing swelling at previous catheter insertion site.    Contact [facility] RN Line at [phone] with questions or concerns.

## 2023-11-10 NOTE — CONSULTS
[facility]  OSTOMY ASSESSMENT    Assessment of established end Ileostomy:    Stoma location: RLQ  Stoma appearance: healthy, beefy red, and moist  Mucocutaneous junction:  not visualized (barrier in place)  Peristomal complication(s): Irritant contact dermatitis noted at edges of barrier adhesive  Output: thin and brown      Supplies ordered for patient - she is independent in cares and can do her own or request nursing to help.    [facility] will s/o at this time.    Aysha Hobbs, MSN RN CWOCN  Pager no longer is use, please contact through [employer] group: [facility] [employer] [facility]

## 2023-11-10 NOTE — CONSULTS
"      Initial Psychiatric Consult   Consult date: November 10, 2023         Reason for Consult, requesting source:    Side effects concerned from psychiatric medications; team members observed lip smacking and extrapyramidal symptoms; holding home Duloxetine, Buspirone and Trazodone.    Requesting source: Vijay Solis    Labs and imaging reviewed. Notes reviewed.     Total time spent in card review, patient interview and coordination of care: 60 minutes.         HPI:   Psychiatry seeing patient today regarding concerns for side effects from psychiatric medications. Medical team observed lip smacking and extrapyramidal symptoms, so decision was made to hold home Duloxetine, Buspirone and Trazodone.    Per ED provider from 11/8: Dee Mattson is a 73 year old female with PMHx of HTN, chronic fatigue syndrome, severe fibromyalgia on opioid therapy with home care nursing, asthma, CKD 2, ileostomy with SBOs, DVT in setting of Hodgkin's lymphoma remotely and not on blood thinner therapy, anxiety, and depression who presents to the ED today via ambulance with generalized weakness.      Per EMS,  For the past week, the patient complains of generalized weakness and fatigue. The patient lives at home alone with a home care RN. The patient has port a cath for fluids due to malnutrition and an ileostomy. Port to right chest area looks red and dry. When EMS arrived, patient was at 89% and applied 2L nasal cannula. The patient is now 94% on room air. The patient usually ambulates independently but was unable to move.      Per patient,  The patient was drinking alcohol when she fell and hit her head. Her nurse called 911. Denies abdominal pain, chest pain, neck pain, or any other complaints at this time.    Today, patient reports that she feels \"foggy\" and she felt this way when she was at home, as well. She denies having consumed alcohol or substances prior to her hospitalization. Patient reports that she has been on her " "medications \"for many years\" and does not recall how she felt prior to taking these medications. She believes that she originally started taking her medications for anxiety and depression. Patient shares that she is worried about her memory and developing signs of dementia, but she is oriented x4. She denies SI/SIB and other safety concerns.           Past Psychiatric History:   No previous psychiatric hospitalizations.           Substance Use and History:     Tobacco Use    Smoking status: Former     Packs/day: 1.00     Years: 30.00     Additional pack years: 0.00     Total pack years: 30.00     Types: Cigarettes     Quit date: 2000     Years since quittin.8    Smokeless tobacco: Never   Substance Use Topics    Alcohol use: No           Past Medical History:   PAST MEDICAL HISTORY:   Past Medical History:   Diagnosis Date    Anxiety and depression     Asthma 4/10/2012    Bowel obstruction (H)     Chronic fatigue syndrome 7/3/2014    Dehydration 2014    Disease of thyroid gland     Fibromyalgia 7/3/2014    GERD (gastroesophageal reflux disease)     History of anesthesia complications     Slower to wake up    History of blood clots     Hodgkin's lymphoma (H) 10/1979    Hypertension     Major depressive disorder, recurrent episode, moderate (H) 2005    PONV (postoperative nausea and vomiting)     Shortness of breath     TIA (transient ischemic attack)        PAST SURGICAL HISTORY:   Past Surgical History:   Procedure Laterality Date    ANAL SPHINCTEROPLASTY      APPENDECTOMY      C UNLISTED PROCEDURE, ABDOMEN/PERITONEUM/OMENTUM      Description: Hernia Repair;  Recorded: 2009;  Comments: perineal    CHOLECYSTECTOMY      COLECTOMY      HC DILATION/CURETTAGE DIAG/THER NON OB      Description: Dilation And Curettage;  Recorded: 2009;    HC REMOVAL GALLBLADDER      Description: Cholecystectomy;  Recorded: 2014;    HC REMOVAL OF TONSILS,<13 Y/O      Description: Tonsillectomy;  Recorded: " 08/05/2009;    HYSTERECTOMY  05/2000    ILEOSTOMY      IR MISCELLANEOUS PROCEDURE  10/9/2002    IR MISCELLANEOUS PROCEDURE  10/14/2002    OOPHORECTOMY Bilateral 05/2000    MA CYSTOURETHROSCOPY INJ CHEMODENERVATION BLADDER N/A 2/25/2021    Procedure: PELVIC FLOOR BOTOX;  Surgeon: José Antonio Cade MD;  Location: [facility] OR;  Service: Gynecology    MA INSJ TUNNELED CTR VAD W/SUBQ PORT AGE 5 YR/> N/A 10/29/2014    Procedure: REMOVAL PICC LINE RIGHT ARM and PLACEMENT PORTACATH;  Surgeon: Jason Kirkpatrick MD;  Location: [facility] OR;  Service: General    MA RMVL JENY CTR VAD W/SUBQ PORT/ CTR/PRPH INSJ N/A 7/20/2018    Procedure: REMOVAL OF PORT A CATH;  Surgeon: Jason Kirkpatrick MD;  Location: [facility] OR;  Service: General    SMALL INTESTINE SURGERY      THYROIDECTOMY, PARTIAL      TUNNELED VENOUS CATHETER PLACEMENT N/A 8/29/2018    Procedure: PORT-A-CATH INSERTION;  Surgeon: Jason Kirkpatrick MD;  Location: [facility];  Service:     [facility] TOTAL ABDOM HYSTERECTOMY      Description: Total Abdominal Hysterectomy;  Recorded: 07/18/2014;             Family History:   FAMILY HISTORY:   Family History   Problem Relation Age of Onset    Colon Cancer Father     Liver Cancer Brother            Social History:   Lives independently at home.          Physical ROS:   The 10 point Review of Systems is negative other than noted in the HPI or here.           Medications:      [Held by provider] busPIRone  30 mg Oral BID    diphenoxylate-atropine  2 tablet Oral TID    [Held by provider] DULoxetine  60 mg Oral BID    heparin ANTICOAGULANT  5,000 Units Subcutaneous Q12H    HYDROmorphone  4 mg Oral 5x Daily    lactobacillus rhamnosus (GG)  1 capsule Oral BID    lipase-protease-amylase  1 capsule Oral TID w/meals    lisinopril  20 mg Oral Daily    metoprolol succinate ER  25 mg Oral Daily    nitroFURantoin macrocrystal  50 mg Oral At Bedtime    pantoprazole  40 mg Oral BID    piperacillin-tazobactam  3.375 g  Intravenous Q8H    Pregabalin  100 mg Oral TID    sodium bicarbonate  650 mg Oral BID    [Held by provider] traZODone  450 mg Oral At Bedtime    vancomycin  1,000 mg Intravenous Q24H              Allergies:     Allergies   Allergen Reactions    Ketorolac Tromethamine Palpitations    Citalopram Analogues [Citalopram] Unknown     GI affects    Methadone Unknown     Hallucinations    Metoclopramide Hives     GI upset    Metronidazole Hives    Mirtazapine Unknown     GI affects    Morphine Other (See Comments)     confusion    Neuromuscular Blockers, Steroidal [Neuromuscular Blocking Agents] Unknown     Unsure,per [facility]     Norfloxacin Unknown     C.Diff    Other Drug Allergy (See Comments) Unknown     Steroidal Neuromuscular blocking agents- unsure, Pancuronium, vecuronium, rocuronium, rapacuronium, dacuronium, malouètine, duador, dipyrandium, pipecuronium, chandonium, pt unsure of this reaction, thinks it was painful, muscle aches    Oxycodone Unknown     Gi distress          Labs:     Recent Results (from the past 48 hour(s))   Comprehensive metabolic panel    Collection Time: 11/08/23  2:03 PM   Result Value Ref Range    Sodium 129 (L) 135 - 145 mmol/L    Potassium 6.5 (HH) 3.4 - 5.3 mmol/L    Carbon Dioxide (CO2) 21 (L) 22 - 29 mmol/L    Anion Gap 9 7 - 15 mmol/L    Urea Nitrogen 16.7 8.0 - 23.0 mg/dL    Creatinine 1.19 (H) 0.51 - 0.95 mg/dL    GFR Estimate 48 (L) >60 mL/min/1.73m2    Calcium 8.9 8.8 - 10.2 mg/dL    Chloride 99 98 - 107 mmol/L    Glucose 104 (H) 70 - 99 mg/dL    Alkaline Phosphatase 126 (H) 35 - 104 U/L    AST 28 0 - 45 U/L    ALT 9 0 - 50 U/L    Protein Total 7.3 6.4 - 8.3 g/dL    Albumin 3.7 3.5 - 5.2 g/dL    Bilirubin Total 0.9 <=1.2 mg/dL   Troponin T, High Sensitivity    Collection Time: 11/08/23  2:03 PM   Result Value Ref Range    Troponin T, High Sensitivity 8 <=14 ng/L   CBC with platelets and differential    Collection Time: 11/08/23  2:03 PM   Result Value Ref Range    WBC  Count 8.7 4.0 - 11.0 10e3/uL    RBC Count 4.18 3.80 - 5.20 10e6/uL    Hemoglobin 12.3 11.7 - 15.7 g/dL    Hematocrit 37.4 35.0 - 47.0 %    MCV 90 78 - 100 fL    MCH 29.4 26.5 - 33.0 pg    MCHC 32.9 31.5 - 36.5 g/dL    RDW 13.8 10.0 - 15.0 %    Platelet Count 133 (L) 150 - 450 10e3/uL    % Neutrophils 83 %    % Lymphocytes 9 %    % Monocytes 8 %    % Eosinophils 0 %    % Basophils 0 %    % Immature Granulocytes 0 %    NRBCs per 100 WBC 0 <1 /100    Absolute Neutrophils 7.2 1.6 - 8.3 10e3/uL    Absolute Lymphocytes 0.8 0.8 - 5.3 10e3/uL    Absolute Monocytes 0.7 0.0 - 1.3 10e3/uL    Absolute Eosinophils 0.0 0.0 - 0.7 10e3/uL    Absolute Basophils 0.0 0.0 - 0.2 10e3/uL    Absolute Immature Granulocytes 0.0 <=0.4 10e3/uL    Absolute NRBCs 0.0 10e3/uL   TSH with free T4 reflex    Collection Time: 11/08/23  2:03 PM   Result Value Ref Range    TSH 0.94 0.30 - 4.20 uIU/mL   Magnesium    Collection Time: 11/08/23  2:03 PM   Result Value Ref Range    Magnesium 1.7 1.7 - 2.3 mg/dL   CK total    Collection Time: 11/08/23  2:03 PM   Result Value Ref Range    CK 83 26 - 192 U/L   Extra Blood Culture Bottle    Collection Time: 11/08/23  2:04 PM   Result Value Ref Range    Hold Specimen JIC    Extra Blue Top Tube    Collection Time: 11/08/23  2:04 PM   Result Value Ref Range    Hold Specimen JIC    Extra Red Top Tube    Collection Time: 11/08/23  2:04 PM   Result Value Ref Range    Hold Specimen JIC    Extra Purple Top Tube    Collection Time: 11/08/23  2:04 PM   Result Value Ref Range    Hold Specimen JIC    Procalcitonin    Collection Time: 11/08/23  2:04 PM   Result Value Ref Range    Procalcitonin 0.51 (H) <0.05 ng/mL   Blood Culture Line, venous    Collection Time: 11/08/23  2:04 PM    Specimen: Line, venous; Blood   Result Value Ref Range    Culture Positive on the 1st day of incubation (A)     Culture Gram positive cocci in clusters (AA)    Verigene GP Panel    Collection Time: 11/08/23  2:04 PM    Specimen: Line, venous;  Blood   Result Value Ref Range    Staphylococcus aureus Detected (A) Not Detected    Staphylococcus epidermidis Not Detected Not Detected    Staphylococcus lugdunensis Not Detected Not Detected    Enterococcus faecalis Not Detected Not Detected    Enterococcus faecium Not Detected Not Detected    Streptococcus species Not Detected Not Detected    Streptococcus agalactiae Not Detected Not Detected    Streptococcus anginosus group Not Detected Not Detected    Streptococcus pneumoniae Not Detected Not Detected    Streptococcus pyogenes Not Detected Not Detected    Listeria species Not Detected Not Detected   Symptomatic Influenza A/B, RSV, & SARS-CoV2 PCR (COVID-19) Nasopharyngeal    Collection Time: 11/08/23  2:38 PM    Specimen: Nasopharyngeal; Swab   Result Value Ref Range    Influenza A PCR Negative Negative    Influenza B PCR Negative Negative    RSV PCR Negative Negative    SARS CoV2 PCR Negative Negative   Ethyl Alcohol Level    Collection Time: 11/08/23  2:39 PM   Result Value Ref Range    Alcohol ethyl <0.01 <=0.01 g/dL   Ammonia    Collection Time: 11/08/23  2:39 PM   Result Value Ref Range    Ammonia 16 11 - 51 umol/L   ECG 12-LEAD WITH MUSE (LHE)    Collection Time: 11/08/23  2:43 PM   Result Value Ref Range    Systolic Blood Pressure 115 mmHg    Diastolic Blood Pressure 67 mmHg    Ventricular Rate 148 BPM    Atrial Rate 148 BPM    MI Interval 160 ms    QRS Duration 70 ms     ms    QTc 292 ms    P Axis 66 degrees    R AXIS 12 degrees    T Axis 59 degrees    Interpretation ECG       Sinus tachycardia with frequent Premature ventricular complexes in a pattern of bigeminy  Possible Left atrial enlargement  Borderline ECG  When compared with ECG of 17-MAY-2021 15:34,  Premature ventricular complexes are now Present  Vent. rate has increased BY  81 BPM  T wave amplitude has decreased in Inferior leads  Nonspecific T wave abnormality, worse in Anterolateral leads  Confirmed by SEE ED PROVIDER NOTE FOR, ECG  INTERPRETATION ([id number]),  Bryce Vides ([id number]) on 11/8/2023 5:17:23 PM     Lactic acid whole blood    Collection Time: 11/08/23  2:58 PM   Result Value Ref Range    Lactic Acid 0.9 0.7 - 2.0 mmol/L   Potassium    Collection Time: 11/08/23  2:58 PM   Result Value Ref Range    Potassium 7.0 (HH) 3.4 - 5.3 mmol/L   Blood Culture Peripheral Blood    Collection Time: 11/08/23  3:23 PM    Specimen: Peripheral Blood   Result Value Ref Range    Culture Positive on the 1st day of incubation (A)     Culture Gram positive cocci in clusters (AA)    UA with Microscopic reflex to Culture    Collection Time: 11/08/23  4:14 PM    Specimen: Urine, Clean Catch   Result Value Ref Range    Color Urine Light Yellow Colorless, Straw, Light Yellow, Yellow    Appearance Urine Clear Clear    Glucose Urine 150 (A) Negative mg/dL    Bilirubin Urine Negative Negative    Ketones Urine Negative Negative mg/dL    Specific Gravity Urine 1.006 1.001 - 1.030    Blood Urine Negative Negative    pH Urine 7.0 5.0 - 7.0    Protein Albumin Urine Negative Negative mg/dL    Urobilinogen Urine <2.0 <2.0 mg/dL    Nitrite Urine Negative Negative    Leukocyte Esterase Urine Negative Negative    Bacteria Urine Few (A) None Seen /HPF    Mucus Urine Present (A) None Seen /LPF    RBC Urine <1 <=2 /HPF    WBC Urine 2 <=5 /HPF   Sodium random urine    Collection Time: 11/08/23  4:14 PM   Result Value Ref Range    Sodium Urine mmol/L 102 mmol/L   Urine Drug Screen Panel    Collection Time: 11/08/23  4:14 PM   Result Value Ref Range    Amphetamines Urine Screen Negative Screen Negative    Barbituates Urine Screen Negative Screen Negative    Benzodiazepine Urine Screen Negative Screen Negative    Cannabinoids Urine Screen Negative Screen Negative    Cocaine Urine Screen Negative Screen Negative    Fentanyl Qual Urine Screen Negative Screen Negative    Opiates Urine Screen Negative Screen Negative    PCP Urine Screen Negative Screen Negative   Sodium random  urine    Collection Time: 11/08/23  4:14 PM   Result Value Ref Range    Sodium Urine mmol/L 102 mmol/L   Osmolality urine    Collection Time: 11/08/23  4:14 PM   Result Value Ref Range    Osmolality Urine 277 100 - 1,200 mmol/kg   Glucose by meter    Collection Time: 11/08/23  4:16 PM   Result Value Ref Range    GLUCOSE BY METER POCT 189 (H) 70 - 99 mg/dL   Glucose by meter    Collection Time: 11/08/23  4:55 PM   Result Value Ref Range    GLUCOSE BY METER POCT 170 (H) 70 - 99 mg/dL   Glucose by meter    Collection Time: 11/08/23  5:17 PM   Result Value Ref Range    GLUCOSE BY METER POCT 178 (H) 70 - 99 mg/dL   Potassium    Collection Time: 11/08/23  5:18 PM   Result Value Ref Range    Potassium 3.8 3.4 - 5.3 mmol/L   Troponin T, High Sensitivity    Collection Time: 11/08/23  5:18 PM   Result Value Ref Range    Troponin T, High Sensitivity 7 <=14 ng/L   Osmolality    Collection Time: 11/08/23  5:18 PM   Result Value Ref Range    Osmolality Blood 289 280 - 301 mmol/kg   Glucose by meter    Collection Time: 11/08/23  5:51 PM   Result Value Ref Range    GLUCOSE BY METER POCT 179 (H) 70 - 99 mg/dL   Glucose by meter    Collection Time: 11/08/23  6:41 PM   Result Value Ref Range    GLUCOSE BY METER POCT 173 (H) 70 - 99 mg/dL   Glucose by meter    Collection Time: 11/08/23  7:38 PM   Result Value Ref Range    GLUCOSE BY METER POCT 124 (H) 70 - 99 mg/dL   Glucose by meter    Collection Time: 11/08/23  8:55 PM   Result Value Ref Range    GLUCOSE BY METER POCT 80 70 - 99 mg/dL   Glucose by meter    Collection Time: 11/08/23 10:05 PM   Result Value Ref Range    GLUCOSE BY METER POCT 117 (H) 70 - 99 mg/dL   Glucose by meter    Collection Time: 11/09/23 12:57 AM   Result Value Ref Range    GLUCOSE BY METER POCT 100 (H) 70 - 99 mg/dL   Basic metabolic panel    Collection Time: 11/09/23  5:05 AM   Result Value Ref Range    Sodium 134 (L) 135 - 145 mmol/L    Potassium 4.9 3.4 - 5.3 mmol/L    Chloride 99 98 - 107 mmol/L    Carbon  "Dioxide (CO2) 23 22 - 29 mmol/L    Anion Gap 12 7 - 15 mmol/L    Urea Nitrogen 19.6 8.0 - 23.0 mg/dL    Creatinine 1.40 (H) 0.51 - 0.95 mg/dL    GFR Estimate 40 (L) >60 mL/min/1.73m2    Calcium 8.1 (L) 8.8 - 10.2 mg/dL    Glucose 91 70 - 99 mg/dL   CBC with platelets and differential    Collection Time: 11/09/23  7:21 AM   Result Value Ref Range    WBC Count 8.9 4.0 - 11.0 10e3/uL    RBC Count 4.21 3.80 - 5.20 10e6/uL    Hemoglobin 12.3 11.7 - 15.7 g/dL    Hematocrit 37.7 35.0 - 47.0 %    MCV 90 78 - 100 fL    MCH 29.2 26.5 - 33.0 pg    MCHC 32.6 31.5 - 36.5 g/dL    RDW 14.0 10.0 - 15.0 %    Platelet Count 126 (L) 150 - 450 10e3/uL    % Neutrophils 78 %    % Lymphocytes 11 %    % Monocytes 11 %    % Eosinophils 0 %    % Basophils 0 %    % Immature Granulocytes 0 %    NRBCs per 100 WBC 0 <1 /100    Absolute Neutrophils 6.9 1.6 - 8.3 10e3/uL    Absolute Lymphocytes 1.0 0.8 - 5.3 10e3/uL    Absolute Monocytes 1.0 0.0 - 1.3 10e3/uL    Absolute Eosinophils 0.0 0.0 - 0.7 10e3/uL    Absolute Basophils 0.0 0.0 - 0.2 10e3/uL    Absolute Immature Granulocytes 0.0 <=0.4 10e3/uL    Absolute NRBCs 0.0 10e3/uL   Creatinine    Collection Time: 11/10/23  5:07 AM   Result Value Ref Range    Creatinine 1.43 (H) 0.51 - 0.95 mg/dL    GFR Estimate 39 (L) >60 mL/min/1.73m2   Potassium    Collection Time: 11/10/23  5:07 AM   Result Value Ref Range    Potassium 4.8 3.4 - 5.3 mmol/L          Physical and Psychiatric Examination:     /53 (BP Location: Left arm)   Pulse 89   Temp 97.7  F (36.5  C) (Oral)   Resp 16   Ht 1.549 m (5' 1\")   Wt 55.7 kg (122 lb 12.7 oz)   SpO2 97%   BMI 23.20 kg/m    Weight is 122 lbs 12.74 oz  Body mass index is 23.2 kg/m .    Physical Exam:  I have reviewed the physical exam as documented by by the medical team and agree with findings and assessment and have no additional findings to add at this time.         MSE:   Calm and pleasant with some thought blocking present. Denies SI/SIB, as well as " AH/VH. She is oriented x4.              DSM-5 Diagnosis:   311 (F32.9) Unspecified Depressive Disorder   300.02 (F41.1) Generalized Anxiety Disorder  Substance-related disorder, alcohol, rule out  Neurocognitive Disorder, r/o delirium          Assessment:   Patient was calm and pleasant during our meeting. There appears to be some inconsistency with her report of events prior to her coming to the hospital, and what actually occurred. For example, patient denies alcohol consumption, but it appears she had been drinking prior to coming to the ED.     Taking Cymbalta, Buspirone and Trazodone together can increase risk for serotonin syndrome. However, this combination would not necessarily cause extrapyramidal side effects, which would more commonly be seen with antipsychotics. There was particular concern noted in the consult request for lip smacking, which I did not observe. Patient did not have her false teeth in place, which might have played a role in this. It is reasonable to adjust medications to avoid further concerns for side effects and negative medication interaction.     QTc on 11/8: 292          Summary of Recommendations:   Could replace buspar 30mg BID with propranolol 10 mg BID for anxiety  -Would avoid clonidine and gabapentin due to CKD    Could restart Trazodone 150 mg at HS - this is patient's reported PTA dosing, as opposed to 450 mg. This will address both sleep and depression  -Would continue to periodically assess EKG, as Trazodone can prolong QTc.    I would not restart Cymbalta, as patient should not be taking this medication if there is concern for heavy alcohol use    Referrals made for outpatient therapy and psychiatry    Addendum:  Delirium precautions:  Up during the day with lights on  Lights off at night, avoid interruptions during the night as much as possible  Family visits  Encourage wearing glasses  Reorientation  Avoid opioids, benzodiazepines, anticholinergics.    Continue to  ensure proper nutrition, fluid and electrolyte balance. Monitor for infections, hypoxia, metabolic derangements, or other causes of delirium.           Page me or re-consult psychiatry as needed.       Michelle Contreras, COLBY, APRN  Consult/Liaison Psychiatry  [facility]   Contact information available via [facility] Paging/Directory.  If I am not available, please call [facility] intake ([phone])

## 2023-11-10 NOTE — PROGRESS NOTES
Care Management Follow Up    Length of Stay (days): 2    Expected Discharge Date: 11/11/2023     Concerns to be Addressed:       Patient plan of care discussed at interdisciplinary rounds: Yes    Anticipated Discharge Disposition: Home Care, Transitional Care     Anticipated Discharge Services: None  Anticipated Discharge DME:      Patient/family educated on Medicare website which has current facility and service quality ratings:    Education Provided on the Discharge Plan:    Patient/Family in Agreement with the Plan: yes    Referrals Placed by CM/SW:    Private pay costs discussed: Not applicable    Additional Information:  10:08 AM  SW spoke with admissions at [facility].  Will review and get back to SW.  Pt is not medically ready for discharge today per hospitalist.    MARTIR Barros

## 2023-11-10 NOTE — PLAN OF CARE
Goal Outcome Evaluation:       Patient noted to have lip smacking and tongue rolling after giving morning meds. Physician aware. Patient working with PT and OT. Up in chair for a couple of hours. Patient VSS and RA. Patient ostomy putting out 150cc q 4 hours. Patient periwick out several times. Incontinent of urine.

## 2023-11-10 NOTE — CONSULTS
Consultation - Infectious Disease  [facility]  Dee Mattson,  1950, MRN [id number]    Admitting Dx: Hyperkalemia [E87.5]  Delirium [R41.0]  Failure to thrive in adult [R62.7]    PCP: Duke Mccall, [phone]       ASSESSMENT   73-year-old woman with a history of hypertension, asthma, hyperlipidemia, chronic fatigue syndrome, severe fibromyalgia was admitted with increasing pain and weakness.  ID is consulted for staph bacteremia.    Staph aureus bacteremia.  2 of 2 blood cultures on admission with Staph aureus.  Identified as MSSA on Verigene platform.  Concern for port infection given history of previous port infections and erythema around her port site.  High output ileostomy.  Requires nightly IV infusions through her port.  Acute kidney injury and hyperkalemia.  Imaging on admission with bilateral ureteral pyelocaliectasis.    Principal Problem:    Failure to thrive in adult  Active Problems:    Chronic fatigue syndrome    Asthma    GERD (gastroesophageal reflux disease)    Fibromyalgia    Hyperkalemia    Delirium    Hyponatremia       PLAN   -Discontinue Zosyn  -Continue vancomycin  -Start cefazolin  -Daily blood cultures to ensure clearance  -TTE  -Recommend port removal.  If patient becomes hemodynamically unstable this needs to be done urgently    Thank you for this consult. Will follow.    Naseem Hay MD  [facility]  Direct messaging: [employer]TRA Paging  On-Call ID provider: [phone], option: [phone]      ===========================================      Chief Complaint   Failure to thrive in adult       HPI     We have been requested by Vijay Solis MD to evaluate Dee Mattson for the above.    History obtained by patient    Dee Mattson is a 73 year old woman with a history of hypertension, chronic fatigue syndrome, severe fibromyalgia, asthma, high output ileostomy who was admitted after a fall associated with increasing pain.  The  patient has chronic rectal pain and leg pain.  This pain became severe prior to admission.  This was associated with new headache.  She tells me that when she maximilian from her chair her legs were too weak and she fell.  She was seen by her home nurse who recommended she go to the hospital.  She was brought in by ambulance.  Upon work-up there is questionable possible cervical hematoma, but this was not seen on MRI.  CT scan of the abdomen showed bilateral ureteral pyelocalyectasis. She also had hyperkalemia.  Blood cultures were collected and the patient is now growing Staph aureus.  She was started on vancomycin soon after admission.  Repeat blood cultures were drawn today.  Today, the patient is without acute complaints however she has chronic pain as mentioned above.  She tells me that she has had 3 prior port infections in the past.  She understands that her port might be infected again.  She denies any problems with her port recently, but does note some redness around her site today.          Review of Systems   Ten systems reviewed and negative except for what is noted in the HPI         Medical History  Past Medical History:   Diagnosis Date    Anxiety and depression     Asthma 4/10/2012    Bowel obstruction (H)     Chronic fatigue syndrome 7/3/2014    Dehydration 7/4/2014    Disease of thyroid gland     Fibromyalgia 7/3/2014    GERD (gastroesophageal reflux disease)     History of anesthesia complications     Slower to wake up    History of blood clots     Hodgkin's lymphoma (H) 10/1979    Hypertension     Major depressive disorder, recurrent episode, moderate (H) 9/6/2005    PONV (postoperative nausea and vomiting)     Shortness of breath     TIA (transient ischemic attack)     Surgical History  She  has a past surgical history that includes IR Miscellaneous Procedure (10/9/2002); IR Miscellaneous Procedure (10/14/2002); REMOVAL OF TONSILS,<13 Y/O; DILATION/CURETTAGE DIAG/THER NON OB; COMPARTMENT STUDY  ABDOMINAL; Cholecystectomy; appendectomy; colectomy; Thyroidectomy, Partial; Small intestine surgery; Anal Sphincteroplasty; TOTAL ABDOM HYSTERECTOMY; REMOVAL GALLBLADDER; Pr Insj Tunneled Ctr Vad W/Subq Port Age 5 Yr/> (N/A, 10/29/2014); Ileostomy; Pr Rmvl Sharif Ctr Vad W/Subq Port/ Ctr/Prph Insj (N/A, 7/20/2018); Tunneled Venous Catheter Placement (N/A, 8/29/2018); Oophorectomy (Bilateral, 05/2000); Hysterectomy (05/2000); and Pr Cystourethroscopy Inj Chemodenervation Bladder (N/A, 2/25/2021).     Social History  Reviewed, and she  reports that she quit smoking about 23 years ago. Her smoking use included cigarettes. She has a 30.00 pack-year smoking history. She has never used smokeless tobacco. She reports that she does not drink alcohol and does not use drugs.  Social History     Social History Narrative    Not on file     Family History  family history includes Colon Cancer in her father; Liver Cancer in her brother.  family history reviewed and is not pertinent to the presenting problem.            Allergies     Allergies   Allergen Reactions    Ketorolac Tromethamine Palpitations    Citalopram Analogues [Citalopram] Unknown     GI affects    Methadone Unknown     Hallucinations    Metoclopramide Hives     GI upset    Metronidazole Hives    Mirtazapine Unknown     GI affects    Morphine Other (See Comments)     confusion    Neuromuscular Blockers, Steroidal [Neuromuscular Blocking Agents] Unknown     Unsure,per [facility]     Norfloxacin Unknown     C.Diff    Other Drug Allergy (See Comments) Unknown     Steroidal Neuromuscular blocking agents- unsure, Pancuronium, vecuronium, rocuronium, rapacuronium, dacuronium, malouètine, duador, dipyrandium, pipecuronium, chandonium, pt unsure of this reaction, thinks it was painful, muscle aches    Oxycodone Unknown     Gi distress         Antibiotics   Zosyn 11/8-  Vancomycin 11/9-    Nitrofurantoin prior to arrival for prophylaxis    Previous:        Physical  "Exam     Temp:  [97.7  F (36.5  C)-98.5  F (36.9  C)] 98.3  F (36.8  C)  Pulse:  [80-97] 84  Resp:  [16-18] 16  BP: (102-147)/(51-66) 111/57  SpO2:  [75 %-98 %] 94 %    /57 (BP Location: Left arm)   Pulse 84   Temp 98.3  F (36.8  C) (Oral)   Resp 16   Ht 1.549 m (5' 1\")   Wt 55.7 kg (122 lb 12.7 oz)   SpO2 94%   BMI 23.20 kg/m      GENERAL:  well-developed, well-nourished, sitting in chair in no acute distress.   HENT:  Head is normocephalic, atraumatic. Oropharynx is moist without exudates or ulcers.  Edentulous  EYES:  Eyes have anicteric sclerae without conjunctival injection or stigmata of endocarditis.   NECK:  Supple.  LUNGS:  Clear to auscultation.  CARDIOVASCULAR:  Regular rate and rhythm, 2 out of 6 systolic murmur along the left sternal border.  ABDOMEN:  Normal bowel sounds, soft, nontender.  Right-sided ileostomy in place.  EXT: Extremities warm and without edema.  SKIN:  No acute rashes.  Right chest port site is not accessed.  No surrounding tenderness, but there is patchy erythema around the port site.  No fluctuance noted.    NEUROLOGIC:  Grossly nonfocal.      Cultures   11/8 blood cultures x2: Staph aureus  11/10 blood cultures x2: Pending      Susceptibility data from last 90 days.  Collected Specimen Info Organism   11/08/23 Peripheral Blood Staphylococcus aureus   11/08/23 Blood from Line, venous Staphylococcus aureus         Laboratory results     Recent Labs   Lab 11/09/23  0721 11/08/23  1403   WBC 8.9 8.7   HGB 12.3 12.3   * 133*       Recent Labs   Lab 11/09/23  0505 11/08/23  1403   * 129*   CO2 23 21*   BUN 19.6 16.7   ALBUMIN  --  3.7   ALKPHOS  --  126*   ALT  --  9   AST  --  28       No results for input(s): \"CRPI\", \"SED\" in the last 168 hours.        Imaging   Radiology results reviewed    US Renal Complete Non-Vascular    Result Date: 11/10/2023  EXAM: US RENAL COMPLETE NON-VASCULAR LOCATION: [facility] DATE: 11/10/2023 INDICATION: " persistent DYLAN; evalute for hydronephrosis or other obstructive process COMPARISON: CT abdomen pelvis from 11/08/2023. TECHNIQUE: Routine Bilateral Renal and Bladder Ultrasound. FINDINGS: RIGHT KIDNEY: 11 cm. Normal without hydronephrosis or masses. LEFT KIDNEY: 10 cm. Normal without hydronephrosis or masses. Small accessory splenule noted adjacent to the left kidney measures 1.2 cm and unchanged from recent CT. BLADDER: The bladder is incompletely distended accentuating wall thickening.     IMPRESSION: 1.  Resolved hydronephrosis. 2.  The bladder is incompletely distended accentuating wall thickening.    Cervical spine MRI w/o contrast    Result Date: 11/8/2023  EXAM: MR CERVICAL SPINE W/O CONTRAST LOCATION: [facility] DATE: 11/8/2023 INDICATION: prominant ventral anomaly, possible hematoma with a history of trauma. COMPARISON: 11/08/2023. TECHNIQUE: MRI Cervical Spine without IV contrast. FINDINGS: There is motion on these images leading to suboptimal visualization of fine detail. Normal vertebral body heights, alignment and marrow signal. There is no abnormal signal or change in size of the cervical spinal cord. There is no evidence of an intracanal mass or hemorrhage. There is prominence of the ventral epidural plexus from skull  base to the top of C5. This is a normal variant. The ligamentous structures are intact. There is no significant canal compromise or significant neural foraminal narrowing throughout cervical spine. Craniovertebral junction and C1-C2: Normal.     IMPRESSION: 1.  Good anatomic alignment and vertebral body heights maintained. 2.  No abnormal signal cervical spinal cord. 3.  Prominent ventral epidural complexes skull base to top of C5. 4.  No significant canal compromise or neural foraminal narrowing throughout cervical spine.     CT Cervical Spine w/o Contrast    Result Date: 11/8/2023  EXAM: CT HEAD W/O CONTRAST, CT CERVICAL SPINE W/O CONTRAST LOCATION: [facility]  [facility] DATE: 11/8/2023 INDICATION: fall head trauma intox poor historian altered mental status and neck pain. COMPARISON: 11/05/2019. TECHNIQUE: 1) Routine CT Head without IV contrast. Multiplanar reformats. Dose reduction techniques were used. 2) Routine CT Cervical Spine without IV contrast. Multiplanar reformats. Dose reduction techniques were used. FINDINGS: HEAD CT: INTRACRANIAL CONTENTS: No intracranial hemorrhage, extraaxial collection, or mass effect.  No CT evidence of acute infarct. Normal parenchymal attenuation. Normal ventricles and sulci. VISUALIZED ORBITS/SINUSES/MASTOIDS: No intraorbital abnormality. No paranasal sinus mucosal disease. Trace fluid left mastoid air cells. BONES/SOFT TISSUES: No acute abnormality. CERVICAL SPINE CT: VERTEBRA: There is a slight anterior listhesis of C5 in relation to C6. The rest of the cervical spine has good anatomic alignment. The vertebral body heights are well-maintained throughout. There is slight osteopenia of the bony structures. No fracture or posttraumatic subluxation. CANAL/FORAMINA: There is increased density seen in the ventral epidural space from the skull base down to the top of the C6 vertebral body. This is more prominent than normal. It measures approximately 4 mm in its widest width. There is no significant canal compromise from this. This could represent a prominent ventral epidural venous plexus versus a small acute ventral epidural hematoma. Disc spaces are fairly well-maintained throughout. There is diffuse facet arthropathy visualized. There is no significant canal compromise or neural foraminal narrowing throughout cervical spine. PARASPINAL: No extraspinal abnormality. Left lung apical scarring.     IMPRESSION: HEAD CT: 1.  No CT finding of a mass, hemorrhage or focal area suggestive of acute infarct. CERVICAL SPINE CT: 1.  No CT evidence for acute fracture or post traumatic subluxation. 2.  4 mm increased density in the  ventral epidural space from  skull base to the top of C6 which could represent a small acute ventral epidural hemorrhage versus prominent ventral epidural plexus. Recommend MRI cervical spine for further evaluation. 3.  No significant canal compromise or neural foraminal narrowing throughout cervical spine. These findings were communicated by phone to Dr. Quiroga at 5:31 PM on 11/08/2023.    CT Head w/o Contrast    Result Date: 11/8/2023  EXAM: CT HEAD W/O CONTRAST, CT CERVICAL SPINE W/O CONTRAST LOCATION: [facility] DATE: 11/8/2023 INDICATION: fall head trauma intox poor historian altered mental status and neck pain. COMPARISON: 11/05/2019. TECHNIQUE: 1) Routine CT Head without IV contrast. Multiplanar reformats. Dose reduction techniques were used. 2) Routine CT Cervical Spine without IV contrast. Multiplanar reformats. Dose reduction techniques were used. FINDINGS: HEAD CT: INTRACRANIAL CONTENTS: No intracranial hemorrhage, extraaxial collection, or mass effect.  No CT evidence of acute infarct. Normal parenchymal attenuation. Normal ventricles and sulci. VISUALIZED ORBITS/SINUSES/MASTOIDS: No intraorbital abnormality. No paranasal sinus mucosal disease. Trace fluid left mastoid air cells. BONES/SOFT TISSUES: No acute abnormality. CERVICAL SPINE CT: VERTEBRA: There is a slight anterior listhesis of C5 in relation to C6. The rest of the cervical spine has good anatomic alignment. The vertebral body heights are well-maintained throughout. There is slight osteopenia of the bony structures. No fracture or posttraumatic subluxation. CANAL/FORAMINA: There is increased density seen in the ventral epidural space from the skull base down to the top of the C6 vertebral body. This is more prominent than normal. It measures approximately 4 mm in its widest width. There is no significant canal compromise from this. This could represent a prominent ventral epidural venous plexus versus a small acute  ventral epidural hematoma. Disc spaces are fairly well-maintained throughout. There is diffuse facet arthropathy visualized. There is no significant canal compromise or neural foraminal narrowing throughout cervical spine. PARASPINAL: No extraspinal abnormality. Left lung apical scarring.     IMPRESSION: HEAD CT: 1.  No CT finding of a mass, hemorrhage or focal area suggestive of acute infarct. CERVICAL SPINE CT: 1.  No CT evidence for acute fracture or post traumatic subluxation. 2.  4 mm increased density in the ventral epidural space from  skull base to the top of C6 which could represent a small acute ventral epidural hemorrhage versus prominent ventral epidural plexus. Recommend MRI cervical spine for further evaluation. 3.  No significant canal compromise or neural foraminal narrowing throughout cervical spine. These findings were communicated by phone to Dr. Quiroga at 5:31 PM on 11/08/2023.    CT Chest Pulmonary Embolism w Contrast    Result Date: 11/8/2023  EXAM: CT ABDOMEN PELVIS W CONTRAST, CT CHEST PULMONARY EMBOLISM W CONTRAST LOCATION: [facility] DATE: 11/8/2023 INDICATION: Fall, fatigue, poor historian, AMS, DVT. Hypoxia. COMPARISON: 04/22/2019 CT abdomen and pelvis. TECHNIQUE: CT scan of the abdomen and pelvis was performed following injection of IV contrast. Multiplanar reformats were obtained. CT chest pulmonary angiogram during arterial phase injection of IV contrast. Multiplanar reformats and MIP reconstructions  were performed. Dose reduction techniques were used. Dose reduction techniques were used. CONTRAST: Isovue 370 75 mL. FINDINGS: CT PULMONARY ANGIOGRAM: No pulmonary emboli. No evidence for right heart strain. Ectasia of the ascending aorta measuring 3.7 cm. LUNGS AND PLEURA: Dependent atelectasis. Trace right pleural fluid. MEDIASTINUM: Negative. CORONARY ARTERY CALCIFICATION: Mild. HEPATOBILIARY: Cholecystectomy. Riedel's lobe of the liver. PANCREAS: Normal.  SPLEEN: Splenomegaly. ADRENAL GLANDS: Normal. KIDNEYS/BLADDER: There is new moderate dilatation of the intrarenal collecting systems and both ureters down to the bladder base. The urinary bladder is somewhat distended. Would recommend reimaging the intrarenal collecting systems and ureters after drainage of the bladder. BOWEL: Proctocolectomy with right lower quadrant ileostomy. LYMPH NODES: Normal. VASCULATURE: Unremarkable. PELVIC ORGANS: Hysterectomy. MUSCULOSKELETAL: Normal.     IMPRESSION: 1.  No pulmonary embolism. 2.  Proctocolectomy with right lower quadrant ileostomy. Nothing for bowel obstruction. No abscess. 3.  Moderate bilateral ureteral pyelocaliectasis. The urinary bladder is distended. Would recommend reimaging of the collecting systems and ureters after urinary bladder drainage. 4.  Cholecystectomy and hysterectomy. 5.  Stable splenomegaly.    CT Abdomen Pelvis w Contrast    Result Date: 11/8/2023  EXAM: CT ABDOMEN PELVIS W CONTRAST, CT CHEST PULMONARY EMBOLISM W CONTRAST LOCATION: [facility] DATE: 11/8/2023 INDICATION: Fall, fatigue, poor historian, AMS, DVT. Hypoxia. COMPARISON: 04/22/2019 CT abdomen and pelvis. TECHNIQUE: CT scan of the abdomen and pelvis was performed following injection of IV contrast. Multiplanar reformats were obtained. CT chest pulmonary angiogram during arterial phase injection of IV contrast. Multiplanar reformats and MIP reconstructions  were performed. Dose reduction techniques were used. Dose reduction techniques were used. CONTRAST: Isovue 370 75 mL. FINDINGS: CT PULMONARY ANGIOGRAM: No pulmonary emboli. No evidence for right heart strain. Ectasia of the ascending aorta measuring 3.7 cm. LUNGS AND PLEURA: Dependent atelectasis. Trace right pleural fluid. MEDIASTINUM: Negative. CORONARY ARTERY CALCIFICATION: Mild. HEPATOBILIARY: Cholecystectomy. Riedel's lobe of the liver. PANCREAS: Normal. SPLEEN: Splenomegaly. ADRENAL GLANDS: Normal.  KIDNEYS/BLADDER: There is new moderate dilatation of the intrarenal collecting systems and both ureters down to the bladder base. The urinary bladder is somewhat distended. Would recommend reimaging the intrarenal collecting systems and ureters after drainage of the bladder. BOWEL: Proctocolectomy with right lower quadrant ileostomy. LYMPH NODES: Normal. VASCULATURE: Unremarkable. PELVIC ORGANS: Hysterectomy. MUSCULOSKELETAL: Normal.     IMPRESSION: 1.  No pulmonary embolism. 2.  Proctocolectomy with right lower quadrant ileostomy. Nothing for bowel obstruction. No abscess. 3.  Moderate bilateral ureteral pyelocaliectasis. The urinary bladder is distended. Would recommend reimaging of the collecting systems and ureters after urinary bladder drainage. 4.  Cholecystectomy and hysterectomy. 5.  Stable splenomegaly.      Data reviewed today: I reviewed all medications, new labs and imaging results over the last 24 hours. I personally reviewed the abdominal CT image(s) showing hydronephrosis .  The patient's care was discussed with the Bedside Nurse, Patient, and Primary team.

## 2023-11-10 NOTE — PROGRESS NOTES
[facility]    Medicine Progress Note - Hospitalist Service    Date of Admission:  11/8/2023    Assessment & Plan      Dee Mattson is a 73 year old female admitted on 11/8/2023. She has a hx of HTN, chronic fatigue syndrome, severe fibromyalgia on opioid therapy, asthma, CKD2, ileostomy, hx of SBOs, hx of DVT in setting of Hodgkin's lymphoma and not on anticoagulation, MDD, presents with generalized weakness, physical deconditioning, and severe hyperkalemia and hyponatremia. Artifact noted on CT spine confirmed with MRI spine. [place] [facility] [place], hyperkalemia has not resolved and transfer patient to med/surg (with remote telemetry), and treat mild DYLAN with IVF, recheck Cr in AM. Hyponatremia normalizing/improved to 134.     PT and OT recommend TCU, placement pending. Checking renal US given persistent DYLAN. Cr 1.4, GFR 39. If renal US unclear, may consult nephrology. Appreciate psychiatry evaluation of possible medication side effects of extrapyramidal symptoms.     Update: Notified regarding staph aureus blood culture. Ordered vancomycin. Ordered repeat blood cultures. Will consult ID.    Staph Aureus bacteremia  -Ordered repeat blood cultures  -Ordered vancomycin, pharmacy to dose  -ID consultation for staph bacteremia    DYLAN on CKD  -Check Renal US  -Consider nephrology evaluation pending initial US    Hyperkalemia, resolved  Hyponatremia, normalizing  CKD2  -K 7.0; ordered hyperkalemia protocol,   -K 3.8 and normalized, 4.9 on 11/9  -Mag monitoring  -Urine osm and urine Na and serum osm for hyponatremia work-up; suspect possibly medication-induced (selective serotonin reuptake inhibitor), SIADH, hypovolemia, etc on Ddx  -Trend BMP  -Na normalizing/improved to 134 on 11/9    ? Medication Side Effects  Lip-smacking, EPS  -Consultation to psychiatry for concern from psychiatric medications  -Team members observed lip smacking and extrapyramidal symptoms  -For now, Hold home  Duloxetine, Buspirone, and Trazodone.     Asthma  -Order home inhalers and medications.   -Nebulizer and RT pulmonary hygiene as needed.     HTN  -Order home medications and as needed apply specified hold parameters.     Chronic Fatigue Syndrome  Fibromyalgia  -Consider pain team as needed.     Ileostomy  -Monitor, ensure patency and function  -High-output could be a contributing factor to electrolyte abnormalities     Generalized Weakness  Failure to Thrive  Physical Deconditioning  -PT  -OT  -CM/SW pending initial PT/OT evals          Diet: Combination Diet Regular Diet Adult    DVT Prophylaxis: Heparin SQ, Pneumatic Compression Devices, Anti-embolisim stockings (TEDs), and Ambulate every shift  Agee Catheter: Not present  Lines: PRESENT      Port a Cath 11/08/23 Single Lumen Right Chest wall-Site Assessment: WDL except;Other (Comment) (dry/flaky)      Cardiac Monitoring: None  Code Status: Full Code      Clinically Significant Risk Factors        # Hyperkalemia: Highest K = 7 mmol/L in last 2 days, will monitor as appropriate  # Hyponatremia: Lowest Na = 129 mmol/L in last 2 days, will monitor as appropriate  # Hypocalcemia: Lowest Ca = 8.1 mg/dL in last 2 days, will monitor and replace as appropriate       # Thrombocytopenia: Lowest platelets = 126 in last 2 days, will monitor for bleeding   # Hypertension: Noted on problem list            # Asthma: noted on problem list        Disposition Plan      Expected Discharge Date: 11/11/2023    Discharge Delays: Placement - TCU  Destination: home with help/services              Vijay Solis MD  Hospitalist Service  [facility]  Securely message with [employer] (more info)  Text page via [employer] Paging/Directory   ______________________________________________________________________    Interval History   No acute events overnight.    No report of chest pain, shortness of breath, or other new complaints. Discussed her ongoing/slightly worsened  DYLAN, and also possible psychotropic medication side effects, plan for psychiatry evaluation. Discussed plan of care with patient. Answered all questions to patient's verbalized understanding and satisfaction.    Discussed POC with RN directly. Discussed with CM/SW as well.     Physical Exam   Vital Signs: Temp: 97.7  F (36.5  C) Temp src: Oral BP: 112/53 Pulse: 89   Resp: 16 SpO2: 97 % O2 Device: Nasal cannula Oxygen Delivery: 2 LPM  Weight: 122 lbs 12.74 oz    GENERAL:  Alert, appears comfortable, in no acute distress, appears stated age, thin, frail-appearing   HEAD:  Normocephalic, without obvious abnormality, atraumatic   EYES:  PERRL, conjunctiva/corneas clear, no scleral icterus, EOM's intact   NOSE: Nares normal, septum midline, mucosa normal, no drainage   THROAT: Lips, mucosa, and tongue normal; teeth and gums normal, mouth moist   NECK: Supple, symmetrical, trachea midline   BACK:   Symmetric, no curvature, ROM normal   LUNGS:   Clear to auscultation bilaterally, no rales, rhonchi, or wheezing, symmetric chest rise on inhalation, respirations unlabored   CHEST WALL:  No tenderness or deformity   HEART:  Regular rate and rhythm, S1 and S2 normal, no murmur, rub, or gallop    ABDOMEN:   Soft, non-tender, bowel sounds active all four quadrants, no masses, no organomegaly, no rebound or guarding   EXTREMITIES: Extremities normal, atraumatic, no cyanosis or edema    SKIN: Dry to touch, no exanthems in the visualized areas   NEURO: Alert, oriented x 4, moves all four extremities freely/spontaneously   PSYCH: Cooperative, behavior is appropriate        Medical Decision Making       54 MINUTES SPENT BY ME on the date of service doing chart review, history, exam, documentation & further activities per the note.       Data     I have personally reviewed the following data over the past 24 hrs:    N/A  \   N/A   / N/A     N/A N/A N/A /  N/A   4.8 N/A 1.43 (H) \       Imaging results reviewed over the past 24 hrs:    No results found for this or any previous visit (from the past 24 hour(s)).

## 2023-11-10 NOTE — PHARMACY-VANCOMYCIN DOSING SERVICE
"Pharmacy Vancomycin Note  Date of Service November 10, 2023  Patient's  1950   73 year old, female    Indication: Bacteremia  Day of Therapy: 2  Current vancomycin regimen:  1000mg IV q24h  Current vancomycin monitoring method: AUC  Current vancomycin therapeutic monitoring goal: 400-600 mg*h/L    [employer] Prediction of Current Vancomycin Regimen  Regimen: 1000 mg IV every 24 hours.  Start time: 23:10 on 11/10/2023  Exposure target: AUC24 (range)400-600 mg/L.hr   AUC24,ss: 641 mg/L.hr  Probability of AUC24 > 400: 92 %  Ctrough,ss: 20.7 mg/L  Probability of Ctrough,ss > 20: 53 %  Probability of nephrotoxicity (Lodise LUCAS ): 19 %    Current estimated CrCl = Estimated Creatinine Clearance: 30.8 mL/min (A) (based on SCr of 1.43 mg/dL (H)).    Creatinine for last 3 days  2023:  2:03 PM Creatinine 1.19 mg/dL  2023:  5:05 AM Creatinine 1.40 mg/dL  11/10/2023:  5:07 AM Creatinine 1.43 mg/dL    Recent Vancomycin Levels (past 3 days)  11/10/2023: 1:52pm - 14.9    Vancomycin IV Administrations (past 72 hours)                     vancomycin (VANCOCIN) 1,000 mg in NaCl 0.9% 200 mL intermittent infusion (mg) 1,000 mg Given 23 231     1,000 mg Given  0025                    Nephrotoxins and other renal medications (From now, onward)      Start     Dose/Rate Route Frequency Ordered Stop    23 0900  lisinopril (ZESTRIL) tablet 20 mg        Note to Pharmacy: PTA Sig:Take 1 tablet (20 mg) by mouth daily      20 mg Oral DAILY 23 0530  piperacillin-tazobactam (ZOSYN) 3.375 g vial to attach to  mL bag        Note to Pharmacy: For [facility], [facility] and [facility]: For Zosyn-naive patients, use the \"Zosyn initial dose + extended infusion\" order panel.    3.375 g  over 240 Minutes Intravenous EVERY 8 HOURS 23 2330  vancomycin (VANCOCIN) 1,000 mg in NaCl 0.9% 200 mL intermittent infusion         1,000 mg  over 60 Minutes Intravenous EVERY 24 HOURS 23 2203   "               Contrast Orders - past 72 hours (72h ago, onward)      Start     Dose/Rate Route Frequency Stop    11/08/23 1700  iopamidol (ISOVUE-370) solution 75 mL         75 mL Intravenous ONCE 11/08/23 1642            Interpretation of levels and current regimen:  Vancomycin level is reflective of  No current level drawn    Has serum creatinine changed greater than 50% in last 72 hours: No    Urine output:  good urine output    Renal Function: Worsening    InsightRX Prediction of Planned New Vancomycin Regimen  Regimen: 750 mg IV every 24 hours.  Start time: 23:10 on 11/10/2023  Exposure target: AUC24 (range)400-600 mg/L.hr   AUC24,ss: 489 mg/L.hr  Probability of AUC24 > 400: 83 %  Ctrough,ss: 15.8 mg/L  Probability of Ctrough,ss > 20: 20 %  Probability of nephrotoxicity (Lodise LUCAS 2009): 11 %    Plan:  Changing Vancomycin regimen based on level draw due to increased serum creatinine change  Decrease Dose to Vanmcomycin 750mg IV q24h  Vancomycin monitoring method: AUC  Vancomycin therapeutic monitoring goal: 400-600 mg*h/L  Pharmacy will check vancomycin levels as appropriate in 1-3 Days.  Serum creatinine levels will be ordered daily for the first week of therapy and at least twice weekly for subsequent weeks.    Jordan Campbell, [facility]

## 2023-11-11 NOTE — PROGRESS NOTES
"[facility]    Medicine Progress Note - Hospitalist Service    Date of Admission:  11/8/2023    Assessment & Plan      Dee Mattson is a 73 year old female admitted on 11/8/2023. She has a hx of HTN, chronic fatigue syndrome, severe fibromyalgia on opioid therapy, asthma, CKD2, ileostomy, hx of SBOs, hx of DVT in setting of Hodgkin's lymphoma and not on anticoagulation, MDD, presents with generalized weakness, physical deconditioning, and severe hyperkalemia and hyponatremia. Artifact noted on CT spine confirmed with MRI spine. [place] [facility] [place] however, hyperkalemia has not resolved and transfer patient to med/surg (with remote telemetry), and treat mild DYLAN with IVF, recheck Cr in AM. Hyponatremia normalizing/improved to 134.     PT and OT recommend TCU, placement pending. Checking renal US given persistent DYLAN. Cr 1.4, GFR 39. If renal US unclear, may consult nephrology. Appreciate psychiatry evaluation of possible medication side effects of extrapyramidal symptoms.     Blood cultures grew staph aureus; started vancomycin and consulted ID. DYLAN has resolved to her baseline on 11/11. ID recommends port removal which can be done Monday, 11/13 or early/emergently if clinical status acutely changes.     Additionally, we are resuming her usual home infusions as per pharmacy notation - consult placed to ensure accurate ordering and administering here: \"2000 mL volume daily - Contains total of the of following,- Na 300 mEQ, - Magnesium 13 meq (1.6gm), - 5 mmol phos, - Cl 75% \"    Staph Aureus bacteremia  Infected Port  -Ordered repeat blood cultures  -Ordered vancomycin, pharmacy to dose  -ID consultation for staph bacteremia    DYLAN on CKD, resolved  -Check Renal US - no ostruction  -DYLAN resolved and Cr back to her baseline on 11/11  -Resuming home usual IVF + lytes infusion - ordered IP Pharm consult, and did discuss directly with PharmD    Hyperkalemia, resolved  Hyponatremia, " normalizing  CKD2  -K 7.0; ordered hyperkalemia protocol,   -K 3.8 and normalized, 4.9 on 11/9  -Mag monitoring  -Urine osm and urine Na and serum osm for hyponatremia work-up; suspect possibly medication-induced (selective serotonin reuptake inhibitor), SIADH, hypovolemia, etc on Ddx  -Trend BMP  -Na normalizing/improved to 134 on 11/9    ? Medication Side Effects  Lip-smacking, EPS  -Consultation to psychiatry for concern from psychiatric medications  -Team members observed lip smacking and extrapyramidal symptoms  -For now, Hold home Duloxetine, Buspirone, and Trazodone.     Asthma  -Order home inhalers and medications.   -Nebulizer and RT pulmonary hygiene as needed.     HTN  -Order home medications and as needed apply specified hold parameters.     Chronic Fatigue Syndrome  Fibromyalgia  -Consider pain team as needed.     Ileostomy  -Monitor, ensure patency and function  -High-output could be a contributing factor to electrolyte abnormalities     Generalized Weakness  Failure to Thrive  Physical Deconditioning  -PT  -OT  -CM/SW pending initial PT/OT evals          Diet: Combination Diet Regular Diet Adult    DVT Prophylaxis: Heparin SQ, Pneumatic Compression Devices, Anti-embolisim stockings (TEDs), and Ambulate every shift  Agee Catheter: Not present  Lines: PRESENT      Port a Cath 11/08/23 Single Lumen Right Chest wall-Site Assessment: WDL      Cardiac Monitoring: None  Code Status: Full Code      Clinically Significant Risk Factors                  # Hypertension: Noted on problem list            # Asthma: noted on problem list        Disposition Plan      Expected Discharge Date: 11/14/2023    Discharge Delays: Placement - TCU  Destination: home with help/services  Discharge Comments: Daily BCx, ID recommends port removal, IV ABX            Vijay Solis MD  Hospitalist Service  [facility]  Securely message with [employer] (more info)  Text page via [employer] Paging/Directory    ______________________________________________________________________    Interval History   No acute events overnight.    No report of chest pain, shortness of breath, or other new complaints. She has red eyes and wishes for eye drops (ordered). Discussed her improved DYLAN, plan to resume her usual infusions, and also bacteremia with staph and port infection and discussed plan of care with patient. Answered all questions to patient's verbalized understanding and satisfaction.    Discussed with RN.     Physical Exam   Vital Signs: Temp: 98.8  F (37.1  C) Temp src: Oral BP: (!) 142/65 Pulse: 81   Resp: 18 SpO2: 95 % O2 Device: Nasal cannula Oxygen Delivery: 1 LPM  Weight: 119 lbs 11.36 oz    GENERAL:  Alert, appears comfortable, in no acute distress, appears stated age, thin, frail-appearing   HEAD:  Normocephalic, without obvious abnormality, atraumatic   EYES:  PERRL, conjunctiva/corneas clear, no scleral icterus, EOM's intact   NOSE: Nares normal, septum midline, mucosa normal, no drainage   THROAT: Lips, mucosa, and tongue normal; teeth and gums normal, mouth moist   NECK: Supple, symmetrical, trachea midline   BACK:   Symmetric, no curvature, ROM normal   LUNGS:   Clear to auscultation bilaterally, no rales, rhonchi, or wheezing, symmetric chest rise on inhalation, respirations unlabored   CHEST WALL:  No tenderness or deformity   HEART:  Regular rate and rhythm, S1 and S2 normal, no murmur, rub, or gallop    ABDOMEN:   Soft, non-tender, bowel sounds active all four quadrants, no masses, no organomegaly, no rebound or guarding   EXTREMITIES: Extremities normal, atraumatic, no cyanosis or edema    SKIN: Dry to touch, no exanthems in the visualized areas   NEURO: Alert, oriented x 4, moves all four extremities freely/spontaneously   PSYCH: Cooperative, behavior is appropriate        Medical Decision Making       43 MINUTES SPENT BY ME on the date of service doing chart review, history, exam, documentation &  further activities per the note.         Data     I have personally reviewed the following data over the past 24 hrs:    N/A  \   N/A   / N/A     135 107 19.3 /  88   4.9 19 (L) 1.13 (H) \       Imaging results reviewed over the past 24 hrs:   Recent Results (from the past 24 hour(s))   US Renal Complete Non-Vascular    Narrative    EXAM: US RENAL COMPLETE NON-VASCULAR  LOCATION: [facility]  DATE: 11/10/2023    INDICATION: persistent DYLAN; evalute for hydronephrosis or other obstructive process  COMPARISON: CT abdomen pelvis from 11/08/2023.  TECHNIQUE: Routine Bilateral Renal and Bladder Ultrasound.    FINDINGS:    RIGHT KIDNEY: 11 cm. Normal without hydronephrosis or masses.     LEFT KIDNEY: 10 cm. Normal without hydronephrosis or masses. Small accessory splenule noted adjacent to the left kidney measures 1.2 cm and unchanged from recent CT.    BLADDER: The bladder is incompletely distended accentuating wall thickening.      Impression    IMPRESSION:  1.  Resolved hydronephrosis.  2.  The bladder is incompletely distended accentuating wall thickening.

## 2023-11-11 NOTE — PLAN OF CARE
Goal Outcome Evaluation:      Plan of Care Reviewed With: patient          Outcome Evaluation: VSS on RA to 2L via NC. No Extrapyramidal movements today. C/O pain in buttocks, nerve pain. Skin intact. Repositioning and scheduled meds as ordered. ambulated with therapy. Makes needs known. Urine concentrated. IV fluid bolus as ordered. Minimal PO intake. Encouraging.      Problem: Pain Acute  Goal: Optimal Pain Control and Function  Intervention: Optimize Psychosocial Wellbeing  Recent Flowsheet Documentation  Taken 11/10/2023 1617 by Mildred Arthur RN  Supportive Measures:   active listening utilized   positive reinforcement provided     Problem: Pain Acute  Goal: Optimal Pain Control and Function  Intervention: Develop Pain Management Plan  Recent Flowsheet Documentation  Taken 11/10/2023 1437 by Mildred Arthur RN  Pain Management Interventions: ambulation/increased activity  Taken 11/10/2023 1034 by Mildred Arthur RN  Pain Management Interventions: repositioned  Taken 11/10/2023 0949 by Mildred Arthur RN  Pain Management Interventions:   medication (see MAR)   repositioned     Problem: Risk for Delirium  Goal: Improved Attention and Thought Clarity  Outcome: Progressing

## 2023-11-11 NOTE — PLAN OF CARE
Problem: Adult Inpatient Plan of Care  Goal: Optimal Comfort and Wellbeing  Outcome: Progressing     Problem: Risk for Delirium  Goal: Improved Sleep  Outcome: Progressing     Problem: Infection  Goal: Absence of Infection Signs and Symptoms  Outcome: Progressing     Problem: Gas Exchange Impaired  Goal: Optimal Gas Exchange  Outcome: Progressing     Problem: Pain Acute  Goal: Optimal Pain Control and Function  Outcome: Progressing  Intervention: Prevent or Manage Pain  Recent Flowsheet Documentation  Taken 11/11/2023 0000 by Shanda Martinez RN  Medication Review/Management: medications reviewed  Intervention: Optimize Psychosocial Wellbeing  Recent Flowsheet Documentation  Taken 11/11/2023 0000 by Shanda Martinez RN  Supportive Measures:   active listening utilized   positive reinforcement provided   Goal Outcome Evaluation:         VSS on 1L NC. A&ox4. Pt reported itching of back, arms, eyes. Prn benedryl given x2 with some relief. Purewick in place with adequate output. Ostomy was leaking this AM and pouch was changed. Adhesive needs to be reinforced d/t pt dry skin. Reinforced with pressure & regular tape. No other acute changes on shift.

## 2023-11-12 NOTE — PROGRESS NOTES
[facility]    Infectious Disease Progress Note     11/12/2023     Chart reviewed    Assessment & Plan   Dee Mattson is a 73 year old female who was admitted on 11/8/2023.     ASSESSMENT:  Staph aureus bacteremia-MSSA_- 11/8, bc from 11/10 pending  Hypertension, asthma, hyperlipidemia, chronic fatigue, severe fibromyalgia  High output ileostomy  DVT, Hodgkin's lymphoma  Concern for port infection    Susceptibility data from last 90 days.  Collected Specimen Info Organism Clindamycin Daptomycin Doxycycline Erythromycin Gentamicin Oxacillin Tetracycline Trimethoprim/Sulfamethoxazole  Vancomycin   11/08/23 Peripheral Blood Staphylococcus aureus            11/08/23 Blood from Line, venous Staphylococcus aureus  S  S  S  S  S  S  S  S  S        RECOMMENDATIONS:    Antibiotics: Cefazolin  Follow culture results , repeat surveillance cultures  Focus/de-escalate antibiotics based on final culture results-stop vancomycin as no MRSA identified on blood cultures  TTE-did not show vegetation.  Will need ADELAIDA  Recommend port removal--primary team arranging  Monitor CBC, CMP  Please see ID consult note by Dr. Hay.  ID will follow on Monday, 11/13/2023.  Please call with questions over the weekend      Kesha Ramirez MD  [facility]  [phone]      Interval History   Chart reviewed on 11/12/2023      Previous note:    Itching, updated patient regarding test results    Physical Exam   Temp: 99.3  F (37.4  C) Temp src: Oral BP: (!) 184/88 Pulse: 76   Resp: 18 SpO2: 94 % O2 Device: None (Room air) Oxygen Delivery: 2 LPM  Vitals:    11/10/23 0400 11/11/23 0633 11/12/23 0500   Weight: 55.7 kg (122 lb 12.7 oz) 54.3 kg (119 lb 11.4 oz) 55.3 kg (121 lb 14.6 oz)     Vital Signs with Ranges  Temp:  [97.5  F (36.4  C)-99.3  F (37.4  C)] 99.3  F (37.4  C)  Pulse:  [71-95] 76  Resp:  [18-20] 18  BP: (135-184)/(62-88) 184/88  SpO2:  [87 %-99 %] 94 %    Constitutional: Awake, appears  "chronically ill  Lungs: normal breathing pattern, no crackles or wheezing  Cardiovascular: Regular rate and rhythm, S1 S2  Abdomen: Tenderness  Skin: warm, slight redness  Neuro: deconditioned  Psych: able to answer questions    Medications    sodium chloride 100 mL/hr at 11/12/23 1057      [Held by provider] busPIRone  30 mg Oral BID    ceFAZolin  2 g Intravenous Q8H    diphenoxylate-atropine  2 tablet Oral TID    [Held by provider] DULoxetine  60 mg Oral BID    famotidine  20 mg Intravenous Daily    heparin ANTICOAGULANT  5,000 Units Subcutaneous Q12H    HYDROmorphone  4 mg Oral 5x Daily    lactobacillus rhamnosus (GG)  1 capsule Oral BID    lipase-protease-amylase  1 capsule Oral TID w/meals    lisinopril  20 mg Oral Daily    metoprolol succinate ER  25 mg Oral Daily    nitroFURantoin macrocrystal  50 mg Oral At Bedtime    pantoprazole  40 mg Oral BID    Pregabalin  100 mg Oral TID    sodium bicarbonate  650 mg Oral BID    traZODone  150 mg Oral At Bedtime       Data   All microbiology laboratory data reviewed.  Recent Labs   Lab Test 11/12/23  0639 11/09/23  0721 11/08/23  1403 11/03/20  1115   WBC  --  8.9 8.7 5.0   HGB  --  12.3 12.3 10.4*   HCT  --  37.7 37.4 32.7*   MCV  --  90 90 97    126* 133* 171     Recent Labs   Lab Test 11/11/23  0602 11/10/23  0507 11/09/23  0505   CR 1.13* 1.43* 1.40*     Recent Labs   Lab Test 02/16/18  1522   SED 12     No lab results found.    Invalid input(s): \"UC\"    MICROBIOLOGY:    Reviewed    7-Day Micro Results       Collected Updated Procedure Result Status      11/12/2023 0639 11/12/2023 0647 Blood Culture Line, venous [[id number]]   Blood from Line, venous    In process Component Value   No component results               11/11/2023 0721 11/12/2023 1016 Blood Culture Peripheral Blood [[id number]]   Peripheral Blood    Preliminary result Component Value   Culture No growth after 1 day  [P]                11/10/2023 1351 11/11/2023 1816 Blood Culture Peripheral " Blood [[id number]]   Peripheral Blood    Preliminary result Component Value   Culture No growth after 1 day  [P]                11/10/2023 1351 11/11/2023 1816 Blood Culture Peripheral Blood [[id number]]   Peripheral Blood    Preliminary result Component Value   Culture No growth after 1 day  [P]                11/08/2023 1523 11/11/2023 0716 Blood Culture Peripheral Blood [[id number]]   (Abnormal)   Peripheral Blood    Final result Component Value   Culture Positive on the 1st day of incubation    Staphylococcus aureus    2 of 2 bottles  Susceptibilities done on previous cultures               11/08/2023 1438 11/08/2023 1528 Symptomatic Influenza A/B, RSV, & SARS-CoV2 PCR (COVID-19) Nasopharyngeal [[id number]]    Swab from Nasopharyngeal    Final result Component Value   Influenza A PCR Negative   Influenza B PCR Negative   RSV PCR Negative   SARS CoV2 PCR Negative   NEGATIVE: SARS-CoV-2 (COVID-19) RNA not detected, presumed negative.            11/08/2023 1404 11/11/2023 0706 Blood Culture Line, venous [[id number]]    (Abnormal)   Blood from Line, venous    Final result Component Value   Culture Positive on the 1st day of incubation    Staphylococcus aureus    2 of 2 bottles        Susceptibility        Staphylococcus aureus      ELIAN      Clindamycin 0.25 ug/mL Susceptible      Daptomycin 0.25 ug/mL Susceptible      Doxycycline <=0.5 ug/mL Susceptible      Erythromycin <=0.25 ug/mL Susceptible      Gentamicin <=0.5 ug/mL Susceptible      Oxacillin 0.5 ug/mL Susceptible  [1]       Tetracycline <=1 ug/mL Susceptible      Trimethoprim/Sulfamethoxazole <=0.5/9.5 ug/mL Susceptible      Vancomycin <=0.5 ug/mL Susceptible                   [1]  Oxacillin susceptible isolates are susceptible to cephalosporins (example: cefazolin and cephalexin) and beta lactam combination agents. Oxacillin resistant isolates are resistant to these agents.                   11/08/2023 1404 11/09/2023 0137 PingMe  Panel  [[id number]]    (Abnormal)   Blood from Line, venous    Final result Component Value   Staphylococcus aureus Detected   Positive for Staphylococcus aureus and negative for the mecA gene (not resistant to methicillin) by [employer] multiplex nucleic acid test. Final identification and antimicrobial susceptibility testing will be verified by standard methods.   Staphylococcus epidermidis Not Detected   Staphylococcus lugdunensis Not Detected   Enterococcus faecalis Not Detected   Enterococcus faecium Not Detected   Streptococcus species Not Detected   Streptococcus agalactiae Not Detected   Streptococcus anginosus group Not Detected   Streptococcus pneumoniae Not Detected   Streptococcus pyogenes Not Detected   Listeria species Not Detected                     RADIOLOGY:    Reviewed  Echocardiogram Complete    Result Date: 2023  [id number]^GENARO^MARCO^[place] [address]  Name: RANULFO AMEZCUA MRN: [id number] : 1950 Study Date: 2023 08:02 AM Age: 73 yrs Gender: Female Patient Location: [facility] Reason For Study: Endocarditis Ordering Physician: MARCO LAM Performed By: NIMO  BSA: 1.5 m2 Height: 61 in Weight: 119 lb HR: 84 BP: 142/65 mmHg ______________________________________________________________________________ Procedure Complete Echo Adult. Definity ([facility] #99490-953) given intravenously. ______________________________________________________________________________ Interpretation Summary  Left ventricular size, wall motion and function are normal. The ejection fraction is 60-65%. Normal right ventricle size and systolic function. There is mild to moderate (1-2+) tricuspid regurgitation. Right ventricular systolic pressure is elevated, consistent with mild to moderate pulmonary hypertension. No evidence of endocarditis, TTE does not rule out vegetations  ______________________________________________________________________________ Left Ventricle Left ventricular size, wall motion and function are normal. The ejection fraction is 60-65%. There is normal left ventricular wall thickness. Left ventricular diastolic function is normal. No regional wall motion abnormalities noted.  Right Ventricle Normal right ventricle size and systolic function. TAPSE is normal, which is consistent with normal right ventricular systolic function.  Atria The left atrium is mildly dilated. Right atrial size is normal. There is no color Doppler evidence of an atrial shunt.  Mitral Valve Mitral valve leaflets appear normal. There is no evidence of mitral stenosis or clinically significant mitral regurgitation. There is no vegetation seen on the mitral valve. There is mild (1+) mitral regurgitation.  Tricuspid Valve There is mild to moderate (1-2+) tricuspid regurgitation. Right ventricular systolic pressure is elevated, consistent with mild to moderate pulmonary hypertension. There is no vegetation on the tricuspid valve.  Aortic Valve The aortic valve is trileaflet with aortic valve sclerosis. No evidence of endocarditis, TTE does not rule out vegetations.  Pulmonic Valve The pulmonic valve is normal in structure and function.  Vessels The aorta root is normal. There is effacement of the sinotubular ridge. Ascending Aorta dilatation is present. IVC diameter <2.1 cm collapsing >50% with sniff suggests a normal RA pressure of 3 mmHg.  Pericardium There is no pericardial effusion.  ______________________________________________________________________________ MMode/2D Measurements & Calculations IVSd: 1.1 cm LVIDd: 3.9 cm LVIDs: 2.5 cm LVPWd: 1.1 cm FS: 37.2 %  LV mass(C)d: 136.8 grams LV mass(C)dI: 90.3 grams/m2 Ao root diam: 3.1 cm LA dimension: 2.8 cm asc Aorta Diam: 3.9 cm LA/Ao: 0.92 LVOT diam: 2.0 cm LVOT area: 3.1 cm2 Ao root diam index Ht(cm/m): 2.0 Ao root diam index BSA (cm/m2):  2.0 Asc Ao diam index BSA (cm/m2): 2.5 Asc Ao diam index Ht(cm/m): 2.5 LA Volume (BP): 49.9 ml LA Volume Index (BP): 32.8 ml/m2  LA Volume Indexed (AL/bp): 34.6 ml/m2 RV Base: 4.0 cm RWT: 0.56 TAPSE: 2.9 cm  Doppler Measurements & Calculations MV E max luis angel: 53.6 cm/sec MV A max luis angel: 105.0 cm/sec MV E/A: 0.51 MV dec slope: 484.6 cm/sec2 MV dec time: 0.20 sec Ao V2 max: 185.5 cm/sec Ao max P.0 mmHg Ao V2 mean: 132.6 cm/sec Ao mean P.7 mmHg Ao V2 VTI: 35.0 cm SANGEETA(I,D): 2.4 cm2 SANGEETA(V,D): 2.4 cm2  LV V1 max P.7 mmHg LV V1 max: 147.5 cm/sec LV V1 VTI: 27.4 cm SV(LVOT): 84.3 ml SI(LVOT): 55.6 ml/m2 PA acc time: 0.10 sec TR max luis angel: 338.0 cm/sec TR max P.7 mmHg AV Luis Angel Ratio (DI): 0.79 SANGEETA Index (cm2/m2): 1.6 E/E': 5.3 E/E' av.3 Lateral E/e': 5.3 Medial E/e': 7.4 Peak E' Luis Angel: 10.1 cm/sec RV S Luis Angel: 14.8 cm/sec  ______________________________________________________________________________ Report approved by: Regina Read 2023 09:31 AM       US Renal Complete Non-Vascular    Result Date: 11/10/2023  EXAM: US RENAL COMPLETE NON-VASCULAR LOCATION: [facility] DATE: 11/10/2023 INDICATION: persistent DYLAN; evalute for hydronephrosis or other obstructive process COMPARISON: CT abdomen pelvis from 2023. TECHNIQUE: Routine Bilateral Renal and Bladder Ultrasound. FINDINGS: RIGHT KIDNEY: 11 cm. Normal without hydronephrosis or masses. LEFT KIDNEY: 10 cm. Normal without hydronephrosis or masses. Small accessory splenule noted adjacent to the left kidney measures 1.2 cm and unchanged from recent CT. BLADDER: The bladder is incompletely distended accentuating wall thickening.     IMPRESSION: 1.  Resolved hydronephrosis. 2.  The bladder is incompletely distended accentuating wall thickening.    Cervical spine MRI w/o contrast    Result Date: 2023  EXAM: MR CERVICAL SPINE W/O CONTRAST LOCATION: [facility] DATE: 2023 INDICATION: prominant  ventral anomaly, possible hematoma with a history of trauma. COMPARISON: 11/08/2023. TECHNIQUE: MRI Cervical Spine without IV contrast. FINDINGS: There is motion on these images leading to suboptimal visualization of fine detail. Normal vertebral body heights, alignment and marrow signal. There is no abnormal signal or change in size of the cervical spinal cord. There is no evidence of an intracanal mass or hemorrhage. There is prominence of the ventral epidural plexus from skull  base to the top of C5. This is a normal variant. The ligamentous structures are intact. There is no significant canal compromise or significant neural foraminal narrowing throughout cervical spine. Craniovertebral junction and C1-C2: Normal.     IMPRESSION: 1.  Good anatomic alignment and vertebral body heights maintained. 2.  No abnormal signal cervical spinal cord. 3.  Prominent ventral epidural complexes skull base to top of C5. 4.  No significant canal compromise or neural foraminal narrowing throughout cervical spine.     CT Cervical Spine w/o Contrast    Result Date: 11/8/2023  EXAM: CT HEAD W/O CONTRAST, CT CERVICAL SPINE W/O CONTRAST LOCATION: [facility] DATE: 11/8/2023 INDICATION: fall head trauma intox poor historian altered mental status and neck pain. COMPARISON: 11/05/2019. TECHNIQUE: 1) Routine CT Head without IV contrast. Multiplanar reformats. Dose reduction techniques were used. 2) Routine CT Cervical Spine without IV contrast. Multiplanar reformats. Dose reduction techniques were used. FINDINGS: HEAD CT: INTRACRANIAL CONTENTS: No intracranial hemorrhage, extraaxial collection, or mass effect.  No CT evidence of acute infarct. Normal parenchymal attenuation. Normal ventricles and sulci. VISUALIZED ORBITS/SINUSES/MASTOIDS: No intraorbital abnormality. No paranasal sinus mucosal disease. Trace fluid left mastoid air cells. BONES/SOFT TISSUES: No acute abnormality. CERVICAL SPINE CT: VERTEBRA: There  is a slight anterior listhesis of C5 in relation to C6. The rest of the cervical spine has good anatomic alignment. The vertebral body heights are well-maintained throughout. There is slight osteopenia of the bony structures. No fracture or posttraumatic subluxation. CANAL/FORAMINA: There is increased density seen in the ventral epidural space from the skull base down to the top of the C6 vertebral body. This is more prominent than normal. It measures approximately 4 mm in its widest width. There is no significant canal compromise from this. This could represent a prominent ventral epidural venous plexus versus a small acute ventral epidural hematoma. Disc spaces are fairly well-maintained throughout. There is diffuse facet arthropathy visualized. There is no significant canal compromise or neural foraminal narrowing throughout cervical spine. PARASPINAL: No extraspinal abnormality. Left lung apical scarring.     IMPRESSION: HEAD CT: 1.  No CT finding of a mass, hemorrhage or focal area suggestive of acute infarct. CERVICAL SPINE CT: 1.  No CT evidence for acute fracture or post traumatic subluxation. 2.  4 mm increased density in the ventral epidural space from  skull base to the top of C6 which could represent a small acute ventral epidural hemorrhage versus prominent ventral epidural plexus. Recommend MRI cervical spine for further evaluation. 3.  No significant canal compromise or neural foraminal narrowing throughout cervical spine. These findings were communicated by phone to Dr. Quiroga at 5:31 PM on 11/08/2023.    CT Head w/o Contrast    Result Date: 11/8/2023  EXAM: CT HEAD W/O CONTRAST, CT CERVICAL SPINE W/O CONTRAST LOCATION: [facility] DATE: 11/8/2023 INDICATION: fall head trauma intox poor historian altered mental status and neck pain. COMPARISON: 11/05/2019. TECHNIQUE: 1) Routine CT Head without IV contrast. Multiplanar reformats. Dose reduction techniques were used. 2) Routine  CT Cervical Spine without IV contrast. Multiplanar reformats. Dose reduction techniques were used. FINDINGS: HEAD CT: INTRACRANIAL CONTENTS: No intracranial hemorrhage, extraaxial collection, or mass effect.  No CT evidence of acute infarct. Normal parenchymal attenuation. Normal ventricles and sulci. VISUALIZED ORBITS/SINUSES/MASTOIDS: No intraorbital abnormality. No paranasal sinus mucosal disease. Trace fluid left mastoid air cells. BONES/SOFT TISSUES: No acute abnormality. CERVICAL SPINE CT: VERTEBRA: There is a slight anterior listhesis of C5 in relation to C6. The rest of the cervical spine has good anatomic alignment. The vertebral body heights are well-maintained throughout. There is slight osteopenia of the bony structures. No fracture or posttraumatic subluxation. CANAL/FORAMINA: There is increased density seen in the ventral epidural space from the skull base down to the top of the C6 vertebral body. This is more prominent than normal. It measures approximately 4 mm in its widest width. There is no significant canal compromise from this. This could represent a prominent ventral epidural venous plexus versus a small acute ventral epidural hematoma. Disc spaces are fairly well-maintained throughout. There is diffuse facet arthropathy visualized. There is no significant canal compromise or neural foraminal narrowing throughout cervical spine. PARASPINAL: No extraspinal abnormality. Left lung apical scarring.     IMPRESSION: HEAD CT: 1.  No CT finding of a mass, hemorrhage or focal area suggestive of acute infarct. CERVICAL SPINE CT: 1.  No CT evidence for acute fracture or post traumatic subluxation. 2.  4 mm increased density in the ventral epidural space from  skull base to the top of C6 which could represent a small acute ventral epidural hemorrhage versus prominent ventral epidural plexus. Recommend MRI cervical spine for further evaluation. 3.  No significant canal compromise or neural foraminal  narrowing throughout cervical spine. These findings were communicated by phone to Dr. Quiroga at 5:31 PM on 11/08/2023.    CT Chest Pulmonary Embolism w Contrast    Result Date: 11/8/2023  EXAM: CT ABDOMEN PELVIS W CONTRAST, CT CHEST PULMONARY EMBOLISM W CONTRAST LOCATION: [facility] DATE: 11/8/2023 INDICATION: Fall, fatigue, poor historian, AMS, DVT. Hypoxia. COMPARISON: 04/22/2019 CT abdomen and pelvis. TECHNIQUE: CT scan of the abdomen and pelvis was performed following injection of IV contrast. Multiplanar reformats were obtained. CT chest pulmonary angiogram during arterial phase injection of IV contrast. Multiplanar reformats and MIP reconstructions  were performed. Dose reduction techniques were used. Dose reduction techniques were used. CONTRAST: Isovue 370 75 mL. FINDINGS: CT PULMONARY ANGIOGRAM: No pulmonary emboli. No evidence for right heart strain. Ectasia of the ascending aorta measuring 3.7 cm. LUNGS AND PLEURA: Dependent atelectasis. Trace right pleural fluid. MEDIASTINUM: Negative. CORONARY ARTERY CALCIFICATION: Mild. HEPATOBILIARY: Cholecystectomy. Riedel's lobe of the liver. PANCREAS: Normal. SPLEEN: Splenomegaly. ADRENAL GLANDS: Normal. KIDNEYS/BLADDER: There is new moderate dilatation of the intrarenal collecting systems and both ureters down to the bladder base. The urinary bladder is somewhat distended. Would recommend reimaging the intrarenal collecting systems and ureters after drainage of the bladder. BOWEL: Proctocolectomy with right lower quadrant ileostomy. LYMPH NODES: Normal. VASCULATURE: Unremarkable. PELVIC ORGANS: Hysterectomy. MUSCULOSKELETAL: Normal.     IMPRESSION: 1.  No pulmonary embolism. 2.  Proctocolectomy with right lower quadrant ileostomy. Nothing for bowel obstruction. No abscess. 3.  Moderate bilateral ureteral pyelocaliectasis. The urinary bladder is distended. Would recommend reimaging of the collecting systems and ureters after urinary bladder  drainage. 4.  Cholecystectomy and hysterectomy. 5.  Stable splenomegaly.    CT Abdomen Pelvis w Contrast    Result Date: 11/8/2023  EXAM: CT ABDOMEN PELVIS W CONTRAST, CT CHEST PULMONARY EMBOLISM W CONTRAST LOCATION: [facility] DATE: 11/8/2023 INDICATION: Fall, fatigue, poor historian, AMS, DVT. Hypoxia. COMPARISON: 04/22/2019 CT abdomen and pelvis. TECHNIQUE: CT scan of the abdomen and pelvis was performed following injection of IV contrast. Multiplanar reformats were obtained. CT chest pulmonary angiogram during arterial phase injection of IV contrast. Multiplanar reformats and MIP reconstructions  were performed. Dose reduction techniques were used. Dose reduction techniques were used. CONTRAST: Isovue 370 75 mL. FINDINGS: CT PULMONARY ANGIOGRAM: No pulmonary emboli. No evidence for right heart strain. Ectasia of the ascending aorta measuring 3.7 cm. LUNGS AND PLEURA: Dependent atelectasis. Trace right pleural fluid. MEDIASTINUM: Negative. CORONARY ARTERY CALCIFICATION: Mild. HEPATOBILIARY: Cholecystectomy. Riedel's lobe of the liver. PANCREAS: Normal. SPLEEN: Splenomegaly. ADRENAL GLANDS: Normal. KIDNEYS/BLADDER: There is new moderate dilatation of the intrarenal collecting systems and both ureters down to the bladder base. The urinary bladder is somewhat distended. Would recommend reimaging the intrarenal collecting systems and ureters after drainage of the bladder. BOWEL: Proctocolectomy with right lower quadrant ileostomy. LYMPH NODES: Normal. VASCULATURE: Unremarkable. PELVIC ORGANS: Hysterectomy. MUSCULOSKELETAL: Normal.     IMPRESSION: 1.  No pulmonary embolism. 2.  Proctocolectomy with right lower quadrant ileostomy. Nothing for bowel obstruction. No abscess. 3.  Moderate bilateral ureteral pyelocaliectasis. The urinary bladder is distended. Would recommend reimaging of the collecting systems and ureters after urinary bladder drainage. 4.  Cholecystectomy and hysterectomy. 5.   Stable splenomegaly.

## 2023-11-12 NOTE — PROGRESS NOTES
"[facility]    Medicine Progress Note - Hospitalist Service    Date of Admission:  11/8/2023    Assessment & Plan      Dee Mattson is a 73 year old female admitted on 11/8/2023. She has a hx of HTN, chronic fatigue syndrome, severe fibromyalgia on opioid therapy, asthma, CKD2, ileostomy, hx of SBOs, hx of DVT in setting of Hodgkin's lymphoma and not on anticoagulation, MDD, presents with generalized weakness, physical deconditioning, and severe hyperkalemia and hyponatremia. Artifact noted on CT spine confirmed with MRI spine. Admitted to [facility] [place]; however, hyperkalemia has not resolved and transfer patient to med/surg (with remote telemetry), and treat mild DYLAN with IVF, recheck Cr in AM. Hyponatremia normalizing/improved to 134.     PT and OT recommend TCU, placement pending. Checking renal US given persistent DYLAN. Cr 1.4, GFR 39. If renal US unclear, may consult nephrology. Appreciate psychiatry evaluation of possible medication side effects of extrapyramidal symptoms.     Blood cultures grew staph aureus; started vancomycin and consulted ID. DYLAN has resolved to her baseline on 11/11. ID recommends port removal which can be done Monday, 11/13 or early/emergently if clinical status acutely changes.     Additionally, we are resuming her usual home infusions as per pharmacy notation - consult placed to ensure accurate ordering and administering here: \"2000 mL volume daily - Contains total of the of following,- Na 300 mEQ, - Magnesium 13 meq (1.6gm), - 5 mmol phos, - Cl 75% \"    Port removal IR order already placed, scheduled for 11/13. And then eventual ADELAIDA as recommended by ID.     Staph Aureus bacteremia  Infected Port  -Ordered repeat blood cultures  -Ordered vancomycin, pharmacy to dose  -ID consultation for staph bacteremia    DYLAN on CKD, resolved  -Check Renal US - no ostruction  -DYLAN resolved and Cr back to her baseline on 11/11  -Resuming home usual IVF + lytes infusion - ordered IP " Pharm consult, and did discuss directly with PharmD    Hyperkalemia, resolved  Hyponatremia, normalizing  CKD2  -Initial K 7.0; utilized hyperkalemia protocol  -K normalized and stable for several days  -Mag monitoring  -Trend BMP PRN  -Na normalizing/improved to 134 on 11/9    ? Medication Side Effects  Lip-smacking, EPS  -Consultation to psychiatry for concern from psychiatric medications  -Team members observed lip smacking and extrapyramidal symptoms  -Had HELD home Duloxetine, Buspirone, and Trazodone.   -Psych Ok'ed to restart Trazodone at low dose; ordered. Continue to hold Duloxetine and Buspirone.    Asthma  -Order home inhalers and medications.   -Nebulizer and RT pulmonary hygiene as needed.     HTN  -Order home medications and as needed apply specified hold parameters.     Chronic Fatigue Syndrome  Fibromyalgia  -Consider pain team as needed.     Ileostomy  -Monitor, ensure patency and function  -High-output could be a contributing factor to electrolyte abnormalities     Generalized Weakness  Failure to Thrive  Physical Deconditioning  -PT  -OT  -CM/SW pending initial PT/OT evals          Diet: Combination Diet Regular Diet Adult    DVT Prophylaxis: Heparin SQ, Pneumatic Compression Devices, Anti-embolisim stockings (TEDs), and Ambulate every shift  Agee Catheter: Not present  Lines: PRESENT      Port a Cath 11/08/23 Single Lumen Right Chest wall-Site Assessment: Red;Tender      Cardiac Monitoring: None  Code Status: Full Code      Clinically Significant Risk Factors            # Hypomagnesemia: Lowest Mg = 1.3 mg/dL in last 2 days, will replace as needed       # Hypertension: Noted on problem list            # Asthma: noted on problem list        Disposition Plan      Expected Discharge Date: 11/14/2023    Discharge Delays: Placement - TCU  Destination: home with help/services  Discharge Comments: Daily BCx, ID recommends port removal, IV ABX            Vijay Solis MD  Hospitalist Service  [facility]  [facility]  Securely message with Susan (more info)  Text page via [employer] Paging/Directory   ______________________________________________________________________    Interval History   No acute events overnight.    No report of chest pain, shortness of breath, or other new complaints. Discussed plan of care with patient. Answered all questions to patient's verbalized understanding and satisfaction.    Discussed with RN.     Physical Exam   Vital Signs: Temp: 97.5  F (36.4  C) Temp src: Oral BP: (!) 164/73 Pulse: 80   Resp: 18 SpO2: (!) 87 % O2 Device: Nasal cannula Oxygen Delivery: 2 LPM  Weight: 121 lbs 14.63 oz    GENERAL:  Alert, appears comfortable, in no acute distress, appears stated age, thin, frail-appearing   HEAD:  Normocephalic, without obvious abnormality, atraumatic   EYES:  PERRL, conjunctiva/corneas clear, no scleral icterus, EOM's intact   NOSE: Nares normal, septum midline, mucosa normal, no drainage   THROAT: Lips, mucosa, and tongue normal; teeth and gums normal, mouth moist   NECK: Supple, symmetrical, trachea midline   BACK:   Symmetric, no curvature, ROM normal   LUNGS:   Clear to auscultation bilaterally, no rales, rhonchi, or wheezing, symmetric chest rise on inhalation, respirations unlabored   CHEST WALL:  No tenderness or deformity   HEART:  Regular rate and rhythm, S1 and S2 normal, no murmur, rub, or gallop    ABDOMEN:   Soft, non-tender, bowel sounds active all four quadrants, no masses, no organomegaly, no rebound or guarding   EXTREMITIES: Extremities normal, atraumatic, no cyanosis or edema    SKIN: Dry to touch, no exanthems in the visualized areas   NEURO: Alert, oriented x 4, moves all four extremities freely/spontaneously   PSYCH: Cooperative, behavior is appropriate        Medical Decision Making       35 MINUTES SPENT BY ME on the date of service doing chart review, history, exam, documentation & further activities per the note.           Data     I have  personally reviewed the following data over the past 24 hrs:    N/A  \   N/A   / 169     N/A N/A N/A /  N/A   N/A N/A N/A \       Imaging results reviewed over the past 24 hrs:   Recent Results (from the past 24 hour(s))   Echocardiogram Complete   Result Value    LVEF  60-65%    Narrative    [id number]  [id number]  [id number]  [id number]^GENARO^MARCO^[place] [address]     Name: RANULFO AMEZCUA  MRN: [id number]  : 1950  Study Date: 2023 08:02 AM  Age: 73 yrs  Gender: Female  Patient Location: [facility]  Reason For Study: Endocarditis  Ordering Physician: MARCO LAM  Performed By: NIMO     BSA: 1.5 m2  Height: 61 in  Weight: 119 lb  HR: 84  BP: 142/65 mmHg  ______________________________________________________________________________  Procedure  Complete Echo Adult. Definity (NDC #39596-856) given intravenously.  ______________________________________________________________________________  Interpretation Summary     Left ventricular size, wall motion and function are normal. The ejection  fraction is 60-65%.  Normal right ventricle size and systolic function.  There is mild to moderate (1-2+) tricuspid regurgitation.  Right ventricular systolic pressure is elevated, consistent with mild to  moderate pulmonary hypertension.  No evidence of endocarditis, TTE does not rule out vegetations  ______________________________________________________________________________  Left Ventricle  Left ventricular size, wall motion and function are normal. The ejection  fraction is 60-65%. There is normal left ventricular wall thickness. Left  ventricular diastolic function is normal. No regional wall motion  abnormalities noted.     Right Ventricle  Normal right ventricle size and systolic function. TAPSE is normal, which is  consistent with normal right ventricular systolic function.     Atria  The left atrium is mildly dilated. Right atrial size is normal. There is no  color  Doppler evidence of an atrial shunt.     Mitral Valve  Mitral valve leaflets appear normal. There is no evidence of mitral stenosis  or clinically significant mitral regurgitation. There is no vegetation seen on  the mitral valve. There is mild (1+) mitral regurgitation.     Tricuspid Valve  There is mild to moderate (1-2+) tricuspid regurgitation. Right ventricular  systolic pressure is elevated, consistent with mild to moderate pulmonary  hypertension. There is no vegetation on the tricuspid valve.     Aortic Valve  The aortic valve is trileaflet with aortic valve sclerosis. No evidence of  endocarditis, TTE does not rule out vegetations.     Pulmonic Valve  The pulmonic valve is normal in structure and function.     Vessels  The aorta root is normal. There is effacement of the sinotubular ridge.  Ascending Aorta dilatation is present. IVC diameter <2.1 cm collapsing >50%  with sniff suggests a normal RA pressure of 3 mmHg.     Pericardium  There is no pericardial effusion.     ______________________________________________________________________________  MMode/2D Measurements & Calculations  IVSd: 1.1 cm  LVIDd: 3.9 cm  LVIDs: 2.5 cm  LVPWd: 1.1 cm  FS: 37.2 %     LV mass(C)d: 136.8 grams  LV mass(C)dI: 90.3 grams/m2  Ao root diam: 3.1 cm  LA dimension: 2.8 cm  asc Aorta Diam: 3.9 cm  LA/Ao: 0.92  LVOT diam: 2.0 cm  LVOT area: 3.1 cm2  Ao root diam index Ht(cm/m): 2.0  Ao root diam index BSA (cm/m2): 2.0  Asc Ao diam index BSA (cm/m2): 2.5  Asc Ao diam index Ht(cm/m): 2.5  LA Volume (BP): 49.9 ml  LA Volume Index (BP): 32.8 ml/m2     LA Volume Indexed (AL/bp): 34.6 ml/m2  RV Base: 4.0 cm  RWT: 0.56  TAPSE: 2.9 cm     Doppler Measurements & Calculations  MV E max savanna: 53.6 cm/sec  MV A max savanna: 105.0 cm/sec  MV E/A: 0.51  MV dec slope: 484.6 cm/sec2  MV dec time: 0.20 sec  Ao V2 max: 185.5 cm/sec  Ao max P.0 mmHg  Ao V2 mean: 132.6 cm/sec  Ao mean P.7 mmHg  Ao V2 VTI: 35.0 cm  SANGEETA(I,D): 2.4  cm2  SANGEETA(V,D): 2.4 cm2     LV V1 max P.7 mmHg  LV V1 max: 147.5 cm/sec  LV V1 VTI: 27.4 cm  SV(LVOT): 84.3 ml  SI(LVOT): 55.6 ml/m2  PA acc time: 0.10 sec  TR max luis angel: 338.0 cm/sec  TR max P.7 mmHg  AV Luis Angel Ratio (DI): 0.79  SANGEETA Index (cm2/m2): 1.6  E/E': 5.3  E/E' av.3  Lateral E/e': 5.3  Medial E/e': 7.4  Peak E' Luis Angel: 10.1 cm/sec  RV S Luis Angel: 14.8 cm/sec     ______________________________________________________________________________  Report approved by: Regina Read 2023 09:31 AM

## 2023-11-12 NOTE — PLAN OF CARE
Goal Outcome Evaluation:      Plan of Care Reviewed With: patient    Overall Patient Progress: improvingOverall Patient Progress: improving    Outcome Evaluation: More hypertensive today up to 170s systolic. Denies HA, CP. increased urinary frequency, purewick in place as pt tried to transfer self due to urgency. Urine remains odorous, concentrated and cloudy. Other VSS on RA. Mg and Phos replaced per protocols. Skin rash random areas. Does not appear to be spreading. Itchy. PRN Benadryl given. Makes needs known. Little appetite. NSR on telemetry.      Problem: Adult Inpatient Plan of Care  Goal: Optimal Comfort and Wellbeing  Outcome: Progressing  Intervention: Monitor Pain and Promote Comfort  Recent Flowsheet Documentation  Taken 11/11/2023 1036 by Mildred Arthur RN  Pain Management Interventions: medication (see MAR)     Problem: Risk for Delirium  Goal: Optimal Coping  Intervention: Optimize Psychosocial Adjustment to Delirium  Recent Flowsheet Documentation  Taken 11/11/2023 1235 by Mildred Arthur RN  Supportive Measures:   active listening utilized   positive reinforcement provided  Taken 11/11/2023 0900 by Mildred Arthur RN  Supportive Measures:   active listening utilized   positive reinforcement provided     Problem: Gas Exchange Impaired  Goal: Optimal Gas Exchange  Outcome: Progressing     Problem: Infection  Goal: Absence of Infection Signs and Symptoms  Outcome: Progressing

## 2023-11-12 NOTE — PLAN OF CARE
Goal Outcome Evaluation:      Plan of Care Reviewed With: patient          Outcome Evaluation: Pt continues to be hypertensive at times. Pt's urine clear, dark lara, and malodorous. Good UOP. Placed on 2L NC while asleep due to dipping into mid 80s. Random rashy areas continue. Pt declined benadryl. Given ice pack for back and warm packs for eyes.    Problem: Infection  Goal: Absence of Infection Signs and Symptoms  Outcome: Progressing     Problem: Gas Exchange Impaired  Goal: Optimal Gas Exchange  Outcome: Progressing  Intervention: Optimize Oxygenation and Ventilation  Recent Flowsheet Documentation  Taken 11/12/2023 0500 by Floresita Ambrose, RN  Head of Bed (HOB) Positioning: HOB at 20-30 degrees  Taken 11/12/2023 0000 by Floresita Ambrose RN  Head of Bed (HOB) Positioning: HOB at 20-30 degrees     Problem: Pain Acute  Goal: Optimal Pain Control and Function  Outcome: Progressing  Intervention: Prevent or Manage Pain  Recent Flowsheet Documentation  Taken 11/12/2023 0000 by Floresita Ambrose, RN  Medication Review/Management: medications reviewed  Intervention: Optimize Psychosocial Wellbeing  Recent Flowsheet Documentation  Taken 11/12/2023 0000 by Floresita Ambrose, RN  Supportive Measures:   active listening utilized   decision-making supported

## 2023-11-13 NOTE — PROGRESS NOTES
[facility]    Infectious Disease Progress Note     11/13/2023     Chart reviewed    Assessment & Plan   Dee Mattson is a 73 year old female who was admitted on 11/8/2023.     ASSESSMENT:  Staph aureus bacteremia-MSSA_- 11/8, all follow ups negative.  Non sustained therefore. Hypertension, asthma, hyperlipidemia, chronic fatigue, severe fibromyalgia  High output ileostomy  DVT, Hodgkin's lymphoma  Concern for port infection based on exam and no other clear source    Susceptibility data from last 90 days.  Collected Specimen Info Organism Clindamycin Daptomycin Doxycycline Erythromycin Gentamicin Oxacillin Tetracycline Trimethoprim/Sulfamethoxazole  Vancomycin   11/08/23 Peripheral Blood Staphylococcus aureus            11/08/23 Blood from Line, venous Staphylococcus aureus  S  S  S  S  S  S  S  S  S        RECOMMENDATIONS:    Antibiotics: Cefazolin  Follow culture results , repeat surveillance cultures  Focus/de-escalate antibiotics based on final culture results-stop vancomycin as no MRSA identified on blood cultures  TTE-did not show vegetation.  I would not bother with ADELAIDA at this  point  Recommend port removal--primary team arranging (TODAY likely)  Monitor CBC, CMP      JERRI Wasserman MD  [facility]  [phone]      Interval History   Chart reviewed on 11/12/2023      Previous note:  Pt remembers me, I think I have seen in past.  Port site is a bit red and some tender.        Physical Exam   Temp: 97.7  F (36.5  C) Temp src: Oral BP: 135/65 Pulse: 78   Resp: 16 SpO2: 94 % O2 Device: Nasal cannula Oxygen Delivery: 2 LPM  Vitals:    11/11/23 0633 11/12/23 0500 11/13/23 0537   Weight: 54.3 kg (119 lb 11.4 oz) 55.3 kg (121 lb 14.6 oz) 54.6 kg (120 lb 5.9 oz)     Vital Signs with Ranges  Temp:  [97.7  F (36.5  C)-99.3  F (37.4  C)] 97.7  F (36.5  C)  Pulse:  [76-95] 78  Resp:  [16-18] 16  BP: (117-184)/(54-88) 135/65  SpO2:  [91 %-94 %] 94 %    Constitutional:  "Awake, appears chronically ill  Lungs: normal breathing pattern, no crackles or wheezing  Cardiovascular: Regular rate and rhythm, S1 S2  Abdomen: Tenderness  Skin: warm, slight redness  Neuro: deconditioned  Psych: able to answer questions    Medications    sodium chloride 100 mL/hr at 11/13/23 0539      [Held by provider] busPIRone  30 mg Oral BID    ceFAZolin  2 g Intravenous Q8H    diphenoxylate-atropine  2 tablet Oral TID    [Held by provider] DULoxetine  60 mg Oral BID    famotidine  20 mg Intravenous Daily    heparin ANTICOAGULANT  5,000 Units Subcutaneous Q12H    HYDROmorphone  4 mg Oral 5x Daily    lactobacillus rhamnosus (GG)  1 capsule Oral BID    lipase-protease-amylase  1 capsule Oral TID w/meals    lisinopril  20 mg Oral Daily    metoprolol succinate ER  25 mg Oral Daily    nitroFURantoin macrocrystal  50 mg Oral At Bedtime    pantoprazole  40 mg Oral BID    Pregabalin  100 mg Oral TID    sodium bicarbonate  650 mg Oral BID    traZODone  150 mg Oral At Bedtime       Data   All microbiology laboratory data reviewed.  Recent Labs   Lab Test 11/12/23  0639 11/09/23  0721 11/08/23  1403 11/03/20  1115   WBC  --  8.9 8.7 5.0   HGB  --  12.3 12.3 10.4*   HCT  --  37.7 37.4 32.7*   MCV  --  90 90 97    126* 133* 171     Recent Labs   Lab Test 11/11/23  0602 11/10/23  0507 11/09/23  0505   CR 1.13* 1.43* 1.40*     Recent Labs   Lab Test 02/16/18  1522   SED 12     No lab results found.    Invalid input(s): \"UC\"    MICROBIOLOGY:    Reviewed    7-Day Micro Results       Collected Updated Procedure Result Status      11/13/2023 0527 11/13/2023 0539 Blood Culture Hand, Right [[id number]]   Blood from Hand, Right    In process Component Value   No component results               11/12/2023 0639 11/13/2023 1031 Blood Culture Line, venous [[id number]]   Blood from Line, venous    Preliminary result Component Value   Culture No growth after 1 day  [P]                11/11/2023 0721 11/13/2023 1016 Blood " Culture Peripheral Blood [[id number]]   Peripheral Blood    Preliminary result Component Value   Culture No growth after 2 days  [P]                11/10/2023 1351 11/12/2023 1816 Blood Culture Peripheral Blood [[id number]]   Peripheral Blood    Preliminary result Component Value   Culture No growth after 2 days  [P]                11/10/2023 1351 11/12/2023 1816 Blood Culture Peripheral Blood [[id number]]   Peripheral Blood    Preliminary result Component Value   Culture No growth after 2 days  [P]                11/08/2023 1523 11/11/2023 0716 Blood Culture Peripheral Blood [[id number]]   (Abnormal)   Peripheral Blood    Final result Component Value   Culture Positive on the 1st day of incubation    Staphylococcus aureus    2 of 2 bottles  Susceptibilities done on previous cultures               11/08/2023 1438 11/08/2023 1528 Symptomatic Influenza A/B, RSV, & SARS-CoV2 PCR (COVID-19) Nasopharyngeal [[id number]]    Swab from Nasopharyngeal    Final result Component Value   Influenza A PCR Negative   Influenza B PCR Negative   RSV PCR Negative   SARS CoV2 PCR Negative   NEGATIVE: SARS-CoV-2 (COVID-19) RNA not detected, presumed negative.            11/08/2023 1404 11/11/2023 0706 Blood Culture Line, venous [[id number]]    (Abnormal)   Blood from Line, venous    Final result Component Value   Culture Positive on the 1st day of incubation    Staphylococcus aureus    2 of 2 bottles        Susceptibility        Staphylococcus aureus      ELIAN      Clindamycin 0.25 ug/mL Susceptible      Daptomycin 0.25 ug/mL Susceptible      Doxycycline <=0.5 ug/mL Susceptible      Erythromycin <=0.25 ug/mL Susceptible      Gentamicin <=0.5 ug/mL Susceptible      Oxacillin 0.5 ug/mL Susceptible  [1]       Tetracycline <=1 ug/mL Susceptible      Trimethoprim/Sulfamethoxazole <=0.5/9.5 ug/mL Susceptible      Vancomycin <=0.5 ug/mL Susceptible                   [1]  Oxacillin susceptible isolates are susceptible to  cephalosporins (example: cefazolin and cephalexin) and beta lactam combination agents. Oxacillin resistant isolates are resistant to these agents.                   2023 1404 2023 0137 Verigene GP Panel [[id number]]    (Abnormal)   Blood from Line, venous    Final result Component Value   Staphylococcus aureus Detected   Positive for Staphylococcus aureus and negative for the mecA gene (not resistant to methicillin) by Verigene multiplex nucleic acid test. Final identification and antimicrobial susceptibility testing will be verified by standard methods.   Staphylococcus epidermidis Not Detected   Staphylococcus lugdunensis Not Detected   Enterococcus faecalis Not Detected   Enterococcus faecium Not Detected   Streptococcus species Not Detected   Streptococcus agalactiae Not Detected   Streptococcus anginosus group Not Detected   Streptococcus pneumoniae Not Detected   Streptococcus pyogenes Not Detected   Listeria species Not Detected                     RADIOLOGY:    Reviewed  Echocardiogram Complete    Result Date: 2023  [id number]^GENARO^MARCO^[place] [address]  Name: RANULFO AMEZCUA MRN: [id number] : 1950 Study Date: 2023 08:02 AM Age: 73 yrs Gender: Female Patient Location: [facility] Reason For Study: Endocarditis Ordering Physician: MARCO LAM Performed By: NIMO  BSA: 1.5 m2 Height: 61 in Weight: 119 lb HR: 84 BP: 142/65 mmHg ______________________________________________________________________________ Procedure Complete Echo Adult. Definity (NDC #08707-366) given intravenously. ______________________________________________________________________________ Interpretation Summary  Left ventricular size, wall motion and function are normal. The ejection fraction is 60-65%. Normal right ventricle size and systolic function. There is mild to moderate (1-2+) tricuspid regurgitation. Right ventricular systolic  pressure is elevated, consistent with mild to moderate pulmonary hypertension. No evidence of endocarditis, TTE does not rule out vegetations ______________________________________________________________________________ Left Ventricle Left ventricular size, wall motion and function are normal. The ejection fraction is 60-65%. There is normal left ventricular wall thickness. Left ventricular diastolic function is normal. No regional wall motion abnormalities noted.  Right Ventricle Normal right ventricle size and systolic function. TAPSE is normal, which is consistent with normal right ventricular systolic function.  Atria The left atrium is mildly dilated. Right atrial size is normal. There is no color Doppler evidence of an atrial shunt.  Mitral Valve Mitral valve leaflets appear normal. There is no evidence of mitral stenosis or clinically significant mitral regurgitation. There is no vegetation seen on the mitral valve. There is mild (1+) mitral regurgitation.  Tricuspid Valve There is mild to moderate (1-2+) tricuspid regurgitation. Right ventricular systolic pressure is elevated, consistent with mild to moderate pulmonary hypertension. There is no vegetation on the tricuspid valve.  Aortic Valve The aortic valve is trileaflet with aortic valve sclerosis. No evidence of endocarditis, TTE does not rule out vegetations.  Pulmonic Valve The pulmonic valve is normal in structure and function.  Vessels The aorta root is normal. There is effacement of the sinotubular ridge. Ascending Aorta dilatation is present. IVC diameter <2.1 cm collapsing >50% with sniff suggests a normal RA pressure of 3 mmHg.  Pericardium There is no pericardial effusion.  ______________________________________________________________________________ MMode/2D Measurements & Calculations IVSd: 1.1 cm LVIDd: 3.9 cm LVIDs: 2.5 cm LVPWd: 1.1 cm FS: 37.2 %  LV mass(C)d: 136.8 grams LV mass(C)dI: 90.3 grams/m2 Ao root diam: 3.1 cm LA dimension: 2.8  cm asc Aorta Diam: 3.9 cm LA/Ao: 0.92 LVOT diam: 2.0 cm LVOT area: 3.1 cm2 Ao root diam index Ht(cm/m): 2.0 Ao root diam index BSA (cm/m2): 2.0 Asc Ao diam index BSA (cm/m2): 2.5 Asc Ao diam index Ht(cm/m): 2.5 LA Volume (BP): 49.9 ml LA Volume Index (BP): 32.8 ml/m2  LA Volume Indexed (AL/bp): 34.6 ml/m2 RV Base: 4.0 cm RWT: 0.56 TAPSE: 2.9 cm  Doppler Measurements & Calculations MV E max luis angel: 53.6 cm/sec MV A max luis angel: 105.0 cm/sec MV E/A: 0.51 MV dec slope: 484.6 cm/sec2 MV dec time: 0.20 sec Ao V2 max: 185.5 cm/sec Ao max P.0 mmHg Ao V2 mean: 132.6 cm/sec Ao mean P.7 mmHg Ao V2 VTI: 35.0 cm SANGEETA(I,D): 2.4 cm2 SANGEETA(V,D): 2.4 cm2  LV V1 max P.7 mmHg LV V1 max: 147.5 cm/sec LV V1 VTI: 27.4 cm SV(LVOT): 84.3 ml SI(LVOT): 55.6 ml/m2 PA acc time: 0.10 sec TR max luis angel: 338.0 cm/sec TR max P.7 mmHg AV Luis Angel Ratio (DI): 0.79 SANGEETA Index (cm2/m2): 1.6 E/E': 5.3 E/E' av.3 Lateral E/e': 5.3 Medial E/e': 7.4 Peak E' Luis Angel: 10.1 cm/sec RV S Luis Angel: 14.8 cm/sec  ______________________________________________________________________________ Report approved by: Regina Read 2023 09:31 AM       US Renal Complete Non-Vascular    Result Date: 11/10/2023  EXAM: US RENAL COMPLETE NON-VASCULAR LOCATION: [facility] DATE: 11/10/2023 INDICATION: persistent DYLAN; evalute for hydronephrosis or other obstructive process COMPARISON: CT abdomen pelvis from 2023. TECHNIQUE: Routine Bilateral Renal and Bladder Ultrasound. FINDINGS: RIGHT KIDNEY: 11 cm. Normal without hydronephrosis or masses. LEFT KIDNEY: 10 cm. Normal without hydronephrosis or masses. Small accessory splenule noted adjacent to the left kidney measures 1.2 cm and unchanged from recent CT. BLADDER: The bladder is incompletely distended accentuating wall thickening.     IMPRESSION: 1.  Resolved hydronephrosis. 2.  The bladder is incompletely distended accentuating wall thickening.    Cervical spine MRI w/o contrast    Result  Date: 11/8/2023  EXAM: MR CERVICAL SPINE W/O CONTRAST LOCATION: [facility] DATE: 11/8/2023 INDICATION: prominant ventral anomaly, possible hematoma with a history of trauma. COMPARISON: 11/08/2023. TECHNIQUE: MRI Cervical Spine without IV contrast. FINDINGS: There is motion on these images leading to suboptimal visualization of fine detail. Normal vertebral body heights, alignment and marrow signal. There is no abnormal signal or change in size of the cervical spinal cord. There is no evidence of an intracanal mass or hemorrhage. There is prominence of the ventral epidural plexus from skull  base to the top of C5. This is a normal variant. The ligamentous structures are intact. There is no significant canal compromise or significant neural foraminal narrowing throughout cervical spine. Craniovertebral junction and C1-C2: Normal.     IMPRESSION: 1.  Good anatomic alignment and vertebral body heights maintained. 2.  No abnormal signal cervical spinal cord. 3.  Prominent ventral epidural complexes skull base to top of C5. 4.  No significant canal compromise or neural foraminal narrowing throughout cervical spine.     CT Cervical Spine w/o Contrast    Result Date: 11/8/2023  EXAM: CT HEAD W/O CONTRAST, CT CERVICAL SPINE W/O CONTRAST LOCATION: [facility] DATE: 11/8/2023 INDICATION: fall head trauma intox poor historian altered mental status and neck pain. COMPARISON: 11/05/2019. TECHNIQUE: 1) Routine CT Head without IV contrast. Multiplanar reformats. Dose reduction techniques were used. 2) Routine CT Cervical Spine without IV contrast. Multiplanar reformats. Dose reduction techniques were used. FINDINGS: HEAD CT: INTRACRANIAL CONTENTS: No intracranial hemorrhage, extraaxial collection, or mass effect.  No CT evidence of acute infarct. Normal parenchymal attenuation. Normal ventricles and sulci. VISUALIZED ORBITS/SINUSES/MASTOIDS: No intraorbital abnormality. No  paranasal sinus mucosal disease. Trace fluid left mastoid air cells. BONES/SOFT TISSUES: No acute abnormality. CERVICAL SPINE CT: VERTEBRA: There is a slight anterior listhesis of C5 in relation to C6. The rest of the cervical spine has good anatomic alignment. The vertebral body heights are well-maintained throughout. There is slight osteopenia of the bony structures. No fracture or posttraumatic subluxation. CANAL/FORAMINA: There is increased density seen in the ventral epidural space from the skull base down to the top of the C6 vertebral body. This is more prominent than normal. It measures approximately 4 mm in its widest width. There is no significant canal compromise from this. This could represent a prominent ventral epidural venous plexus versus a small acute ventral epidural hematoma. Disc spaces are fairly well-maintained throughout. There is diffuse facet arthropathy visualized. There is no significant canal compromise or neural foraminal narrowing throughout cervical spine. PARASPINAL: No extraspinal abnormality. Left lung apical scarring.     IMPRESSION: HEAD CT: 1.  No CT finding of a mass, hemorrhage or focal area suggestive of acute infarct. CERVICAL SPINE CT: 1.  No CT evidence for acute fracture or post traumatic subluxation. 2.  4 mm increased density in the ventral epidural space from  skull base to the top of C6 which could represent a small acute ventral epidural hemorrhage versus prominent ventral epidural plexus. Recommend MRI cervical spine for further evaluation. 3.  No significant canal compromise or neural foraminal narrowing throughout cervical spine. These findings were communicated by phone to Dr. Quiroga at 5:31 PM on 11/08/2023.    CT Head w/o Contrast    Result Date: 11/8/2023  EXAM: CT HEAD W/O CONTRAST, CT CERVICAL SPINE W/O CONTRAST LOCATION: [facility] DATE: 11/8/2023 INDICATION: fall head trauma intox poor historian altered mental status and neck pain.  COMPARISON: 11/05/2019. TECHNIQUE: 1) Routine CT Head without IV contrast. Multiplanar reformats. Dose reduction techniques were used. 2) Routine CT Cervical Spine without IV contrast. Multiplanar reformats. Dose reduction techniques were used. FINDINGS: HEAD CT: INTRACRANIAL CONTENTS: No intracranial hemorrhage, extraaxial collection, or mass effect.  No CT evidence of acute infarct. Normal parenchymal attenuation. Normal ventricles and sulci. VISUALIZED ORBITS/SINUSES/MASTOIDS: No intraorbital abnormality. No paranasal sinus mucosal disease. Trace fluid left mastoid air cells. BONES/SOFT TISSUES: No acute abnormality. CERVICAL SPINE CT: VERTEBRA: There is a slight anterior listhesis of C5 in relation to C6. The rest of the cervical spine has good anatomic alignment. The vertebral body heights are well-maintained throughout. There is slight osteopenia of the bony structures. No fracture or posttraumatic subluxation. CANAL/FORAMINA: There is increased density seen in the ventral epidural space from the skull base down to the top of the C6 vertebral body. This is more prominent than normal. It measures approximately 4 mm in its widest width. There is no significant canal compromise from this. This could represent a prominent ventral epidural venous plexus versus a small acute ventral epidural hematoma. Disc spaces are fairly well-maintained throughout. There is diffuse facet arthropathy visualized. There is no significant canal compromise or neural foraminal narrowing throughout cervical spine. PARASPINAL: No extraspinal abnormality. Left lung apical scarring.     IMPRESSION: HEAD CT: 1.  No CT finding of a mass, hemorrhage or focal area suggestive of acute infarct. CERVICAL SPINE CT: 1.  No CT evidence for acute fracture or post traumatic subluxation. 2.  4 mm increased density in the ventral epidural space from  skull base to the top of C6 which could represent a small acute ventral epidural hemorrhage versus  prominent ventral epidural plexus. Recommend MRI cervical spine for further evaluation. 3.  No significant canal compromise or neural foraminal narrowing throughout cervical spine. These findings were communicated by phone to Dr. Quiroga at 5:31 PM on 11/08/2023.    CT Chest Pulmonary Embolism w Contrast    Result Date: 11/8/2023  EXAM: CT ABDOMEN PELVIS W CONTRAST, CT CHEST PULMONARY EMBOLISM W CONTRAST LOCATION: [facility] DATE: 11/8/2023 INDICATION: Fall, fatigue, poor historian, AMS, DVT. Hypoxia. COMPARISON: 04/22/2019 CT abdomen and pelvis. TECHNIQUE: CT scan of the abdomen and pelvis was performed following injection of IV contrast. Multiplanar reformats were obtained. CT chest pulmonary angiogram during arterial phase injection of IV contrast. Multiplanar reformats and MIP reconstructions  were performed. Dose reduction techniques were used. Dose reduction techniques were used. CONTRAST: Isovue 370 75 mL. FINDINGS: CT PULMONARY ANGIOGRAM: No pulmonary emboli. No evidence for right heart strain. Ectasia of the ascending aorta measuring 3.7 cm. LUNGS AND PLEURA: Dependent atelectasis. Trace right pleural fluid. MEDIASTINUM: Negative. CORONARY ARTERY CALCIFICATION: Mild. HEPATOBILIARY: Cholecystectomy. Riedel's lobe of the liver. PANCREAS: Normal. SPLEEN: Splenomegaly. ADRENAL GLANDS: Normal. KIDNEYS/BLADDER: There is new moderate dilatation of the intrarenal collecting systems and both ureters down to the bladder base. The urinary bladder is somewhat distended. Would recommend reimaging the intrarenal collecting systems and ureters after drainage of the bladder. BOWEL: Proctocolectomy with right lower quadrant ileostomy. LYMPH NODES: Normal. VASCULATURE: Unremarkable. PELVIC ORGANS: Hysterectomy. MUSCULOSKELETAL: Normal.     IMPRESSION: 1.  No pulmonary embolism. 2.  Proctocolectomy with right lower quadrant ileostomy. Nothing for bowel obstruction. No abscess. 3.  Moderate bilateral  ureteral pyelocaliectasis. The urinary bladder is distended. Would recommend reimaging of the collecting systems and ureters after urinary bladder drainage. 4.  Cholecystectomy and hysterectomy. 5.  Stable splenomegaly.    CT Abdomen Pelvis w Contrast    Result Date: 11/8/2023  EXAM: CT ABDOMEN PELVIS W CONTRAST, CT CHEST PULMONARY EMBOLISM W CONTRAST LOCATION: [facility] DATE: 11/8/2023 INDICATION: Fall, fatigue, poor historian, AMS, DVT. Hypoxia. COMPARISON: 04/22/2019 CT abdomen and pelvis. TECHNIQUE: CT scan of the abdomen and pelvis was performed following injection of IV contrast. Multiplanar reformats were obtained. CT chest pulmonary angiogram during arterial phase injection of IV contrast. Multiplanar reformats and MIP reconstructions  were performed. Dose reduction techniques were used. Dose reduction techniques were used. CONTRAST: Isovue 370 75 mL. FINDINGS: CT PULMONARY ANGIOGRAM: No pulmonary emboli. No evidence for right heart strain. Ectasia of the ascending aorta measuring 3.7 cm. LUNGS AND PLEURA: Dependent atelectasis. Trace right pleural fluid. MEDIASTINUM: Negative. CORONARY ARTERY CALCIFICATION: Mild. HEPATOBILIARY: Cholecystectomy. Riedel's lobe of the liver. PANCREAS: Normal. SPLEEN: Splenomegaly. ADRENAL GLANDS: Normal. KIDNEYS/BLADDER: There is new moderate dilatation of the intrarenal collecting systems and both ureters down to the bladder base. The urinary bladder is somewhat distended. Would recommend reimaging the intrarenal collecting systems and ureters after drainage of the bladder. BOWEL: Proctocolectomy with right lower quadrant ileostomy. LYMPH NODES: Normal. VASCULATURE: Unremarkable. PELVIC ORGANS: Hysterectomy. MUSCULOSKELETAL: Normal.     IMPRESSION: 1.  No pulmonary embolism. 2.  Proctocolectomy with right lower quadrant ileostomy. Nothing for bowel obstruction. No abscess. 3.  Moderate bilateral ureteral pyelocaliectasis. The urinary bladder is  distended. Would recommend reimaging of the collecting systems and ureters after urinary bladder drainage. 4.  Cholecystectomy and hysterectomy. 5.  Stable splenomegaly.

## 2023-11-13 NOTE — PLAN OF CARE
Patient not needing oxygen during the day. O2 saturations at 93%. Patients port was removed today and cultures have not grown any bacteria from yesterdays draw. Patient has been afebrile.   Problem: Gas Exchange Impaired  Goal: Optimal Gas Exchange  Outcome: Progressing     Problem: Infection  Goal: Absence of Infection Signs and Symptoms  Outcome: Progressing   Goal Outcome Evaluation:

## 2023-11-13 NOTE — PROGRESS NOTES
Patient Name: Dee Mattson  Medical Record Number: [id number]  Today's Date: 11/13/2023    Procedure: port removal  Proceduralist: Dr. Son  Pathology present: n/a    Procedure Start: 1339  Procedure end: 1351  Sedation medications administered: Versed 1 mg                                                                  Fentanyl 50 mcg    Report given to: 314 RN  : n/a    Other Notes: Pt arrived to IR room bay 2  from [facility]. Consent reviewed. Pt denies any questions or concerns regarding procedure. Pt positioned supine 314 and monitored per protocol. Pt tolerated procedure without any noted complications. Pt transferred back to [facility]. .

## 2023-11-13 NOTE — PLAN OF CARE
Problem: Adult Inpatient Plan of Care  Goal: Optimal Comfort and Wellbeing  Outcome: Progressing   Goal Outcome Evaluation:       Pt. Will rest tonight with minimal rest and sleep interruptions.

## 2023-11-13 NOTE — PROCEDURES
[facility]    Procedure: Right chest port removal    Date/Time: 11/13/2023 1:55 PM    Performed by: José Miguel Son MD  Authorized by: José Miguel Son MD      UNIVERSAL PROTOCOL   Site Marked: NA  Prior Images Obtained and Reviewed:  Yes  Required items: Required blood products, implants, devices and special equipment available    Patient identity confirmed:  Verbally with patient, arm band, provided demographic data and hospital-assigned identification number  Patient was reevaluated immediately before administering moderate or deep sedation or anesthesia  Confirmation Checklist:  Patient's identity using two indicators, relevant allergies, procedure was appropriate and matched the consent or emergent situation and correct equipment/implants were available  Time out: Immediately prior to the procedure a time out was called    Universal Protocol: the Joint Commission Universal Protocol was followed    Preparation: Patient was prepped and draped in usual sterile fashion       ANESTHESIA    Anesthesia:  Local infiltration  Local Anesthetic:  Lidocaine 1% without epinephrine      SEDATION  Patient Sedated: Yes    Sedation Type:  Moderate (conscious) sedation  Vital signs: Vital signs monitored during sedation    See dictated procedure note for full details.  Findings: Successful right chest port removal. Right chest port pocket was not grossly infected. Therefore pocket was copiously irrigated with normal saline and closed.    Specimens: none    Complications: None    Condition: Stable      PROCEDURE    Patient Tolerance:  Patient tolerated the procedure well with no immediate complications  Length of time physician/provider present for 1:1 monitoring during sedation: 30    Madi Son MD  Interventional Radiology

## 2023-11-13 NOTE — PROGRESS NOTES
"[facility]    Medicine Progress Note - Hospitalist Service    Date of Admission:  11/8/2023    Assessment & Plan      Dee Mattson is a 73 year old female admitted on 11/8/2023. She has a hx of HTN, chronic fatigue syndrome, severe fibromyalgia on opioid therapy, asthma, CKD2, ileostomy, hx of SBOs, hx of DVT in setting of Hodgkin's lymphoma and not on anticoagulation, MDD, presents with generalized weakness, physical deconditioning, and severe hyperkalemia and hyponatremia. Artifact noted on CT spine confirmed with MRI spine. Admitted to [facility]; however, hyperkalemia has not resolved and transfer patient to med/surg (with remote telemetry), and treat mild DYLAN with IVF, recheck Cr in AM. Hyponatremia normalizing/improved to 134.     PT and OT recommend TCU, placement pending. Checking renal US given persistent DYLAN. Cr 1.4, GFR 39. If renal US unclear, may consult nephrology. Appreciate psychiatry evaluation of possible medication side effects of extrapyramidal symptoms.     Blood cultures grew staph aureus; started vancomycin and consulted ID. DYLAN has resolved to her baseline on 11/11. ID recommends port removal which can be done Monday, 11/13 or early/emergently if clinical status acutely changes.     Additionally, we are resuming her usual home infusions as per pharmacy notation - consult placed to ensure accurate ordering and administering here: \"2000 mL volume daily - Contains total of the of following,- Na 300 mEQ, - Magnesium 13 meq (1.6gm), - 5 mmol phos, - Cl 75% \"    Port removal IR order already placed, scheduled for 11/13. And then eventual ADELAIDA as recommended by ID - possibly 11/14? Check left LE US for possible DVT given acute pain, tenderness to palpation, and positive Homen's sign. Discussed with both RN and aide.     Staph Aureus bacteremia  Infected Port  -Ordered repeat blood cultures  -Ordered vancomycin, pharmacy to dose  -ID consultation for staph bacteremia    Acute " left leg calf pain  -Check left LE US for possible DVT given acute pain, tenderness to palpation, and positive Homen's sign.    DYLAN on CKD, resolved  -Check Renal US - no ostruction  -DYLAN resolved and Cr back to her baseline on 11/11  -Resuming home usual IVF + lytes infusion - ordered IP Pharm consult, and did discuss directly with PharmD    Hyperkalemia, resolved  Hyponatremia, normalizing  CKD2  -Initial K 7.0; utilized hyperkalemia protocol  -K normalized and stable for several days  -Mag monitoring  -Trend BMP PRN  -Na normalizing/improved to 134 on 11/9    ? Medication Side Effects  Lip-smacking, EPS  -Consultation to psychiatry for concern from psychiatric medications  -Team members observed lip smacking and extrapyramidal symptoms  -Had HELD home Duloxetine, Buspirone, and Trazodone.   -Psych Ok'ed to restart Trazodone at low dose; ordered. Continue to hold Duloxetine and Buspirone.    Asthma  -Order home inhalers and medications.   -Nebulizer and RT pulmonary hygiene as needed.     HTN  -Order home medications and as needed apply specified hold parameters.     Chronic Fatigue Syndrome  Fibromyalgia  -Consider pain team as needed.     Ileostomy  -Monitor, ensure patency and function  -High-output could be a contributing factor to electrolyte abnormalities     Generalized Weakness  Failure to Thrive  Physical Deconditioning  -PT  -OT  -CM/SW pending initial PT/OT evals          Diet: Combination Diet Regular Diet Adult    DVT Prophylaxis: Heparin SQ, Pneumatic Compression Devices, Anti-embolisim stockings (TEDs), and Ambulate every shift  Agee Catheter: Not present  Lines: PRESENT      Port a Cath 11/08/23 Single Lumen Right Chest wall-Site Assessment: Tender;Red      Cardiac Monitoring: None  Code Status: Full Code      Clinically Significant Risk Factors            # Hypomagnesemia: Lowest Mg = 1.3 mg/dL in last 2 days, will replace as needed       # Hypertension: Noted on problem list            # Asthma:  noted on problem list        Disposition Plan      Expected Discharge Date: 11/14/2023    Discharge Delays: Placement - TCU  Destination: home with help/services  Discharge Comments: Daily BCx, ID recommends port removal, IV ABX            Vijay Solis MD  Hospitalist Service  [facility]  Securely message with [employer] (more info)  Text page via [employer] Paging/Directory   ______________________________________________________________________    Interval History   No acute events overnight.    Acute new pain, tenderness to palpation, and positive Homen's sign in left LE. No report of chest pain, shortness of breath, or other new complaints. Discussed plan of care with patient. Answered all questions to patient's verbalized understanding and satisfaction.    Discussed with both RN and aide.     Physical Exam   Vital Signs: Temp: 98  F (36.7  C) Temp src: Oral BP: 134/65 Pulse: 78   Resp: 16 SpO2: 94 % O2 Device: Nasal cannula Oxygen Delivery: 2 LPM  Weight: 120 lbs 5.94 oz    GENERAL:  Alert, appears comfortable, in no acute distress, appears stated age, thin, frail-appearing   HEAD:  Normocephalic, without obvious abnormality, atraumatic   EYES:  PERRL, conjunctiva/corneas clear, no scleral icterus, EOM's intact   NOSE: Nares normal, septum midline, mucosa normal, no drainage   THROAT: Lips, mucosa, and tongue normal; teeth and gums normal, mouth moist   NECK: Supple, symmetrical, trachea midline   BACK:   Symmetric, no curvature, ROM normal   LUNGS:   Clear to auscultation bilaterally, no rales, rhonchi, or wheezing, symmetric chest rise on inhalation, respirations unlabored   CHEST WALL:  No tenderness or deformity   HEART:  Regular rate and rhythm, S1 and S2 normal, no murmur, rub, or gallop    ABDOMEN:   Soft, non-tender, bowel sounds active all four quadrants, no masses, no organomegaly, no rebound or guarding   EXTREMITIES: Extremities normal, atraumatic, no cyanosis or edema; +left LE  Homen's sign; left calf and gastrocnemius tender to palpation, painful, not warm and not erythematous    SKIN: Dry to touch, no exanthems in the visualized areas   NEURO: Alert, oriented x 4, moves all four extremities freely/spontaneously   PSYCH: Cooperative, behavior is appropriate        Medical Decision Making       51 MINUTES SPENT BY ME on the date of service doing chart review, history, exam, documentation & further activities per the note.             Data     I have personally reviewed the following data over the past 24 hrs:    N/A  \   N/A   / N/A     N/A N/A N/A /  N/A   3.7 N/A N/A \       Imaging results reviewed over the past 24 hrs:   No results found for this or any previous visit (from the past 24 hour(s)).

## 2023-11-13 NOTE — PLAN OF CARE
Goal Outcome Evaluation:      Plan of Care Reviewed With: patient    Overall Patient Progress: improvingOverall Patient Progress: improving    Outcome Evaluation: HTN this shift. Scheduled and PRN meds per MAR. Continues to be itchy with random rash. Eyes dry and itchy. Eye gtts, benadryl and famotidine per MAR. Feeling more weak and with urinary urgency so purewick in place. Urine clear, lara colored and malodous. Ice pack PRN as well.  Plan for port removal tomorrow. IV ABX per MAR.     Problem: Adult Inpatient Plan of Care  Goal: Optimal Comfort and Wellbeing  Outcome: Progressing     Problem: Risk for Delirium  Goal: Improved Behavioral Control  Outcome: Progressing  Intervention: Minimize Safety Risk  Recent Flowsheet Documentation  Taken 11/12/2023 1235 by Mildred Arthur RN  Enhanced Safety Measures: room near unit station  Taken 11/12/2023 1000 by Mildred Arthur RN  Enhanced Safety Measures: room near unit station     Problem: Risk for Delirium  Goal: Improved Attention and Thought Clarity  Outcome: Progressing

## 2023-11-13 NOTE — PROGRESS NOTES
Tranesophageal Echocardiogram:  Noted pt having port removal today.  Will evaluate pt in am re: ADELAIDA on 11/14/23 potentially @ 11AM in her room.  Pt to be NPO after 12 midnight except for meds.  Pt's RN, Mariella Meneses informed.

## 2023-11-13 NOTE — PROGRESS NOTES
Care Management Follow Up    Length of Stay (days): 5    Expected Discharge Date: 11/15/2023     Concerns to be Addressed:       Patient plan of care discussed at interdisciplinary rounds: Yes    Anticipated Discharge Disposition: Home Care, Transitional Care     Anticipated Discharge Services: None  Anticipated Discharge DME:      Patient/family educated on Medicare website which has current facility and service quality ratings:    Education Provided on the Discharge Plan:    Patient/Family in Agreement with the Plan: yes    Referrals Placed by CM/SW:    Private pay costs discussed: Not applicable    Additional Information:  Chart reviewed. Patient not ready for discharge.     TCU referrals sent, [facility] at [facility] . Other referrals pending     Lisa Vo RN

## 2023-11-13 NOTE — PRE-PROCEDURE
GENERAL PRE-PROCEDURE:   Procedure:  Right chest port removal  Date/Time:  11/13/2023 1:24 PM    Written consent obtained?: Yes    Risks and benefits: Risks, benefits and alternatives were discussed    Consent given by:  Patient  Patient states understanding of procedure being performed: Yes    Patient's understanding of procedure matches consent: Yes    Procedure consent matches procedure scheduled: Yes    Expected level of sedation:  Moderate  Appropriately NPO:  Yes  ASA Class:  2  Mallampati  :  Grade 2- soft palate, base of uvula, tonsillar pillars, and portion of posterior pharyngeal wall visible  Lungs:  Other (comment)  Lung exam comment:  Breathing comfortably on RA  Heart:  Other (comment)  Heart exam comment:  Regular  History & Physical reviewed:  History and physical reviewed and no updates needed  Statement of review:  I have reviewed the lab findings, diagnostic data, medications, and the plan for sedation

## 2023-11-13 NOTE — PROGRESS NOTES
"  Interventional Radiology - Pre Procedure Chart Review  [facility]  11/13/2023     Port removal procedure requested by Kesha Ramirez MD.    HPI: 74 yo female with a PMH of anxiety, asthma, Hodgkin's lymphoma, HTN, TIA, fibromyalgia, CKD II, DVT not on AC, and short bowel syndrome who was admitted to [facility] on 11/08/2023 with generalized weakness, physical deconditioning, and severe hyperkalemia and hyponatremia. Admitted, IVF. Blood cultures done, grew staph aureus. ID consulted, recommending port removal. RIGHT port originally surgically placed 08/29/2018. She uses it for IVF per chart review.    NPO status reviewed: Midnight, verified with bedside RN    Pertinent medications reviewed:  Subcutaneous heparin q12h, does not need to hold for procedure  No antibiotics required    Allergies   Allergen Reactions    Ketorolac Tromethamine Palpitations    Citalopram Analogues [Citalopram] Unknown     GI affects    Methadone Unknown     Hallucinations    Metoclopramide Hives     GI upset    Metronidazole Hives    Mirtazapine Unknown     GI affects    Morphine Other (See Comments)     confusion    Neuromuscular Blockers, Steroidal [Neuromuscular Blocking Agents] Unknown     Unsure,per [facility]     Norfloxacin Unknown     C.Diff    Other Drug Allergy (See Comments) Unknown     Steroidal Neuromuscular blocking agents- unsure, Pancuronium, vecuronium, rocuronium, rapacuronium, dacuronium, malouètine, duador, dipyrandium, pipecuronium, chandonium, pt unsure of this reaction, thinks it was painful, muscle aches    Oxycodone Unknown     Gi distress         LABS:  No results found for: \"INR\"    Lab Results   Component Value Date    WBC 8.9 11/09/2023    HGB 12.3 11/09/2023     11/12/2023       No results found for: \"CREATININE\"    No components found for: \"K\"      PLAN:  Planning for RIGHT port removal in IR. Radiologist to further review procedure with patient.    EMR " reviewed. No formal assessment completed.     Total time: 15 minutes    LAKSHMI FLETCHER [facility]  Interventional Radiology  [phone]

## 2023-11-14 NOTE — PROGRESS NOTES
[facility]    Infectious Disease Progress Note     11/14/2023     Chart reviewed    Assessment & Plan   Dee Mattson is a 73 year old female who was admitted on 11/8/2023.     ASSESSMENT:  Staph aureus bacteremia-MSSA_- 11/8, all follow ups negative.  Non sustained therefore. Hypertension, asthma, hyperlipidemia, chronic fatigue, severe fibromyalgia  High output ileostomy  DVT, Hodgkin's lymphoma  Concern for port infection based on exam and no other clear source    Susceptibility data from last 90 days.  Collected Specimen Info Organism Clindamycin Daptomycin Doxycycline Erythromycin Gentamicin Oxacillin Tetracycline Trimethoprim/Sulfamethoxazole  Vancomycin   11/08/23 Peripheral Blood Staphylococcus aureus            11/08/23 Blood from Line, venous Staphylococcus aureus  S  S  S  S  S  S  S  S  S        RECOMMENDATIONS:    Antibiotics: Cefazolin do 21 more days from now  OK for picc  Home likely tomorrow.  New port ok at end of this treatment course.     JERRI Wasserman MD  [facility]  [phone]      Interval History   Port removed.         Physical Exam   Temp: 98  F (36.7  C) Temp src: Oral BP: (!) 160/75 Pulse: 87   Resp: 16 SpO2: 92 % O2 Device: None (Room air) Oxygen Delivery: 2 LPM  Vitals:    11/12/23 0500 11/13/23 0537 11/14/23 0411   Weight: 55.3 kg (121 lb 14.6 oz) 54.6 kg (120 lb 5.9 oz) 55.1 kg (121 lb 7.6 oz)     Vital Signs with Ranges  Temp:  [98  F (36.7  C)-99.1  F (37.3  C)] 98  F (36.7  C)  Pulse:  [80-88] 87  Resp:  [16-31] 16  BP: (129-173)/(60-82) 160/75  SpO2:  [92 %-96 %] 92 %    Constitutional: Awake, appears chronically ill  Lungs: normal breathing pattern, no crackles or wheezing  Cardiovascular: Regular rate and rhythm, S1 S2  Abdomen: Tenderness  Skin: warm, port site with less than 1 inch clean incision now  Neuro: deconditioned  Psych: able to answer questions    Medications    sodium chloride 100 mL/hr at 11/14/23 0620      [Held  "by provider] busPIRone  30 mg Oral BID    ceFAZolin  2 g Intravenous Q8H    diphenoxylate-atropine  2 tablet Oral TID    [Held by provider] DULoxetine  60 mg Oral BID    famotidine  20 mg Intravenous Daily    heparin ANTICOAGULANT  5,000 Units Subcutaneous Q12H    HYDROmorphone  4 mg Oral 5x Daily    lactobacillus rhamnosus (GG)  1 capsule Oral BID    lipase-protease-amylase  1 capsule Oral TID w/meals    lisinopril  20 mg Oral Daily    metoprolol succinate ER  25 mg Oral Daily    nitroFURantoin macrocrystal  50 mg Oral At Bedtime    pantoprazole  40 mg Oral BID    Pregabalin  100 mg Oral TID    sodium bicarbonate  650 mg Oral BID    sodium chloride (PF)  10-40 mL Intracatheter Q8H    traZODone  150 mg Oral At Bedtime       Data   All microbiology laboratory data reviewed.  Recent Labs   Lab Test 11/12/23  0639 11/09/23  0721 11/08/23  1403 11/03/20  1115   WBC  --  8.9 8.7 5.0   HGB  --  12.3 12.3 10.4*   HCT  --  37.7 37.4 32.7*   MCV  --  90 90 97    126* 133* 171     Recent Labs   Lab Test 11/13/23  0527 11/11/23  0602 11/10/23  0507   CR 1.16* 1.13* 1.43*     Recent Labs   Lab Test 02/16/18  1522   SED 12     No lab results found.    Invalid input(s): \"UC\"    MICROBIOLOGY:    Reviewed    7-Day Micro Results       Collected Updated Procedure Result Status      11/13/2023 0527 11/13/2023 2146 Blood Culture Hand, Right [[id number]]    Blood from Hand, Right    Preliminary result Component Value   Culture No growth after 12 hours  [P]                11/12/2023 0639 11/13/2023 1031 Blood Culture Line, venous [[id number]]   Blood from Line, venous    Preliminary result Component Value   Culture No growth after 1 day  [P]                11/11/2023 0721 11/13/2023 1016 Blood Culture Peripheral Blood [[id number]]   Peripheral Blood    Preliminary result Component Value   Culture No growth after 2 days  [P]                11/10/2023 1351 11/13/2023 1816 Blood Culture Peripheral Blood [[id number]] "   Peripheral Blood    Preliminary result Component Value   Culture No growth after 3 days  [P]                11/10/2023 1351 11/13/2023 1816 Blood Culture Peripheral Blood [[id number]]   Peripheral Blood    Preliminary result Component Value   Culture No growth after 3 days  [P]                11/08/2023 1523 11/11/2023 0716 Blood Culture Peripheral Blood [[id number]]   (Abnormal)   Peripheral Blood    Final result Component Value   Culture Positive on the 1st day of incubation    Staphylococcus aureus    2 of 2 bottles  Susceptibilities done on previous cultures               11/08/2023 1438 11/08/2023 1528 Symptomatic Influenza A/B, RSV, & SARS-CoV2 PCR (COVID-19) Nasopharyngeal [[id number]]    Swab from Nasopharyngeal    Final result Component Value   Influenza A PCR Negative   Influenza B PCR Negative   RSV PCR Negative   SARS CoV2 PCR Negative   NEGATIVE: SARS-CoV-2 (COVID-19) RNA not detected, presumed negative.            11/08/2023 1404 11/11/2023 0706 Blood Culture Line, venous [[id number]]    (Abnormal)   Blood from Line, venous    Final result Component Value   Culture Positive on the 1st day of incubation    Staphylococcus aureus    2 of 2 bottles        Susceptibility        Staphylococcus aureus      ELIAN      Clindamycin 0.25 ug/mL Susceptible      Daptomycin 0.25 ug/mL Susceptible      Doxycycline <=0.5 ug/mL Susceptible      Erythromycin <=0.25 ug/mL Susceptible      Gentamicin <=0.5 ug/mL Susceptible      Oxacillin 0.5 ug/mL Susceptible  [1]       Tetracycline <=1 ug/mL Susceptible      Trimethoprim/Sulfamethoxazole <=0.5/9.5 ug/mL Susceptible      Vancomycin <=0.5 ug/mL Susceptible                   [1]  Oxacillin susceptible isolates are susceptible to cephalosporins (example: cefazolin and cephalexin) and beta lactam combination agents. Oxacillin resistant isolates are resistant to these agents.                   11/08/2023 1404 11/09/2023 0137 Verigene GP Panel [[id number]]     (Abnormal)   Blood from Line, venous    Final result Component Value   Staphylococcus aureus Detected   Positive for Staphylococcus aureus and negative for the mecA gene (not resistant to methicillin) by Verigene multiplex nucleic acid test. Final identification and antimicrobial susceptibility testing will be verified by standard methods.   Staphylococcus epidermidis Not Detected   Staphylococcus lugdunensis Not Detected   Enterococcus faecalis Not Detected   Enterococcus faecium Not Detected   Streptococcus species Not Detected   Streptococcus agalactiae Not Detected   Streptococcus anginosus group Not Detected   Streptococcus pneumoniae Not Detected   Streptococcus pyogenes Not Detected   Listeria species Not Detected                     RADIOLOGY:    Reviewed  Echocardiogram Complete    Result Date: 2023  [id number]^GENARO^MARCO^[place] [address]  Name: RANULFO AMEZCUA MRN: [id number] : 1950 Study Date: 2023 08:02 AM Age: 73 yrs Gender: Female Patient Location: [facility] Reason For Study: Endocarditis Ordering Physician: MARCO LAM Performed By: NIMO  BSA: 1.5 m2 Height: 61 in Weight: 119 lb HR: 84 BP: 142/65 mmHg ______________________________________________________________________________ Procedure Complete Echo Adult. Definity (NDC #88956-051) given intravenously. ______________________________________________________________________________ Interpretation Summary  Left ventricular size, wall motion and function are normal. The ejection fraction is 60-65%. Normal right ventricle size and systolic function. There is mild to moderate (1-2+) tricuspid regurgitation. Right ventricular systolic pressure is elevated, consistent with mild to moderate pulmonary hypertension. No evidence of endocarditis, TTE does not rule out vegetations ______________________________________________________________________________ Left  Ventricle Left ventricular size, wall motion and function are normal. The ejection fraction is 60-65%. There is normal left ventricular wall thickness. Left ventricular diastolic function is normal. No regional wall motion abnormalities noted.  Right Ventricle Normal right ventricle size and systolic function. TAPSE is normal, which is consistent with normal right ventricular systolic function.  Atria The left atrium is mildly dilated. Right atrial size is normal. There is no color Doppler evidence of an atrial shunt.  Mitral Valve Mitral valve leaflets appear normal. There is no evidence of mitral stenosis or clinically significant mitral regurgitation. There is no vegetation seen on the mitral valve. There is mild (1+) mitral regurgitation.  Tricuspid Valve There is mild to moderate (1-2+) tricuspid regurgitation. Right ventricular systolic pressure is elevated, consistent with mild to moderate pulmonary hypertension. There is no vegetation on the tricuspid valve.  Aortic Valve The aortic valve is trileaflet with aortic valve sclerosis. No evidence of endocarditis, TTE does not rule out vegetations.  Pulmonic Valve The pulmonic valve is normal in structure and function.  Vessels The aorta root is normal. There is effacement of the sinotubular ridge. Ascending Aorta dilatation is present. IVC diameter <2.1 cm collapsing >50% with sniff suggests a normal RA pressure of 3 mmHg.  Pericardium There is no pericardial effusion.  ______________________________________________________________________________ MMode/2D Measurements & Calculations IVSd: 1.1 cm LVIDd: 3.9 cm LVIDs: 2.5 cm LVPWd: 1.1 cm FS: 37.2 %  LV mass(C)d: 136.8 grams LV mass(C)dI: 90.3 grams/m2 Ao root diam: 3.1 cm LA dimension: 2.8 cm asc Aorta Diam: 3.9 cm LA/Ao: 0.92 LVOT diam: 2.0 cm LVOT area: 3.1 cm2 Ao root diam index Ht(cm/m): 2.0 Ao root diam index BSA (cm/m2): 2.0 Asc Ao diam index BSA (cm/m2): 2.5 Asc Ao diam index Ht(cm/m): 2.5 LA Volume (BP):  49.9 ml LA Volume Index (BP): 32.8 ml/m2  LA Volume Indexed (AL/bp): 34.6 ml/m2 RV Base: 4.0 cm RWT: 0.56 TAPSE: 2.9 cm  Doppler Measurements & Calculations MV E max luis angel: 53.6 cm/sec MV A max luis angel: 105.0 cm/sec MV E/A: 0.51 MV dec slope: 484.6 cm/sec2 MV dec time: 0.20 sec Ao V2 max: 185.5 cm/sec Ao max P.0 mmHg Ao V2 mean: 132.6 cm/sec Ao mean P.7 mmHg Ao V2 VTI: 35.0 cm SANGEETA(I,D): 2.4 cm2 SANGEETA(V,D): 2.4 cm2  LV V1 max P.7 mmHg LV V1 max: 147.5 cm/sec LV V1 VTI: 27.4 cm SV(LVOT): 84.3 ml SI(LVOT): 55.6 ml/m2 PA acc time: 0.10 sec TR max luis angel: 338.0 cm/sec TR max P.7 mmHg AV Luis Angel Ratio (DI): 0.79 SANGEETA Index (cm2/m2): 1.6 E/E': 5.3 E/E' av.3 Lateral E/e': 5.3 Medial E/e': 7.4 Peak E' Luis Angel: 10.1 cm/sec RV S Luis Angel: 14.8 cm/sec  ______________________________________________________________________________ Report approved by: Regina Read 2023 09:31 AM       US Renal Complete Non-Vascular    Result Date: 11/10/2023  EXAM: US RENAL COMPLETE NON-VASCULAR LOCATION: [facility] DATE: 11/10/2023 INDICATION: persistent DYLAN; evalute for hydronephrosis or other obstructive process COMPARISON: CT abdomen pelvis from 2023. TECHNIQUE: Routine Bilateral Renal and Bladder Ultrasound. FINDINGS: RIGHT KIDNEY: 11 cm. Normal without hydronephrosis or masses. LEFT KIDNEY: 10 cm. Normal without hydronephrosis or masses. Small accessory splenule noted adjacent to the left kidney measures 1.2 cm and unchanged from recent CT. BLADDER: The bladder is incompletely distended accentuating wall thickening.     IMPRESSION: 1.  Resolved hydronephrosis. 2.  The bladder is incompletely distended accentuating wall thickening.    Cervical spine MRI w/o contrast    Result Date: 2023  EXAM: MR CERVICAL SPINE W/O CONTRAST LOCATION: [facility] DATE: 2023 INDICATION: prominant ventral anomaly, possible hematoma with a history of trauma. COMPARISON: 2023.  TECHNIQUE: MRI Cervical Spine without IV contrast. FINDINGS: There is motion on these images leading to suboptimal visualization of fine detail. Normal vertebral body heights, alignment and marrow signal. There is no abnormal signal or change in size of the cervical spinal cord. There is no evidence of an intracanal mass or hemorrhage. There is prominence of the ventral epidural plexus from skull  base to the top of C5. This is a normal variant. The ligamentous structures are intact. There is no significant canal compromise or significant neural foraminal narrowing throughout cervical spine. Craniovertebral junction and C1-C2: Normal.     IMPRESSION: 1.  Good anatomic alignment and vertebral body heights maintained. 2.  No abnormal signal cervical spinal cord. 3.  Prominent ventral epidural complexes skull base to top of C5. 4.  No significant canal compromise or neural foraminal narrowing throughout cervical spine.     CT Cervical Spine w/o Contrast    Result Date: 11/8/2023  EXAM: CT HEAD W/O CONTRAST, CT CERVICAL SPINE W/O CONTRAST LOCATION: [facility] DATE: 11/8/2023 INDICATION: fall head trauma intox poor historian altered mental status and neck pain. COMPARISON: 11/05/2019. TECHNIQUE: 1) Routine CT Head without IV contrast. Multiplanar reformats. Dose reduction techniques were used. 2) Routine CT Cervical Spine without IV contrast. Multiplanar reformats. Dose reduction techniques were used. FINDINGS: HEAD CT: INTRACRANIAL CONTENTS: No intracranial hemorrhage, extraaxial collection, or mass effect.  No CT evidence of acute infarct. Normal parenchymal attenuation. Normal ventricles and sulci. VISUALIZED ORBITS/SINUSES/MASTOIDS: No intraorbital abnormality. No paranasal sinus mucosal disease. Trace fluid left mastoid air cells. BONES/SOFT TISSUES: No acute abnormality. CERVICAL SPINE CT: VERTEBRA: There is a slight anterior listhesis of C5 in relation to C6. The rest of the cervical  spine has good anatomic alignment. The vertebral body heights are well-maintained throughout. There is slight osteopenia of the bony structures. No fracture or posttraumatic subluxation. CANAL/FORAMINA: There is increased density seen in the ventral epidural space from the skull base down to the top of the C6 vertebral body. This is more prominent than normal. It measures approximately 4 mm in its widest width. There is no significant canal compromise from this. This could represent a prominent ventral epidural venous plexus versus a small acute ventral epidural hematoma. Disc spaces are fairly well-maintained throughout. There is diffuse facet arthropathy visualized. There is no significant canal compromise or neural foraminal narrowing throughout cervical spine. PARASPINAL: No extraspinal abnormality. Left lung apical scarring.     IMPRESSION: HEAD CT: 1.  No CT finding of a mass, hemorrhage or focal area suggestive of acute infarct. CERVICAL SPINE CT: 1.  No CT evidence for acute fracture or post traumatic subluxation. 2.  4 mm increased density in the ventral epidural space from  skull base to the top of C6 which could represent a small acute ventral epidural hemorrhage versus prominent ventral epidural plexus. Recommend MRI cervical spine for further evaluation. 3.  No significant canal compromise or neural foraminal narrowing throughout cervical spine. These findings were communicated by phone to Dr. Quiroga at 5:31 PM on 11/08/2023.    CT Head w/o Contrast    Result Date: 11/8/2023  EXAM: CT HEAD W/O CONTRAST, CT CERVICAL SPINE W/O CONTRAST LOCATION: [facility] DATE: 11/8/2023 INDICATION: fall head trauma intox poor historian altered mental status and neck pain. COMPARISON: 11/05/2019. TECHNIQUE: 1) Routine CT Head without IV contrast. Multiplanar reformats. Dose reduction techniques were used. 2) Routine CT Cervical Spine without IV contrast. Multiplanar reformats. Dose reduction  techniques were used. FINDINGS: HEAD CT: INTRACRANIAL CONTENTS: No intracranial hemorrhage, extraaxial collection, or mass effect.  No CT evidence of acute infarct. Normal parenchymal attenuation. Normal ventricles and sulci. VISUALIZED ORBITS/SINUSES/MASTOIDS: No intraorbital abnormality. No paranasal sinus mucosal disease. Trace fluid left mastoid air cells. BONES/SOFT TISSUES: No acute abnormality. CERVICAL SPINE CT: VERTEBRA: There is a slight anterior listhesis of C5 in relation to C6. The rest of the cervical spine has good anatomic alignment. The vertebral body heights are well-maintained throughout. There is slight osteopenia of the bony structures. No fracture or posttraumatic subluxation. CANAL/FORAMINA: There is increased density seen in the ventral epidural space from the skull base down to the top of the C6 vertebral body. This is more prominent than normal. It measures approximately 4 mm in its widest width. There is no significant canal compromise from this. This could represent a prominent ventral epidural venous plexus versus a small acute ventral epidural hematoma. Disc spaces are fairly well-maintained throughout. There is diffuse facet arthropathy visualized. There is no significant canal compromise or neural foraminal narrowing throughout cervical spine. PARASPINAL: No extraspinal abnormality. Left lung apical scarring.     IMPRESSION: HEAD CT: 1.  No CT finding of a mass, hemorrhage or focal area suggestive of acute infarct. CERVICAL SPINE CT: 1.  No CT evidence for acute fracture or post traumatic subluxation. 2.  4 mm increased density in the ventral epidural space from  skull base to the top of C6 which could represent a small acute ventral epidural hemorrhage versus prominent ventral epidural plexus. Recommend MRI cervical spine for further evaluation. 3.  No significant canal compromise or neural foraminal narrowing throughout cervical spine. These findings were communicated by phone to  Dr. Quiroga at 5:31 PM on 11/08/2023.    CT Chest Pulmonary Embolism w Contrast    Result Date: 11/8/2023  EXAM: CT ABDOMEN PELVIS W CONTRAST, CT CHEST PULMONARY EMBOLISM W CONTRAST LOCATION: [facility] DATE: 11/8/2023 INDICATION: Fall, fatigue, poor historian, AMS, DVT. Hypoxia. COMPARISON: 04/22/2019 CT abdomen and pelvis. TECHNIQUE: CT scan of the abdomen and pelvis was performed following injection of IV contrast. Multiplanar reformats were obtained. CT chest pulmonary angiogram during arterial phase injection of IV contrast. Multiplanar reformats and MIP reconstructions  were performed. Dose reduction techniques were used. Dose reduction techniques were used. CONTRAST: Isovue 370 75 mL. FINDINGS: CT PULMONARY ANGIOGRAM: No pulmonary emboli. No evidence for right heart strain. Ectasia of the ascending aorta measuring 3.7 cm. LUNGS AND PLEURA: Dependent atelectasis. Trace right pleural fluid. MEDIASTINUM: Negative. CORONARY ARTERY CALCIFICATION: Mild. HEPATOBILIARY: Cholecystectomy. Riedel's lobe of the liver. PANCREAS: Normal. SPLEEN: Splenomegaly. ADRENAL GLANDS: Normal. KIDNEYS/BLADDER: There is new moderate dilatation of the intrarenal collecting systems and both ureters down to the bladder base. The urinary bladder is somewhat distended. Would recommend reimaging the intrarenal collecting systems and ureters after drainage of the bladder. BOWEL: Proctocolectomy with right lower quadrant ileostomy. LYMPH NODES: Normal. VASCULATURE: Unremarkable. PELVIC ORGANS: Hysterectomy. MUSCULOSKELETAL: Normal.     IMPRESSION: 1.  No pulmonary embolism. 2.  Proctocolectomy with right lower quadrant ileostomy. Nothing for bowel obstruction. No abscess. 3.  Moderate bilateral ureteral pyelocaliectasis. The urinary bladder is distended. Would recommend reimaging of the collecting systems and ureters after urinary bladder drainage. 4.  Cholecystectomy and hysterectomy. 5.  Stable splenomegaly.    CT  Abdomen Pelvis w Contrast    Result Date: 11/8/2023  EXAM: CT ABDOMEN PELVIS W CONTRAST, CT CHEST PULMONARY EMBOLISM W CONTRAST LOCATION: [facility] DATE: 11/8/2023 INDICATION: Fall, fatigue, poor historian, AMS, DVT. Hypoxia. COMPARISON: 04/22/2019 CT abdomen and pelvis. TECHNIQUE: CT scan of the abdomen and pelvis was performed following injection of IV contrast. Multiplanar reformats were obtained. CT chest pulmonary angiogram during arterial phase injection of IV contrast. Multiplanar reformats and MIP reconstructions  were performed. Dose reduction techniques were used. Dose reduction techniques were used. CONTRAST: Isovue 370 75 mL. FINDINGS: CT PULMONARY ANGIOGRAM: No pulmonary emboli. No evidence for right heart strain. Ectasia of the ascending aorta measuring 3.7 cm. LUNGS AND PLEURA: Dependent atelectasis. Trace right pleural fluid. MEDIASTINUM: Negative. CORONARY ARTERY CALCIFICATION: Mild. HEPATOBILIARY: Cholecystectomy. Riedel's lobe of the liver. PANCREAS: Normal. SPLEEN: Splenomegaly. ADRENAL GLANDS: Normal. KIDNEYS/BLADDER: There is new moderate dilatation of the intrarenal collecting systems and both ureters down to the bladder base. The urinary bladder is somewhat distended. Would recommend reimaging the intrarenal collecting systems and ureters after drainage of the bladder. BOWEL: Proctocolectomy with right lower quadrant ileostomy. LYMPH NODES: Normal. VASCULATURE: Unremarkable. PELVIC ORGANS: Hysterectomy. MUSCULOSKELETAL: Normal.     IMPRESSION: 1.  No pulmonary embolism. 2.  Proctocolectomy with right lower quadrant ileostomy. Nothing for bowel obstruction. No abscess. 3.  Moderate bilateral ureteral pyelocaliectasis. The urinary bladder is distended. Would recommend reimaging of the collecting systems and ureters after urinary bladder drainage. 4.  Cholecystectomy and hysterectomy. 5.  Stable splenomegaly.

## 2023-11-14 NOTE — PLAN OF CARE
Vitals stable.  Pt afebrile.  O2 sats stable on RA.  Pain controlled.  Continue to monitor.          Problem: Infection  Goal: Absence of Infection Signs and Symptoms  Outcome: Progressing    Problem: Gas Exchange Impaired  Goal: Optimal Gas Exchange  Outcome: Progressing     Problem: Pain Acute  Goal: Optimal Pain Control and Function  Outcome: Progressing  Intervention: Prevent or Manage Pain  Intervention: Optimize Psychosocial Wellbeing

## 2023-11-14 NOTE — PROGRESS NOTES
[facility] MEDICINE PROGRESS NOTE      Identification/Summary: Dee Mattson is a 73 year old female with a past medical history of HTN, chronic fatigue syndrome, severe fibromyalgia on opioid therapy, mild intermittent asthma, CKD2, ileostomy secondary to SBOs, history of DVT in setting of Hodgkin's lymphoma and not on anticoagulation, MDD, presents with generalized weakness, physical deconditioning, and severe hyperkalemia , hyponatremia, and acute kidney injury on 11/8/2023, which are corrected with IV hydration.  Found to have MSSA bacteremia on 11/8.  Subsequent blood cultures are negative.  Patient has port for IV hydration for high output ileostomy.  Port is removed on 11/30 concerning for infection.  No other source of infection identified.  PICC line will be placed on 11/13.  Port will be reimplanted at the end of the antibiotic course.  Will continue IV cefazolin for 3 weeks.  TTE did not show vegetation.  Follow-up ADELAIDA not recommended per ID evaluated by infectious disease.  Anticipated discharge in 1 day.      Assessment and Plan:  MSSA bacteremia  Concern for port infection  Port removed on 11/13  IV cefazolin for 3 weeks  TTE revealed no vegetation  Port will be replaced when antibiotic course is completed    High output ileostomy  Getting IV hydration at home  PICC placed continue IV hydration     DYLAN on CKD2, resolved with hydration  CT 11/8-Moderate bilateral ureteral pyelocaliectasis.   Renal ultrasound revealed no obstruction    Hyperkalemia, resolved  Hyponatremia, corrected    GERD, resume PPI    ? Medication Side Effects  Lip-smacking, EPS, resolved  -Consultation to psychiatry for concern from psychiatric medications  -Team members observed lip smacking and extrapyramidal symptoms  -Had HELD home Duloxetine, Buspirone, and Trazodone.   -Psych Ok'ed to restart Trazodone at low dose; ordered. Continue to hold Duloxetine and Buspirone.  -Psych started propanolol in  place of buspirone. No EPS symptoms were identified.      Mild intermittent asthma, stable and asymptomatic  -Order home inhalers and medications.   -Nebulizer and RT pulmonary hygiene as needed.      Essential hypertension  Norvasc 2.5 mg daily  Lisinopril 20 mg daily  Metoprolol 25 mg daily    Chronic Fatigue Syndrome  Fibromyalgia  Stable now    Generalized Weakness  Failure to Thrive  Physical Deconditioning  PT and OT  Will be discharging with home care and  family support    Clinically Significant Risk Factors            # Hypomagnesemia: Lowest Mg = 1.4 mg/dL in last 2 days, will replace as needed       # Hypertension: Noted on problem list            # Asthma: noted on problem list        Diet: Combination Diet Regular Diet Adult  Agee Catheter: Not present  Lines: None       DVT Prophylaxis: Subcu heparin 5000 unit twice a day  Code Status: Full Code    Anticipated possible discharge in 1 day to 2 once milestones are met.  Disposition Plan     Expected Discharge Date: 11/15/2023      Destination: home with help/services  Discharge Comments: Daily BCx, ID recommends port removal, IV ABX TCU at [facility]          I agreed and reviewed the patient with RAFAEL Lock.  I examined the patient by myself.    Mone Wong MD      The patient's care was discussed with the Attending Physician, Dr. Wong .    RAFAEL Lock  [facility]  Phone: #[phone]  Securely message with the Vocera Web Console (learn more here)  Text page via [employer] Paging/Directory     Interval History/Subjective:  Patient rests comfortably in bed and appears WDWN and in NAD, Aox4. Ileostomy filling without issue. Patient is passing flatus and denies vomiting although states she has experienced nausea since a day before admission on 11/8. She is able to eat solids. Mild pain in legs bilaterally and a mild headache that she describes as tension-like.     Physical  Exam/Objective:  Temp:  [98.2  F (36.8  C)-99.1  F (37.3  C)] 98.5  F (36.9  C)  Pulse:  [80-88] 80  Resp:  [16-31] 16  BP: (129-173)/(60-82) 160/71  SpO2:  [92 %-96 %] 93 %  Body mass index is 22.95 kg/m .    GENERAL:  Alert, appears comfortable, in no acute distress, appears stated age   HEAD:  Normocephalic, without obvious abnormality, atraumatic   EYES:  PERRL, conjunctiva  clear, no scleral icterus, EOM's intact   NOSE: Nares normal, septum midline, mucosa normal, no drainage   THROAT: Lips, mucosa,   gums normal, mouth moist   NECK: Supple, symmetrical, trachea midline   BACK:   Symmetric, no curvature, ROM normal   LUNGS:   Clear to auscultation bilaterally, no rales, rhonchi, or wheezing, symmetric chest rise on inhalation, respirations unlabored   CHEST WALL:  No tenderness or deformity   HEART:  Regular rate and rhythm, S1 and S2 normal, no murmur, rub, or gallop    ABDOMEN:   Soft, non-tender, no masses, no organomegaly, no rebound or guarding   EXTREMITIES: No edema, mild PTP on right shin   SKIN: Dry to touch, no exanthems in the visualized areas   NEURO: Alert, oriented x3, moves all four extremities freely   PSYCH: Cooperative, behavior is appropriate      Data reviewed today: I personally reviewed all new medications, labs, imaging/diagnostics reports over the past 24 hours. Pertinent findings include:    Imaging:   HEAD CT:  1.  No CT finding of a mass, hemorrhage or focal area suggestive of acute infarct.     CERVICAL SPINE CT:  1.  No CT evidence for acute fracture or post traumatic subluxation.  2.  4 mm increased density in the ventral epidural space from  skull base to the top of C6 which could represent a small acute ventral epidural hemorrhage versus prominent ventral epidural plexus. Recommend MRI cervical spine for further evaluation.  3.  No significant canal compromise or neural foraminal narrowing throughout cervical spine.    Chest and abdomen ct  1.  No pulmonary embolism.  2.   Proctocolectomy with right lower quadrant ileostomy. Nothing for bowel obstruction. No abscess.  3.  Moderate bilateral ureteral pyelocaliectasis. The urinary bladder is distended. Would recommend reimaging of the collecting systems and ureters after urinary bladder drainage.  4.  Cholecystectomy and hysterectomy.  5.  Stable splenomegaly.    Renal us  1.  Resolved hydronephrosis.  2.  The bladder is incompletely distended accentuating wall thickening.    Labs:  Most Recent 3 CBC's:  Recent Labs   Lab Test 11/12/23  0639 11/09/23  0721 11/08/23  1403 11/03/20  1115   WBC  --  8.9 8.7 5.0   HGB  --  12.3 12.3 10.4*   MCV  --  90 90 97    126* 133* 171       Medications:   Personally Reviewed.  Medications    sodium chloride 100 mL/hr at 11/14/23 0620      [Held by provider] busPIRone  30 mg Oral BID    ceFAZolin  2 g Intravenous Q8H    diphenoxylate-atropine  2 tablet Oral TID    [Held by provider] DULoxetine  60 mg Oral BID    famotidine  20 mg Intravenous Daily    heparin ANTICOAGULANT  5,000 Units Subcutaneous Q12H    HYDROmorphone  4 mg Oral 5x Daily    lactobacillus rhamnosus (GG)  1 capsule Oral BID    lipase-protease-amylase  1 capsule Oral TID w/meals    lisinopril  20 mg Oral Daily    metoprolol succinate ER  25 mg Oral Daily    nitroFURantoin macrocrystal  50 mg Oral At Bedtime    pantoprazole  40 mg Oral BID    Pregabalin  100 mg Oral TID    sodium bicarbonate  650 mg Oral BID    sodium chloride (PF)  10-40 mL Intracatheter Q8H    traZODone  150 mg Oral At Bedtime      Total time spent 50 min

## 2023-11-14 NOTE — PROGRESS NOTES
Care Management Follow Up    Length of Stay (days): 6    Expected Discharge Date: 11/15/2023     Concerns to be Addressed:       Patient plan of care discussed at interdisciplinary rounds: Yes    Anticipated Discharge Disposition: Transitional Care     Anticipated Discharge Services: None  Anticipated Discharge DME:      Patient/family educated on Medicare website which has current facility and service quality ratings:    Education Provided on the Discharge Plan:    Patient/Family in Agreement with the Plan: yes    Referrals Placed by CM/SW:    Private pay costs discussed: Not applicable, transport     Additional Information:  See below     Lisa Vo RN        Spoke to Lakeisha with [facility]. She is checking if they will still have a bed at [facility] tomorrow.     Received a call from Lesley Russo with [facility] care coordinator [phone]    12:17 PM  Met with patient. She is agreeable to [facility] if they have a bed. Requests wheelchair transport and is aware of potential cost.     1:38 PM  Spoke to Lakeisha at [facility]. They want to know about patients IV fluids and what that entails (how often, what labs are associated with it, etc). Met with patient. She says she gets fluids from \[facility]\"". Message sent to Michelle Rosa with \[facility]\"" to clarify     2:13 PM  Per Michelle with \[facility]\"", patients current home infusion orders are the following:    Sterile water 2000 ml with Na 300 mEq/Mag 13 mEq/Phos 5 mmol at 167/hr (over 12 hours) Daily.  PLUS  NaCl 0.9% 500 ml-1000 ml over 2 hours daily as needed for dehydration.    Labs: CMP, Mag, Phos monthly and PRN.    Updated Lakeisha at [facility]. She will check if they can accommodate this infusion and call CM back     3:05 PM  Spoke to Lakeisha at [facility], says they would not be able to accept patient at any of their facilities d/t the daily 12 hour infusion. Will need to check if this can be held or changed while patient in TCU.

## 2023-11-14 NOTE — PROCEDURES
Single lumen picc ordered for patient.   Pt just had an infected portacath removed this afternoon. Blood cultures still pending.   Best practice to wait until blood culture negative x 72hr. Not recommended to place PICC line at this time. Bedside RN updated.  Writer placed piv at this time.

## 2023-11-14 NOTE — PROGRESS NOTES
Patient lost IV access X2. PICC placed an IV in the right arm.  IV fluids started back up. PICC stated Blood cultures still pending. Best practice to wait until blood culture negative x 72hr. Not recommended to place PICC line at this time.

## 2023-11-14 NOTE — CONSULTS
Psychiatry Consultation; Follow up              Reason for Consult, requesting source:    Follow up    Requesting source: Vijay Solis    Telemedicine Visit: The patient was seen for a visit utilizing the [employer] system. Permission from the patient to conduct the exam by telemedicine was obtained prior to proceeding.  Dee was also informed that insurance will be billed for this contact.   Patient Location):  [facility]   Provider Location: [employer]/remote  As the provider I attest to compliance with applicable laws and regulations related to telemedicine               Interim history:    Psychiatry following up with patient.    Per provider note from 11/13: Dee Mattson is a 73 year old female admitted on 11/8/2023. She has a hx of HTN, chronic fatigue syndrome, severe fibromyalgia on opioid therapy, asthma, CKD2, ileostomy, hx of SBOs, hx of DVT in setting of Hodgkin's lymphoma and not on anticoagulation, MDD, presents with generalized weakness, physical deconditioning, and severe hyperkalemia and hyponatremia. Artifact noted on CT spine confirmed with MRI spine. Admitted to [facility]; however, hyperkalemia has not resolved and transfer patient to med/surg (with remote telemetry), and treat mild DYLAN with IVF, recheck Cr in AM. Hyponatremia normalizing/improved to 134.       Following my meeting with patient on 11/10, I made the following recommendations: Could replace buspar 30mg BID with propranolol 10 mg BID for anxiety  -Would avoid clonidine and gabapentin due to CKD     Could restart Trazodone 150 mg at HS - this is patient's reported PTA dosing, as opposed to 450 mg. This will address both sleep and depression  -Would continue to periodically assess EKG, as Trazodone can prolong QTc.     I would not restart Cymbalta, as patient should not be taking this medication if there is concern for heavy alcohol use    Referrals made for outpatient therapy and psychiatry     Delirium  "precautions      Today, patient reports sleeping ok, but endorses anxiety. She requests that her buspirone be added back to help address this anxiety. Patient denies SI/SIB, as well as AH/VH. Patient is agreeable to plan for referrals to outpatient therapy and psychiatry.         Current Medications:      [Held by provider] busPIRone  30 mg Oral BID    ceFAZolin  2 g Intravenous Q8H    diphenoxylate-atropine  2 tablet Oral TID    [Held by provider] DULoxetine  60 mg Oral BID    famotidine  20 mg Intravenous Daily    heparin ANTICOAGULANT  5,000 Units Subcutaneous Q12H    HYDROmorphone  4 mg Oral 5x Daily    lactobacillus rhamnosus (GG)  1 capsule Oral BID    lipase-protease-amylase  1 capsule Oral TID w/meals    lisinopril  20 mg Oral Daily    metoprolol succinate ER  25 mg Oral Daily    nitroFURantoin macrocrystal  50 mg Oral At Bedtime    pantoprazole  40 mg Oral BID    Pregabalin  100 mg Oral TID    sodium bicarbonate  650 mg Oral BID    sodium chloride (PF)  10-40 mL Intracatheter Q8H    traZODone  150 mg Oral At Bedtime              MSE:   Appearance: awake, alert  Attitude:  cooperative  Eye Contact:  fair  Mood:  \"fair\"  Affect:  slightly restricted  Speech:  clear, coherent  Psychomotor Behavior:  no evidence of tardive dyskinesia, dystonia, or tics  Muscle strength and tone: Appears average  Thought Process:  logical and linear  Associations:  no loose associations  Thought Content:  no evidence of suicidal ideation or homicidal ideation, no evidence of psychotic thought, no auditory hallucinations present, and no visual hallucinations present  Insight:  fair  Judgement:  intact  Oriented to:  time, person, and place  Attention Span and Concentration:  fair  Recent and Remote Memory:  fair    Vital signs:  Temp: 98  F (36.7  C) Temp src: Oral BP: (!) 160/75 Pulse: 87   Resp: 16 SpO2: 92 % O2 Device: None (Room air) Oxygen Delivery: 2 LPM Height: 154.9 cm (5' 1\") Weight: 55.1 kg (121 lb 7.6 oz)  Estimated " "body mass index is 22.95 kg/m  as calculated from the following:    Height as of this encounter: 1.549 m (5' 1\").    Weight as of this encounter: 55.1 kg (121 lb 7.6 oz).    Qtc: 292 on 11/8/2023         DSM-5 Diagnosis:   311 (F32.9) Unspecified Depressive Disorder   300.02 (F41.1) Generalized Anxiety Disorder  Substance-related disorder, alcohol, rule out  Neurocognitive Disorder, delirium, resolving          Assessment:   Patient was calm and pleasant during our meeting. She does endorse continued anxiety throughout the day, so might benefit from reintroducing a medication for anxiety. Patient might also benefit from meeting with an outpatient therapist for additional supports and non-pharmacological interventions for anxiety.     Patient was able to track conversation appropriately and appeared more engaged than our previous visit. There seemed to be some remaining thought blocking and confusion, but patient reports less confusion than when she was first admitted to the hospital.             Summary of Recommendations:   Could restart buspirone at low dose, given previous suspected side effects due to medication interactions - 5 mg BID for anxiety  -No adjustment needed for altered kidney function    Can continue PTA Trazodone 150 mg at HS for sleep - will also help target symptoms consistent with depression    Outpatient psychiatry and therapy referrals have been made - will appear in patient's AVS  Delirium precautions:  Up during the day with lights on  Lights off at night, avoid interruptions during the night as much as possible  Family visits  Encourage wearing glasses  Reorientation  Avoid opioids, benzodiazepines, anticholinergics.    Continue to ensure proper nutrition, fluid and electrolyte balance. Monitor for infections, hypoxia, metabolic derangements, or other causes of delirium.     Addendum: Provider paged with recommendations.      Page me or re-consult psychiatry as needed (psychiatry is signing " off).       Michelle Contreras, MAYP, APRN  Consult/Liaison Psychiatry  [facility]   Contact information available via [facility] Paging/Directory.  If I am not available, please call [facility] intake ([phone])

## 2023-11-14 NOTE — PROGRESS NOTES
PICC line placement order placed. Paged PICC RN.    1500: PICC line coiled up per XR verification, paged MD Wong to see if it's ok for pt to have midline. Per PICC RN, midline ok to use for a month. Per Pharmacy, IV abx would be mid-line compatible. Paged PICC RN to communicate the above, awaiting call back. Oncoming NADEEM Croft updated as well.

## 2023-11-14 NOTE — PROCEDURES
"Midline Insertion Procedure Note  Pt. Name: Dee Mattson  MRN:        [id number]    Procedure: Insertion of a  single Lumen  4 fr  Bard SOLO (valved) Power catheter trimmed to MIDLINE.  Lot number [id number].     Indications: access/outpatient infusions    Procedure Details   Patient identified with 2 identifiers and \"Time Out\" conducted.  Contraindications reviewed: none.     Central line insertion bundle followed: hand hygeine performed prior to procedure, site cleansed with cholraprep, hat, mask, sterile gloves,sterile gown worn, patient draped with maximum barrier head to toe drape, sterile field maintained.    2 ml 1% Lidocaine administered sq to the insertion site. A 4 Fr catheter was inserted into the BASILIC vein of the right arm with ultrasound guidance. NO attempt(s) required to access vein, catheter placed via over-wire exchange.   Catheter threaded without difficulty. Good blood return noted.    Modified Seldinger Technique used for insertion.    Catheter secured with Statlock, biopatch and Tegaderm dressing applied.    Findings:  Total catheter length  15 cm, with 0 cm exposed. Mid upper arm circumference is 32 cm. Catheter was flushed with 20 cc NS. Patient  tolerated procedure well.    Tip placement verified by ultrasound placement and good blood return.  Tip placement approximately in the axillary region.    Patient's primary RN notified catheter is a MIDLINE catheter and is ready for use.    Comments:      A midline catheter is a form of peripheral venous access. Not recommended for the infusion of vesicants (Vancomycin, Vasopressors, TPN, etc.)    Ila Roberts RN    [facility] Vascular Access  [phone]  "

## 2023-11-14 NOTE — PROCEDURES
"**FAILED**  PICC Line Insertion Procedure Note  Pt. Name: RANULFO AMEZCUA  MRN:        [id number]    Procedure: Insertion of a  single Lumen  4 fr  Bard SOLO (valved) Power PICC, Lot number [id number]    Indications: access/outpatient infusions    Contraindications : none    Procedure Details   Patient identified with 2 identifiers and \"Time Out\" conducted.  .     Central line insertion bundle followed: hand hygeine performed prior to procedure, site cleansed with cholraprep, hat, mask, sterile gloves,sterile gown worn, patient draped with maximum barrier head to toe drape, sterile field maintained.    The vein was assessed and found to be compressible and of adequate size. 2 ml 1% Lidocaine administered sq to the insertion site. A 4 Fr PICC was inserted into the BASILIC vein of the right arm with ultrasound guidance. 1 attempt(s) required to access vein.   Catheter threaded with difficulty, UNABLE TO DROP INTO SVC. Good blood return noted.    Modified Seldinger Technique used for insertion.    The 8 sharps that are included in the PICC insertion kit were accounted for and disposed of in the sharps container prior to breakdown of the sterile field.    Catheter secured with Statlock, biopatch and Tegaderm dressing applied.    Findings:  Total catheter length  35 cm, with 0 cm exposed. Mid upper arm circumference is 32 cm. Catheter was flushed with 30 cc NS. Patient  tolerated procedure well.    Tip placement verified by xray. Xray read by Dr. SWEENEY . Tip placement in the IJ.    CLABSI prevention brochure left at bedside.    Patient's primary RN notified PICC is ready for use.    Comments:      15:00 This picc line will need to be re-wired to a midline, or attempt to access via LUE. Primary RN to contact MD for directions to proceed.  15:13 Confirmation from RN that MD wishes to proceed with midline.    Ila Roberts RN [facility] Vascular Access    "

## 2023-11-15 PROBLEM — B95.61 MSSA BACTEREMIA: Status: ACTIVE | Noted: 2023-01-01

## 2023-11-15 PROBLEM — R78.81 MSSA BACTEREMIA: Status: ACTIVE | Noted: 2023-01-01

## 2023-11-15 NOTE — PROVIDER NOTIFICATION
Rm# [id number] RC.  Transferred from ICU. Took BP, 3rd attempt w/ BP of 181/77. Pt C/O chest pain (rated 2/10 when breath in) and reported from unhealed broken rib (denied of SOB and HA), and appears anxious.     Doctor ordered Labetalol 5mg, labs, and telemetry.

## 2023-11-15 NOTE — PROGRESS NOTES
Care Management Follow Up    Length of Stay (days): 7    Expected Discharge Date: 11/15/2023     Concerns to be Addressed:    TCU    Patient plan of care discussed at interdisciplinary rounds: Yes    Anticipated Discharge Disposition: Transitional Care     Anticipated Discharge Services: None  Anticipated Discharge DME:      Patient/family educated on Medicare website which has current facility and service quality ratings:  Yes   Education Provided on the Discharge Plan:  Yes   Patient/Family in Agreement with the Plan: yes    Referrals Placed by CM/SW:  TCU   Private pay costs discussed: Not at this time Not applicable    Additional Information:  DEV called Lakeisha and let her know that Pt does her own hydration IV's at home and supplies are brought in weekly.  Lakeisha will check to see if [facility] can accomdate. DEV talked with Pt and sent out more TCU choices.     3:56 PM  DEV talked with [facility] to see if they would reconsider - Edith states that IV Hydration is not a covered service at a TCU.  TCU's have asked if the hydration can be on hold and this may not be on option.  DEV update son.      ROSAILNDA Navarro

## 2023-11-15 NOTE — PLAN OF CARE
Problem: Pain Acute  Goal: Optimal Pain Control and Function  Outcome: Progressing  Intervention: Develop Pain Management Plan  Recent Flowsheet Documentation  Taken 11/14/2023 1726 by Guerda Stevens RN  Pain Management Interventions: medication (see MAR)  Intervention: Prevent or Manage Pain  Recent Flowsheet Documentation  Taken 11/14/2023 1600 by Guerda Stevens RN  Medication Review/Management: medications reviewed     Problem: Adult Inpatient Plan of Care  Goal: Readiness for Transition of Care  Outcome: Progressing    Goal Outcome Evaluation:    Patient is alert and oriented. Pt endorses generalized pain and headache, relieved with medication. Pt experienced nausea and vomiting. Phenegran couldn't be given and Pt states zofran makes her sick. MD paged for compazine. No EPS symptoms noted by writer this shift, so MD ordered and medication was given with relief. Peppermint oil and cold compress applied as well.     Guerda Stevens, NADEEM

## 2023-11-15 NOTE — PROGRESS NOTES
"CLINICAL NUTRITION SERVICES - ASSESSMENT NOTE     Nutrition Prescription    RECOMMENDATIONS FOR MDs/PROVIDERS TO ORDER:  Consider MVI as pt is not meeting her low-end estimated nutrition needs    Malnutrition Status:    Patient does not meet two of the established criteria necessary for diagnosing malnutrition    Future/Additional Recommendations:  Monitor po intake, order history     REASON FOR ASSESSMENT  Dee Mattson is a/an 73 year old female assessed by the dietitian for LOS    NUTRITION HISTORY  Dx: generalized weakness, physical deconditioning, and severe hyperkalemia , hyponatremia, and acute kidney injury on 11/8/2023   PMH: HTN, chronic fatigue syndrome, severe fibromyalgia on opioid therapy, mild intermittent asthma, CKD2, ileostomy secondary to SBOs, history of DVT in setting of Hodgkin's lymphoma and not on anticoagulation, MDD     Met with pt in room, who was holding a vomit bag and sitting in her chair. Pt reports eating poorly for 2 days PTA, and has been dealing with nausea daily since being admitted, oftentimes trying to \"force\" herself to eat something for each meal. Pt reports being allergic to Zofran, but finds compazine helpful. Pt has taken drinks like Ensure Enlive before, but does not tolerate their taste. Pt isn't interested in trying any of the other ONS at this time. Pt was interested in trying broth for lunch - writer discussed the low nutritional value of this item and suggested pt try foods like chicken noodle soup instead, which pt didn't think we offered at [facility]. Pt was agreeable to experimenting with more nutritious foods from room service moving forward.     CURRENT NUTRITION ORDERS  Diet: Regular  Intake/Tolerance: Pt orders 1-3 adequate meals/day and consumes 0-100% of them per flow sheets and [employer]. Flow sheet records are pretty sparse and unreliable for this pt.     LABS  Labs reviewed  K+: 2.8 (L)     MEDICATIONS  Medications reviewed  Mag/K+ HIGH replacement " "protocol  Phos STANDARD replacement protocol  Pepcid  Culturell  Creon  Protonix  Sodium bicarbonate  NS @100 mL/hr   Compazine    GI:  400+ mL ileostomy OP/day. 400-500 mL most days.    ANTHROPOMETRICS  Height: 154.9 cm (5' 1\")  Most Recent Weight: 54.2 kg (119 lb 6.4 oz)    IBW: 47.8 kg  BMI: Normal BMI  Weight History:   11/15/23  54.2 kg (119 lb 6.4 oz) -  11/14/23  55.1 kg (121 lb 7.6 oz) Bed scale  11/13/23  54.6 kg (120 lb 5.9 oz) Bed scale  11/12/23  55.3 kg (121 lb 14.6 oz) Bed scale  11/11/23  54.3 kg (119 lb 11.4 oz) Bed scale  11/10/23  55.7 kg (122 lb 12.7 oz) Bed scale  11/08/23  53.3 kg (117 lb 8.1 oz)   10/06/23 : 55.3 kg (122 lb)  05/23/23 : 52.6 kg (116 lb)  05/01/23 : 53.5 kg (118 lb)  06/10/22 : 52.2 kg (115 lb)  02/18/22 : 53.5 kg (118 lb)  Wt appears stable over past year, but recent wt loss could be masked by edema/fluid wt. Pt is +2.7 L since admission.    Dosing Weight: 53.3 kg actual body wt based on driest admit wt    ASSESSED NUTRITION NEEDS  Estimated Energy Needs: 4382-8193 kcals/day (25 - 30 kcals/kg)  Justification: Maintenance  Estimated Protein Needs: 42-53 grams protein/day (0.8 - 1 grams of pro/kg)  Justification: Maintenance  Estimated Fluid Needs: (1 mL/kcal)   Justification: Maintenance    PHYSICAL FINDINGS  See malnutrition section below.    MALNUTRITION  % Intake: Decreased intake does not meet criteria  % Weight Loss: Weight loss does not meet criteria  Subcutaneous Fat Loss: Facial region:  mild  Muscle Loss: None observed  Fluid Accumulation/Edema: +1 trace in right arm, BUE hands, BLE ankles  Malnutrition Diagnosis: Patient does not meet two of the established criteria necessary for diagnosing malnutrition    NUTRITION DIAGNOSIS  Inadequate oral intake related to nausea, poor appetite as evidenced by pt consuming 75% of estimated needs per 5-day average      INTERVENTIONS  Implementation  - Nutrition education for nutrition relationship to health/disease   - Encouraged pt " to order more nutrient dense foods    Goals  Patient to consume % of nutritionally adequate meal trays TID, or the equivalent with supplements/snacks.     Monitoring/Evaluation  Progress toward goals will be monitored and evaluated per protocol.

## 2023-11-15 NOTE — PROGRESS NOTES
[facility]    Writer notes that patient will need IV abx at discharge.  Writer obtained IV abx benefits, should pt discharge to home.      Patient has coverage for IV antibiotics through her [employer] plan. No deductible or out of pocket applies.  Pt should be covered at 100%.      Please note that pt will require ongoing IV hydration at home (2000 ml/ Na 300 mEq/Mag 13 mEq/Phos 5 mmol at 167/hr (over 12 hours) daily  AND   NaCl 0.9% 500 ml-1000 ml over 2 hours daily as needed for dehydration.  Therefore, she will require a central line (PICC, tunneled or port) at discharge.    Update provided to NAKUL Phan.    Michelle Rosa RN, BSN  [facility] Liaison  [phone] (Mon through Fri, 8:00 am-5:00 pm)  [phone] (Office)

## 2023-11-15 NOTE — PROGRESS NOTES
[facility]    Infectious Disease Progress Note     11/15/2023     Assessment & Plan   Dee Mattson is a 73 year old female who was admitted on 11/8/2023.     ASSESSMENT:  Staph aureus bacteremia-MSSA_- 11/8, all follow ups negative.  Non sustained therefore. Hypertension, asthma, hyperlipidemia, chronic fatigue, severe fibromyalgia  High output ileostomy  DVT, Hodgkin's lymphoma  Concern for port infection based on exam and no other clear source    Susceptibility data from last 90 days.  Collected Specimen Info Organism Clindamycin Daptomycin Doxycycline Erythromycin Gentamicin Oxacillin Tetracycline Trimethoprim/Sulfamethoxazole  Vancomycin   11/08/23 Peripheral Blood Staphylococcus aureus            11/08/23 Blood from Line, venous Staphylococcus aureus  S  S  S  S  S  S  S  S  S        RECOMMENDATIONS:    Antibiotics: Cefazolin do 21 more days from now  Unclear if midline will work for 3 wks of ancef.  Hopefully will.  May be best to do a left side picc.   Will do Discharge orders but doubt can go today given not feeling good.       JERRI Wasserman MD  [facility]  [phone]      Interval History   Doesn't look or feel good.  Nausea, SOB at rest.  Worried about access issues short and long term.       Physical Exam   Temp: 97.9  F (36.6  C) Temp src: Oral BP: (!) 164/80 Pulse: 86   Resp: 18 SpO2: 94 % O2 Device: None (Room air)    Vitals:    11/13/23 0537 11/14/23 0411 11/15/23 0052   Weight: 54.6 kg (120 lb 5.9 oz) 55.1 kg (121 lb 7.6 oz) 54.2 kg (119 lb 6.4 oz)     Vital Signs with Ranges  Temp:  [97.8  F (36.6  C)-98.1  F (36.7  C)] 97.9  F (36.6  C)  Pulse:  [71-86] 86  Resp:  [16-20] 18  BP: (148-191)/(69-80) 164/80  SpO2:  [93 %-95 %] 94 %    Constitutional: Awake, appears chronically ill  Lungs: normal breathing pattern, no crackles or wheezing  Cardiovascular: Regular rate and rhythm, S1 S2  Abdomen: Tenderness  Skin: warm, port site with less than 1  "inch clean incision now  Neuro: deconditioned  Psych: able to answer questions    Medications    sodium chloride Stopped (11/15/23 0800)      [Held by provider] busPIRone  30 mg Oral BID    artificial tears  1 drop Both Eyes TID    ceFAZolin  2 g Intravenous Q8H    diphenoxylate-atropine  2 tablet Oral TID    [Held by provider] DULoxetine  60 mg Oral BID    famotidine  20 mg Intravenous Daily    heparin ANTICOAGULANT  5,000 Units Subcutaneous Q12H    HYDROmorphone  4 mg Oral 5x Daily    lactobacillus rhamnosus (GG)  1 capsule Oral BID    lipase-protease-amylase  1 capsule Oral TID w/meals    lisinopril  20 mg Oral Daily    metoprolol succinate ER  50 mg Oral Daily    nitroFURantoin macrocrystal  50 mg Oral At Bedtime    pantoprazole  40 mg Oral BID    Pregabalin  100 mg Oral TID    sodium bicarbonate  650 mg Oral BID    sodium chloride (PF)  10-40 mL Intracatheter Q7 Days    sodium chloride (PF)  10-40 mL Intracatheter Q8H    traZODone  150 mg Oral At Bedtime       Data   All microbiology laboratory data reviewed.  Recent Labs   Lab Test 11/12/23  0639 11/09/23  0721 11/08/23  1403 11/03/20  1115   WBC  --  8.9 8.7 5.0   HGB  --  12.3 12.3 10.4*   HCT  --  37.7 37.4 32.7*   MCV  --  90 90 97    126* 133* 171     Recent Labs   Lab Test 11/15/23  0155 11/13/23  0527 11/11/23  0602   CR 0.91 1.16* 1.13*     Recent Labs   Lab Test 02/16/18  1522   SED 12     No lab results found.    Invalid input(s): \"UC\"    MICROBIOLOGY:    Reviewed    7-Day Micro Results       Collected Updated Procedure Result Status      11/13/2023 0527 11/15/2023 0947 Blood Culture Hand, Right [[id number]]    Blood from Hand, Right    Preliminary result Component Value   Culture No growth after 2 days  [P]                11/12/2023 0639 11/15/2023 1031 Blood Culture Line, venous [[id number]]   Blood from Line, venous    Preliminary result Component Value   Culture No growth after 3 days  [P]                11/11/2023 0721 11/15/2023 1016 " Blood Culture Peripheral Blood [[id number]]   Peripheral Blood    Preliminary result Component Value   Culture No growth after 4 days  [P]                11/10/2023 1351 11/14/2023 1816 Blood Culture Peripheral Blood [[id number]]   Peripheral Blood    Preliminary result Component Value   Culture No growth after 4 days  [P]                11/10/2023 1351 11/14/2023 1816 Blood Culture Peripheral Blood [[id number]]   Peripheral Blood    Preliminary result Component Value   Culture No growth after 4 days  [P]                11/08/2023 1523 11/11/2023 0716 Blood Culture Peripheral Blood [[id number]]   (Abnormal)   Peripheral Blood    Final result Component Value   Culture Positive on the 1st day of incubation    Staphylococcus aureus    2 of 2 bottles  Susceptibilities done on previous cultures               11/08/2023 1438 11/08/2023 1528 Symptomatic Influenza A/B, RSV, & SARS-CoV2 PCR (COVID-19) Nasopharyngeal [[id number]]    Swab from Nasopharyngeal    Final result Component Value   Influenza A PCR Negative   Influenza B PCR Negative   RSV PCR Negative   SARS CoV2 PCR Negative   NEGATIVE: SARS-CoV-2 (COVID-19) RNA not detected, presumed negative.            11/08/2023 1404 11/11/2023 0706 Blood Culture Line, venous [[id number]]    (Abnormal)   Blood from Line, venous    Final result Component Value   Culture Positive on the 1st day of incubation    Staphylococcus aureus    2 of 2 bottles        Susceptibility        Staphylococcus aureus      ELIAN      Clindamycin 0.25 ug/mL Susceptible      Daptomycin 0.25 ug/mL Susceptible      Doxycycline <=0.5 ug/mL Susceptible      Erythromycin <=0.25 ug/mL Susceptible      Gentamicin <=0.5 ug/mL Susceptible      Oxacillin 0.5 ug/mL Susceptible  [1]       Tetracycline <=1 ug/mL Susceptible      Trimethoprim/Sulfamethoxazole <=0.5/9.5 ug/mL Susceptible      Vancomycin <=0.5 ug/mL Susceptible                   [1]  Oxacillin susceptible isolates are susceptible to  cephalosporins (example: cefazolin and cephalexin) and beta lactam combination agents. Oxacillin resistant isolates are resistant to these agents.                   2023 1404 2023 0137 Verigene GP Panel [[id number]]    (Abnormal)   Blood from Line, venous    Final result Component Value   Staphylococcus aureus Detected   Positive for Staphylococcus aureus and negative for the mecA gene (not resistant to methicillin) by Verigene multiplex nucleic acid test. Final identification and antimicrobial susceptibility testing will be verified by standard methods.   Staphylococcus epidermidis Not Detected   Staphylococcus lugdunensis Not Detected   Enterococcus faecalis Not Detected   Enterococcus faecium Not Detected   Streptococcus species Not Detected   Streptococcus agalactiae Not Detected   Streptococcus anginosus group Not Detected   Streptococcus pneumoniae Not Detected   Streptococcus pyogenes Not Detected   Listeria species Not Detected                     RADIOLOGY:    Reviewed  Echocardiogram Complete    Result Date: 2023  [id number]^GENARO^MARCO^[place] [address]  Name: RANULFO AMZECUA MRN: [id number] : 1950 Study Date: 2023 08:02 AM Age: 73 yrs Gender: Female Patient Location: [facility] Reason For Study: Endocarditis Ordering Physician: MARCO LAM Performed By: NIMO  BSA: 1.5 m2 Height: 61 in Weight: 119 lb HR: 84 BP: 142/65 mmHg ______________________________________________________________________________ Procedure Complete Echo Adult. Definity (NDC #30576-511) given intravenously. ______________________________________________________________________________ Interpretation Summary  Left ventricular size, wall motion and function are normal. The ejection fraction is 60-65%. Normal right ventricle size and systolic function. There is mild to moderate (1-2+) tricuspid regurgitation. Right ventricular systolic  pressure is elevated, consistent with mild to moderate pulmonary hypertension. No evidence of endocarditis, TTE does not rule out vegetations ______________________________________________________________________________ Left Ventricle Left ventricular size, wall motion and function are normal. The ejection fraction is 60-65%. There is normal left ventricular wall thickness. Left ventricular diastolic function is normal. No regional wall motion abnormalities noted.  Right Ventricle Normal right ventricle size and systolic function. TAPSE is normal, which is consistent with normal right ventricular systolic function.  Atria The left atrium is mildly dilated. Right atrial size is normal. There is no color Doppler evidence of an atrial shunt.  Mitral Valve Mitral valve leaflets appear normal. There is no evidence of mitral stenosis or clinically significant mitral regurgitation. There is no vegetation seen on the mitral valve. There is mild (1+) mitral regurgitation.  Tricuspid Valve There is mild to moderate (1-2+) tricuspid regurgitation. Right ventricular systolic pressure is elevated, consistent with mild to moderate pulmonary hypertension. There is no vegetation on the tricuspid valve.  Aortic Valve The aortic valve is trileaflet with aortic valve sclerosis. No evidence of endocarditis, TTE does not rule out vegetations.  Pulmonic Valve The pulmonic valve is normal in structure and function.  Vessels The aorta root is normal. There is effacement of the sinotubular ridge. Ascending Aorta dilatation is present. IVC diameter <2.1 cm collapsing >50% with sniff suggests a normal RA pressure of 3 mmHg.  Pericardium There is no pericardial effusion.  ______________________________________________________________________________ MMode/2D Measurements & Calculations IVSd: 1.1 cm LVIDd: 3.9 cm LVIDs: 2.5 cm LVPWd: 1.1 cm FS: 37.2 %  LV mass(C)d: 136.8 grams LV mass(C)dI: 90.3 grams/m2 Ao root diam: 3.1 cm LA dimension: 2.8  cm asc Aorta Diam: 3.9 cm LA/Ao: 0.92 LVOT diam: 2.0 cm LVOT area: 3.1 cm2 Ao root diam index Ht(cm/m): 2.0 Ao root diam index BSA (cm/m2): 2.0 Asc Ao diam index BSA (cm/m2): 2.5 Asc Ao diam index Ht(cm/m): 2.5 LA Volume (BP): 49.9 ml LA Volume Index (BP): 32.8 ml/m2  LA Volume Indexed (AL/bp): 34.6 ml/m2 RV Base: 4.0 cm RWT: 0.56 TAPSE: 2.9 cm  Doppler Measurements & Calculations MV E max luis angel: 53.6 cm/sec MV A max luis angel: 105.0 cm/sec MV E/A: 0.51 MV dec slope: 484.6 cm/sec2 MV dec time: 0.20 sec Ao V2 max: 185.5 cm/sec Ao max P.0 mmHg Ao V2 mean: 132.6 cm/sec Ao mean P.7 mmHg Ao V2 VTI: 35.0 cm SANGEETA(I,D): 2.4 cm2 SANGEETA(V,D): 2.4 cm2  LV V1 max P.7 mmHg LV V1 max: 147.5 cm/sec LV V1 VTI: 27.4 cm SV(LVOT): 84.3 ml SI(LVOT): 55.6 ml/m2 PA acc time: 0.10 sec TR max luis angel: 338.0 cm/sec TR max P.7 mmHg AV Luis Angel Ratio (DI): 0.79 SANGEETA Index (cm2/m2): 1.6 E/E': 5.3 E/E' av.3 Lateral E/e': 5.3 Medial E/e': 7.4 Peak E' Luis Angel: 10.1 cm/sec RV S Luis Angel: 14.8 cm/sec  ______________________________________________________________________________ Report approved by: Regina Read 2023 09:31 AM       US Renal Complete Non-Vascular    Result Date: 11/10/2023  EXAM: US RENAL COMPLETE NON-VASCULAR LOCATION: [facility] DATE: 11/10/2023 INDICATION: persistent DYLAN; evalute for hydronephrosis or other obstructive process COMPARISON: CT abdomen pelvis from 2023. TECHNIQUE: Routine Bilateral Renal and Bladder Ultrasound. FINDINGS: RIGHT KIDNEY: 11 cm. Normal without hydronephrosis or masses. LEFT KIDNEY: 10 cm. Normal without hydronephrosis or masses. Small accessory splenule noted adjacent to the left kidney measures 1.2 cm and unchanged from recent CT. BLADDER: The bladder is incompletely distended accentuating wall thickening.     IMPRESSION: 1.  Resolved hydronephrosis. 2.  The bladder is incompletely distended accentuating wall thickening.    Cervical spine MRI w/o contrast    Result  Date: 11/8/2023  EXAM: MR CERVICAL SPINE W/O CONTRAST LOCATION: [facility] DATE: 11/8/2023 INDICATION: prominant ventral anomaly, possible hematoma with a history of trauma. COMPARISON: 11/08/2023. TECHNIQUE: MRI Cervical Spine without IV contrast. FINDINGS: There is motion on these images leading to suboptimal visualization of fine detail. Normal vertebral body heights, alignment and marrow signal. There is no abnormal signal or change in size of the cervical spinal cord. There is no evidence of an intracanal mass or hemorrhage. There is prominence of the ventral epidural plexus from skull  base to the top of C5. This is a normal variant. The ligamentous structures are intact. There is no significant canal compromise or significant neural foraminal narrowing throughout cervical spine. Craniovertebral junction and C1-C2: Normal.     IMPRESSION: 1.  Good anatomic alignment and vertebral body heights maintained. 2.  No abnormal signal cervical spinal cord. 3.  Prominent ventral epidural complexes skull base to top of C5. 4.  No significant canal compromise or neural foraminal narrowing throughout cervical spine.     CT Cervical Spine w/o Contrast    Result Date: 11/8/2023  EXAM: CT HEAD W/O CONTRAST, CT CERVICAL SPINE W/O CONTRAST LOCATION: [facility] DATE: 11/8/2023 INDICATION: fall head trauma intox poor historian altered mental status and neck pain. COMPARISON: 11/05/2019. TECHNIQUE: 1) Routine CT Head without IV contrast. Multiplanar reformats. Dose reduction techniques were used. 2) Routine CT Cervical Spine without IV contrast. Multiplanar reformats. Dose reduction techniques were used. FINDINGS: HEAD CT: INTRACRANIAL CONTENTS: No intracranial hemorrhage, extraaxial collection, or mass effect.  No CT evidence of acute infarct. Normal parenchymal attenuation. Normal ventricles and sulci. VISUALIZED ORBITS/SINUSES/MASTOIDS: No intraorbital abnormality. No  paranasal sinus mucosal disease. Trace fluid left mastoid air cells. BONES/SOFT TISSUES: No acute abnormality. CERVICAL SPINE CT: VERTEBRA: There is a slight anterior listhesis of C5 in relation to C6. The rest of the cervical spine has good anatomic alignment. The vertebral body heights are well-maintained throughout. There is slight osteopenia of the bony structures. No fracture or posttraumatic subluxation. CANAL/FORAMINA: There is increased density seen in the ventral epidural space from the skull base down to the top of the C6 vertebral body. This is more prominent than normal. It measures approximately 4 mm in its widest width. There is no significant canal compromise from this. This could represent a prominent ventral epidural venous plexus versus a small acute ventral epidural hematoma. Disc spaces are fairly well-maintained throughout. There is diffuse facet arthropathy visualized. There is no significant canal compromise or neural foraminal narrowing throughout cervical spine. PARASPINAL: No extraspinal abnormality. Left lung apical scarring.     IMPRESSION: HEAD CT: 1.  No CT finding of a mass, hemorrhage or focal area suggestive of acute infarct. CERVICAL SPINE CT: 1.  No CT evidence for acute fracture or post traumatic subluxation. 2.  4 mm increased density in the ventral epidural space from  skull base to the top of C6 which could represent a small acute ventral epidural hemorrhage versus prominent ventral epidural plexus. Recommend MRI cervical spine for further evaluation. 3.  No significant canal compromise or neural foraminal narrowing throughout cervical spine. These findings were communicated by phone to Dr. Quiroga at 5:31 PM on 11/08/2023.    CT Head w/o Contrast    Result Date: 11/8/2023  EXAM: CT HEAD W/O CONTRAST, CT CERVICAL SPINE W/O CONTRAST LOCATION: [facility] DATE: 11/8/2023 INDICATION: fall head trauma intox poor historian altered mental status and neck pain.  COMPARISON: 11/05/2019. TECHNIQUE: 1) Routine CT Head without IV contrast. Multiplanar reformats. Dose reduction techniques were used. 2) Routine CT Cervical Spine without IV contrast. Multiplanar reformats. Dose reduction techniques were used. FINDINGS: HEAD CT: INTRACRANIAL CONTENTS: No intracranial hemorrhage, extraaxial collection, or mass effect.  No CT evidence of acute infarct. Normal parenchymal attenuation. Normal ventricles and sulci. VISUALIZED ORBITS/SINUSES/MASTOIDS: No intraorbital abnormality. No paranasal sinus mucosal disease. Trace fluid left mastoid air cells. BONES/SOFT TISSUES: No acute abnormality. CERVICAL SPINE CT: VERTEBRA: There is a slight anterior listhesis of C5 in relation to C6. The rest of the cervical spine has good anatomic alignment. The vertebral body heights are well-maintained throughout. There is slight osteopenia of the bony structures. No fracture or posttraumatic subluxation. CANAL/FORAMINA: There is increased density seen in the ventral epidural space from the skull base down to the top of the C6 vertebral body. This is more prominent than normal. It measures approximately 4 mm in its widest width. There is no significant canal compromise from this. This could represent a prominent ventral epidural venous plexus versus a small acute ventral epidural hematoma. Disc spaces are fairly well-maintained throughout. There is diffuse facet arthropathy visualized. There is no significant canal compromise or neural foraminal narrowing throughout cervical spine. PARASPINAL: No extraspinal abnormality. Left lung apical scarring.     IMPRESSION: HEAD CT: 1.  No CT finding of a mass, hemorrhage or focal area suggestive of acute infarct. CERVICAL SPINE CT: 1.  No CT evidence for acute fracture or post traumatic subluxation. 2.  4 mm increased density in the ventral epidural space from  skull base to the top of C6 which could represent a small acute ventral epidural hemorrhage versus  prominent ventral epidural plexus. Recommend MRI cervical spine for further evaluation. 3.  No significant canal compromise or neural foraminal narrowing throughout cervical spine. These findings were communicated by phone to Dr. Quiroga at 5:31 PM on 11/08/2023.    CT Chest Pulmonary Embolism w Contrast    Result Date: 11/8/2023  EXAM: CT ABDOMEN PELVIS W CONTRAST, CT CHEST PULMONARY EMBOLISM W CONTRAST LOCATION: [facility] DATE: 11/8/2023 INDICATION: Fall, fatigue, poor historian, AMS, DVT. Hypoxia. COMPARISON: 04/22/2019 CT abdomen and pelvis. TECHNIQUE: CT scan of the abdomen and pelvis was performed following injection of IV contrast. Multiplanar reformats were obtained. CT chest pulmonary angiogram during arterial phase injection of IV contrast. Multiplanar reformats and MIP reconstructions  were performed. Dose reduction techniques were used. Dose reduction techniques were used. CONTRAST: Isovue 370 75 mL. FINDINGS: CT PULMONARY ANGIOGRAM: No pulmonary emboli. No evidence for right heart strain. Ectasia of the ascending aorta measuring 3.7 cm. LUNGS AND PLEURA: Dependent atelectasis. Trace right pleural fluid. MEDIASTINUM: Negative. CORONARY ARTERY CALCIFICATION: Mild. HEPATOBILIARY: Cholecystectomy. Riedel's lobe of the liver. PANCREAS: Normal. SPLEEN: Splenomegaly. ADRENAL GLANDS: Normal. KIDNEYS/BLADDER: There is new moderate dilatation of the intrarenal collecting systems and both ureters down to the bladder base. The urinary bladder is somewhat distended. Would recommend reimaging the intrarenal collecting systems and ureters after drainage of the bladder. BOWEL: Proctocolectomy with right lower quadrant ileostomy. LYMPH NODES: Normal. VASCULATURE: Unremarkable. PELVIC ORGANS: Hysterectomy. MUSCULOSKELETAL: Normal.     IMPRESSION: 1.  No pulmonary embolism. 2.  Proctocolectomy with right lower quadrant ileostomy. Nothing for bowel obstruction. No abscess. 3.  Moderate bilateral  ureteral pyelocaliectasis. The urinary bladder is distended. Would recommend reimaging of the collecting systems and ureters after urinary bladder drainage. 4.  Cholecystectomy and hysterectomy. 5.  Stable splenomegaly.    CT Abdomen Pelvis w Contrast    Result Date: 11/8/2023  EXAM: CT ABDOMEN PELVIS W CONTRAST, CT CHEST PULMONARY EMBOLISM W CONTRAST LOCATION: [facility] DATE: 11/8/2023 INDICATION: Fall, fatigue, poor historian, AMS, DVT. Hypoxia. COMPARISON: 04/22/2019 CT abdomen and pelvis. TECHNIQUE: CT scan of the abdomen and pelvis was performed following injection of IV contrast. Multiplanar reformats were obtained. CT chest pulmonary angiogram during arterial phase injection of IV contrast. Multiplanar reformats and MIP reconstructions  were performed. Dose reduction techniques were used. Dose reduction techniques were used. CONTRAST: Isovue 370 75 mL. FINDINGS: CT PULMONARY ANGIOGRAM: No pulmonary emboli. No evidence for right heart strain. Ectasia of the ascending aorta measuring 3.7 cm. LUNGS AND PLEURA: Dependent atelectasis. Trace right pleural fluid. MEDIASTINUM: Negative. CORONARY ARTERY CALCIFICATION: Mild. HEPATOBILIARY: Cholecystectomy. Riedel's lobe of the liver. PANCREAS: Normal. SPLEEN: Splenomegaly. ADRENAL GLANDS: Normal. KIDNEYS/BLADDER: There is new moderate dilatation of the intrarenal collecting systems and both ureters down to the bladder base. The urinary bladder is somewhat distended. Would recommend reimaging the intrarenal collecting systems and ureters after drainage of the bladder. BOWEL: Proctocolectomy with right lower quadrant ileostomy. LYMPH NODES: Normal. VASCULATURE: Unremarkable. PELVIC ORGANS: Hysterectomy. MUSCULOSKELETAL: Normal.     IMPRESSION: 1.  No pulmonary embolism. 2.  Proctocolectomy with right lower quadrant ileostomy. Nothing for bowel obstruction. No abscess. 3.  Moderate bilateral ureteral pyelocaliectasis. The urinary bladder is  distended. Would recommend reimaging of the collecting systems and ureters after urinary bladder drainage. 4.  Cholecystectomy and hysterectomy. 5.  Stable splenomegaly.

## 2023-11-15 NOTE — PROGRESS NOTES
[facility] [place] MEDICINE PROGRESS NOTE      Identification/Summary: Dee Mattson is a 73 year old female with a past medical history of HTN, chronic fatigue syndrome, severe fibromyalgia on opioid therapy, mild intermittent asthma, CKD2, ileostomy secondary to SBOs, history of DVT in setting of Hodgkin's lymphoma and not on anticoagulation, MDD, presents with generalized weakness, physical deconditioning, and severe hyperkalemia , hyponatremia, and acute kidney injury on 11/8/2023, which are corrected with IV hydration.  Found to have MSSA bacteremia on 11/8. Subsequent blood cultures are negative.  Patient has port for IV hydration for high output ileostomy. Port was removed on 11/13 concerning for infection.  No other source of infection identified. PICC line placement was attempted on 11/14 but failed. A midline was inserted instead. Port will be reimplanted at the end of the antibiotic course.  Will continue IV cefazolin for 3 weeks.  TTE did not show vegetation.  Follow-up ADELAIDA not recommended per ID evaluated by infectious disease.  Anticipated discharge in 1 day.     Assessment and Plan:  MSSA bacteremia  Concern for port infection  Port removed on 11/13  IV cefazolin for 3 weeks  TTE revealed no vegetation  Port will be placed when antibiotic course is completed    High output ileostomy  Receiving IV hydration at home  Midline placed for IV antibiotic  Port will be replaced in 3 weeks    DYLAN on CKD2, resolved with hydration  CT 11/8-Moderate bilateral ureteral pyelocaliectasis.   Renal ultrasound revealed no obstruction     Hyperkalemia, resolved  Hyponatremia, corrected     GERD, resume PPI     ? Medication Side Effects  Lip-smacking, EPS, resolved  -Consultation to psychiatry for concern from psychiatric medications  -Team members observed lip smacking and extrapyramidal symptoms  -Had HELD home Duloxetine, Buspirone, and Trazodone.   -Psych Ok'ed to restart Trazodone at low dose;  ordered. Continue to hold Duloxetine and Buspirone.  -Psych started propanolol in place of buspirone. No EPS symptoms were identified.      Mild intermittent asthma, stable and asymptomatic  -Order home inhalers and medications.   -Nebulizer and RT pulmonary hygiene as needed.      Essential hypertension  Norvasc 2.5 mg daily  Lisinopril 20 mg daily  Metoprolol 25 mg daily     Chronic Fatigue Syndrome  Fibromyalgia  Stable now     Generalized Weakness  Failure to Thrive  Physical Deconditioning  PT and OT  Will be discharging with home care and  family support  Clinically Significant Risk Factors        # Hypokalemia: Lowest K = 2.8 mmol/L in last 2 days, will replace as needed     # Hypomagnesemia: Lowest Mg = 1.4 mg/dL in last 2 days, will replace as needed   # Hypoalbuminemia: Lowest albumin = 2.8 g/dL at 11/15/2023  1:55 AM, will monitor as appropriate     # Hypertension: Noted on problem list            # Asthma: noted on problem list        Diet: Combination Diet Regular Diet Adult   Agee Catheter: Not present  Lines: PRESENT             DVT Prophylaxis:  Heparin subcutaneous 5000 unit BID  Code Status: Full Code    Anticipated possible discharge in 1 days to 2 once milestones are met.  Disposition Plan     Expected Discharge Date: 11/15/2023      Destination: home with help/services  Discharge Comments: Daily BCx, ID recommends port removal, IV ABX TCU at [facility]        I agreed and reviewed the patient with RAFAEL Lock.  I examined the patient by myself.     Mone Wong MD    The patient's care was discussed with the Attending Physician, Dr. Wong .    RAFAEL Lock  [facility]  Phone: #[phone]  Securely message with the Vocera Web Console (learn more here)  Text page via [facility] Paging/Directory     Interval History/Subjective:  Patient is resting comfortably. Chest pain she was experiencing last night is now completely gone. She  still has concern for pain in the region blow her right arm. She broke her ribs a while back and she does not think it healed properly. Is still passing stool into ileostomy. Denies vomiting but is very nauseas.     Physical Exam/Objective:  Temp:  [97.8  F (36.6  C)-98.1  F (36.7  C)] 97.9  F (36.6  C)  Pulse:  [71-84] 84  Resp:  [16-20] 18  BP: (148-191)/(69-80) 164/80  SpO2:  [93 %-95 %] 94 %  Body mass index is 22.56 kg/m .    GENERAL:  Alert, appears comfortable, in no acute distress, appears stated age   HEAD:  Normocephalic, without obvious abnormality, atraumatic   EYES:  PERRL, conjunctiva/corneas clear, no scleral icterus, EOM's intact   NOSE: Nares normal, septum midline, mucosa normal, no drainage   THROAT: Lips, mucosa,  mouth moist   NECK: Supple, symmetrical, trachea midline   BACK:   Symmetric, no curvature, ROM normal   LUNGS:   Clear to auscultation bilaterally, no rales, rhonchi, or wheezing, symmetric chest rise on inhalation, respirations unlabored, ostomy in place and functioning well   CHEST WALL:  No tenderness or deformity   HEART:  Regular rate and rhythm, S1 and S2 normal, no murmur, rub, or gallop    ABDOMEN:   Soft, non-tender, no masses, no organomegaly, no rebound or guarding   EXTREMITIES: Extremities normal, atraumatic, no edema 2+ pedal pulses   SKIN: no exanthems in the visualized areas.    NEURO: Alert, oriented x3, moves all four extremities freely   PSYCH: Cooperative, behavior is appropriate      Data reviewed today: I personally reviewed all new medications, labs, imaging/diagnostics reports over the past 24 hours. Pertinent findings include:    Imaging:   HEAD CT:  1.  No CT finding of a mass, hemorrhage or focal area suggestive of acute infarct.     CERVICAL SPINE CT:  1.  No CT evidence for acute fracture or post traumatic subluxation.  2.  4 mm increased density in the ventral epidural space from  skull base to the top of C6 which could represent a small acute ventral  epidural hemorrhage versus prominent ventral epidural plexus. Recommend MRI cervical spine for further evaluation.  3.  No significant canal compromise or neural foraminal narrowing throughout cervical spine.     Chest and abdomen ct  1.  No pulmonary embolism.  2.  Proctocolectomy with right lower quadrant ileostomy. Nothing for bowel obstruction. No abscess.  3.  Moderate bilateral ureteral pyelocaliectasis. The urinary bladder is distended. Would recommend reimaging of the collecting systems and ureters after urinary bladder drainage.  4.  Cholecystectomy and hysterectomy.  5.  Stable splenomegaly.     Renal us  1.  Resolved hydronephrosis.  2.  The bladder is incompletely distended accentuating wall thickening.    Labs:  Most Recent 3 CBC's:  Recent Labs   Lab Test 11/12/23  0639 11/09/23  0721 11/08/23  1403 11/03/20  1115   WBC  --  8.9 8.7 5.0   HGB  --  12.3 12.3 10.4*   MCV  --  90 90 97    126* 133* 171     Most Recent 3 BMP's:  Recent Labs   Lab Test 11/15/23  0155 11/14/23  1202 11/13/23  0527 11/12/23  1006 11/11/23  0602 11/10/23  0507 11/09/23  0505     --   --   --  135  --  134*   POTASSIUM 2.8* 3.7  --  3.7 4.9   < > 4.9   CHLORIDE 109*  --   --   --  107  --  99   CO2 21*  --   --   --  19*  --  23   BUN 10.0  --   --   --  19.3  --  19.6   CR 0.91  --  1.16*  --  1.13*   < > 1.40*   ANIONGAP 10  --   --   --  9  --  12   LOI 7.4*  --   --   --  7.3*  --  8.1*   GLC 85  --   --   --  88  --  91    < > = values in this interval not displayed.     Most Recent 2 LFT's:  Recent Labs   Lab Test 11/15/23  0155 11/08/23  1403   AST 22 28   ALT <5 9   ALKPHOS 81 126*   BILITOTAL 0.2 0.9       Medications:   Personally Reviewed.  Medications    sodium chloride 100 mL/hr at 11/15/23 0100      [Held by provider] busPIRone  30 mg Oral BID    artificial tears  1 drop Both Eyes TID    ceFAZolin  2 g Intravenous Q8H    diphenoxylate-atropine  2 tablet Oral TID    [Held by provider] DULoxetine  60 mg  Oral BID    famotidine  20 mg Intravenous Daily    heparin ANTICOAGULANT  5,000 Units Subcutaneous Q12H    HYDROmorphone  4 mg Oral 5x Daily    lactobacillus rhamnosus (GG)  1 capsule Oral BID    lipase-protease-amylase  1 capsule Oral TID w/meals    lisinopril  20 mg Oral Daily    magnesium oxide  400 mg Oral Q4H    metoprolol succinate ER  50 mg Oral Daily    nitroFURantoin macrocrystal  50 mg Oral At Bedtime    pantoprazole  40 mg Oral BID    Pregabalin  100 mg Oral TID    sodium bicarbonate  650 mg Oral BID    sodium chloride (PF)  10-40 mL Intracatheter Q7 Days    sodium chloride (PF)  10-40 mL Intracatheter Q8H    traZODone  150 mg Oral At Bedtime      Total time spent 50 min

## 2023-11-15 NOTE — PLAN OF CARE
Goal Outcome Evaluation:      Problem: Adult Inpatient Plan of Care  Goal: Optimal Comfort and Wellbeing  Intervention: Monitor Pain and Promote Comfort    Pt was anxious for being transferred. A&O. VSS, except elevated BP, on RA. BP was managed with IV Labetalol.  /78 and HR 85 after Labetalol administered. CMS intact. Denied N/V. Reported mild chest pain when breath in (w/out SOB or headache), Dr. Decker informed.  Pt has a single lumen on right arm, blood returned noted and lab drawn.  NS infusing at 100ml/hr.  Telemetry reading normal sinus rhythm. K and Mag. Replaced.  Went back to do a lab drawn around 0700, no blood returned noted, PIV was not able to be flushed either, lab was notified and on coming nurse is aware. Ileostomy in placed, self managed by pt.

## 2023-11-15 NOTE — PROVIDER NOTIFICATION
Lab result came back, K 2.8 not on K protocol please advise.     Dr. Decker ordered high K and Mag. Protocol.

## 2023-11-16 NOTE — PROGRESS NOTES
Care Management Follow Up    Length of Stay (days): 8    Expected Discharge Date: 11/16/2023     Concerns to be Addressed:  Home with HC and Home Infusion      Patient plan of care discussed at interdisciplinary rounds: Yes    Anticipated Discharge Disposition: Home, Home Care     Anticipated Discharge Services: None  Anticipated Discharge DME: None    Patient/family educated on Medicare website which has current facility and service quality ratings: yes  Education Provided on the Discharge Plan: Yes  Patient/Family in Agreement with the Plan: yes    Referrals Placed by CM/SW: Home Infusion  Private pay costs discussed: N/A  Not applicable    Additional Information:  [facility] was going to accept Pt but unable to accommodate IV hydration and [facility] unable to brings supplies to TCU. Pt will discharge home with [facility] for RN and Therapy and [facility] for Infusion needs. Pt states that she will call her son for a ride and will come after work.  Pt will need a PICC line and [facility] will come today and do teaching.     ROSALINDA Navarro

## 2023-11-16 NOTE — DISCHARGE SUMMARY
[facility] MEDICINE  DISCHARGE SUMMARY     Primary Care Physician: Duke Mccall  Admission Date: 11/8/2023   Discharge Provider: Mone Wong MD Discharge Date: 11/16/2023   Diet:   Regular diet   Code Status: Full Code   Activity: DCACTIVITY: Activity as tolerated        Condition at Discharge: Stable     REASON FOR PRESENTATION(See Admission Note for Details)   Generalized weakness, physical deconditioning    PRINCIPAL & ACTIVE DISCHARGE DIAGNOSES     MSSA bacteremia  Port infection  Port removal on 11/13  High output ileostomy  Acute kidney injury on CKD 2  Hyperkalemia, resolved  Hyponatremia, resolved  GERD  Mild intermittent asthma  Acute Respiratory Failure with Hypoxia, resolved  Essential hypertension  Chronic fatigue syndrome  Fibromyalgia  Generalized weakness  Failure to thrive  Physical deconditioning      PENDING LABS     Unresulted Labs Ordered in the Past 30 Days of this Admission       Date and Time Order Name Status Description    11/13/2023 12:01 AM Blood Culture Hand, Right Preliminary     11/12/2023 12:00 AM Blood Culture Line, venous Preliminary     11/11/2023 12:01 AM Blood Culture Peripheral Blood Preliminary           PROCEDURES ( this hospitalization only)      None    RECOMMENDATIONS TO OUTPATIENT PROVIDER FOR F/U VISIT     Follow-up Appointments     Follow-up and recommended labs and tests       Follow-up with primary MD in 10 day  Follow-up with infectious disease in  2 weeks  Draw weekly: CBC with differential BMP ESR CRP inflammation (NOT the hi   sens version) Fax above weekly to [facility] [phone]  Midline care  Port will be placed after completion of IV antibiotic, will be arranged by   primary care physician              DISPOSITION     Home with home care    SUMMARY OF HOSPITAL COURSE:      Ms.Rebecca JAMES Mattson is a 73 year old female with a past medical history of HTN, chronic fatigue syndrome, severe fibromyalgia on opioid therapy,  mild intermittent asthma, CKD2, ileostomy secondary to SBOs, history of DVT in setting of Hodgkin's lymphoma and not on anticoagulation, MDD, presents with generalized weakness, physical deconditioning, found to have severe hyperkalemia , hyponatremia, and acute kidney injury on 11/8/2023, which are corrected with IV hydration.  Found to have MSSA bacteremia on 11/8. Subsequent blood cultures are negative.  Patient has port for IV hydration for high output ileostomy. Port was removed on 11/13 concerning for infection.  No other source of infection identified.  History of port infection multiple times.  Started on IV cefazolin for 3 weeks.  Right-sided PICC line was not able to placed.  Left-sided PICC line placed for IV antibiotic.  Patient is getting 2 L of IV fluid at night.  Will resume IV hydration through PICC line.  Port will be placed  at the end of the antibiotic course. TTE did not show vegetation.  Follow-up ADELAIDA not recommended. Evaluated by infectious disease.  Anticipated discharge in 1 day.     Blood pressure is in higher side.  Metoprolol dose increased to 40 mg daily, metoprolol dose increased to 50 mg daily, resume Norvasc 2.5 mg daily.    Had episode of lipsmacking.  Question of medication side effect.  Decrease BuSpar and trazodone dose.  Resume Cymbalta.  No further episodes.  Follow-up closely with primary care physician.    Discharge Medications with Med changes:     Current Discharge Medication List        START taking these medications    Details   acetaminophen (TYLENOL) 325 MG tablet Take 2 tablets (650 mg) by mouth every 6 hours as needed for mild pain or other (and adjunct with moderate or severe pain or per patient request)    Associated Diagnoses: Encounter for attention to ileostomy (H)      ceFAZolin (ANCEF) intermittent infusion 2 g in 100 mL dextrose PRE-MIX Inject 100 mLs (2 g) into the vein every 8 hours for 21 days  Qty: 6300 mL, Refills: 0    Associated Diagnoses: MSSA bacteremia       melatonin 1 MG TABS tablet Take 1 tablet (1 mg) by mouth nightly as needed for sleep    Associated Diagnoses: Encounter for attention to ileostomy (H)      ondansetron (ZOFRAN ODT) 4 MG ODT tab Take 1 tablet (4 mg) by mouth every 6 hours as needed for nausea or vomiting  Qty: 20 tablet, Refills: 0    Associated Diagnoses: Encounter for attention to ileostomy (H)      polyethylene glycol (MIRALAX) 17 g packet Take 17 g by mouth daily as needed for constipation  Qty: 20 packet, Refills: 0    Associated Diagnoses: Encounter for attention to ileostomy (H)           CONTINUE these medications which have CHANGED    Details   busPIRone (BUSPAR) 15 MG tablet Take 1 tablet (15 mg) by mouth 2 times daily    Associated Diagnoses: Anxiety and depression      lisinopril (ZESTRIL) 20 MG tablet Take 1 tablet (20 mg) by mouth 2 times daily  Qty: 60 tablet, Refills: 3    Associated Diagnoses: Benign essential hypertension      metoprolol succinate ER (TOPROL XL) 50 MG 24 hr tablet Take 1 tablet (50 mg) by mouth daily  Qty: 30 tablet, Refills: 3    Associated Diagnoses: Benign essential hypertension      traZODone (DESYREL) 150 MG tablet Take 1 tablet (150 mg) by mouth daily  Qty: 270 tablet, Refills: 0    Associated Diagnoses: Anxiety and depression           CONTINUE these medications which have NOT CHANGED    Details   amLODIPine (NORVASC) 2.5 MG tablet Take 2.5 mg by mouth See Admin Instructions Take 2.5 mg daily as needed for SBP > 140 or DBP > 80. May take up to twice daily.      cholecalciferol, vitamin D3, (VITAMIN D3) 2,000 unit cap [CHOLECALCIFEROL, VITAMIN D3, (VITAMIN D3) 2,000 UNIT CAP] Take 2,000 Units by mouth every morning.      cyanocobalamin (CYANOCOBALAMIN) 1000 MCG/ML injection ADMINISTER 1 ML(1000 MCG) IN THE MUSCLE EVERY 30 DAYS  Qty: 1 mL, Refills: 11    Associated Diagnoses: S/P total colectomy      diphenoxylate-atropine (LOMOTIL) 2.5-0.025 MG tablet [DIPHENOXYLATE-ATROPINE (LOMOTIL) 2.5-0.025 MG PER  TABLET] TAKE 2 TABLETS BY MOUTH THREE TIMES DAILY  Qty: 180 tablet, Refills: 0    Associated Diagnoses: Dehydration      DULoxetine (CYMBALTA) 60 MG capsule TAKE 1 CAPSULE BY MOUTH TWICE DAILY  Qty: 180 capsule, Refills: 3    Associated Diagnoses: Depression      HYDROmorphone (DILAUDID) 4 MG tablet Take 1 tablet (4 mg) by mouth 5 times daily  Qty: 150 tablet, Refills: 0    Associated Diagnoses: Pain syndrome, chronic; Chronic, continuous use of opioids      Lactobacillus acidophilus (ACIDOPHILUS ORAL) [LACTOBACILLUS ACIDOPHILUS (ACIDOPHILUS ORAL)] Take 1 tablet by mouth 2 (two) times a day.      lipase-protease-amylase (CREON) 7787-37269-87525 units CPEP TAKE 2 CAPSULES BY MOUTH THREE TIMES DAILY WITH FOOD  Qty: 540 capsule, Refills: 11    Associated Diagnoses: Dehydration      multivit-min/folic acid/bnu814 (ALIVE WOMEN'S GUMMY VITAMINS ORAL) [MULTIVIT-MIN/FOLIC ACID/FID994 (ALIVE WOMEN'S GUMMY VITAMINS ORAL)] Take 2 tablets by mouth Daily after breakfast.       NEW MED 2,000 mLs by Intravenous (Continuous Infusion) route daily 2000 mL volume daily - Na 300 mEQ, Magnesium 13 meq (1.6gm), 5 mmol phos, Cl 75%  Patient infuses over 12 hours once daily - supplemental IVF through [facility] services      nitroFURantoin macrocrystal (MACRODANTIN) 50 MG capsule TAKE 1 CAPSULE(50 MG) BY MOUTH AT BEDTIME  Qty: 90 capsule, Refills: 3    Associated Diagnoses: Chronic cystitis      omeprazole (PRILOSEC) 40 MG DR capsule Take 40 mg by mouth daily      Pregabalin (LYRICA) 200 MG capsule Take 1 capsule (200 mg) by mouth 3 times daily  Qty: 270 capsule, Refills: 1    Associated Diagnoses: Fibromyalgia      promethazine (PHENERGAN) 25 MG tablet TAKE 1 TABLET(25 MG) BY MOUTH EVERY 6 HOURS AS NEEDED FOR NAUSEA  Qty: 30 tablet, Refills: 3    Associated Diagnoses: Fibromyalgia      simethicone (MYLICON) 80 MG chewable tablet Take 80 mg by mouth every 6 hours as needed for flatulence or cramping      sodium bicarbonate 650  MG tablet Take 650 mg by mouth 2 times daily      vitamin D2 (ERGOCALCIFEROL) 33566 units (1250 mcg) capsule TAKE 1 CAPSULE BY MOUTH 1 TIME EVERY WEEK FOR 12 DOSES  Qty: 12 capsule, Refills: 3    Associated Diagnoses: S/P total colectomy                   Rationale for medication changes:      IV cefazolin for 3 weeks  Trazodone and BuSpar dose decreased        Consults   Infectious disease  Psychiatry  PT/OT      Immunizations given this encounter     Most Recent Immunizations   Administered Date(s) Administered    COVID-19 Bivalent 12+ (Pfizer) 09/25/2022    COVID-19 MONOVALENT 12+ (Pfizer) 10/15/2021    FLU 6-35 months 12/11/2007    Flu, Unspecified 11/01/2016    Hepatitis A (ADULT 19+) 12/11/2007    Hepatitis B, Adult 12/11/2007    Influenza (High Dose) 3 valent vaccine 11/25/2019    Influenza (IIV3) PF 10/27/2014    Influenza Vaccine 65+ (FLUAD) 10/15/2021    Influenza Vaccine 65+ (Fluzone HD) 11/11/2023    Influenza Vaccine, 6+MO IM (QUADRIVALENT W/PRESERVATIVES) 11/03/2013    Pneumo Conj 13-V (2010&after) 08/18/2016    Pneumococcal 20 valent Conjugate (Prevnar 20) 05/23/2023    Pneumococcal 23 valent 02/07/2013    TD,PF 7+ (Tenivac) 08/18/2016    Zoster recombinant adjuvanted (SHINGRIX) 05/23/2023    Zoster vaccine, live 10/16/2012           Anticoagulation Information      None      SIGNIFICANT IMAGING FINDINGS     HEAD CT:  1.  No CT finding of a mass, hemorrhage or focal area suggestive of acute infarct.     CERVICAL SPINE CT:  1.  No CT evidence for acute fracture or post traumatic subluxation.  2.  4 mm increased density in the ventral epidural space from  skull base to the top of C6 which could represent a small acute ventral epidural hemorrhage versus prominent ventral epidural plexus. Recommend MRI cervical spine for further evaluation.  3.  No significant canal compromise or neural foraminal narrowing throughout cervical spine.     Chest and abdomen  CT  1.  No pulmonary embolism.  2.   Proctocolectomy with right lower quadrant ileostomy. Nothing for bowel obstruction. No abscess.  3.  Moderate bilateral ureteral pyelocaliectasis. The urinary bladder is distended. Would recommend reimaging of the collecting systems and ureters after urinary bladder drainage.  4.  Cholecystectomy and hysterectomy.  5.  Stable splenomegaly.     Renal US  1.  Resolved hydronephrosis.  2.  The bladder is incompletely distended accentuating wall thickening.    ECHOCARDIOGRAM  Left ventricular size, wall motion and function are normal. The ejection  fraction is 60-65%.  Normal right ventricle size and systolic function.  There is mild to moderate (1-2+) tricuspid regurgitation.  Right ventricular systolic pressure is elevated, consistent with mild to  moderate pulmonary hypertension.  No evidence of endocarditis, TTE does not rule out vegetations    SIGNIFICANT LABORATORY FINDINGS     WBC 8.9, hemoglobin 12.3, platelet 169  Magnesium 1.6  Phosphorus 3.1  Potassium  Creatinine 1.43--> 0.91  Discharge Orders        Home Care Referral      Home Infusion Referral      Brief Discharge Instructions    Port Removal Discharge Instructions:  You had your port-a-catheter removed today. Please follow the below instructions following your port removal:    Care Instructions:  - Avoid tub baths, Jacuzzi and pool soaks for 10 days.  - You may shower beginning tomorrow. Do not scrub site until well healed; pat dry.  - If you experience significant bleeding at site, apply pressure with hands above the clavicle bone, sit upright.    Seek medical assistance for any of the following:   - Uncontrolled bleeding.  - You have a fever (greater than 101 F (38.3C)).  - Purulent (yellow/green/foul smelling) drainage from previous catheter insertion site.  - Increasing redness at previous catheter insertion site.  - Increasing pain at previous catheter insertion site.  - Increasing swelling at previous catheter insertion site.    Contact [facility]  RN Line at [phone] with questions or concerns.     Patient care order    ID discharge orders:    Draw weekly:  CBC with differential  BMP  ESR  CRP inflammation (NOT the hi sens version)    Fax above weekly to [facility] [phone]     Reason for your hospital stay    sepsis     Follow-up and recommended labs and tests     Follow-up with primary MD in 10 day  Follow-up with infectious disease in  2 weeks  Draw weekly: CBC with differential BMP ESR CRP inflammation (NOT the hi sens version) Fax above weekly to [facility] [phone]  Midline care  Port will be placed after completion of IV antibiotic, will be arranged by primary care physician     Activity    Your activity upon discharge: activity as tolerated     Diet    Follow this diet upon discharge: regular diet       Examination   Physical Exam   Temp:  [97.5  F (36.4  C)-98.3  F (36.8  C)] 98.2  F (36.8  C)  Pulse:  [69-90] 90  Resp:  [16-18] 18  BP: (135-180)/(68-83) 168/76  SpO2:  [93 %-97 %] 96 %  Wt Readings from Last 1 Encounters:   11/15/23 54.2 kg (119 lb 6.4 oz)     GENERAL:  Alert, appears comfortable, in no acute distress, appears stated age   HEAD:  Normocephalic, without obvious abnormality, atraumatic   EYES:  PERRL, conjunctiva  clear,  EOM's intact   NOSE: Nares normal, septum midline, mucosa normal, no drainage   THROAT: Lips, mucosa,  mouth moist   NECK: Supple, symmetrical, trachea midline   BACK:   Symmetric, no curvature, ROM normal   LUNGS:   Clear to auscultation bilaterally, no rales, rhonchi, or wheezing, symmetric chest rise on inhalation, respirations unlabored, ostomy in place and functioning well   CHEST WALL:  No tenderness or deformity   HEART:  Regular rate and rhythm, S1 and S2 normal, no murmur, rub, or gallop    ABDOMEN:   Soft, non-tender, no masses, no organomegaly, no rebound or guarding, status post ileostomy, functioning well   EXTREMITIES: Extremities normal, atraumatic, no edema 2+ pedal pulses   SKIN: No exanthems in the  visualized areas.    NEURO: Alert, oriented x3, moves all four extremities freely   PSYCH: Cooperative, behavior is appropriate        Please see EMR for more detailed significant labs, imaging, consultant notes etc.    IMone MD, personally saw the patient today and spent greater than 30 minutes discharging this patient.    Mone Wong MD  [facility]    CC:Duke Mccall

## 2023-11-16 NOTE — PROGRESS NOTES
[facility]    Met with patient at bedside for teach of IV antibiotic via SL valved PICC.  Plan is for patient to discharge on Cefazolin 2g q 8 hours. Pt will have her 4 pm dose at the hospital prior to dosing.  Pt will also be resuming her home electrolyte infusions, as before hospital stay.      Provided hands-on teaching using teaching mat and demo equipment.  Patient taught SAS method and patient was able to provide return demonstration using aseptic technique.  Extension tubing was added to her PICC.  Unable to add y-site, as they are on back order at the hospital.  [facility] will send out y-sites and the RN seeing pt tomorrow in the home will add the y-site, so pt can infuse the IV abx while the hydration is infusing. Ancef is compatible with the electrolyte infusion.  IV catheter flushes without resistance and has good blood return.  Delivery of med and supplies will be delivered to patient's home tonight between 6-8 pm.  [facility] nurse plans to make home visit tomorrow morning at 11 am.     Provided 24/7 phone number and answered all questions.  Patient verbalized understanding of discharge plans.    Michelle Rosa, RN, BSN  [facility]  [phone] (Monday through Friday, 8am-5pm)  [phone] (office)

## 2023-11-16 NOTE — PROGRESS NOTES
Patient vital signs are at baseline: Yes  Patient able to ambulate as they were prior to admission or with assist devices provided by therapies during their stay:  Yes, 40 feet.  Patient MUST void prior to discharge:  yes  Patient able to tolerate oral intake:  Yes  Pain has adequate pain control using Oral analgesics:  Yes  Does patient have an identified :  Yes  Has goal D/C date and time been discussed with patient:  Yes     A&O. VSS, on RA. CMS intact. Denied pain. Ileostomy in placed, self managed by pt. NS infusing at 100ml.hr. Telemetry reading NSR. Bed alarm activated for safety.

## 2023-11-16 NOTE — PROGRESS NOTES
Occupational Therapy Discharge Summary    Reason for therapy discharge:    Discharged to home with home therapy.    Progress towards therapy goal(s). See goals on Care Plan in [facility] electronic health record for goal details.  Goals partially met.  Barriers to achieving goals:   discharge from facility.    Therapy recommendation(s):    Continued therapy is recommended.  Rationale/Recommendations:  home care to facilitate safe transition home w/ all ADLs.

## 2023-11-16 NOTE — PROGRESS NOTES
[facility] MEDICINE PROGRESS NOTE      Identification/Summary: Dee Mattson is a 73 year old female with a past medical history of HTN, chronic fatigue syndrome, severe fibromyalgia on opioid therapy, mild intermittent asthma, CKD2, ileostomy secondary to SBOs, history of DVT in setting of Hodgkin's lymphoma and not on anticoagulation, MDD, presents with generalized weakness, physical deconditioning, and severe hyperkalemia , hyponatremia, and acute kidney injury on 11/8/2023, which are corrected with IV hydration.  Found to have MSSA bacteremia on 11/8. Subsequent blood cultures are negative.  Patient has port for IV hydration for high output ileostomy. Port was removed on 11/13 concerning for infection.  History of port infection multiple times.  No other source of infection identified. Right PICC line placement was attempted on 11/14 but failed. A midline was inserted instead.  Left PICC line will be placed prior to discharge.  Port will be reimplanted at the end of the antibiotic course.  Will continue IV cefazolin for 3 weeks.  TTE did not show vegetation.  Follow-up ADELAIDA not recommended.  Evaluated by infectious disease. PICC to be reinserted in left side due to continuous IV hydration (11/16).     Assessment and Plan:  MSSA bacteremia  Concern for port infection  Port removed on 11/13  IV cefazolin for 3 weeks  Left-sided PICC line placed  TTE revealed no vegetation  Port will be placed when antibiotic course is completed     High output ileostomy  Receiving IV hydration at home, 2 L at night  Port will be replaced in 3 weeks  Left side PICC to be installed 11/16     DYLAN on CKD2, resolved with hydration  CT 11/8-Moderate bilateral ureteral pyelocaliectasis.   Renal ultrasound revealed no obstruction     Hyperkalemia, resolved  Hyponatremia, corrected     GERD, resume PPI     ? Medication Side Effects  Lip-smacking, EPS, resolved  -Consultation to psychiatry for concern from  psychiatric medications  -Team members observed lip smacking and extrapyramidal symptoms  -Will rstart home Duloxetine, Buspirone, and Trazodone.   -Psych started propanolol in place of buspirone. No EPS symptoms were identified.      Mild intermittent asthma, stable and asymptomatic  -Order home inhalers and medications.   -Nebulizer and RT pulmonary hygiene as needed.      Essential hypertension  Norvasc 2.5 mg daily  Lisinopril 20 mg daily  Metoprolol 25 mg daily     Chronic Fatigue Syndrome  Fibromyalgia  Stable now     Generalized Weakness  Failure to Thrive  Physical Deconditioning  PT and OT  Will be discharging with home care and  family support     Clinically Significant Risk Factors        # Hypokalemia: Lowest K = 2.8 mmol/L in last 2 days, will replace as needed     # Hypomagnesemia: Lowest Mg = 1.6 mg/dL in last 2 days, will replace as needed   # Hypoalbuminemia: Lowest albumin = 2.8 g/dL at 11/15/2023  1:55 AM, will monitor as appropriate     # Hypertension: Noted on problem list            # Asthma: noted on problem list        Diet: Combination Diet Regular Diet Adult  Diet  Agee Catheter: Not present  Lines: PRESENT             DVT Prophylaxis:  Heparin subcutaneous 5000 unit BID   Code Status: Full Code    Anticipated possible discharge in 1 days to 2 once TCU is available.   Disposition Plan      Expected Discharge Date: 11/16/2023      Destination: home with help/services  Discharge Comments: Daily BCx, ID recommends port removal, IV ABX TCU at [facility]; home infusion for hydration          I agreed and reviewed the patient with RAFAEL Lock.  I examined the patient by myself.     Mone Wong MD  The patient's care was discussed with the Attending Physician, Dr. Wong .    [facility]  [facility]  Phone: #[phone]  Securely message with the Vocera Web Console (learn more here)  Text page via [employer] Paging/Directory     Interval  History/Subjective:  Patient is resting comfortably. Afebrile. Denies vomiting but expresses ongoing nausea.     Physical Exam/Objective:  Temp:  [97.5  F (36.4  C)-98.3  F (36.8  C)] 98.2  F (36.8  C)  Pulse:  [69-90] 90  Resp:  [16-18] 18  BP: (135-180)/(68-83) 168/76  SpO2:  [93 %-97 %] 96 %  Body mass index is 22.56 kg/m .    GENERAL:  Alert, appears comfortable, in no acute distress, appears stated age   HEAD:  Normocephalic, without obvious abnormality, atraumatic   EYES:  PERRL, conjunctiva/corneas clear, no scleral icterus, EOM's intact   NOSE: Nares normal, septum midline, mucosa normal, no drainage   THROAT: Mouth moist, lips without lesions   NECK: Supple, symmetrical, trachea midline   BACK:   Symmetric, no curvature, ROM normal   LUNGS:   Clear to auscultation bilaterally, no rales, rhonchi, or wheezing, symmetric chest rise on inhalation, respirations unlabored   CHEST WALL:  No tenderness or deformity   HEART:  Regular rate and rhythm, S1 and S2 normal, no murmur, rub, or gallop    ABDOMEN:   Soft, bowel sounds active all four quadrants, no masses, no organomegaly, no rebound or guarding, mild TTP    EXTREMITIES: Extremities normal, atraumatic, or edema    SKIN: no exanthems in the visualized areas   NEURO: Alert, oriented x3, moves all four extremities freely   PSYCH: Cooperative, behavior is appropriate      Data reviewed today: I personally reviewed all new medications, labs, imaging/diagnostics reports over the past 24 hours. Pertinent findings include:    Imaging:   No results found for this or any previous visit (from the past 24 hour(s)).    Labs:  Most Recent 3 CBC's:  Recent Labs   Lab Test 11/15/23  1147 11/12/23  0639 11/09/23  0721 11/08/23  1403 11/03/20  1115   WBC  --   --  8.9 8.7 5.0   HGB  --   --  12.3 12.3 10.4*   MCV  --   --  90 90 97    169 126* 133* 171     Most Recent 3 BMP's:  Recent Labs   Lab Test 11/16/23  0747 11/15/23  1147 11/15/23  0155 11/14/23  1208  11/13/23  0527 11/12/23  1006 11/11/23  0602 11/10/23  0507 11/09/23  0505   NA  --   --  140  --   --   --  135  --  134*   POTASSIUM 4.0 4.0 2.8*   < >  --    < > 4.9   < > 4.9   CHLORIDE  --   --  109*  --   --   --  107  --  99   CO2  --   --  21*  --   --   --  19*  --  23   BUN  --   --  10.0  --   --   --  19.3  --  19.6   CR  --   --  0.91  --  1.16*  --  1.13*   < > 1.40*   ANIONGAP  --   --  10  --   --   --  9  --  12   LOI  --   --  7.4*  --   --   --  7.3*  --  8.1*   GLC  --   --  85  --   --   --  88  --  91    < > = values in this interval not displayed.       Medications:   Personally Reviewed.  Medications    sodium chloride 100 mL/hr at 11/16/23 0344      [Held by provider] busPIRone  30 mg Oral BID    artificial tears  1 drop Both Eyes TID    ceFAZolin  2 g Intravenous Q8H    diphenoxylate-atropine  2 tablet Oral TID    [Held by provider] DULoxetine  60 mg Oral BID    [START ON 11/17/2023] famotidine  20 mg Oral Daily    heparin ANTICOAGULANT  5,000 Units Subcutaneous Q12H    HYDROmorphone  4 mg Oral 5x Daily    lactobacillus rhamnosus (GG)  1 capsule Oral BID    lipase-protease-amylase  1 capsule Oral TID w/meals    lisinopril  20 mg Oral Daily    metoprolol succinate ER  50 mg Oral Daily    nitroFURantoin macrocrystal  50 mg Oral At Bedtime    pantoprazole  40 mg Oral BID    Pregabalin  100 mg Oral TID    sodium bicarbonate  650 mg Oral BID    sodium chloride (PF)  10-40 mL Intracatheter Q7 Days    sodium chloride (PF)  10-40 mL Intracatheter Q8H    traZODone  150 mg Oral At Bedtime

## 2023-11-16 NOTE — PLAN OF CARE
Patient is A & O x 4. SBA walker gait belt. Pt reports throbbing pain in anus area. Pt utilizing PO 4mg dilaudid sched. 5x daily for pain. Pt has ostomy bag. R PIV running ns @ 100 mL/h. No escobedo or drains. Regular diet. Pt had nausea intermittently today. Patient also has a midline catheter.     Protocols:  Mg+  K+  Phos+

## 2023-11-16 NOTE — PROGRESS NOTES
"  SPIRITUAL HEALTH SERVICES Progress Note     WW 3104     Referral Source/Reason for Visit: LOS visit              Summary and Recommendations -   \"Joann\" had recovered from an infection from her \"port\", she has long complicated illness narrative and longs to be healed, she is discharge later today.  Joann's Faith kailyn comforts her, she requested a special blessing of healing.    PLAN: [facility] is available per pt request    Jason Main M.Div., [facility]  Staff    [phone]   [facility] is available 24/7 for emergent requests and consults, either by paging the on-call  or by entering an ASAP/STAT consult in [employer], which will also page the on-call .    "

## 2023-11-16 NOTE — PROGRESS NOTES
[facility]    Writer spoke with pt to review benefits and resumption of her home infusions.  Pt will also be on Cefazolin IV and will need teaching re: admin of it.  Writer plans to meet with pt this afternoon, between 2-3 pm, to instruct her on admin of the Cefazolin.  Writer will place an ext set on the PICC, once it is placed. [facility] will deliver drug/hydration and supplies to her home this evening.    Updates to/from NAKUL Phan, today.      Michelle Rosa RN, BSN  [facility] Liaison  [phone] (Mon through Fri, 8:00 am-5:00 pm)  [phone] (Office)

## 2023-11-16 NOTE — PROCEDURES
"Procedures  PICC Line Insertion Procedure Note  Pt. Name: Dee Mattson  MRN:        [id number]    Procedure: Insertion of a  single Lumen  4 fr  Bard SOLO (valved) Power PICC, Lot number [id number]    Indications: Antibiotics    Contraindications : Right arm midline and previous failed PICC     Procedure Details     Patient identified with 2 identifiers and \"Time Out\" conducted.  .     Central line insertion bundle followed: hand hygiene performed prior to procedure, site cleansed with cholraprep, hat, mask, sterile gloves, sterile gown worn, patient draped with maximum barrier head to toe drape, sterile field maintained.    The vein was assessed and found to be compressible and of adequate size.     Lidocaine 1% 3 ml administered sq to the insertion site. A 4 Fr PICC was inserted into the basilic vein of the left arm with ultrasound guidance. 1 attempt(s) required to access vein.   Catheter threaded without difficulty. Good blood return noted.    Modified Seldinger Technique used for insertion.    The 8 sharps that are included in the PICC insertion kit were accounted for and disposed of in the sharps container prior to breakdown of the sterile field.    Catheter secured with Statlock, biopatch and Tegaderm dressing applied.    Findings:    Total catheter length  42 cm, with 1 cm exposed. Mid upper arm circumference is 29 cm. Catheter was flushed with 20 cc NS. Patient  tolerated procedure well.    Tip placement verified by 3CG . Tip placement in the SVC.        CLABSI prevention brochure left at bedside.    Patient's primary RN notified PICC is ready for use.      Comments:        Nilam Douglass RN,BSN  Vascular Access - [facility]      "

## 2023-11-16 NOTE — PROGRESS NOTES
Physical Therapy Discharge Summary    Reason for therapy discharge:    Discharged to home with home therapy.    Progress towards therapy goal(s). See goals on Care Plan in [facility] electronic health record for goal details.  Goals not met.  Barriers to achieving goals:   discharge from facility and weakness.    Therapy recommendation(s):    Continued therapy is recommended.  Rationale/Recommendations:  Recommending TCU patient unable to discharge to TCU due to infusion needs. Discharging home with home PT for continued strengthening.

## 2023-11-17 NOTE — TELEPHONE ENCOUNTER
Medication Question or Refill    Contacts         Type Contact Phone/Fax    11/17/2023 12:08 PM CST Phone (Incoming) Dee Mattson (Self) [phone] (M)            What medication are you calling about (include dose and sig)?: See below    Preferred Pharmacy:   [id number] [place], [place] - [address] AT [facility] [address]  [address]  [facility] [address]  Phone: [phone] Fax: [phone]      Controlled Substance Agreement on file:   CSA -- Patient Level:     [Media Unavailable] Controlled Substance Agreement - Opioid - Scan on 5/25/2023  2:38 PM   [Media Unavailable] Controlled Substance Agreement - Opioid - Scan on 11/30/2018   [Media Unavailable] Controlled Substance Agreement - Opioid - Scan on 11/30/2018   [Media Unavailable] Controlled Substance Agreement - Opioid - Scan on 11/1/2017       Who prescribed the medication?: Jeffery Mccall    Do you need a refill? Yes    When did you use the medication last? 11.17.23    Patient offered an appointment? No    Do you have any questions or concerns?  No      Okay to leave a detailed message?: Yes at Cell number on file:    Telephone Information:   Mobile [phone]

## 2023-11-20 NOTE — TELEPHONE ENCOUNTER
I spoke with Justina, she is off tomorrow. She would like a RN to check in on Dee tomorrow. She refused to go into the ER to be seen today.    I spoke with the patient, she stated she is not going to the hospital and she is going to lay down and take a nap. Denies headache, chest pain, trouble breathing, SOB, wheezing, or fever.

## 2023-11-20 NOTE — TELEPHONE ENCOUNTER
"PT home care nurse called to talk with a nurse. Home care RN is concerned about the symptoms the pt has been having today. Home Rn states the pt has been having double vision that the pt states is like looking at thing son greg top of the other. Pts b/p has been lower per RN. Pt reports being weak and gaining 4 1./2 lbs overnight last night. Pt reports tot the home care RN that she is not drinking many fluids and that the pt is lightheaded from time to time. Home care RN states that she feels something is \"off\" with the pt and she did drawn labs form the pt today and was awaiting those results. Writer and home care RN agreed that the pt needs to be seen in the ER for evaluation of symptoms. Home care RN was going to call the pt and advise this and call 911 for her. Writer advised the home care RN to call back if she needed anything. Home care RN had no further questions or concerns. Justina is the Nurse and her number is [phone] and okay to leave .     Routing to provider as an FYI.   "

## 2023-11-21 NOTE — TELEPHONE ENCOUNTER
Home Care is calling regarding an established patient with [facility].       Requesting orders from: Duke Mccall  Provider is following patient: Yes  Is this a 60-day recertification request?  No    Orders Requested    Physical Therapy  Request for initial certification (first set of orders)   Frequency:  2x/wk for 2 wks  then 1x/wk for 2 wks  For home exercise programming, balance issues, gait training       Information was gathered and will be sent to provider for review.  RN will contact Home Care with information after provider review.  Confirmed ok to leave a detailed message with call back.  Contact information confirmed and updated as needed.    Leesa Crowley RN

## 2023-11-21 NOTE — TELEPHONE ENCOUNTER
I spoke with this patient today, I wanted to check in on her as she refused to go to the ER last night as recommended by her home health nurse and myself. Patients blood pressure and sx have normalized. Patient stated she is feeling good today.  Reminded her to call us with any questions or concerns, she verbalized her understanding of the provided information.    NADEEM Carty

## 2023-11-22 NOTE — TELEPHONE ENCOUNTER
Outgoing call to [facility] on [address], they are out of the 4mg hydromorphone as well.     They do have 8mg in stock with qty 120 left.     Pt would need qty 59 filled for a total to equal her 118 left of her 4mg remaining of her qty 150 script since she filled qty 32 tablets of 4mg today.

## 2023-11-22 NOTE — TELEPHONE ENCOUNTER
Okay to change to lactated Ringer's.  My advice would be that patient be evaluated.  Acute and diagnostic services may be appropriate.

## 2023-11-22 NOTE — TELEPHONE ENCOUNTER
HYDROmorphone (DILAUDID) 4 MG tablet  Is backordered  They dispensed all they had per pt. [32 tablets]  They do Hydromorphone 8 mg tablets if you are willing to send a new rx for this strenghth with equivalent dosing

## 2023-11-22 NOTE — TELEPHONE ENCOUNTER
Outgoing call to [facility] in [place].     [facility] confirmed pt did  32 tablets of 4mg hydromorphone today.     Patient did lie about getting these today.     [facility] did confirm that their 8mg hydromorphone is scored, but to check with [facility] [address] if not wanting to send in 8mg tablets for patient to take half a tablet at a time.       Pt in need of qty 118 more tablets of her hydromorphone.

## 2023-11-22 NOTE — TELEPHONE ENCOUNTER
Relayed Attila Deweybenny's message to Amber.     She will change sodium chloride fluids to lactated ringers.     She states concern about hemoglobin down 2.5 points over last couple of draws and patient denying bleeding.     Writer informed Amber she will call pt and inform her that Attila wants her to be seen and will triage if needed.  Reason for Disposition    Blurred vision and new or worsening    Additional Information    Negative: Followed an eye injury    Negative: Eye pain from chemical in the eye    Negative: Eye pain from foreign body in eye    Negative: Has sinus pain or pressure    Negative: SEVERE eye pain    Negative: Complete loss of vision in one or both eyes    Negative: Eyelids are very swollen (shut or almost) and fever    Negative: Eyelid (outer) is very red and fever    Negative: Foreign body sensation ('feels like something is in there') and irrigation didn't help    Negative: Vomiting    Negative: Ulcer or sore seen on the cornea (clear center part of the eye)    Negative: Recent eye surgery and increasing eye pain    Protocols used: Eye Pain and Other Symptoms-A-OH

## 2023-11-22 NOTE — TELEPHONE ENCOUNTER
"S-(situation): blurry vision and decreased hemoglobin.     B-(background): vision issue started 11/8-16 when she was in hospital. \"Comes and goes\" providers in hospital were aware of this issue and \"they didn't say anything I think they wanted me to go home\"    A-(assessment): pt currently seeing double.  Double vision progressively getting worse.   Denies eye pain, headaches.   Pt states lightheaded or dizziness.       R-(recommendations): go to ED/UC now or office with PCP approval. Hemoglobin down 9.7 from 12.3 from 13 days ago. Pt denies black stools or external bleeding. Pt states has no ride, has appointment Monday 11/27. Pt states refusing to go to ED as she has no ride. \"All they will do is give me fluids I will go to eye doctor next week.\" Pt states no appointment with eye doctor at this time. Pt states no rides and will not go anywhere until next week. Educated to go to ED with red flag symptoms.     Pt states that her pharmacy is out of the 4mg hydromorphone tablets, they are on backorder. Pt states she is completely out of this medication and needs it today. Pt states they do have 8mg and she can cut these in half.     "

## 2023-11-23 NOTE — TELEPHONE ENCOUNTER
She had an DYLAN during her hospitalization and received lots of IV fluids.  I suspect that the decrease in hemoglobin is likely hemodilution, especially in the context of lack of evidence of bleed.    Help her schedule a telephone visit with me next week so that we can discuss her pain medication.  I checked the  database and for what ever reason it does not show that she picked up the Dilaudid but perhaps the  did not update yet (?).    I can send in the 8 mg dose of Dilaudid, as long as she follows up with Ana Cristina next week in clinic, and virtual visit with me sometime later in the week after that.

## 2023-11-24 NOTE — TELEPHONE ENCOUNTER
Outgoing call to set up telephone visit with Attila Mccall next week. Scheduled after seeing eye doctor for her double vision next Wednesday.

## 2023-11-27 NOTE — PROGRESS NOTES
Assessment & Plan     MSSA bacteremia  Port or reservoir infection, subsequent encounter  Overall improved. Continuing ancef for 3 weeks total-about 1 week left   Patient is doing the injections. Does have home health nurse coming one time per week.  Recommendation was to get a new port when antibiotics were completed.  Recommendation to get CBC with differential BMP ESR CRP inflammation (NOT the hi   sens version)  weekly-not done today as patient reports home health nurse will do on Wednesday.    Benign essential hypertension   lisinopril dose increased to 40 mg daily, metoprolol dose increased to 50 mg daily while hospitalized. Patient was also told to resume amlodipine 2.5 mg. Per patient she does not take amlodipine. BP is ok here today 138/81 -can continue to hold amlodipine unless any changes in blood pressure    Gastroesophageal reflux disease with esophagitis, unspecified whether hemorrhage  Patient previously instructed to take by GI doctor. She has not been taking this-I will send it today for patient.    - omeprazole (PRILOSEC) 40 MG DR capsule; Take 1 capsule (40 mg) by mouth daily    Hyperparathyroidism (H24)  Calcium low throughout hospitalization. She was also noted to have hyperparathyroidism in 2021. She was supposed to have a parathyroid uptake scan but this did not occur. I offered to order however patient prefers to take to primary provider. 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 Reports does about 2 L per night.    Diplopia  New symptom-ongoing since hospitalization. Does not appear to be side effect of ancef. I suspect this is from the fall pre hospitalization and post concussion symptoms. Head CT while hospitalized were normal. I still recommend she see ophthalmology. If that visit is normal she may benefit from referral to concussion clinic for further evaluation.            MED REC REQUIRED  Post  Medication Reconciliation Status:  Discharge medications reconciled and changed, see notes/orders      LAKSHMI MOHAN CNP  [facility]    Anitra Price is a 73 year old, presenting for the following health issues:  Hospital F/U (Blood infection, pt states feeling better however pt is stating they are seeing double (has appt with ophthalmology on Wednesday), fatigue, weakness.  )        11/27/2023     2:12 PM   Additional Questions   Roomed by Ashley MOSQUEDA       History of Present Illness       Reason for visit:  Hospital Follow Up      Eye doctor appt on Weds. Double vision. If closes one eye symptoms will go away.    Has phone appointment with primary provider on 11/30 for follow up.     Patient hospitalized 11/8-11/16 with MSSA bacteremia, Port infection  And subsequent Port removal on 11/13.   Has High output ileostomy and does saline at night due to this.   While hospitalized had Acute kidney injury on CKD 2 and was given subsequent fluds   Had Hyperkalemia And Hyponatremia but this resolved     History of port infection multiple times.  Started on IV cefazolin for 3 weeks.  Left-sided PICC line placed for IV antibiotic.  Patient is getting 2 L of IV fluid at night.  Will resume IV hydration through PICC line.    Port will be placed at the end of the antibiotic course. TTE did not show vegetation.  Follow-up ADELAIDA not recommended. Evaluated by infectious disease.      Blood pressure was in higher side.  lisinopril dose increased to 40 mg daily, metoprolol dose increased to 50 mg daily     Had episode of lipsmacking.  Question of medication side effect.  Decreased BuSpar and trazodone dose.  Resume Cymbalta.  No further episodes.        PT is going to house-hasn't started yet.   Using walker-     Fell before going to hospital. Hit head on shower floor. Head CT was negative. Is now having double vision-with headache              Review of Systems         Objective    /81  "  Pulse 79   Temp 98.3  F (36.8  C) (Oral)   Ht 1.549 m (5' 0.98\")   Wt 54.4 kg (120 lb)   SpO2 99%   BMI 22.69 kg/m    Body mass index is 22.69 kg/m .  Physical Exam  Constitutional:       Appearance: Normal appearance.   Cardiovascular:      Rate and Rhythm: Normal rate and regular rhythm.   Pulmonary:      Breath sounds: Normal breath sounds.   Neurological:      General: No focal deficit present.      Mental Status: She is alert.      Cranial Nerves: Cranial nerves 2-12 are intact.      Comments: Forgetful                               "

## 2023-11-28 NOTE — TELEPHONE ENCOUNTER
PA Initiation    Medication: HYDROMORPHONE HCL 8 MG PO TABS  Insurance Company: [employer] - Phone [phone] Fax [phone]  Pharmacy Filling the Rx: [id number] [place], [place] - [address]  Filling Pharmacy Phone: [phone]  Filling Pharmacy Fax: [phone]  Start Date: 11/28/2023

## 2023-11-28 NOTE — TELEPHONE ENCOUNTER
Prior Authorization Approval    Medication: HYDROMORPHONE HCL 8 MG PO TABS  Authorization Effective Date: 10/28/2023  Authorization Expiration Date: 11/28/2024  Approved Dose/Quantity:   Reference #:     Insurance Company: [employer] - Phone [phone] Fax [phone]  Expected CoPay: $    CoPay Card Available:      Financial Assistance Needed:   Which Pharmacy is filling the prescription: [id number] [place], [place] - [address] AT [facility]  Pharmacy Notified: YES  Patient Notified: **Instructed pharmacy to notify patient when script is ready to /ship.**

## 2023-11-30 NOTE — PROGRESS NOTES
Dee is a 73 year old who is being evaluated via a billable telephone visit.      What phone number would you like to be contacted at? [phone]  How would you like to obtain your AVS? Mail a copy    Distant Location (provider location):  On-site    Assessment & Plan     Hypocalcemia  She has a history of elevated PTH.  This has not been worked up since 2021.  I am going to recheck a vitamin D level, PTH, and ionized calcium with her next set of infusion labs.    I have been messaging back-and-forth with the infusion pharmacy team.  She is currently using phosphate in her infusion solution which is not compatible with IV calcium supplementation.    Therefore, I am going to have Dee use her home calcium supplement tablets that she has on hand, which are 600 mg each.  I would like her to take 1 tablet 3 times a day  - Vitamin D Deficiency; Future  - Parathyroid Hormone Intact; Future  - Ionized Calcium; Future    Hypokalemia  I asked that the infusion pharmacy add some potassium to her IV solution.  We will need to monitor this closely as her potassium can vary wildly.    Generalized muscle weakness  Probably multifactorial.  She does have a history of chronic fatigue syndrome.  Electrolyte abnormalities are probably contributing as well as a low hemoglobin.    She is giving herself 1 L of fluids daily in addition to the 2 L she gets every night.  I asked that she limit her boluses to no more than 500 mL/day and we will see how her hemoglobin response    Double vision  She likely suffered a concussion after falling and hitting her head very forcefully on the shower floor.  Now having double vision.  Saw the eye doctor recently and was told that her eyes are okay.    I told her that her symptoms should get better with time.  She will follow-up with me next month and if she is still having symptoms we could refer her to the concussion clinic    Infection of venous access port, subsequent encounter  She has a  history of recurrent port infection.  Her most recent port was taken out and she had a PICC line placed.    The idea is that we would replace her port after she was done with her IV antibiotics.  She tells me that her IV antibiotics should be done in about 4 days.  I am going to place an order so that we she can have this done    - REPLACE PICC W PORT OR PUMP    Essential hypertension  She was seen earlier this week by an  colleague and found to have normal blood pressure despite not taking her amlodipine.  She will continue holding her amlodipine for now.  She is taking higher dose metoprolol now, 50 mg daily, lisinopril 20 mg twice daily        [facility]   Dee is a 73 year old, presenting for the following health issues:  Follow Up (Very weak, run down, double vision)    History of Present Illness       Reason for visit:  Hospital Follow Up      Hospital discharge follow-up appointment done virtually because patient has difficulty with transportation.    She presented to the hospital and was found to have very high potassium levels.  She also had an infected port.  Port was removed and she was placed on IV antibiotics.    She had some changes made to her blood pressure medication.  Blood pressure was recently assessed by a colleague in the family medicine department and found to be stable despite not taking her amlodipine    She continues to feel weak.  Having double vision but recently saw the eye doctor and told her eyes were normal.      Review of Systems   Constitutional, HEENT, cardiovascular, pulmonary, gi and gu systems are negative, except as otherwise noted.      Objective           Vitals:  No vitals were obtained today due to virtual visit.    Physical Exam   healthy, alert, and no distress  PSYCH: Alert and oriented times 3; coherent speech, normal   rate and volume, able to articulate logical thoughts, able   to abstract  reason, no tangential thoughts, no hallucinations   or delusions  Her affect is normal  RESP: No cough, no audible wheezing, able to talk in full sentences  Remainder of exam unable to be completed due to telephone visits            Phone call duration: 40 minutes

## 2023-12-04 NOTE — TELEPHONE ENCOUNTER
KWAKUHI calling to discuss end of abx. Current end date is IV Ancef thru 12/6/23 for MSSE bacteremia.   Pt was to be seen 2 weeks after discharge.. Pt was not scheduled till January.       Per  stop abx this Thursday and no need for follow up.     [facility] informed.

## 2023-12-06 NOTE — TELEPHONE ENCOUNTER
Home Health Care    Reason for call:  Verbal orders    Orders are needed for this patient.  Skilled Nursing: extend for 1x a week for 5 weeks  for iv management, labs, general wellness, fall prevention and home health aide  Home health aide 1x a week for 2 more weeks for bathing & showering assistance     Pt Provider: Attila Mccall    Phone Number Homecare Nurse can be reached at: [phone]    Can we leave a detailed message on this number? YES

## 2023-12-08 NOTE — TELEPHONE ENCOUNTER
S-(situation):   Radha, GI, calling to report VS  BP Sitting 100/57  BP Standing 96/61  Pulse 106 at rest - high for her  Temp 99  Pt reported that she is shaky, has dizziness with ambulation  Patient is staying hydrated  No other symptoms    Patient does not have an at-home COVID test available.    B-(background): With friend yesterday who was just getting over a cold. Pulse usually in 70s-80s    A-(assessment): Possibly concerning for some kind of infection;     R-(recommendations): Routing to PCP to review and advise on monitoring needed.

## 2023-12-11 NOTE — TELEPHONE ENCOUNTER
Outgoing call, relayed provider message.     Denies fever at this time.   Pt report her BP to be in the 130s.   Will call back as directed.   No further questions.     Pay P. RN

## 2023-12-11 NOTE — TELEPHONE ENCOUNTER
Have her continue to monitor her symptoms for the next 24 to 48 hours.  If fever spikes and blood pressure becomes even lower than reported, have her get evaluated.  Otherwise okay to use Tylenol

## 2023-12-13 NOTE — TELEPHONE ENCOUNTER
"Last Written Prescription Date:  6/27/2022  Last Fill Quantity: 1 ml,  # refills: 11   Last office visit provider:  5/23/2023     Requested Prescriptions   Pending Prescriptions Disp Refills     cyanocobalamin (CYANOCOBALAMIN) 1000 MCG/ML injection [Pharmacy Med Name: CYANOCOBALAMIN 1000MCG/ML INJ 1ML] 1 mL 11     Sig: ADMINISTER 1 ML(1000 MCG) IN THE MUSCLE EVERY 30 DAYS       Vitamin Supplements (Adult) Protocol Passed - 7/18/2023  6:05 AM        Passed - High dose Vitamin D not ordered        Passed - Recent (12 mo) or future (30 days) visit within the authorizing provider's specialty     Patient has had an office visit with the authorizing provider or a provider within the authorizing providers department within the previous 12 mos or has a future within next 30 days. See \"Patient Info\" tab in inbasket, or \"Choose Columns\" in Meds & Orders section of the refill encounter.              Passed - Medication is active on med list             Katerine Hodges RN 07/18/23 3:15 PM  " Because the PDMP site is down I did call [facility] pharmacy and confirmed she did not  either the 40 mg or the 50 mg Vyvanse script sent on 12/6

## 2023-12-20 NOTE — TELEPHONE ENCOUNTER
Home Care is calling regarding an established patient with [facility].       Requesting orders from: Duke Mccall  Provider is following patient: Yes  Is this a 60-day recertification request?  No    Orders Requested    Skilled Nursing  Request for continuation of care with no increase or decrease in frequency  Frequency:  1x/wk for 2 wks          Verbal orders given.  Home Care will send orders for provider to sign.  Confirmed ok to leave a detailed message with call back.  Contact information confirmed and updated as needed.    Sheree Hodges RN

## 2023-12-29 NOTE — TELEPHONE ENCOUNTER
Patient agreed to cancel the 1/3 appt because she is doing good. Patient is wondering if Dr. Wasserman can order a port to be placed at [facility].

## 2023-12-29 NOTE — TELEPHONE ENCOUNTER
----- Message from Ina Coulter RN sent at 12/29/2023 11:39 AM CST -----  This pt is scheduled as a new with Dr Armstrong and should be scheduled as a follow up with Dr Wasserman. The appt notes say scheduled per pt preference, but I am unsure what her preference is. She was also in patient 1.5 months ago and if she is doing fine, doesn't need the appt.  Thanks,  Ina

## 2024-01-01 ENCOUNTER — TELEPHONE (OUTPATIENT)
Dept: INTERNAL MEDICINE | Facility: CLINIC | Age: 74
End: 2024-01-01
Payer: COMMERCIAL

## 2024-01-01 ENCOUNTER — LAB REQUISITION (OUTPATIENT)
Dept: LAB | Facility: CLINIC | Age: 74
End: 2024-01-01
Payer: COMMERCIAL

## 2024-01-01 ENCOUNTER — TELEPHONE (OUTPATIENT)
Dept: INTERNAL MEDICINE | Facility: CLINIC | Age: 74
End: 2024-01-01

## 2024-01-01 ENCOUNTER — HOSPITAL ENCOUNTER (OUTPATIENT)
Facility: CLINIC | Age: 74
Discharge: HOME | End: 2024-01-31
Attending: INTERNAL MEDICINE | Admitting: INTERNAL MEDICINE
Payer: COMMERCIAL

## 2024-01-01 ENCOUNTER — MEDICAL CORRESPONDENCE (OUTPATIENT)
Dept: HEALTH INFORMATION MANAGEMENT | Facility: CLINIC | Age: 74
End: 2024-01-01

## 2024-01-01 ENCOUNTER — ANESTHESIA EVENT (OUTPATIENT)
Dept: SURGERY | Facility: CLINIC | Age: 74
End: 2024-01-01
Payer: COMMERCIAL

## 2024-01-01 ENCOUNTER — HOSPITAL ENCOUNTER (OUTPATIENT)
Dept: INTERVENTIONAL RADIOLOGY/VASCULAR | Facility: CLINIC | Age: 74
Discharge: HOME | End: 2024-02-07
Attending: NURSE PRACTITIONER | Admitting: RADIOLOGY
Payer: COMMERCIAL

## 2024-01-01 ENCOUNTER — MEDICAL CORRESPONDENCE (OUTPATIENT)
Dept: HEALTH INFORMATION MANAGEMENT | Facility: CLINIC | Age: 74
End: 2024-01-01
Payer: COMMERCIAL

## 2024-01-01 ENCOUNTER — ANESTHESIA (OUTPATIENT)
Dept: SURGERY | Facility: CLINIC | Age: 74
End: 2024-01-01
Payer: COMMERCIAL

## 2024-01-01 ENCOUNTER — OFFICE VISIT (OUTPATIENT)
Dept: INTERNAL MEDICINE | Facility: CLINIC | Age: 74
End: 2024-01-01
Payer: COMMERCIAL

## 2024-01-01 ENCOUNTER — TELEPHONE (OUTPATIENT)
Dept: INTERVENTIONAL RADIOLOGY/VASCULAR | Facility: CLINIC | Age: 74
End: 2024-01-01
Payer: COMMERCIAL

## 2024-01-01 ENCOUNTER — VIRTUAL VISIT (OUTPATIENT)
Dept: INTERNAL MEDICINE | Facility: CLINIC | Age: 74
End: 2024-01-01
Payer: COMMERCIAL

## 2024-01-01 ENCOUNTER — NURSE TRIAGE (OUTPATIENT)
Dept: NURSING | Facility: CLINIC | Age: 74
End: 2024-01-01
Payer: COMMERCIAL

## 2024-01-01 VITALS
RESPIRATION RATE: 20 BRPM | OXYGEN SATURATION: 97 % | SYSTOLIC BLOOD PRESSURE: 162 MMHG | TEMPERATURE: 97.6 F | HEART RATE: 81 BPM | DIASTOLIC BLOOD PRESSURE: 74 MMHG

## 2024-01-01 VITALS
DIASTOLIC BLOOD PRESSURE: 63 MMHG | OXYGEN SATURATION: 99 % | TEMPERATURE: 97.2 F | HEART RATE: 68 BPM | RESPIRATION RATE: 20 BRPM | SYSTOLIC BLOOD PRESSURE: 142 MMHG

## 2024-01-01 VITALS
RESPIRATION RATE: 18 BRPM | HEART RATE: 58 BPM | DIASTOLIC BLOOD PRESSURE: 78 MMHG | HEIGHT: 61 IN | WEIGHT: 115 LBS | OXYGEN SATURATION: 97 % | BODY MASS INDEX: 21.71 KG/M2 | TEMPERATURE: 98.1 F | SYSTOLIC BLOOD PRESSURE: 112 MMHG

## 2024-01-01 DIAGNOSIS — M53.3 CHRONIC COCCYGEAL PAIN: ICD-10-CM

## 2024-01-01 DIAGNOSIS — E21.3 HYPERPARATHYROIDISM (H): ICD-10-CM

## 2024-01-01 DIAGNOSIS — F41.9 ANXIETY AND DEPRESSION: ICD-10-CM

## 2024-01-01 DIAGNOSIS — F11.90 CHRONIC, CONTINUOUS USE OF OPIOIDS: ICD-10-CM

## 2024-01-01 DIAGNOSIS — R78.81 BACTEREMIA: ICD-10-CM

## 2024-01-01 DIAGNOSIS — R10.13 ABDOMINAL PAIN, EPIGASTRIC: ICD-10-CM

## 2024-01-01 DIAGNOSIS — I82.5Y9 CHRONIC DEEP VEIN THROMBOSIS (DVT) OF PROXIMAL VEIN OF LOWER EXTREMITY, UNSPECIFIED LATERALITY (H): ICD-10-CM

## 2024-01-01 DIAGNOSIS — E83.51 HYPOCALCEMIA: ICD-10-CM

## 2024-01-01 DIAGNOSIS — N18.2 STAGE 2 CHRONIC KIDNEY DISEASE: ICD-10-CM

## 2024-01-01 DIAGNOSIS — G89.29 CHRONIC COCCYGEAL PAIN: ICD-10-CM

## 2024-01-01 DIAGNOSIS — M79.7 FIBROMYALGIA: ICD-10-CM

## 2024-01-01 DIAGNOSIS — I10 BENIGN ESSENTIAL HYPERTENSION: ICD-10-CM

## 2024-01-01 DIAGNOSIS — Z01.818 PREOPERATIVE EXAMINATION: Primary | ICD-10-CM

## 2024-01-01 DIAGNOSIS — F32.A DEPRESSION: ICD-10-CM

## 2024-01-01 DIAGNOSIS — F32.5 MAJOR DEPRESSIVE DISORDER IN REMISSION, UNSPECIFIED WHETHER RECURRENT (H): ICD-10-CM

## 2024-01-01 DIAGNOSIS — K90.829 SHORT BOWEL SYNDROME, UNSPECIFIED WHETHER COLON IN CONTINUITY: ICD-10-CM

## 2024-01-01 DIAGNOSIS — M79.7 FIBROMYALGIA: Chronic | ICD-10-CM

## 2024-01-01 DIAGNOSIS — E86.0 DEHYDRATION: Chronic | ICD-10-CM

## 2024-01-01 DIAGNOSIS — Z53.9 DIAGNOSIS NOT YET DEFINED: Primary | ICD-10-CM

## 2024-01-01 DIAGNOSIS — E83.42 HYPOMAGNESEMIA: ICD-10-CM

## 2024-01-01 DIAGNOSIS — F32.A ANXIETY AND DEPRESSION: ICD-10-CM

## 2024-01-01 DIAGNOSIS — K90.829 SHORT BOWEL SYNDROME, UNSPECIFIED WHETHER COLON IN CONTINUITY: Primary | ICD-10-CM

## 2024-01-01 LAB
ALBUMIN SERPL BCG-MCNC: 3.3 G/DL (ref 3.5–5.2)
ALBUMIN SERPL BCG-MCNC: 3.5 G/DL (ref 3.5–5.2)
ALBUMIN SERPL BCG-MCNC: 3.6 G/DL (ref 3.5–5.2)
ALBUMIN SERPL BCG-MCNC: 3.7 G/DL (ref 3.5–5.2)
ALBUMIN SERPL BCG-MCNC: 3.8 G/DL (ref 3.5–5.2)
ALBUMIN SERPL BCG-MCNC: 3.9 G/DL (ref 3.5–5.2)
ALP SERPL-CCNC: 151 U/L (ref 40–150)
ALP SERPL-CCNC: 154 U/L (ref 40–150)
ALP SERPL-CCNC: 158 U/L (ref 40–150)
ALP SERPL-CCNC: 163 U/L (ref 40–150)
ALP SERPL-CCNC: 163 U/L (ref 40–150)
ALP SERPL-CCNC: 167 U/L (ref 40–150)
ALT SERPL W P-5'-P-CCNC: 12 U/L (ref 0–50)
ALT SERPL W P-5'-P-CCNC: 13 U/L (ref 0–50)
ALT SERPL W P-5'-P-CCNC: 15 U/L (ref 0–50)
ALT SERPL W P-5'-P-CCNC: 18 U/L (ref 0–50)
ALT SERPL W P-5'-P-CCNC: 21 U/L (ref 0–50)
ALT SERPL W P-5'-P-CCNC: 8 U/L (ref 0–50)
ANION GAP SERPL CALCULATED.3IONS-SCNC: 4 MMOL/L (ref 7–15)
ANION GAP SERPL CALCULATED.3IONS-SCNC: 6 MMOL/L (ref 7–15)
ANION GAP SERPL CALCULATED.3IONS-SCNC: 7 MMOL/L (ref 7–15)
ANION GAP SERPL CALCULATED.3IONS-SCNC: 7 MMOL/L (ref 7–15)
ANION GAP SERPL CALCULATED.3IONS-SCNC: 8 MMOL/L (ref 7–15)
ANION GAP SERPL CALCULATED.3IONS-SCNC: 8 MMOL/L (ref 7–15)
AST SERPL W P-5'-P-CCNC: 23 U/L (ref 0–45)
AST SERPL W P-5'-P-CCNC: 25 U/L (ref 0–45)
AST SERPL W P-5'-P-CCNC: 25 U/L (ref 0–45)
AST SERPL W P-5'-P-CCNC: 28 U/L (ref 0–45)
AST SERPL W P-5'-P-CCNC: 30 U/L (ref 0–45)
AST SERPL W P-5'-P-CCNC: 31 U/L (ref 0–45)
BILIRUB SERPL-MCNC: 0.3 MG/DL
BILIRUB SERPL-MCNC: 0.4 MG/DL
BUN SERPL-MCNC: 18.8 MG/DL (ref 8–23)
BUN SERPL-MCNC: 18.9 MG/DL (ref 8–23)
BUN SERPL-MCNC: 22.3 MG/DL (ref 8–23)
BUN SERPL-MCNC: 22.7 MG/DL (ref 8–23)
BUN SERPL-MCNC: 24.5 MG/DL (ref 8–23)
BUN SERPL-MCNC: 28.1 MG/DL (ref 8–23)
CALCIUM SERPL-MCNC: 8 MG/DL (ref 8.8–10.2)
CALCIUM SERPL-MCNC: 8.2 MG/DL (ref 8.8–10.2)
CALCIUM SERPL-MCNC: 8.2 MG/DL (ref 8.8–10.2)
CALCIUM SERPL-MCNC: 8.3 MG/DL (ref 8.8–10.2)
CALCIUM SERPL-MCNC: 8.6 MG/DL (ref 8.8–10.2)
CALCIUM SERPL-MCNC: 8.6 MG/DL (ref 8.8–10.2)
CHLORIDE SERPL-SCNC: 104 MMOL/L (ref 98–107)
CHLORIDE SERPL-SCNC: 106 MMOL/L (ref 98–107)
CHLORIDE SERPL-SCNC: 107 MMOL/L (ref 98–107)
CHLORIDE SERPL-SCNC: 108 MMOL/L (ref 98–107)
CHLORIDE SERPL-SCNC: 108 MMOL/L (ref 98–107)
CHLORIDE SERPL-SCNC: 109 MMOL/L (ref 98–107)
CREAT SERPL-MCNC: 1.15 MG/DL (ref 0.51–0.95)
CREAT SERPL-MCNC: 1.18 MG/DL (ref 0.51–0.95)
CREAT SERPL-MCNC: 1.22 MG/DL (ref 0.51–0.95)
CREAT SERPL-MCNC: 1.26 MG/DL (ref 0.51–0.95)
CREAT SERPL-MCNC: 1.37 MG/DL (ref 0.51–0.95)
CREAT SERPL-MCNC: 1.88 MG/DL (ref 0.51–0.95)
DEPRECATED HCO3 PLAS-SCNC: 23 MMOL/L (ref 22–29)
DEPRECATED HCO3 PLAS-SCNC: 25 MMOL/L (ref 22–29)
DEPRECATED HCO3 PLAS-SCNC: 25 MMOL/L (ref 22–29)
DEPRECATED HCO3 PLAS-SCNC: 27 MMOL/L (ref 22–29)
DEPRECATED HCO3 PLAS-SCNC: 27 MMOL/L (ref 22–29)
DEPRECATED HCO3 PLAS-SCNC: 29 MMOL/L (ref 22–29)
EGFRCR SERPLBLD CKD-EPI 2021: 28 ML/MIN/1.73M2
EGFRCR SERPLBLD CKD-EPI 2021: 40 ML/MIN/1.73M2
EGFRCR SERPLBLD CKD-EPI 2021: 45 ML/MIN/1.73M2
EGFRCR SERPLBLD CKD-EPI 2021: 46 ML/MIN/1.73M2
EGFRCR SERPLBLD CKD-EPI 2021: 49 ML/MIN/1.73M2
EGFRCR SERPLBLD CKD-EPI 2021: 50 ML/MIN/1.73M2
GLUCOSE SERPL-MCNC: 102 MG/DL (ref 70–99)
GLUCOSE SERPL-MCNC: 105 MG/DL (ref 70–99)
GLUCOSE SERPL-MCNC: 112 MG/DL (ref 70–99)
GLUCOSE SERPL-MCNC: 116 MG/DL (ref 70–99)
GLUCOSE SERPL-MCNC: 130 MG/DL (ref 70–99)
GLUCOSE SERPL-MCNC: 71 MG/DL (ref 70–99)
HOLD SPECIMEN: NORMAL
MAGNESIUM SERPL-MCNC: 2 MG/DL (ref 1.7–2.3)
MAGNESIUM SERPL-MCNC: 2.1 MG/DL (ref 1.7–2.3)
MAGNESIUM SERPL-MCNC: 2.2 MG/DL (ref 1.7–2.3)
MAGNESIUM SERPL-MCNC: 2.3 MG/DL (ref 1.7–2.3)
PATH REPORT.COMMENTS IMP SPEC: NORMAL
PATH REPORT.FINAL DX SPEC: NORMAL
PATH REPORT.GROSS SPEC: NORMAL
PATH REPORT.MICROSCOPIC SPEC OTHER STN: NORMAL
PATH REPORT.RELEVANT HX SPEC: NORMAL
PHOSPHATE SERPL-MCNC: 3.1 MG/DL (ref 2.5–4.5)
PHOSPHATE SERPL-MCNC: 3.2 MG/DL (ref 2.5–4.5)
PHOSPHATE SERPL-MCNC: 3.6 MG/DL (ref 2.5–4.5)
PHOSPHATE SERPL-MCNC: 3.6 MG/DL (ref 2.5–4.5)
PHOSPHATE SERPL-MCNC: 3.8 MG/DL (ref 2.5–4.5)
PHOSPHATE SERPL-MCNC: 4.1 MG/DL (ref 2.5–4.5)
PHOTO IMAGE: NORMAL
POTASSIUM SERPL-SCNC: 4.5 MMOL/L (ref 3.4–5.3)
POTASSIUM SERPL-SCNC: 4.6 MMOL/L (ref 3.4–5.3)
POTASSIUM SERPL-SCNC: 4.7 MMOL/L (ref 3.4–5.3)
POTASSIUM SERPL-SCNC: 4.7 MMOL/L (ref 3.4–5.3)
POTASSIUM SERPL-SCNC: 4.9 MMOL/L (ref 3.4–5.3)
POTASSIUM SERPL-SCNC: 5 MMOL/L (ref 3.4–5.3)
PROT SERPL-MCNC: 6.2 G/DL (ref 6.4–8.3)
PROT SERPL-MCNC: 6.5 G/DL (ref 6.4–8.3)
PROT SERPL-MCNC: 6.7 G/DL (ref 6.4–8.3)
PROT SERPL-MCNC: 6.7 G/DL (ref 6.4–8.3)
PROT SERPL-MCNC: 6.8 G/DL (ref 6.4–8.3)
PROT SERPL-MCNC: 6.9 G/DL (ref 6.4–8.3)
SODIUM SERPL-SCNC: 137 MMOL/L (ref 135–145)
SODIUM SERPL-SCNC: 139 MMOL/L (ref 135–145)
SODIUM SERPL-SCNC: 140 MMOL/L (ref 135–145)
SODIUM SERPL-SCNC: 142 MMOL/L (ref 135–145)
UPPER GI ENDOSCOPY: NORMAL

## 2024-01-01 PROCEDURE — 84155 ASSAY OF PROTEIN SERUM: CPT | Performed by: NURSE PRACTITIONER

## 2024-01-01 PROCEDURE — 258N000003 HC RX IP 258 OP 636: Performed by: ANESTHESIOLOGY

## 2024-01-01 PROCEDURE — 84100 ASSAY OF PHOSPHORUS: CPT | Performed by: NURSE PRACTITIONER

## 2024-01-01 PROCEDURE — 710N000012 HC RECOVERY PHASE 2, PER MINUTE: Performed by: INTERNAL MEDICINE

## 2024-01-01 PROCEDURE — 272N000500 HC NEEDLE CR2

## 2024-01-01 PROCEDURE — 250N000011 HC RX IP 250 OP 636: Performed by: NURSE ANESTHETIST, CERTIFIED REGISTERED

## 2024-01-01 PROCEDURE — 250N000011 HC RX IP 250 OP 636: Performed by: RADIOLOGY

## 2024-01-01 PROCEDURE — 80053 COMPREHEN METABOLIC PANEL: CPT | Performed by: NURSE PRACTITIONER

## 2024-01-01 PROCEDURE — 250N000009 HC RX 250: Performed by: INTERNAL MEDICINE

## 2024-01-01 PROCEDURE — 250N000009 HC RX 250: Performed by: RADIOLOGY

## 2024-01-01 PROCEDURE — 83735 ASSAY OF MAGNESIUM: CPT | Performed by: NURSE PRACTITIONER

## 2024-01-01 PROCEDURE — 88342 IMHCHEM/IMCYTCHM 1ST ANTB: CPT | Mod: 26 | Performed by: PATHOLOGY

## 2024-01-01 PROCEDURE — 88342 IMHCHEM/IMCYTCHM 1ST ANTB: CPT | Mod: TC | Performed by: INTERNAL MEDICINE

## 2024-01-01 PROCEDURE — C1788 PORT, INDWELLING, IMP: HCPCS

## 2024-01-01 PROCEDURE — 36592 COLLECT BLOOD FROM PICC: CPT | Performed by: NURSE PRACTITIONER

## 2024-01-01 PROCEDURE — C1769 GUIDE WIRE: HCPCS

## 2024-01-01 PROCEDURE — 250N000009 HC RX 250: Performed by: NURSE PRACTITIONER

## 2024-01-01 PROCEDURE — 36561 INSERT TUNNELED CV CATH: CPT

## 2024-01-01 PROCEDURE — 370N000017 HC ANESTHESIA TECHNICAL FEE, PER MIN: Performed by: INTERNAL MEDICINE

## 2024-01-01 PROCEDURE — 272N000001 HC OR GENERAL SUPPLY STERILE: Performed by: INTERNAL MEDICINE

## 2024-01-01 PROCEDURE — 99152 MOD SED SAME PHYS/QHP 5/>YRS: CPT

## 2024-01-01 PROCEDURE — 258N000003 HC RX IP 258 OP 636: Performed by: NURSE PRACTITIONER

## 2024-01-01 PROCEDURE — 36591 DRAW BLOOD OFF VENOUS DEVICE: CPT | Performed by: NURSE PRACTITIONER

## 2024-01-01 PROCEDURE — 360N000075 HC SURGERY LEVEL 2, PER MIN: Performed by: INTERNAL MEDICINE

## 2024-01-01 PROCEDURE — G0179 MD RECERTIFICATION HHA PT: HCPCS | Performed by: NURSE PRACTITIONER

## 2024-01-01 PROCEDURE — 999N000141 HC STATISTIC PRE-PROCEDURE NURSING ASSESSMENT: Performed by: INTERNAL MEDICINE

## 2024-01-01 PROCEDURE — 82374 ASSAY BLOOD CARBON DIOXIDE: CPT | Performed by: NURSE PRACTITIONER

## 2024-01-01 PROCEDURE — 99214 OFFICE O/P EST MOD 30 MIN: CPT | Performed by: NURSE PRACTITIONER

## 2024-01-01 PROCEDURE — 99441 PR PHYSICIAN TELEPHONE EVALUATION 5-10 MIN: CPT | Mod: 93 | Performed by: NURSE PRACTITIONER

## 2024-01-01 PROCEDURE — 250N000011 HC RX IP 250 OP 636: Performed by: NURSE PRACTITIONER

## 2024-01-01 PROCEDURE — 88305 TISSUE EXAM BY PATHOLOGIST: CPT | Mod: 26 | Performed by: PATHOLOGY

## 2024-01-01 RX ORDER — LIDOCAINE HYDROCHLORIDE AND EPINEPHRINE 10; 10 MG/ML; UG/ML
20 INJECTION, SOLUTION INFILTRATION; PERINEURAL ONCE
Status: COMPLETED | OUTPATIENT
Start: 2024-01-01 | End: 2024-01-01

## 2024-01-01 RX ORDER — PROPOFOL 10 MG/ML
INJECTION, EMULSION INTRAVENOUS PRN
Status: DISCONTINUED | OUTPATIENT
Start: 2024-01-01 | End: 2024-01-01

## 2024-01-01 RX ORDER — FLUMAZENIL 0.1 MG/ML
0.2 INJECTION, SOLUTION INTRAVENOUS
Status: DISCONTINUED | OUTPATIENT
Start: 2024-01-01 | End: 2024-01-01 | Stop reason: HOSPADM

## 2024-01-01 RX ORDER — NALOXONE HYDROCHLORIDE 0.4 MG/ML
0.2 INJECTION, SOLUTION INTRAMUSCULAR; INTRAVENOUS; SUBCUTANEOUS
Status: DISCONTINUED | OUTPATIENT
Start: 2024-01-01 | End: 2024-01-01 | Stop reason: HOSPADM

## 2024-01-01 RX ORDER — ONDANSETRON 4 MG/1
4 TABLET, ORALLY DISINTEGRATING ORAL EVERY 30 MIN PRN
Status: DISCONTINUED | OUTPATIENT
Start: 2024-01-01 | End: 2024-01-01 | Stop reason: HOSPADM

## 2024-01-01 RX ORDER — EPINEPHRINE 1 MG/ML
0.1 INJECTION, SOLUTION INTRAMUSCULAR; SUBCUTANEOUS
Status: DISCONTINUED | OUTPATIENT
Start: 2024-01-01 | End: 2024-01-01 | Stop reason: HOSPADM

## 2024-01-01 RX ORDER — BUSPIRONE HYDROCHLORIDE 15 MG/1
15 TABLET ORAL 2 TIMES DAILY
Qty: 180 TABLET | Refills: 3 | Status: SHIPPED | OUTPATIENT
Start: 2024-01-01

## 2024-01-01 RX ORDER — HYDROMORPHONE HYDROCHLORIDE 8 MG/1
4 TABLET ORAL
Qty: 59 TABLET | Refills: 0 | Status: SHIPPED | OUTPATIENT
Start: 2024-01-01

## 2024-01-01 RX ORDER — PREGABALIN 200 MG/1
CAPSULE ORAL
Qty: 270 CAPSULE | Refills: 1 | Status: SHIPPED | OUTPATIENT
Start: 2024-01-01

## 2024-01-01 RX ORDER — FENTANYL CITRATE 50 UG/ML
50-100 INJECTION, SOLUTION INTRAMUSCULAR; INTRAVENOUS EVERY 5 MIN PRN
Status: DISCONTINUED | OUTPATIENT
Start: 2024-01-01 | End: 2024-01-01 | Stop reason: HOSPADM

## 2024-01-01 RX ORDER — ONDANSETRON 2 MG/ML
4 INJECTION INTRAMUSCULAR; INTRAVENOUS EVERY 30 MIN PRN
Status: DISCONTINUED | OUTPATIENT
Start: 2024-01-01 | End: 2024-01-01 | Stop reason: HOSPADM

## 2024-01-01 RX ORDER — ONDANSETRON 2 MG/ML
4 INJECTION INTRAMUSCULAR; INTRAVENOUS
Status: DISCONTINUED | OUTPATIENT
Start: 2024-01-01 | End: 2024-01-01 | Stop reason: HOSPADM

## 2024-01-01 RX ORDER — DULOXETIN HYDROCHLORIDE 60 MG/1
60 CAPSULE, DELAYED RELEASE ORAL 2 TIMES DAILY
Qty: 180 CAPSULE | Refills: 3 | Status: SHIPPED | OUTPATIENT
Start: 2024-01-01

## 2024-01-01 RX ORDER — HEPARIN SODIUM (PORCINE) LOCK FLUSH IV SOLN 100 UNIT/ML 100 UNIT/ML
5 SOLUTION INTRAVENOUS ONCE
Status: COMPLETED | OUTPATIENT
Start: 2024-01-01 | End: 2024-01-01

## 2024-01-01 RX ORDER — NALOXONE HYDROCHLORIDE 0.4 MG/ML
0.4 INJECTION, SOLUTION INTRAMUSCULAR; INTRAVENOUS; SUBCUTANEOUS
Status: DISCONTINUED | OUTPATIENT
Start: 2024-01-01 | End: 2024-01-01 | Stop reason: HOSPADM

## 2024-01-01 RX ORDER — ATROPINE SULFATE 0.1 MG/ML
1 INJECTION INTRAVENOUS
Status: DISCONTINUED | OUTPATIENT
Start: 2024-01-01 | End: 2024-01-01 | Stop reason: HOSPADM

## 2024-01-01 RX ORDER — HYDROMORPHONE HYDROCHLORIDE 8 MG/1
4 TABLET ORAL
Qty: 59 TABLET | Refills: 0 | Status: SHIPPED | OUTPATIENT
Start: 2024-01-01 | End: 2024-01-01

## 2024-01-01 RX ORDER — SIMETHICONE 40MG/0.6ML
133 SUSPENSION, DROPS(FINAL DOSAGE FORM)(ML) ORAL
Status: DISCONTINUED | OUTPATIENT
Start: 2024-01-01 | End: 2024-01-01 | Stop reason: HOSPADM

## 2024-01-01 RX ORDER — FENTANYL CITRATE 50 UG/ML
25 INJECTION, SOLUTION INTRAMUSCULAR; INTRAVENOUS
Status: DISCONTINUED | OUTPATIENT
Start: 2024-01-01 | End: 2024-01-01 | Stop reason: HOSPADM

## 2024-01-01 RX ORDER — LIDOCAINE 40 MG/G
CREAM TOPICAL
Status: DISCONTINUED | OUTPATIENT
Start: 2024-01-01 | End: 2024-01-01 | Stop reason: HOSPADM

## 2024-01-01 RX ORDER — SODIUM CHLORIDE, SODIUM LACTATE, POTASSIUM CHLORIDE, CALCIUM CHLORIDE 600; 310; 30; 20 MG/100ML; MG/100ML; MG/100ML; MG/100ML
INJECTION, SOLUTION INTRAVENOUS CONTINUOUS
Status: DISCONTINUED | OUTPATIENT
Start: 2024-01-01 | End: 2024-01-01 | Stop reason: HOSPADM

## 2024-01-01 RX ORDER — SODIUM CHLORIDE 9 MG/ML
INJECTION, SOLUTION INTRAVENOUS CONTINUOUS
Status: DISCONTINUED | OUTPATIENT
Start: 2024-01-01 | End: 2024-01-01 | Stop reason: HOSPADM

## 2024-01-01 RX ORDER — CEFAZOLIN SODIUM/WATER 2 G/20 ML
2 SYRINGE (ML) INTRAVENOUS
Status: COMPLETED | OUTPATIENT
Start: 2024-01-01 | End: 2024-01-01

## 2024-01-01 RX ORDER — IBUPROFEN 200 MG
200 TABLET ORAL EVERY 4 HOURS PRN
COMMUNITY

## 2024-01-01 RX ORDER — PROCHLORPERAZINE MALEATE 5 MG
5 TABLET ORAL EVERY 6 HOURS PRN
Status: DISCONTINUED | OUTPATIENT
Start: 2024-01-01 | End: 2024-01-01 | Stop reason: HOSPADM

## 2024-01-01 RX ORDER — FENTANYL CITRATE 50 UG/ML
25-50 INJECTION, SOLUTION INTRAMUSCULAR; INTRAVENOUS EVERY 5 MIN PRN
Status: DISCONTINUED | OUTPATIENT
Start: 2024-01-01 | End: 2024-01-01 | Stop reason: HOSPADM

## 2024-01-01 RX ORDER — ONDANSETRON 4 MG/1
4 TABLET, ORALLY DISINTEGRATING ORAL EVERY 6 HOURS PRN
Status: DISCONTINUED | OUTPATIENT
Start: 2024-01-01 | End: 2024-01-01 | Stop reason: HOSPADM

## 2024-01-01 RX ORDER — DIPHENHYDRAMINE HYDROCHLORIDE 50 MG/ML
25-50 INJECTION INTRAMUSCULAR; INTRAVENOUS
Status: DISCONTINUED | OUTPATIENT
Start: 2024-01-01 | End: 2024-01-01 | Stop reason: HOSPADM

## 2024-01-01 RX ORDER — ONDANSETRON 2 MG/ML
4 INJECTION INTRAMUSCULAR; INTRAVENOUS EVERY 6 HOURS PRN
Status: DISCONTINUED | OUTPATIENT
Start: 2024-01-01 | End: 2024-01-01 | Stop reason: HOSPADM

## 2024-01-01 RX ADMIN — PROPOFOL 150 MCG/KG/MIN: 10 INJECTION, EMULSION INTRAVENOUS at 10:25

## 2024-01-01 RX ADMIN — FENTANYL CITRATE 50 MCG: 50 INJECTION INTRAMUSCULAR; INTRAVENOUS at 14:44

## 2024-01-01 RX ADMIN — HEPARIN 5 ML: 100 SYRINGE at 14:57

## 2024-01-01 RX ADMIN — PROPOFOL 50 MG: 10 INJECTION, EMULSION INTRAVENOUS at 10:24

## 2024-01-01 RX ADMIN — LIDOCAINE HYDROCHLORIDE 10 ML: 10 INJECTION, SOLUTION INFILTRATION; PERINEURAL at 14:45

## 2024-01-01 RX ADMIN — SODIUM CHLORIDE, POTASSIUM CHLORIDE, SODIUM LACTATE AND CALCIUM CHLORIDE: 600; 310; 30; 20 INJECTION, SOLUTION INTRAVENOUS at 09:56

## 2024-01-01 RX ADMIN — LIDOCAINE HYDROCHLORIDE,EPINEPHRINE BITARTRATE 10 ML: 10; .01 INJECTION, SOLUTION INFILTRATION; PERINEURAL at 14:46

## 2024-01-01 RX ADMIN — MIDAZOLAM 1 MG: 1 INJECTION INTRAMUSCULAR; INTRAVENOUS at 14:44

## 2024-01-01 RX ADMIN — MIDAZOLAM 1 MG: 1 INJECTION INTRAMUSCULAR; INTRAVENOUS at 14:52

## 2024-01-01 RX ADMIN — SODIUM CHLORIDE 1000 ML: 9 INJECTION, SOLUTION INTRAVENOUS at 14:49

## 2024-01-01 RX ADMIN — LIDOCAINE HYDROCHLORIDE 50 MG: 10 INJECTION, SOLUTION EPIDURAL; INFILTRATION; INTRACAUDAL; PERINEURAL at 10:25

## 2024-01-01 RX ADMIN — FENTANYL CITRATE 50 MCG: 50 INJECTION INTRAMUSCULAR; INTRAVENOUS at 14:52

## 2024-01-01 RX ADMIN — Medication 2 G: at 13:30

## 2024-01-01 RX ADMIN — SODIUM CHLORIDE: 9 INJECTION, SOLUTION INTRAVENOUS at 14:15

## 2024-01-01 ASSESSMENT — ACTIVITIES OF DAILY LIVING (ADL): ADLS_ACUITY_SCORE: 35

## 2024-01-11 NOTE — TELEPHONE ENCOUNTER
Home Care is calling regarding an established patient with [facility].       Requesting orders from: Duke Mccall  Provider is following patient: Yes  Is this a 60-day recertification request?  Yes    Orders Requested    Skilled Nursing  Request for recertification   Frequency:  1x/wk for 9 wks plus 2 PRN visits      Information was gathered and will be sent to provider for review.  RN will contact Home Care with information after provider review.  Confirmed ok to leave a detailed message with call back.  Contact information confirmed and updated as needed.    Gale Ruggiero RN

## 2024-01-23 NOTE — PROGRESS NOTES
Dee is a 74 year old who is being evaluated via a billable telephone visit.      What phone number would you like to be contacted at? [phone]  How would you like to obtain your AVS? Mail a copy    Distant Location (provider location):  On-site    Assessment & Plan     Chronic coccygeal pain  She has used chronic hydromorphone for this for many years and is seeking refills.  - HYDROmorphone (DILAUDID) 8 MG tablet; Take 0.5 tablets (4 mg) by mouth 5 times daily    Short bowel syndrome, unspecified whether colon in continuity  She did have a Port-A-Cath but this became infected this past November and it was removed.  This is not her first issue with infected Fned-Y-Jsmkq.  She does have a PICC line in place now but is due to have that removed soon.    I placed a referral to IR.  Patient is interested in having her PICC line exchanged for a central venous catheter.  She is under the impression that a central venous catheter will be less prone to infection than it Port-A-Cath.  - IR Referral; Future    Hypocalcemia/abnormal PTH  She has a preop appointment coming up for an endoscopy in about a week.  At that time, we should recheck her PTH.  She has had issues with hypocalcemia and is on calcium replacement 600 mg 3 times daily.    Subjective   Dee is a 74 year old, presenting for the following health issues:    Follow Up (Discuss getting port or central venous catheter placed for fluids q night)      1/23/2024    10:03 AM   Additional Questions   Roomed by Mari JACOBSON     [facility] [place]     Telephone visit to discuss getting a new central line.  She wants to discuss getting a central venous catheter as opposed to a Port-A-Cath.  She has had issues with recurrent infections while having Port-A-Cath in the past.    She has a history of short-bowel syndrome and is working with [employer] [facility] on rehydration therapy    Objective           Vitals:  No vitals were obtained today due to virtual visit.    Physical Exam    General: Alert and no distress //Respiratory: No audible wheeze, cough, or shortness of breath // Psychiatric:  Appropriate affect, tone, and pace of words          Phone call duration: 9 minutes  Signed Electronically by: Duke Mccall CNP

## 2024-01-29 NOTE — PROGRESS NOTES
Preoperative Evaluation  [facility]  [address] [address]  Phone: [phone]  Fax: [phone]  Primary Provider: Duke Mccall  Pre-op Performing Provider: DUKE MCCALL  Jan 29, 2024       Dee is a 74 year old, presenting for the following:    Pre-Op Exam (Upper endoscopy on 1/31 at [facility] by Dr Barrientos)        1/29/2024     3:46 PM   Additional Questions   Roomed by Era GAVIRIA     Surgical Information  Surgery/Procedure: Upper Endoscopy  Surgery Location: [facility]  Surgeon: Dr Barrientos  Surgery Date: 1/31/24  Time of Surgery:   Where patient plans to recover: At home with family  Fax number for surgical facility: Note does not need to be faxed, will be available electronically in Epic.    Assessment & Plan     The proposed surgical procedure is considered LOW risk.    Preoperative examination  No contraindications for planned procedure.  She had an EKG done back in November which revealed sinus tachycardia and PVCs in a pattern of bigeminy, no ischemic changes.    Abdominal pain, epigastric  She has some chronic epigastric abdominal pain, especially after eating.  Her gastroenterologist wants to do an upper GI endoscopy for further evaluation    Hypocalcemia  We have been keeping close tabs on her calcium levels.  She has her labs done via home nursing/infusion therapy and her calcium has been mildly low on the last couple of blood draws.  It sounds like she was without her calcium supplement for about a week.  I refilled that for her today so that she does not have to pay out-of-pocket  - calcium carbonate (OS-LOI) 1500 (600 Ca) MG tablet; Take 1 tablet (600 mg) by mouth 3 times daily (with meals)    Fibromyalgia  Chronic pain-patient.  Continues on Dilaudid 4 mg 5 times per day.  She is also on Lyrica 200 mg 3 times per day, and Cymbalta 60 mg daily.    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  She receives IV fluids through a PICC line  in her left arm.  This is managed through the [facility].  She has an ileostomy with good output     Hyperparathyroidism (H24)  This is likely secondary hyperparathyroidism due to chronically low calcium levels.  - Parathyroid Hormone Intact; Future  - Parathyroid Hormone Intact    Hypomagnesemia  She had been on some magnesium replacement after her most recent hospitalization.  Last week her magnesium was within normal limits    Benign essential hypertension  Controlled on lisinopril, metoprolol.    History deep vein thrombosis (DVT) of proximal vein of lower extremity, unspecified laterality (H)  He has a history of DVT.  She is not anticoagulated.  She is not on any antiplatelet medications    Stage 2 chronic kidney disease  She likely is suffering from prerenal azotemia.  Her creatinine typically ranges between 1.1 and 1.4    Anxiety and depression  She has some anxious thoughts about her PICC line.  She did have a Port-A-Cath in place earlier this year but it got infected.  She has had for infected Port-A-Cath over the last few years leading to bacteremia.  She does have an appointment coming up in early February to have her PICC line replaced with a CVC    Major depressive disorder in remission, unspecified whether recurrent (H24)  Mood is stable on the Cymbalta right now    Chronic, continuous use of opioids  Chronic Dilaudid as described above.    Patient Instructions   Hold all supplements, aspirin and NSAIDs for 7 days prior to surgery.     Follow your surgeon's direction on when to stop eating and drinking prior to surgery.    Your surgeon will be managing your pain after your surgery.      Remove all jewelry and metal piercings before your surgery.     Remove nail polish from fingers before surgery.    If you use a CPAP machine, bring this with you to surgery.              - No identified additional risk factors other than previously addressed    Antiplatelet or Anticoagulation Medication  Instructions   - Bleeding risk is low for this procedure (e.g. dental, skin, cataract).    Additional Medication Instructions  Patient is to take all scheduled medications on the day of surgery    Recommendation  APPROVAL GIVEN to proceed with proposed procedure, without further diagnostic evaluation.      Subjective       HPI related to upcoming procedure: As above        1/29/2024     3:09 PM   Preop Questions   1. Have you ever had a heart attack or stroke? UNKNOWN -    2. Have you ever had surgery on your heart or blood vessels, such as a stent placement, a coronary artery bypass, or surgery on an artery in your head, neck, heart, or legs? No   3. Do you have chest pain with activity? No   4. Do you have a history of  heart failure? No   5. Do you currently have a cold, bronchitis or symptoms of other infection? No   6. Do you have a cough, shortness of breath, or wheezing? No   7. Do you or anyone in your family have previous history of blood clots? YES - Prior DVT   8. Do you or does anyone in your family have a serious bleeding problem such as prolonged bleeding following surgeries or cuts? No   9. Have you ever had problems with anemia or been told to take iron pills? No   10. Have you had any abnormal blood loss such as black, tarry or bloody stools, or abnormal vaginal bleeding? No   11. Have you ever had a blood transfusion? UNKNOWN -    12. Are you willing to have a blood transfusion if it is medically needed before, during, or after your surgery? Yes   13. Have you or any of your relatives ever had problems with anesthesia? No   14. Do you have sleep apnea, excessive snoring or daytime drowsiness? No   15. Do you have any artifical heart valves or other implanted medical devices like a pacemaker, defibrillator, or continuous glucose monitor? No   16. Do you have artificial joints? No   17. Are you allergic to latex? No       Health Care Directive  Patient has a Health Care Directive on  file      Preoperative Review of    reviewed - controlled substances reflected in medication list.          Patient Active Problem List    Diagnosis Date Noted    MSSA bacteremia 11/15/2023     Priority: Medium    Hyperkalemia 11/08/2023     Priority: Medium    Delirium 11/08/2023     Priority: Medium    Failure to thrive in adult 11/08/2023     Priority: Medium    Hyponatremia 11/08/2023     Priority: Medium    Chronic coccygeal pain 05/23/2023     Priority: Medium    Controlled substance agreement signed 05/23/2023     Priority: Medium    Hyperparathyroidism (H24) 03/16/2021     Priority: Medium    Chronic deep vein thrombosis (DVT) of proximal vein of lower extremity (H) 02/22/2021     Priority: Medium    Benign essential hypertension 11/15/2019     Priority: Medium    Stage 2 chronic kidney disease      Priority: Medium    Chronic, continuous use of opioids 09/10/2018     Priority: Medium    High output ileostomy (H)      Priority: Medium    Anxiety and depression      Priority: Medium    S/P total colectomy      Priority: Medium    Hypomagnesemia 02/20/2017     Priority: Medium    Chronic cystitis 08/18/2016     Priority: Medium    Chronic migraine 06/29/2015     Priority: Medium     Replacing diagnoses that were inactivated after the 10/1/2021 regulatory import.      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 07/04/2014     Priority: Medium    Chronic fatigue syndrome 07/03/2014     Priority: Medium    Fibromyalgia 07/03/2014     Priority: Medium    History of malignant neoplasm of large intestine 04/10/2012     Priority: Medium    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/10/2012     Priority: Medium    Asthma 04/10/2012     Priority: Medium    Major depression 11/01/2010     Priority: Medium      Past Medical History:   Diagnosis Date    Anxiety and depression      Asthma 4/10/2012    Bowel obstruction (H)     Chronic fatigue syndrome 7/3/2014    Dehydration 7/4/2014    Disease of thyroid gland     Fibromyalgia 7/3/2014    GERD (gastroesophageal reflux disease)     History of anesthesia complications     Slower to wake up    History of blood clots     Hodgkin's lymphoma (H) 10/1979    Hypertension     Major depressive disorder, recurrent episode, moderate (H) 9/6/2005    PONV (postoperative nausea and vomiting)     Shortness of breath     TIA (transient ischemic attack)      Past Surgical History:   Procedure Laterality Date    ANAL SPHINCTEROPLASTY      APPENDECTOMY      C UNLISTED PROCEDURE, ABDOMEN/PERITONEUM/OMENTUM      Description: Hernia Repair;  Recorded: 08/05/2009;  Comments: perineal    CHOLECYSTECTOMY      COLECTOMY      HC DILATION/CURETTAGE DIAG/THER NON OB      Description: Dilation And Curettage;  Recorded: 08/05/2009;    HC REMOVAL GALLBLADDER      Description: Cholecystectomy;  Recorded: 07/18/2014;    HC REMOVAL OF TONSILS,<11 Y/O      Description: Tonsillectomy;  Recorded: 08/05/2009;    HYSTERECTOMY  05/2000    ILEOSTOMY      IR MISCELLANEOUS PROCEDURE  10/9/2002    IR MISCELLANEOUS PROCEDURE  10/14/2002    IR PORT REMOVAL RIGHT  11/13/2023    MIDLINE SINGLE LUMEN PLACEMENT  11/14/2023    OOPHORECTOMY Bilateral 05/2000    NM CYSTOURETHROSCOPY INJ CHEMODENERVATION BLADDER N/A 2/25/2021    Procedure: PELVIC FLOOR BOTOX;  Surgeon: José Antonio Cade MD;  Location: [facility] OR;  Service: Gynecology    NM INSJ TUNNELED CTR VAD W/SUBQ PORT AGE 5 YR/> N/A 10/29/2014    Procedure: REMOVAL PICC LINE RIGHT ARM and PLACEMENT PORTACATH;  Surgeon: Jason Kirkpatrick MD;  Location: [facility] OR;  Service: General    NM RMVL JENY CTR VAD W/SUBQ PORT/ CTR/PRPH INSJ N/A 7/20/2018    Procedure: REMOVAL OF PORT A CATH;  Surgeon: Jason Kirkpatrick MD;  Location: [facility] OR;  Service: General    SMALL INTESTINE SURGERY      THYROIDECTOMY, PARTIAL       TUNNELED VENOUS CATHETER PLACEMENT N/A 8/29/2018    Procedure: PORT-A-CATH INSERTION;  Surgeon: Jason Kirkpatrick MD;  Location: [facility];  Service:     [facility] TOTAL ABDOM HYSTERECTOMY      Description: Total Abdominal Hysterectomy;  Recorded: 07/18/2014;     Current Outpatient Medications   Medication Sig Dispense Refill    busPIRone (BUSPAR) 15 MG tablet Take 1 tablet (15 mg) by mouth 2 times daily      cholecalciferol, vitamin D3, (VITAMIN D3) 2,000 unit cap [CHOLECALCIFEROL, VITAMIN D3, (VITAMIN D3) 2,000 UNIT CAP] Take 2,000 Units by mouth every morning.      cyanocobalamin (CYANOCOBALAMIN) 1000 MCG/ML injection ADMINISTER 1 ML(1000 MCG) IN THE MUSCLE EVERY 30 DAYS 1 mL 11    diphenoxylate-atropine (LOMOTIL) 2.5-0.025 MG tablet [DIPHENOXYLATE-ATROPINE (LOMOTIL) 2.5-0.025 MG PER TABLET] TAKE 2 TABLETS BY MOUTH THREE TIMES DAILY 180 tablet 0    DULoxetine (CYMBALTA) 60 MG capsule TAKE 1 CAPSULE BY MOUTH TWICE DAILY 180 capsule 3    HYDROmorphone (DILAUDID) 8 MG tablet Take 0.5 tablets (4 mg) by mouth 5 times daily 59 tablet 0    ibuprofen (ADVIL/MOTRIN) 200 MG tablet Take 200 mg by mouth every 4 hours as needed for pain      Lactobacillus acidophilus (ACIDOPHILUS ORAL) [LACTOBACILLUS ACIDOPHILUS (ACIDOPHILUS ORAL)] Take 1 tablet by mouth 2 (two) times a day.      lipase-protease-amylase (CREON) 0305-76520-29392 units CPEP TAKE 2 CAPSULES BY MOUTH THREE TIMES DAILY WITH FOOD 540 capsule 11    lisinopril (ZESTRIL) 20 MG tablet Take 1 tablet (20 mg) by mouth 2 times daily 60 tablet 3    magnesium oxide (MAG-OX) 400 MG tablet Take 1 tablet (400 mg) by mouth daily 15 tablet 0    metoprolol succinate ER (TOPROL XL) 50 MG 24 hr tablet Take 1 tablet (50 mg) by mouth daily 30 tablet 3    multivit-min/folic acid/[id number] (ALIVE WOMEN'S GUMMY VITAMINS ORAL) [MULTIVIT-MIN/FOLIC ACID/FDM223 (ALIVE WOMEN'S GUMMY VITAMINS ORAL)] Take 2 tablets by mouth Daily after breakfast.       NEW MED 2,000 mLs by Intravenous  (Continuous Infusion) route daily 2000 mL volume daily - Na 300 mEQ, Magnesium 13 meq (1.6gm), 5 mmol phos, Cl 75%  Patient infuses over 12 hours once daily - supplemental IVF through [facility]fusion services      nitroFURantoin macrocrystal (MACRODANTIN) 50 MG capsule TAKE 1 CAPSULE(50 MG) BY MOUTH AT BEDTIME 90 capsule 3    omeprazole (PRILOSEC) 40 MG DR capsule Take 1 capsule (40 mg) by mouth daily 90 capsule 0    polyethylene glycol (MIRALAX) 17 g packet Take 17 g by mouth daily as needed for constipation 20 packet 0    Pregabalin (LYRICA) 200 MG capsule Take 1 capsule (200 mg) by mouth 3 times daily 270 capsule 1    promethazine (PHENERGAN) 25 MG tablet TAKE 1 TABLET(25 MG) BY MOUTH EVERY 6 HOURS AS NEEDED FOR NAUSEA 30 tablet 3    simethicone (MYLICON) 80 MG chewable tablet Take 80 mg by mouth every 6 hours as needed for flatulence or cramping      traZODone (DESYREL) 150 MG tablet Take 1 tablet (150 mg) by mouth daily 270 tablet 0    vitamin D2 (ERGOCALCIFEROL) 42134 units (1250 mcg) capsule TAKE 1 CAPSULE BY MOUTH 1 TIME EVERY WEEK FOR 12 DOSES 12 capsule 3       Allergies   Allergen Reactions    Ketorolac Tromethamine Palpitations    Methadone Unknown     Hallucinations    Metoclopramide Hives     GI upset    Metronidazole Hives    Mirtazapine Unknown     GI affects    Morphine Other (See Comments)     confusion    Neuromuscular Blockers, Steroidal [Neuromuscular Blocking Agents] Unknown     Unsure,per [facility]     Norfloxacin Unknown     C.Diff    Other Drug Allergy (See Comments) Unknown     Steroidal Neuromuscular blocking agents- unsure, Pancuronium, vecuronium, rocuronium, rapacuronium, dacuronium, malouètine, duador, dipyrandium, pipecuronium, chandonium, pt unsure of this reaction, thinks it was painful, muscle aches    Oxycodone Unknown     Gi distress        Social History     Tobacco Use    Smoking status: Former     Packs/day: 1.00     Years: 30.00     Additional pack years: 0.00  "    Total pack years: 30.00     Types: Cigarettes     Quit date: 2000     Years since quittin.0     Passive exposure: Never    Smokeless tobacco: Never   Substance Use Topics    Alcohol use: No     Family History   Problem Relation Age of Onset    Colon Cancer Father     Liver Cancer Brother      History   Drug Use No         Review of Systems    Review of Systems  Constitutional, HEENT, cardiovascular, pulmonary, gi and gu systems are negative, except as otherwise noted.  Objective    /78 (BP Location: Right arm, Patient Position: Sitting)   Pulse 58   Temp 98.1  F (36.7  C)   Resp 18   Ht 1.549 m (5' 0.98\")   Wt 52.2 kg (115 lb)   LMP  (LMP Unknown)   SpO2 97%   BMI 21.74 kg/m     Estimated body mass index is 21.74 kg/m  as calculated from the following:    Height as of this encounter: 1.549 m (5' 0.98\").    Weight as of this encounter: 52.2 kg (115 lb).  Physical Exam  GENERAL: alert and no distress  NECK: no adenopathy, no asymmetry, masses, or scars  RESP: lungs clear to auscultation - no rales, rhonchi or wheezes  CV: regular rate and rhythm, normal S1 S2, no S3 or S4, no murmur, click or rub, no peripheral edema  ABDOMEN: soft, nontender, no hepatosplenomegaly, no masses and bowel sounds normal  MS: no gross musculoskeletal defects noted, no edema    Recent Labs   Lab Test 24  1020 24  0900 23  1120 23  1438 23  1143   HGB  --   --   --  11.4* 9.1*   PLT  --   --   --  176 171    140   < > 136  136 143   POTASSIUM 5.0 4.9   < > 5.2  5.2 3.1*   CR 1.15* 1.18*   < > 1.24*  1.24* 0.90    < > = values in this interval not displayed.        Diagnostics  No labs were ordered during this visit.   No EKG this visit, completed in the last 90 days.    Revised Cardiac Risk Index (RCRI)  The patient has the following serious cardiovascular risks for perioperative complications:   - No serious cardiac risks = 0 points     RCRI Interpretation: 1 point: Class II " (low risk - 0.9% complication rate)         Signed Electronically by: Duke Mccall CNP  Copy of this evaluation report is provided to requesting physician.

## 2024-01-29 NOTE — H&P (VIEW-ONLY)
Preoperative Evaluation  [facility]  [address] [facility] [address]  Phone: [phone]  Fax: [phone]  Primary Provider: Duke Mccall  Pre-op Performing Provider: DUKE MCCALL  Jan 29, 2024       Dee is a 74 year old, presenting for the following:    Pre-Op Exam (Upper endoscopy on 1/31 at [facility] by Dr Barrientos)        1/29/2024     3:46 PM   Additional Questions   Roomed by Era GAVIRIA     Surgical Information  Surgery/Procedure: Upper Endoscopy  Surgery Location: [facility]  Surgeon: Dr Barrientos  Surgery Date: 1/31/24  Time of Surgery:   Where patient plans to recover: At home with family  Fax number for surgical facility: Note does not need to be faxed, will be available electronically in Epic.    Assessment & Plan     The proposed surgical procedure is considered LOW risk.    Preoperative examination  No contraindications for planned procedure.  She had an EKG done back in November which revealed sinus tachycardia and PVCs in a pattern of bigeminy, no ischemic changes.    Abdominal pain, epigastric  She has some chronic epigastric abdominal pain, especially after eating.  Her gastroenterologist wants to do an upper GI endoscopy for further evaluation    Hypocalcemia  We have been keeping close tabs on her calcium levels.  She has her labs done via home nursing/infusion therapy and her calcium has been mildly low on the last couple of blood draws.  It sounds like she was without her calcium supplement for about a week.  I refilled that for her today so that she does not have to pay out-of-pocket  - calcium carbonate (OS-LOI) 1500 (600 Ca) MG tablet; Take 1 tablet (600 mg) by mouth 3 times daily (with meals)    Fibromyalgia  Chronic pain-patient.  Continues on Dilaudid 4 mg 5 times per day.  She is also on Lyrica 200 mg 3 times per day, and Cymbalta 60 mg daily.    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  She receives IV fluids through a PICC line  in her left arm.  This is managed through the [facility] infusion pharmacy.  She has an ileostomy with good output     Hyperparathyroidism (H24)  This is likely secondary hyperparathyroidism due to chronically low calcium levels.  - Parathyroid Hormone Intact; Future  - Parathyroid Hormone Intact    Hypomagnesemia  She had been on some magnesium replacement after her most recent hospitalization.  Last week her magnesium was within normal limits    Benign essential hypertension  Controlled on lisinopril, metoprolol.    History deep vein thrombosis (DVT) of proximal vein of lower extremity, unspecified laterality (H)  He has a history of DVT.  She is not anticoagulated.  She is not on any antiplatelet medications    Stage 2 chronic kidney disease  She likely is suffering from prerenal azotemia.  Her creatinine typically ranges between 1.1 and 1.4    Anxiety and depression  She has some anxious thoughts about her PICC line.  She did have a Port-A-Cath in place earlier this year but it got infected.  She has had for infected Port-A-Cath over the last few years leading to bacteremia.  She does have an appointment coming up in early February to have her PICC line replaced with a CVC    Major depressive disorder in remission, unspecified whether recurrent (H24)  Mood is stable on the Cymbalta right now    Chronic, continuous use of opioids  Chronic Dilaudid as described above.    Patient Instructions   Hold all supplements, aspirin and NSAIDs for 7 days prior to surgery.     Follow your surgeon's direction on when to stop eating and drinking prior to surgery.    Your surgeon will be managing your pain after your surgery.      Remove all jewelry and metal piercings before your surgery.     Remove nail polish from fingers before surgery.    If you use a CPAP machine, bring this with you to surgery.              - No identified additional risk factors other than previously addressed    Antiplatelet or Anticoagulation Medication  Instructions   - Bleeding risk is low for this procedure (e.g. dental, skin, cataract).    Additional Medication Instructions  Patient is to take all scheduled medications on the day of surgery    Recommendation  APPROVAL GIVEN to proceed with proposed procedure, without further diagnostic evaluation.      Subjective       HPI related to upcoming procedure: As above        1/29/2024     3:09 PM   Preop Questions   1. Have you ever had a heart attack or stroke? UNKNOWN -    2. Have you ever had surgery on your heart or blood vessels, such as a stent placement, a coronary artery bypass, or surgery on an artery in your head, neck, heart, or legs? No   3. Do you have chest pain with activity? No   4. Do you have a history of  heart failure? No   5. Do you currently have a cold, bronchitis or symptoms of other infection? No   6. Do you have a cough, shortness of breath, or wheezing? No   7. Do you or anyone in your family have previous history of blood clots? YES - Prior DVT   8. Do you or does anyone in your family have a serious bleeding problem such as prolonged bleeding following surgeries or cuts? No   9. Have you ever had problems with anemia or been told to take iron pills? No   10. Have you had any abnormal blood loss such as black, tarry or bloody stools, or abnormal vaginal bleeding? No   11. Have you ever had a blood transfusion? UNKNOWN -    12. Are you willing to have a blood transfusion if it is medically needed before, during, or after your surgery? Yes   13. Have you or any of your relatives ever had problems with anesthesia? No   14. Do you have sleep apnea, excessive snoring or daytime drowsiness? No   15. Do you have any artifical heart valves or other implanted medical devices like a pacemaker, defibrillator, or continuous glucose monitor? No   16. Do you have artificial joints? No   17. Are you allergic to latex? No       Health Care Directive  Patient has a Health Care Directive on  file      Preoperative Review of    reviewed - controlled substances reflected in medication list.          Patient Active Problem List    Diagnosis Date Noted    MSSA bacteremia 11/15/2023     Priority: Medium    Hyperkalemia 11/08/2023     Priority: Medium    Delirium 11/08/2023     Priority: Medium    Failure to thrive in adult 11/08/2023     Priority: Medium    Hyponatremia 11/08/2023     Priority: Medium    Chronic coccygeal pain 05/23/2023     Priority: Medium    Controlled substance agreement signed 05/23/2023     Priority: Medium    Hyperparathyroidism (H24) 03/16/2021     Priority: Medium    Chronic deep vein thrombosis (DVT) of proximal vein of lower extremity (H) 02/22/2021     Priority: Medium    Benign essential hypertension 11/15/2019     Priority: Medium    Stage 2 chronic kidney disease      Priority: Medium    Chronic, continuous use of opioids 09/10/2018     Priority: Medium    High output ileostomy (H)      Priority: Medium    Anxiety and depression      Priority: Medium    S/P total colectomy      Priority: Medium    Hypomagnesemia 02/20/2017     Priority: Medium    Chronic cystitis 08/18/2016     Priority: Medium    Chronic migraine 06/29/2015     Priority: Medium     Replacing diagnoses that were inactivated after the 10/1/2021 regulatory import.      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 07/04/2014     Priority: Medium    Chronic fatigue syndrome 07/03/2014     Priority: Medium    Fibromyalgia 07/03/2014     Priority: Medium    History of malignant neoplasm of large intestine 04/10/2012     Priority: Medium    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/10/2012     Priority: Medium    Asthma 04/10/2012     Priority: Medium    Major depression 11/01/2010     Priority: Medium      Past Medical History:   Diagnosis Date    Anxiety and depression      Asthma 4/10/2012    Bowel obstruction (H)     Chronic fatigue syndrome 7/3/2014    Dehydration 7/4/2014    Disease of thyroid gland     Fibromyalgia 7/3/2014    GERD (gastroesophageal reflux disease)     History of anesthesia complications     Slower to wake up    History of blood clots     Hodgkin's lymphoma (H) 10/1979    Hypertension     Major depressive disorder, recurrent episode, moderate (H) 9/6/2005    PONV (postoperative nausea and vomiting)     Shortness of breath     TIA (transient ischemic attack)      Past Surgical History:   Procedure Laterality Date    ANAL SPHINCTEROPLASTY      APPENDECTOMY      C UNLISTED PROCEDURE, ABDOMEN/PERITONEUM/OMENTUM      Description: Hernia Repair;  Recorded: 08/05/2009;  Comments: perineal    CHOLECYSTECTOMY      COLECTOMY      HC DILATION/CURETTAGE DIAG/THER NON OB      Description: Dilation And Curettage;  Recorded: 08/05/2009;    HC REMOVAL GALLBLADDER      Description: Cholecystectomy;  Recorded: 07/18/2014;    HC REMOVAL OF TONSILS,<13 Y/O      Description: Tonsillectomy;  Recorded: 08/05/2009;    HYSTERECTOMY  05/2000    ILEOSTOMY      IR MISCELLANEOUS PROCEDURE  10/9/2002    IR MISCELLANEOUS PROCEDURE  10/14/2002    IR PORT REMOVAL RIGHT  11/13/2023    MIDLINE SINGLE LUMEN PLACEMENT  11/14/2023    OOPHORECTOMY Bilateral 05/2000    CT CYSTOURETHROSCOPY INJ CHEMODENERVATION BLADDER N/A 2/25/2021    Procedure: PELVIC FLOOR BOTOX;  Surgeon: José Antonio Cade MD;  Location: [facility] OR;  Service: Gynecology    CT INSJ TUNNELED CTR VAD W/SUBQ PORT AGE 5 YR/> N/A 10/29/2014    Procedure: REMOVAL PICC LINE RIGHT ARM and PLACEMENT PORTACATH;  Surgeon: Jason Kirkpatrick MD;  Location: [facility] OR;  Service: General    CT RMVL JENY CTR VAD W/SUBQ PORT/ CTR/PRPH INSJ N/A 7/20/2018    Procedure: REMOVAL OF PORT A CATH;  Surgeon: Jason Kirkpatrick MD;  Location: [facility] OR;  Service: General    SMALL INTESTINE SURGERY      THYROIDECTOMY, PARTIAL       TUNNELED VENOUS CATHETER PLACEMENT N/A 8/29/2018    Procedure: PORT-A-CATH INSERTION;  Surgeon: Jason Kirkpatrick MD;  Location: [facility];  Service:     [facility] TOTAL ABDOM HYSTERECTOMY      Description: Total Abdominal Hysterectomy;  Recorded: 07/18/2014;     Current Outpatient Medications   Medication Sig Dispense Refill    busPIRone (BUSPAR) 15 MG tablet Take 1 tablet (15 mg) by mouth 2 times daily      cholecalciferol, vitamin D3, (VITAMIN D3) 2,000 unit cap [CHOLECALCIFEROL, VITAMIN D3, (VITAMIN D3) 2,000 UNIT CAP] Take 2,000 Units by mouth every morning.      cyanocobalamin (CYANOCOBALAMIN) 1000 MCG/ML injection ADMINISTER 1 ML(1000 MCG) IN THE MUSCLE EVERY 30 DAYS 1 mL 11    diphenoxylate-atropine (LOMOTIL) 2.5-0.025 MG tablet [DIPHENOXYLATE-ATROPINE (LOMOTIL) 2.5-0.025 MG PER TABLET] TAKE 2 TABLETS BY MOUTH THREE TIMES DAILY 180 tablet 0    DULoxetine (CYMBALTA) 60 MG capsule TAKE 1 CAPSULE BY MOUTH TWICE DAILY 180 capsule 3    HYDROmorphone (DILAUDID) 8 MG tablet Take 0.5 tablets (4 mg) by mouth 5 times daily 59 tablet 0    ibuprofen (ADVIL/MOTRIN) 200 MG tablet Take 200 mg by mouth every 4 hours as needed for pain      Lactobacillus acidophilus (ACIDOPHILUS ORAL) [LACTOBACILLUS ACIDOPHILUS (ACIDOPHILUS ORAL)] Take 1 tablet by mouth 2 (two) times a day.      lipase-protease-amylase (CREON) 6158-87862-15193 units CPEP TAKE 2 CAPSULES BY MOUTH THREE TIMES DAILY WITH FOOD 540 capsule 11    lisinopril (ZESTRIL) 20 MG tablet Take 1 tablet (20 mg) by mouth 2 times daily 60 tablet 3    magnesium oxide (MAG-OX) 400 MG tablet Take 1 tablet (400 mg) by mouth daily 15 tablet 0    metoprolol succinate ER (TOPROL XL) 50 MG 24 hr tablet Take 1 tablet (50 mg) by mouth daily 30 tablet 3    multivit-min/folic acid/htj347 (ALIVE WOMEN'S GUMMY VITAMINS ORAL) [MULTIVIT-MIN/FOLIC ACID/LCZ264 (ALIVE WOMEN'S GUMMY VITAMINS ORAL)] Take 2 tablets by mouth Daily after breakfast.       NEW MED 2,000 mLs by Intravenous  (Continuous Infusion) route daily 2000 mL volume daily - Na 300 mEQ, Magnesium 13 meq (1.6gm), 5 mmol phos, Cl 75%  Patient infuses over 12 hours once daily - supplemental IVF through [facility] Infusion services      nitroFURantoin macrocrystal (MACRODANTIN) 50 MG capsule TAKE 1 CAPSULE(50 MG) BY MOUTH AT BEDTIME 90 capsule 3    omeprazole (PRILOSEC) 40 MG DR capsule Take 1 capsule (40 mg) by mouth daily 90 capsule 0    polyethylene glycol (MIRALAX) 17 g packet Take 17 g by mouth daily as needed for constipation 20 packet 0    Pregabalin (LYRICA) 200 MG capsule Take 1 capsule (200 mg) by mouth 3 times daily 270 capsule 1    promethazine (PHENERGAN) 25 MG tablet TAKE 1 TABLET(25 MG) BY MOUTH EVERY 6 HOURS AS NEEDED FOR NAUSEA 30 tablet 3    simethicone (MYLICON) 80 MG chewable tablet Take 80 mg by mouth every 6 hours as needed for flatulence or cramping      traZODone (DESYREL) 150 MG tablet Take 1 tablet (150 mg) by mouth daily 270 tablet 0    vitamin D2 (ERGOCALCIFEROL) 33554 units (1250 mcg) capsule TAKE 1 CAPSULE BY MOUTH 1 TIME EVERY WEEK FOR 12 DOSES 12 capsule 3       Allergies   Allergen Reactions    Ketorolac Tromethamine Palpitations    Methadone Unknown     Hallucinations    Metoclopramide Hives     GI upset    Metronidazole Hives    Mirtazapine Unknown     GI affects    Morphine Other (See Comments)     confusion    Neuromuscular Blockers, Steroidal [Neuromuscular Blocking Agents] Unknown     Unsure,per [facility] records     Norfloxacin Unknown     C.Diff    Other Drug Allergy (See Comments) Unknown     Steroidal Neuromuscular blocking agents- unsure, Pancuronium, vecuronium, rocuronium, rapacuronium, dacuronium, malouètine, duador, dipyrandium, pipecuronium, chandonium, pt unsure of this reaction, thinks it was painful, muscle aches    Oxycodone Unknown     Gi distress        Social History     Tobacco Use    Smoking status: Former     Packs/day: 1.00     Years: 30.00     Additional pack years: 0.00  "    Total pack years: 30.00     Types: Cigarettes     Quit date: 2000     Years since quittin.0     Passive exposure: Never    Smokeless tobacco: Never   Substance Use Topics    Alcohol use: No     Family History   Problem Relation Age of Onset    Colon Cancer Father     Liver Cancer Brother      History   Drug Use No         Review of Systems    Review of Systems  Constitutional, HEENT, cardiovascular, pulmonary, gi and gu systems are negative, except as otherwise noted.  Objective    /78 (BP Location: Right arm, Patient Position: Sitting)   Pulse 58   Temp 98.1  F (36.7  C)   Resp 18   Ht 1.549 m (5' 0.98\")   Wt 52.2 kg (115 lb)   LMP  (LMP Unknown)   SpO2 97%   BMI 21.74 kg/m     Estimated body mass index is 21.74 kg/m  as calculated from the following:    Height as of this encounter: 1.549 m (5' 0.98\").    Weight as of this encounter: 52.2 kg (115 lb).  Physical Exam  GENERAL: alert and no distress  NECK: no adenopathy, no asymmetry, masses, or scars  RESP: lungs clear to auscultation - no rales, rhonchi or wheezes  CV: regular rate and rhythm, normal S1 S2, no S3 or S4, no murmur, click or rub, no peripheral edema  ABDOMEN: soft, nontender, no hepatosplenomegaly, no masses and bowel sounds normal  MS: no gross musculoskeletal defects noted, no edema    Recent Labs   Lab Test 24  1020 24  0900 23  1120 23  1438 23  1143   HGB  --   --   --  11.4* 9.1*   PLT  --   --   --  176 171    140   < > 136  136 143   POTASSIUM 5.0 4.9   < > 5.2  5.2 3.1*   CR 1.15* 1.18*   < > 1.24*  1.24* 0.90    < > = values in this interval not displayed.        Diagnostics  No labs were ordered during this visit.   No EKG this visit, completed in the last 90 days.    Revised Cardiac Risk Index (RCRI)  The patient has the following serious cardiovascular risks for perioperative complications:   - No serious cardiac risks = 0 points     RCRI Interpretation: 1 point: Class II " (low risk - 0.9% complication rate)         Signed Electronically by: Duke Mccall CNP  Copy of this evaluation report is provided to requesting physician.

## 2024-01-31 NOTE — INTERVAL H&P NOTE
I have reviewed the surgical (or preoperative) H&P that is linked to this encounter, and examined the patient. There are no significant changes    Clinical Conditions Present on Arrival:  Clinically Significant Risk Factors Present on Admission           # Hypocalcemia: Lowest Ca = 8.2 mg/dL in last 30 days, will monitor and replace as appropriate

## 2024-01-31 NOTE — PRE-PROCEDURE
GENERAL PRE-PROCEDURE:   Procedure:  Esophagogastroduodenoscopy  Date/Time:  1/31/2024 10:17 AM    Verbal consent obtained?: Yes    Written consent obtained?: Yes    Risks and benefits: Risks, benefits and alternatives were discussed    Consent given by:  Patient  Patient states understanding of procedure being performed: Yes    Patient's understanding of procedure matches consent: Yes    Procedure consent matches procedure scheduled: Yes    Expected level of sedation:  Moderate  Appropriately NPO:  Yes  ASA Class:  3  Mallampati  :  Grade 2- soft palate, base of uvula, tonsillar pillars, and portion of posterior pharyngeal wall visible  Lungs:  Lungs clear with good breath sounds bilaterally  Heart:  Normal heart sounds and rate  History & Physical reviewed:  History and physical reviewed and no updates needed  Statement of review:  I have reviewed the lab findings, diagnostic data, medications, and the plan for sedation

## 2024-01-31 NOTE — ANESTHESIA PREPROCEDURE EVALUATION
Anesthesia Pre-Procedure Evaluation    Patient: Dee Mattson   MRN: [id number] : 1950        Procedure : Procedure(s):  ESOPHAGOGASTRODUODENOSCOPY          Past Medical History:   Diagnosis Date    Anxiety and depression     Asthma 04/10/2012    Bowel obstruction (H)     Chronic fatigue syndrome 2014    Dehydration 2014    Disease of thyroid gland     Fibromyalgia 2014    GERD (gastroesophageal reflux disease)     History of anesthesia complications     Slower to wake up    History of blood clots     Hodgkin's lymphoma (H) 10/01/1979    Hypertension     Major depressive disorder, recurrent episode, moderate (H) 2005    PONV (postoperative nausea and vomiting)     Shortness of breath     TIA (transient ischemic attack)       Past Surgical History:   Procedure Laterality Date    ANAL SPHINCTEROPLASTY      APPENDECTOMY      C UNLISTED PROCEDURE, ABDOMEN/PERITONEUM/OMENTUM      Description: Hernia Repair;  Recorded: 2009;  Comments: perineal    CHOLECYSTECTOMY      COLECTOMY      HC DILATION/CURETTAGE DIAG/THER NON OB      Description: Dilation And Curettage;  Recorded: 2009;    HC REMOVAL GALLBLADDER      Description: Cholecystectomy;  Recorded: 2014;    HC REMOVAL OF TONSILS,<11 Y/O      Description: Tonsillectomy;  Recorded: 2009;    HYSTERECTOMY  2000    ILEOSTOMY      IR MISCELLANEOUS PROCEDURE  10/9/2002    IR MISCELLANEOUS PROCEDURE  10/14/2002    IR PORT REMOVAL RIGHT  2023    MIDLINE SINGLE LUMEN PLACEMENT  2023    OOPHORECTOMY Bilateral 2000    CT CYSTOURETHROSCOPY INJ CHEMODENERVATION BLADDER N/A 2021    Procedure: PELVIC FLOOR BOTOX;  Surgeon: José Antonio Cade MD;  Location: [facility];  Service: Gynecology    CT INSJ TUNNELED CTR VAD W/SUBQ PORT AGE 5 YR/> N/A 10/29/2014    Procedure: REMOVAL PICC LINE RIGHT ARM and PLACEMENT PORTACATH;  Surgeon: Jason Kirkpatrick MD;  Location: [facility];  Service: General     GA RMVL JENY CTR VAD W/SUBQ PORT/ CTR/PRPH INSJ N/A 2018    Procedure: REMOVAL OF PORT A CATH;  Surgeon: Jason Kirkpatrick MD;  Location: [facility] OR;  Service: General    SMALL INTESTINE SURGERY      THYROIDECTOMY, PARTIAL      TUNNELED VENOUS CATHETER PLACEMENT N/A 2018    Procedure: PORT-A-CATH INSERTION;  Surgeon: Jason Kirkpatrick MD;  Location: [facility];  Service:     [facility] TOTAL ABDOM HYSTERECTOMY      Description: Total Abdominal Hysterectomy;  Recorded: 2014;      Allergies   Allergen Reactions    Ketorolac Tromethamine Palpitations    Methadone Unknown     Hallucinations    Metoclopramide Hives     GI upset    Metronidazole Hives    Mirtazapine Unknown     GI affects    Morphine Other (See Comments)     confusion    Neuromuscular Blockers, Steroidal [Neuromuscular Blocking Agents] Unknown     Unsure,per [facility] records     Norfloxacin Unknown     C.Diff    Other Drug Allergy (See Comments) Unknown     Steroidal Neuromuscular blocking agents- unsure, Pancuronium, vecuronium, rocuronium, rapacuronium, dacuronium, malouètine, duador, dipyrandium, pipecuronium, chandonium, pt unsure of this reaction, thinks it was painful, muscle aches    Oxycodone Unknown     Gi distress      Social History     Tobacco Use    Smoking status: Former     Packs/day: 1.00     Years: 30.00     Additional pack years: 0.00     Total pack years: 30.00     Types: Cigarettes     Quit date: 2000     Years since quittin.0     Passive exposure: Never    Smokeless tobacco: Never   Substance Use Topics    Alcohol use: No      Wt Readings from Last 1 Encounters:   24 52.2 kg (115 lb)        Anesthesia Evaluation   Pt has had prior anesthetic.     History of anesthetic complications  -  and PONV.      ROS/MED HX  ENT/Pulmonary:     (+)                      asthma                  Neurologic:       Cardiovascular:  - neg cardiovascular ROS     METS/Exercise Tolerance:     Hematologic:  - neg  "hematologic  ROS     Musculoskeletal:  - neg musculoskeletal ROS     GI/Hepatic:  - neg GI/hepatic ROS   (+) GERD,                   Renal/Genitourinary:     (+) renal disease, type: CRI,            Endo:  - neg endo ROS     Psychiatric/Substance Use:     (+) psychiatric history depression and anxiety       Infectious Disease:  - neg infectious disease ROS     Malignancy:       Other:            Physical Exam    Airway  airway exam normal           Respiratory Devices and Support         Dental           Cardiovascular   cardiovascular exam normal          Pulmonary   pulmonary exam normal                OUTSIDE LABS:  CBC:   Lab Results   Component Value Date    WBC 8.9 12/06/2023    WBC 5.9 11/29/2023    HGB 11.4 (L) 12/06/2023    HGB 9.1 (L) 11/29/2023    HCT 35.7 12/06/2023    HCT 28.5 (L) 11/29/2023     12/06/2023     11/29/2023     BMP:   Lab Results   Component Value Date     01/24/2024     01/11/2024    POTASSIUM 5.0 01/24/2024    POTASSIUM 4.9 01/11/2024    CHLORIDE 106 01/24/2024    CHLORIDE 108 (H) 01/11/2024    CO2 27 01/24/2024    CO2 25 01/11/2024    BUN 22.7 01/24/2024    BUN 24.5 (H) 01/11/2024    CR 1.15 (H) 01/24/2024    CR 1.18 (H) 01/11/2024     (H) 01/24/2024    GLC 71 01/11/2024     COAGS: No results found for: \"PTT\", \"INR\", \"FIBR\"  POC: No results found for: \"BGM\", \"HCG\", \"HCGS\"  HEPATIC:   Lab Results   Component Value Date    ALBUMIN 3.9 01/24/2024    PROTTOTAL 6.9 01/24/2024    ALT 21 01/24/2024    AST 31 01/24/2024    ALKPHOS 167 (H) 01/24/2024    BILITOTAL 0.4 01/24/2024    JOSE G 16 11/08/2023     OTHER:   Lab Results   Component Value Date    PH 7.38 12/06/2023    LACT 0.9 11/08/2023    LOI 8.6 (L) 01/24/2024    PHOS 3.2 01/24/2024    MAG 2.2 01/24/2024    LIPASE 18 11/15/2023    TSH 0.94 11/08/2023    CRP 0.3 02/16/2018    SED 22 12/06/2023       Anesthesia Plan    ASA Status:  3    NPO Status:  NPO Appropriate    Anesthesia Type: MAC.     - Reason for " MAC: straight local not clinically adequate      Maintenance: TIVA.        Consents    Anesthesia Plan(s) and associated risks, benefits, and realistic alternatives discussed. Questions answered and patient/representative(s) expressed understanding.     - Discussed:     - Discussed with:  Patient      - Extended Intubation/Ventilatory Support Discussed: No.      - Patient is DNR/DNI Status: No     Use of blood products discussed: No .     Postoperative Care       PONV prophylaxis: Ondansetron (or other 5HT-3), Background Propofol Infusion     Comments:               Brian Horner MD    I have reviewed the pertinent notes and labs in the chart from the past 30 days and (re)examined the patient.  Any updates or changes from those notes are reflected in this note.       # Hypocalcemia: Lowest Ca = 8.2 mg/dL in last 30 days, will monitor and replace as appropriate

## 2024-01-31 NOTE — ANESTHESIA CARE TRANSFER NOTE
Patient: Dee Mattson    Procedure: Procedure(s):  ESOPHAGOGASTRODUODENOSCOPY with biopsies       Diagnosis: Heartburn [R12]  Diagnosis Additional Information: No value filed.    Anesthesia Type:   MAC     Note:      Level of Consciousness: awake  Oxygen Supplementation: room air      Dentition: dentition unchanged  Vital Signs Stable: post-procedure vital signs reviewed and stable  Report to RN Given: handoff report given  Patient transferred to: Phase II    Handoff Report: Identifed the Patient, Identified the Reponsible Provider, Reviewed the pertinent medical history, Discussed the surgical course, Reviewed Intra-OP anesthesia mangement and issues during anesthesia, Set expectations for post-procedure period and Allowed opportunity for questions and acknowledgement of understanding      Vitals:  Vitals Value Taken Time   /61 01/31/24 1043   Temp 36.8  C (98.3  F) 01/31/24 1043   Pulse 67 01/31/24 1043   Resp 20 01/31/24 1043   SpO2 100 % 01/31/24 1044   Vitals shown include unfiled device data.    Electronically Signed By: LAKSHMI LEON CRNA  January 31, 2024  10:45 AM

## 2024-02-06 NOTE — H&P
"  Interventional Radiology - Pre Procedure Chart Review  [facility]  Feb 7, 2024      Procedure Requested: Port placement.  Requested by: MEME Elkins        History and Physical Reviewed: H&P documented within 30 days (by Duke Mccall, CNP on 1/29/24).  I have personally reviewed the patient's medical history and have updated the medical record as necessary.    HPI: 74 year old patient with history of short bowel syndrome with hx of previous port (right chest port placed surgically 8/29/18) for hydration. This port required removal 11/13/23 secondary to infection and presently has PICC in place. Patient would like to have PICC removed and have a port placed.    Clinical notes reviewed    Pertinent medications reviewed:   Anticoagulation: none per chart review   Antibiotic: ancef ordered for procedure.    Allergies   Allergen Reactions    Ketorolac Tromethamine Palpitations    Methadone Unknown     Hallucinations    Metoclopramide Hives     GI upset    Metronidazole Hives    Mirtazapine Unknown     GI affects    Morphine Other (See Comments)     confusion    Neuromuscular Blockers, Steroidal [Neuromuscular Blocking Agents] Unknown     Unsure,per [facility] records     Norfloxacin Unknown     C.Diff    Other Drug Allergy (See Comments) Unknown     Steroidal Neuromuscular blocking agents- unsure, Pancuronium, vecuronium, rocuronium, rapacuronium, dacuronium, malouètine, duador, dipyrandium, pipecuronium, chandonium, pt unsure of this reaction, thinks it was painful, muscle aches    Oxycodone Unknown     Gi distress         EXAM:  LMP  (LMP Unknown)   Not assessed    LABS:  No results found for: \"INR\"    Lab Results   Component Value Date    WBC 8.9 12/06/2023    HGB 11.4 (L) 12/06/2023     12/06/2023       No results found for: \"CREATININE\"    No components found for: \"K\"      PLAN:  Planning for port placement. in IR.     Radiologist to further review procedure with " patient.    EMR reviewed. No formal assessment completed.     Total time: 15 minutes       Priscila Cornelius PA-C  Interventional Radiology

## 2024-02-07 NOTE — PROGRESS NOTES
"Patient Name: Dee Mattson  Medical Record Number: [id number]  Today's Date: 2/7/2024    Procedure: Image Guided LEFT Chest Port Placement with moderate sedation  Proceduralist: Dr. Amador Zapata  Pathology present: n/a    Procedure Start: 1440  Procedure end: 1456  Sedation medications administered: Fentanyl 100 mcg, Versed 2 mg     Report given to: NADEEM Omalley IR  : n/a    Other Notes: Pt arrived to IR room #1 from IR Pre / Post 3. Consent reviewed. Pt denies any questions or concerns regarding procedure. Pt positioned supine and monitored per protocol. Pt tolerated procedure without any noted complications. Pt transferred back to IR Pre / Post 3.    Dr. Zapata placed 8 fr Ang[employer] Smart Port via left internal jugular vein and confirmed placement of catheter tip in atriocaval junction with fluoroscopy. Approved for immediate use, and RN accessed chest port with 20 g x 3/4\" blackwell needle, heparin locked.  "

## 2024-02-07 NOTE — DISCHARGE INSTRUCTIONS
Port Placement Procedure Discharge Instructions:  You had a port placed. A port is a small medical device that is placed under the skin and is connected to a vein with a catheter (thin, flexible tube). Ports can be used to administer IV medications (including chemotherapy), fluids or blood products or for blood lab draws. Please follow the below instructions after your procedure:    Care Instructions:  - If you received sedation for your procedure, do not drive or operate heavy machinery for the rest of the day.  - You may shower beginning tomorrow (post procedure day #1). Do not scrub site until well healed; pat dry gently with a towel.  - You likely have skin adhesive over your port site. Skin adhesive works like a bandage to keep the site covered and protected. Do not use antibiotic ointment or creams/lotions over adhesive as it can break it down. The skin adhesive will peel off on its own (typically in 5-14 days).  - Avoid submerging the port site under water (ex: tub baths, Jacuzzis, lakes, hot tubs and pools) for 10 days or until your site is well healed.  - You may have some discomfort, minimal swelling, redness and/or bruising at your port site/procedure site. You may take over the counter pain medication for discomfort (follow the package directions) or apply an ice pack wrapped in a towel over the site (rotating 20 minutes with ice pack on and 20 minutes with ice pack off) for comfort as needed. It can take several days for these to resolve.  - Avoid heavy lifting (greater than 10 pounds) and strenuous activities for 2 days following your procedure.   - If you experience significant bleeding at site, apply pressure with hands above the clavicle bone, sit upright and seek immediate medical assistance.  - Ports need to be flushed approximately every 4-6 weeks, if not being used more frequently. Follow up with the provider who ordered your port placement for further instructions for this.    Seek medical  evaluation or contact Stephen [facility] RN Line at [phone] if you experience the following:  - Uncontrolled bleeding from port site  - Fever (greater than 101 F (38.3C))  - Purulent (yellow/green/foul smelling) drainage from port insertion site  - Increasing pain at port site  - Increasing redness at port site

## 2024-02-07 NOTE — PRE-PROCEDURE
GENERAL PRE-PROCEDURE:   Procedure:  Port placement  Date/Time:  2/7/2024 2:19 PM    Written consent obtained?: Yes    Risks and benefits: Risks, benefits and alternatives were discussed    Consent given by:  Patient  Patient states understanding of procedure being performed: Yes    Patient's understanding of procedure matches consent: Yes    Procedure consent matches procedure scheduled: Yes    Expected level of sedation:  Moderate  Appropriately NPO:  Yes  ASA Class:  2  Mallampati  :  Grade 1- soft palate, uvula, tonsillar pillars, and posterior pharyngeal wall visible  Lungs:  Lungs clear with good breath sounds bilaterally  Heart:  Normal heart sounds and rate  History & Physical reviewed:  History and physical reviewed and no updates needed  Statement of review:  I have reviewed the lab findings, diagnostic data, medications, and the plan for sedation

## 2024-02-08 NOTE — TELEPHONE ENCOUNTER
General Call    Contacts         Type Contact Phone/Fax    02/08/2024 01:18 PM CST Phone (Incoming) Justina with [facility] (Home Care) [phone]          Reason for Call:  TERRY     What are your questions or concerns:  Justina calling from [facility] - Pt had her cevallos cath placed yesterday. Yesterday she reports she fell 3x, she has a scrape on left side of her back with faint bruising. Pt stated that its sore and achey, pain 4/10. She is thinking its because of anesthetic that she was under yesterday. She couldn't think, just kind of out of it. Reporting low BP readings.  Sitting 108/52  Standing 90/42    She did infuse 2000ml IV fluids overnight and going to infuse at 2pm here another bowel of fluids.     Any questions, can call Justina at [phone]

## 2024-02-08 NOTE — TELEPHONE ENCOUNTER
Spoke with: Patient  Procedure done: 2/7/24 port placement  Any pain: Yes - port site sore. Takes scheduled pain meds @ 0930 and was encouraged to rotate Tylenol and Ibuprofen into regimen as needed. Ice packs PRN advised as well.  Any fever: No  Any redness/swelling/abnormal drainage around puncture site: No  Were you instructed well enough to take care of yourself at home: Yes  Are you satisfied with the care you received: Yes  Any additional concerns or questions: No        Post call completed.   February 8, 2024 9:20 AM  Malia Degroot RN  Interventional Radiology  [phone]

## 2024-02-09 NOTE — TELEPHONE ENCOUNTER
Telephone call:    Patient called back regarding missed phone call this morning.  Writer relayed message from provider to patient.  Patient verbalized understanding.    Chandrika Gilbert RN on 2/9/2024 at 8:27 AM     Reason for Disposition   Follow-up information-only call to recent contact, no triage required    Protocols used: Information Only Call - No Triage-A-OH

## 2024-02-12 NOTE — TELEPHONE ENCOUNTER
"Please see 2/9/2024 Nurse Triage message.  Patient has been made aware of Dr. Mccall's message:   \"creatinine was higher when we checked it last. She looks to be dehydrated. Have her do an extra 500 ml bolus of IV fluids daily for the next few days and then back to her usual regimen and we can check her labs again next week\".  Patient verbalized understanding again.    "

## 2024-02-21 NOTE — TELEPHONE ENCOUNTER
Medication Question or Refill        What medication are you calling about (include dose and sig)?: HYDROMORPHONE DILAUDID 8MG     Preferred Pharmacy:   [id number] - [place], [place] - [address] AT [facility] [address]  [address]  [facility] [address]  Phone: [phone] Fax: [phone]        Controlled Substance Agreement on file:   CSA -- Patient Level:     [Media Unavailable] Controlled Substance Agreement - Opioid - Scan on 5/25/2023  2:38 PM   [Media Unavailable] Controlled Substance Agreement - Opioid - Scan on 11/30/2018   [Media Unavailable] Controlled Substance Agreement - Opioid - Scan on 11/30/2018   [Media Unavailable] Controlled Substance Agreement - Opioid - Scan on 11/1/2017       Who prescribed the medication?: PCP    Do you need a refill? Yes    When did you use the medication last? TODAY, PT HAS ENOUGH UNTIL 02/23    Patient offered an appointment? No    Do you have any questions or concerns?  No      Okay to leave a detailed message?: Yes at Cell number on file:    Telephone Information:   Mobile [phone]

## 2024-02-22 NOTE — TELEPHONE ENCOUNTER
Pt called back and I informed her that her lab levels look ok. She confirmed she was taking her calcium 3 times a day but she will now increase to 4 times a day.

## 2024-02-22 NOTE — TELEPHONE ENCOUNTER
Please call patient.  Let her know that her lab work looks okay but her calcium is still low.  Is she doing calcium 3 times daily?  If she has been doing calcium 3 times daily then we may need to increase her calcium to 4 times daily to try and get calcium levels up into normal range

## 2024-02-22 NOTE — TELEPHONE ENCOUNTER
Prescription for calcium sent into [facility] at a higher frequency 4 times per day interval

## 2024-02-26 NOTE — TELEPHONE ENCOUNTER
Called and spoke with pt, relayed provider message in detail. She states [facility] told her they do not have it in stock when she checked with them on Friday. They need to order this medication. She stated she was taking it 3 times a day until she ran out. She is going to call [facility] today and check the status of her medication to see if it is available to . Notified pt that if [facility] does not have it in stock she can reach out to other pharmacies and have it transferred somewhere that has it available.

## 2024-03-13 NOTE — TELEPHONE ENCOUNTER
Home Care is calling regarding an established patient with [facility].       Requesting orders from: Duke Mccall  Provider is following patient: Yes  Is this a 60-day recertification request?  Yes    Orders Requested    Skilled Nursing  Request for recertification   Frequency:  1x/wk for 9 wks for carlos-cath needle dressing change, labs, b12 injections and general wellness + 2 PRNs for status changes and labs      Norah - Nurse from [facility]  [phone] with Confidential voicemail    Confirmed ok to leave a detailed message with call back.  Contact information confirmed and updated as needed.    Carrington Frank RN

## 2024-03-18 NOTE — TELEPHONE ENCOUNTER
Home Care is calling regarding an established patient with [facility].       Requesting orders from: Duke Mccall  Provider is following patient: Yes  Is this a 60-day recertification request?  Yes    Orders Requested    Skilled Nursing  Request for recertification   Frequency: 1 x week for 9 weeks, 2 prn for status changes and labs    Port a cath dressing change lab draw B12 inject and general wellness      Confirmed ok to leave a detailed message with call back.  Contact information confirmed and updated as needed.    Enedina SILVER [facility] [phone]    Brittany Hanna, RN

## 2024-03-19 NOTE — TELEPHONE ENCOUNTER
Home care calling again, please review.    NADEEM Soto  [facility]  [phone]    [facility]   Monday  - Thursday 7 AM - 6 PM    Friday  7 AM - 5 PM     -Please call your clinic for assistance from a nurse after hours.

## 2024-03-19 NOTE — TELEPHONE ENCOUNTER
Outgoing call to homecare staff Rimma, relayed provider's verbal orders approval.    Carrington Frank RN

## 2024-03-26 NOTE — TELEPHONE ENCOUNTER
We received a form for pt.  Asking if she requires a special diet.  LMOM to have her call us back and let us know.

## 2024-04-02 DIAGNOSIS — Z53.9 DIAGNOSIS NOT YET DEFINED: Primary | ICD-10-CM

## 2024-04-02 PROCEDURE — 99207 PR MD RECERTIFICATION HHA PT: CPT | Performed by: NURSE PRACTITIONER

## 2024-06-18 NOTE — ANESTHESIA POSTPROCEDURE EVALUATION
Patient: Dee Mattson    Procedure: Procedure(s):  ESOPHAGOGASTRODUODENOSCOPY with biopsies       Anesthesia Type:  MAC    Note:  Disposition: Outpatient   Postop Pain Control: Uneventful            Sign Out: Well controlled pain   PONV: No   Neuro/Psych: Uneventful            Sign Out: Acceptable/Baseline neuro status   Airway/Respiratory: Uneventful            Sign Out: Acceptable/Baseline resp. status   CV/Hemodynamics: Uneventful            Sign Out: Acceptable CV status; No obvious hypovolemia; No obvious fluid overload   Other NRE: NONE   DID A NON-ROUTINE EVENT OCCUR? No           Last vitals:  Vitals Value Taken Time   /60 01/31/24 1050   Temp 36.8  C (98.3  F) 01/31/24 1043   Pulse 70 01/31/24 1052   Resp 20 01/31/24 1043   SpO2 100 % 01/31/24 1052   Vitals shown include unfiled device data.    Electronically Signed By: Brian Horner MD  January 31, 2024  10:54 AM   37.3

## 2024-10-21 NOTE — TELEPHONE ENCOUNTER
Forensic Nurse at bedside.   Refill Approved    Rx renewed per Medication Renewal Policy. Medication was last renewed on 10/22/19.    Shani Moore, [facility] Triage/Med Refill 4/16/2020     Requested Prescriptions   Pending Prescriptions Disp Refills     busPIRone (BUSPAR) 15 MG tablet [Pharmacy Med Name: BUSPIRONE 15MG TABLETS] 360 tablet 1     Sig: TAKE 2 TABLETS BY MOUTH TWICE DAILY       Tricyclics/Misc Antidepressant/Antianxiety Meds Refill Protocol Passed - 4/15/2020  8:50 AM        Passed - PCP or prescribing provider visit in last year     Last office visit with prescriber/PCP: 12/17/2019 Bhupendra Caldwell MD OR same dept: 12/17/2019 Bhupendra Caldwell MD OR same specialty: 12/17/2019 Bhupendra Caldwell MD  Last physical: 2/15/2019 Last MTM visit: Visit date not found   Next visit within 3 mo: Visit date not found  Next physical within 3 mo: Visit date not found  Prescriber OR PCP: Bhupendra Caldwell MD  Last diagnosis associated with med order: 1. Anxiety and depression  - busPIRone (BUSPAR) 15 MG tablet [Pharmacy Med Name: BUSPIRONE 15MG TABLETS]; TAKE 2 TABLETS BY MOUTH TWICE DAILY  Dispense: 360 tablet; Refill: 1    If protocol passes may refill for 12 months if within 3 months of last provider visit (or a total of 15 months).

## 2024-11-05 ASSESSMENT — ANXIETY QUESTIONNAIRES: GAD7 TOTAL SCORE: 6

## 2025-03-08 NOTE — TELEPHONE ENCOUNTER
Reason for Call:  Medication or medication refill:    Do you use a [facility]?  Name of the pharmacy and phone number for the current request:  [facility]    Name of the medication requested: B12 SHOT    Patient is out of this medication    Other request: NA    Can we leave a detailed message on this number? YES    Phone number patient can be reached at: Home number on file [phone] (home)    Best Time: ANY    Call taken on 8/9/2021 at 11:29 AM by Laura L Goldberg, ARRT      
Rx entered for refill auth  
[employer]

## (undated) DEVICE — TUBING SUCTION MEDI-VAC 1/4"X20' N620A

## (undated) DEVICE — FORCEP BIOPSY 2.3MM DISP COATED 000388

## (undated) DEVICE — SUCTION MANIFOLD NEPTUNE 2 SYS 1 PORT 702-025-000

## (undated) DEVICE — SOL WATER IRRIG 1000ML BOTTLE 2F7114

## (undated) RX ORDER — LIDOCAINE HYDROCHLORIDE 10 MG/ML
INJECTION, SOLUTION INFILTRATION; PERINEURAL
Status: DISPENSED
Start: 2023-01-01

## (undated) RX ORDER — LIDOCAINE HYDROCHLORIDE AND EPINEPHRINE 10; 10 MG/ML; UG/ML
INJECTION, SOLUTION INFILTRATION; PERINEURAL
Status: DISPENSED
Start: 2023-01-01

## (undated) RX ORDER — FENTANYL CITRATE 50 UG/ML
INJECTION, SOLUTION INTRAMUSCULAR; INTRAVENOUS
Status: DISPENSED
Start: 2023-01-01

## (undated) RX ORDER — CEFAZOLIN SODIUM/WATER 2 G/20 ML
SYRINGE (ML) INTRAVENOUS
Status: DISPENSED
Start: 2024-01-01

## (undated) RX ORDER — FENTANYL CITRATE 50 UG/ML
INJECTION, SOLUTION INTRAMUSCULAR; INTRAVENOUS
Status: DISPENSED
Start: 2024-01-01

## (undated) RX ORDER — LIDOCAINE HYDROCHLORIDE AND EPINEPHRINE 10; 10 MG/ML; UG/ML
INJECTION, SOLUTION INFILTRATION; PERINEURAL
Status: DISPENSED
Start: 2024-01-01